# Patient Record
Sex: FEMALE | Race: WHITE | Employment: OTHER | URBAN - METROPOLITAN AREA
[De-identification: names, ages, dates, MRNs, and addresses within clinical notes are randomized per-mention and may not be internally consistent; named-entity substitution may affect disease eponyms.]

---

## 2017-04-16 ENCOUNTER — HOSPITAL ENCOUNTER (EMERGENCY)
Age: 82
Discharge: HOME | End: 2017-04-16
Attending: EMERGENCY MEDICINE
Payer: MEDICARE

## 2017-04-16 ENCOUNTER — APPOINTMENT (OUTPATIENT)
Dept: CT IMAGING | Age: 82
End: 2017-04-16
Attending: EMERGENCY MEDICINE
Payer: MEDICARE

## 2017-04-16 VITALS
OXYGEN SATURATION: 93 % | RESPIRATION RATE: 18 BRPM | DIASTOLIC BLOOD PRESSURE: 74 MMHG | HEIGHT: 68 IN | SYSTOLIC BLOOD PRESSURE: 136 MMHG | HEART RATE: 56 BPM | WEIGHT: 170 LBS | BODY MASS INDEX: 25.76 KG/M2 | TEMPERATURE: 98.4 F

## 2017-04-16 DIAGNOSIS — R56.9 SEIZURE (HCC): Primary | ICD-10-CM

## 2017-04-16 LAB
ALBUMIN SERPL BCP-MCNC: 3.2 G/DL (ref 3.2–4.6)
ALBUMIN/GLOB SERPL: 0.8 {RATIO} (ref 1.2–3.5)
ALP SERPL-CCNC: 94 U/L (ref 50–136)
ALT SERPL-CCNC: 19 U/L (ref 12–65)
ANION GAP BLD CALC-SCNC: 10 MMOL/L (ref 7–16)
AST SERPL W P-5'-P-CCNC: 23 U/L (ref 15–37)
ATRIAL RATE: 202 BPM
BACTERIA URNS QL MICRO: 0 /HPF
BASOPHILS # BLD AUTO: 0 K/UL (ref 0–0.2)
BASOPHILS # BLD: 0 % (ref 0–2)
BILIRUB SERPL-MCNC: 0.7 MG/DL (ref 0.2–1.1)
BUN SERPL-MCNC: 12 MG/DL (ref 8–23)
CALCIUM SERPL-MCNC: 8.4 MG/DL (ref 8.3–10.4)
CALCULATED P AXIS, ECG09: 89 DEGREES
CALCULATED R AXIS, ECG10: -57 DEGREES
CALCULATED T AXIS, ECG11: 98 DEGREES
CASTS URNS QL MICRO: NORMAL /LPF
CHLORIDE SERPL-SCNC: 84 MMOL/L (ref 98–107)
CO2 SERPL-SCNC: 32 MMOL/L (ref 21–32)
CREAT SERPL-MCNC: 1.08 MG/DL (ref 0.6–1)
DIAGNOSIS, 93000: NORMAL
DIFFERENTIAL METHOD BLD: ABNORMAL
EOSINOPHIL # BLD: 0.1 K/UL (ref 0–0.8)
EOSINOPHIL NFR BLD: 1 % (ref 0.5–7.8)
EPI CELLS #/AREA URNS HPF: NORMAL /HPF
ERYTHROCYTE [DISTWIDTH] IN BLOOD BY AUTOMATED COUNT: 13.1 % (ref 11.9–14.6)
GLOBULIN SER CALC-MCNC: 3.9 G/DL (ref 2.3–3.5)
GLUCOSE SERPL-MCNC: 181 MG/DL (ref 65–100)
HCT VFR BLD AUTO: 36.9 % (ref 35.8–46.3)
HGB BLD-MCNC: 13.3 G/DL (ref 11.7–15.4)
IMM GRANULOCYTES # BLD: 0 K/UL (ref 0–0.5)
IMM GRANULOCYTES NFR BLD AUTO: 0 % (ref 0–5)
LYMPHOCYTES # BLD AUTO: 23 % (ref 13–44)
LYMPHOCYTES # BLD: 1.6 K/UL (ref 0.5–4.6)
MAGNESIUM SERPL-MCNC: 1.6 MG/DL (ref 1.8–2.4)
MCH RBC QN AUTO: 31.5 PG (ref 26.1–32.9)
MCHC RBC AUTO-ENTMCNC: 36 G/DL (ref 31.4–35)
MCV RBC AUTO: 87.4 FL (ref 79.6–97.8)
MONOCYTES # BLD: 0.5 K/UL (ref 0.1–1.3)
MONOCYTES NFR BLD AUTO: 7 % (ref 4–12)
NEUTS SEG # BLD: 4.8 K/UL (ref 1.7–8.2)
NEUTS SEG NFR BLD AUTO: 69 % (ref 43–78)
PLATELET # BLD AUTO: 186 K/UL (ref 150–450)
PLATELET COMMENTS,PCOM: ADEQUATE
PMV BLD AUTO: 10.1 FL (ref 10.8–14.1)
POTASSIUM SERPL-SCNC: 2.9 MMOL/L (ref 3.5–5.1)
PROT SERPL-MCNC: 7.1 G/DL (ref 6.3–8.2)
Q-T INTERVAL, ECG07: 400 MS
QRS DURATION, ECG06: 122 MS
QTC CALCULATION (BEZET), ECG08: 432 MS
RBC # BLD AUTO: 4.22 M/UL (ref 4.05–5.25)
RBC #/AREA URNS HPF: NORMAL /HPF
RBC MORPH BLD: ABNORMAL
SODIUM SERPL-SCNC: 126 MMOL/L (ref 136–145)
TROPONIN I BLD-MCNC: 0.01 NG/ML (ref 0–0.08)
VENTRICULAR RATE, ECG03: 70 BPM
WBC # BLD AUTO: 7 K/UL (ref 4.3–11.1)
WBC MORPH BLD: ABNORMAL
WBC URNS QL MICRO: NORMAL /HPF

## 2017-04-16 PROCEDURE — 93005 ELECTROCARDIOGRAM TRACING: CPT | Performed by: EMERGENCY MEDICINE

## 2017-04-16 PROCEDURE — 81003 URINALYSIS AUTO W/O SCOPE: CPT | Performed by: EMERGENCY MEDICINE

## 2017-04-16 PROCEDURE — 80053 COMPREHEN METABOLIC PANEL: CPT | Performed by: EMERGENCY MEDICINE

## 2017-04-16 PROCEDURE — 85025 COMPLETE CBC W/AUTO DIFF WBC: CPT | Performed by: EMERGENCY MEDICINE

## 2017-04-16 PROCEDURE — 83735 ASSAY OF MAGNESIUM: CPT | Performed by: EMERGENCY MEDICINE

## 2017-04-16 PROCEDURE — 81015 MICROSCOPIC EXAM OF URINE: CPT | Performed by: EMERGENCY MEDICINE

## 2017-04-16 PROCEDURE — 84484 ASSAY OF TROPONIN QUANT: CPT

## 2017-04-16 PROCEDURE — 70450 CT HEAD/BRAIN W/O DYE: CPT

## 2017-04-16 PROCEDURE — 99285 EMERGENCY DEPT VISIT HI MDM: CPT | Performed by: EMERGENCY MEDICINE

## 2017-04-16 NOTE — ED TRIAGE NOTES
Pt. Had a sudden onset of abdominal pain and stated she had become cold and dizzy. Family advised EMS that they thought the pt. Was having a seizure. Pt. Stated that she remembered all events that occurred and denied LOC. Pt. With no hx of seizures.

## 2017-04-16 NOTE — ED PROVIDER NOTES
HPI Comments: 43-year-old lady with a history of passing out versus potentially having had a seizure at home. Family says that they saw her go kind of stiff and then her arms and legs started to shake. He said it then took her a few minutes to get back to normal limits stopped. EMS said on their arrival the  Patient had had no seizures during their travel  But she did have some nausea so they gave her some Zofran. Here the patient says that she has some abdominal pain that she's had for a couple years and she does have some mild nausea with it. Patient has no previous history of seizures. She said she has not been having any kind of headache and for the past couple of days aside from her chronic abdominal pain had been feeling well. Elements of this note were created using speech recognition software. As such, errors of speech recognition may be present. Patient is a 80 y.o. female presenting with abdominal pain and dizziness. The history is provided by the patient. Abdominal Pain    Pertinent negatives include no fever, no diarrhea, no nausea, no vomiting, no dysuria, no hematuria, no headaches, no arthralgias, no myalgias and no chest pain. Dizziness   Pertinent negatives include no shortness of breath, no chest pain, no vomiting, no confusion, no headaches and no nausea. Past Medical History:   Diagnosis Date    Abdominal pain, epigastric     Abdominal pain, unspecified site     Anxiety state, unspecified     Aortic stenosis 6/10/2016    No sever SOB, no chest pain or syncope.  Echo showed mod AS, mean grad 22mm Hg, nl EF, mild LVH    Aortic valve disorders     Atherosclerosis of aorta (HCC)     Backache, unspecified     Benign neoplasm of colon     Cervicalgia     Closed fracture of unspecified bone     Depressive disorder, not elsewhere classified     Diabetes (HCC)     Diarrhea     Disorders of magnesium metabolism     Edema     Elevated sedimentation rate     Encounter for long-term (current) use of other medications     Endocrine disease     Epistaxis     Esophageal reflux     Essential hypertension, benign     Gastrointestinal malfunction arising from mental factors     Hypertension     Hypertension-controlled, benign     Hypertonicity of bladder     Hypopotassemia     Insomnia, unspecified     Irritable bowel syndrome     Memory loss     Nausea alone     Osteoarthrosis, unspecified whether generalized or localized, unspecified site     Other ill-defined conditions     hyperlipidemia    Other malaise and fatigue     Pain in joint, lower leg     Pain in joint, shoulder region     Postnasal drip     Rheumatoid arthritis ([facility] [address]     Type II or unspecified type diabetes mellitus without mention of complication, not stated as uncontrolled     Unspecified constipation     Unspecified hemorrhoids with other complication     Unspecified hemorrhoids without mention of complication     Unspecified hereditary and idiopathic peripheral neuropathy     Unspecified hypothyroidism     Urgency of urination     Urinary tract infection, site not specified        Past Surgical History:   Procedure Laterality Date    HX CHOLECYSTECTOMY      HX OTHER SURGICAL      thyroidectomy    HX TONSILLECTOMY           Family History:   Problem Relation Age of Onset    Arthritis-rheumatoid Daughter     Arthritis-osteo Son        Social History     Social History    Marital status:      Spouse name: N/A    Number of children: N/A    Years of education: N/A     Occupational History    Not on file. Social History Main Topics    Smoking status: Never Smoker    Smokeless tobacco: Never Used    Alcohol use No    Drug use: No    Sexual activity: Not on file     Other Topics Concern    Not on file     Social History Narrative         ALLERGIES: Pcn [penicillins]    Review of Systems   Constitutional: Negative for chills, diaphoresis and fever.    HENT: Negative for congestion, rhinorrhea and sore throat. Eyes: Negative for redness and visual disturbance. Respiratory: Negative for cough, chest tightness, shortness of breath and wheezing. Cardiovascular: Negative for chest pain and palpitations. Gastrointestinal: Positive for abdominal pain. Negative for blood in stool, diarrhea, nausea and vomiting. Endocrine: Negative for polydipsia and polyuria. Genitourinary: Negative for dysuria and hematuria. Musculoskeletal: Negative for arthralgias, myalgias and neck stiffness. Skin: Negative for rash. Allergic/Immunologic: Negative for environmental allergies and food allergies. Neurological: Positive for dizziness and seizures. Negative for weakness and headaches. Hematological: Negative for adenopathy. Does not bruise/bleed easily. Psychiatric/Behavioral: Negative for confusion and sleep disturbance. The patient is not nervous/anxious. Vitals:    04/16/17 1608   BP: 134/63   Pulse: 68   Resp: 18   Temp: 97.7 °F (36.5 °C)   SpO2: 93%   Weight: 77.1 kg (170 lb)   Height: 5' 8\" (1.727 m)            Physical Exam   Constitutional: She is oriented to person, place, and time. She appears well-developed and well-nourished. HENT:   Head: Normocephalic and atraumatic. Eyes: Conjunctivae and EOM are normal. Pupils are equal, round, and reactive to light. Neck: Normal range of motion. Cardiovascular: Normal rate and regular rhythm. Pulmonary/Chest: Effort normal and breath sounds normal. No respiratory distress. She has no wheezes. She has no rales. She exhibits no tenderness. Abdominal: Soft. Bowel sounds are normal. There is no rebound and no guarding. Musculoskeletal: Normal range of motion. She exhibits no edema or tenderness. Lymphadenopathy:     She has no cervical adenopathy. Neurological: She is alert and oriented to person, place, and time. Skin: Skin is warm and dry. Psychiatric: She has a normal mood and affect. Nursing note and vitals reviewed. MDM  Number of Diagnoses or Management Options  Diagnosis management comments: Patient episode does sound like a potential seizure. Her sodium level is 126 which is not low enough that I would think it should cause a seizure. It was 127 in December. Patient's head CT scan was unremarkable. I am waiting on her urine. Patient's urine was unremarkable. I do not have a specific explanation for her symptoms but I do not find any emergent findings. I will discharge her home. I discussed this with the patient and the family and they agree with the plan.     ED Course       Procedures

## 2017-04-17 ENCOUNTER — PATIENT OUTREACH (OUTPATIENT)
Dept: CASE MANAGEMENT | Age: 82
End: 2017-04-17

## 2017-04-17 NOTE — PROGRESS NOTES
This note will not be viewable in [address] Transition of Care Discharge Follow-Up Questionnaire       Date/Time of Call:   April 17, 2017 3:55PM   What was the patient hospitalized for? Patient seen in ED for Seizure           Does the patient understand his/her diagnosis and/or treatment and what happened during the hospitalization? Patient's daughter states understanding of diagnosis and treatment during hospitalization. Did the patient receive discharge instructions? Patient's daughter states discharge instructions explained and received before discharge to home. Review any discharge instructions (see notes in [facility]). Ask patient if they understand these. Do they have any questions? Patient's daughter states no questions at this time discharge instructions were explained at time of discharge. Were home services ordered (nursing, PT, OT, ST, etc.)? No home health services ordered. If so, has the first visit occurred? If not, why? (Assist with coordination of services if necessary. ) NA         Was any DME ordered? No durable medical equipment ordered. If so, has it been received? If not, why?  (Assist with coordination of arranging DME orders if necessary. ) NA       Complete a review of all medications (new, continued and discontinued meds per the D/C instructions and [facility] Care Coordinator reviewed all medications per [facility], no new medications prescribed in ED. Were all new prescriptions filled? If not, why?  (Assist with obtainment of medications if necessary.) Patient states new prescriptions filled and currently being taken per doctors order. Does the patient understand the purpose and dosing instructions for all medications? (If patient has questions, provide explanation and education.) Patient states understanding of medication purposes and dosing instructions. Does the patient have any problems in performing ADLs? (If patient is unable to perform ADLs  what is the limiting factor(s)? Do they have a support system that can assist? If no support system is present, discuss possible assistance that they may be able to obtain.) Patient's daughter states patient is independent with ADL's and ambulation. Does the patient have all follow-up appointments scheduled? Has transportation been arranged? [facility]monary follow-up should be within 7 days of discharge; all others should have PCP follow-up within 7 days of discharge; follow-ups with other specialists as appropriate or ordered.) Patient's daughter states that she is in the process of scheduling hospital follow up with patient's PCP. Care Coordinator advised patient's daughter to schedule within 7 days per medicare guidelines. Patient's daughter states she will do so. Patient has no transportation needs at this time. Any other questions or concerns expressed by the patient? Patient's daughter states no questions or concerns. Schedule next appointment with [facility] Coordinator or refer to RN Case Manager/  as appropriate. No further needs identified, patient's daughter instructed to call Care Coordinator if further questions or concerns arise.    APRIL Call Completed By: Dariela López LPN  Care Coordinator   [facility]

## 2017-04-17 NOTE — ED NOTES
I have reviewed discharge instructions with the patient and caregivers. Patient and caregivers verbalizes understanding. Opportunity for questions provided. Patient off the unit with wheelchair to family car. No distress noted at this time.

## 2017-04-17 NOTE — DISCHARGE INSTRUCTIONS
Return with any fevers, vomiting, difficulty breathing, worsening symptoms, or additional concerns. Follow-up with your primary care doctor for reevaluation in 2 or 3 days.

## 2017-05-18 ENCOUNTER — PATIENT OUTREACH (OUTPATIENT)
Dept: CASE MANAGEMENT | Age: 82
End: 2017-05-18

## 2017-05-18 NOTE — PROGRESS NOTES
Follow Up Call- Care Coordinator spoke with patient's son Mr. Nan Cedeño who stated patient  Mrs. Jan Galeas was doing fine and was still receiving Rehab services. Mr. Emi Mcneill states that he has no questions or concerns pertaining to Mrs. Emi Mcneill everything is going well. This note will not be viewable in [address]

## 2017-07-17 PROBLEM — E89.0 POSTOPERATIVE HYPOTHYROIDISM: Status: ACTIVE | Noted: 2017-07-17

## 2017-07-17 PROBLEM — N32.81 OAB (OVERACTIVE BLADDER): Status: ACTIVE | Noted: 2017-07-17

## 2017-08-30 ENCOUNTER — HOSPITAL ENCOUNTER (EMERGENCY)
Age: 82
Discharge: HOME | End: 2017-08-30
Attending: EMERGENCY MEDICINE
Payer: MEDICARE

## 2017-08-30 ENCOUNTER — APPOINTMENT (OUTPATIENT)
Dept: CT IMAGING | Age: 82
End: 2017-08-30
Attending: EMERGENCY MEDICINE
Payer: MEDICARE

## 2017-08-30 VITALS
TEMPERATURE: 97.7 F | RESPIRATION RATE: 22 BRPM | HEIGHT: 68 IN | BODY MASS INDEX: 25.01 KG/M2 | DIASTOLIC BLOOD PRESSURE: 74 MMHG | SYSTOLIC BLOOD PRESSURE: 161 MMHG | HEART RATE: 83 BPM | WEIGHT: 165 LBS | OXYGEN SATURATION: 98 %

## 2017-08-30 DIAGNOSIS — R51.9 FRONTAL HEADACHE: Primary | ICD-10-CM

## 2017-08-30 DIAGNOSIS — I10 ESSENTIAL HYPERTENSION: ICD-10-CM

## 2017-08-30 LAB
ALBUMIN SERPL-MCNC: 3.3 G/DL (ref 3.2–4.6)
ALBUMIN/GLOB SERPL: 0.7 {RATIO} (ref 1.2–3.5)
ALP SERPL-CCNC: 122 U/L (ref 50–136)
ALT SERPL-CCNC: 16 U/L (ref 12–65)
ANION GAP SERPL CALC-SCNC: 8 MMOL/L (ref 7–16)
AST SERPL-CCNC: 19 U/L (ref 15–37)
BASOPHILS # BLD: 0 K/UL (ref 0–0.2)
BASOPHILS NFR BLD: 0 % (ref 0–2)
BILIRUB SERPL-MCNC: 0.7 MG/DL (ref 0.2–1.1)
BUN SERPL-MCNC: 9 MG/DL (ref 8–23)
CALCIUM SERPL-MCNC: 9.2 MG/DL (ref 8.3–10.4)
CHLORIDE SERPL-SCNC: 97 MMOL/L (ref 98–107)
CO2 SERPL-SCNC: 31 MMOL/L (ref 21–32)
CREAT SERPL-MCNC: 0.8 MG/DL (ref 0.6–1)
DIFFERENTIAL METHOD BLD: ABNORMAL
EOSINOPHIL # BLD: 0.2 K/UL (ref 0–0.8)
EOSINOPHIL NFR BLD: 4 % (ref 0.5–7.8)
ERYTHROCYTE [DISTWIDTH] IN BLOOD BY AUTOMATED COUNT: 14 % (ref 11.9–14.6)
GLOBULIN SER CALC-MCNC: 4.9 G/DL (ref 2.3–3.5)
GLUCOSE SERPL-MCNC: 126 MG/DL (ref 65–100)
HCT VFR BLD AUTO: 43.7 % (ref 35.8–46.3)
HGB BLD-MCNC: 15.6 G/DL (ref 11.7–15.4)
IMM GRANULOCYTES # BLD: 0 K/UL (ref 0–0.5)
IMM GRANULOCYTES NFR BLD: 0.2 % (ref 0–5)
LYMPHOCYTES # BLD: 1.7 K/UL (ref 0.5–4.6)
LYMPHOCYTES NFR BLD: 29 % (ref 13–44)
MCH RBC QN AUTO: 32.2 PG (ref 26.1–32.9)
MCHC RBC AUTO-ENTMCNC: 35.7 G/DL (ref 31.4–35)
MCV RBC AUTO: 90.3 FL (ref 79.6–97.8)
MONOCYTES # BLD: 0.5 K/UL (ref 0.1–1.3)
MONOCYTES NFR BLD: 9 % (ref 4–12)
NEUTS SEG # BLD: 3.4 K/UL (ref 1.7–8.2)
NEUTS SEG NFR BLD: 58 % (ref 43–78)
PLATELET # BLD AUTO: 283 K/UL (ref 150–450)
PMV BLD AUTO: 9.7 FL (ref 10.8–14.1)
POTASSIUM SERPL-SCNC: 3.7 MMOL/L (ref 3.5–5.1)
PROT SERPL-MCNC: 8.2 G/DL (ref 6.3–8.2)
RBC # BLD AUTO: 4.84 M/UL (ref 4.05–5.25)
SODIUM SERPL-SCNC: 136 MMOL/L (ref 136–145)
WBC # BLD AUTO: 5.9 K/UL (ref 4.3–11.1)

## 2017-08-30 PROCEDURE — 93005 ELECTROCARDIOGRAM TRACING: CPT | Performed by: EMERGENCY MEDICINE

## 2017-08-30 PROCEDURE — 70450 CT HEAD/BRAIN W/O DYE: CPT

## 2017-08-30 PROCEDURE — 99284 EMERGENCY DEPT VISIT MOD MDM: CPT | Performed by: EMERGENCY MEDICINE

## 2017-08-30 PROCEDURE — 85025 COMPLETE CBC W/AUTO DIFF WBC: CPT | Performed by: EMERGENCY MEDICINE

## 2017-08-30 PROCEDURE — 74011250636 HC RX REV CODE- 250/636: Performed by: EMERGENCY MEDICINE

## 2017-08-30 PROCEDURE — 96374 THER/PROPH/DIAG INJ IV PUSH: CPT | Performed by: EMERGENCY MEDICINE

## 2017-08-30 PROCEDURE — 80053 COMPREHEN METABOLIC PANEL: CPT | Performed by: EMERGENCY MEDICINE

## 2017-08-30 RX ORDER — HYDRALAZINE HYDROCHLORIDE 20 MG/ML
10 INJECTION INTRAMUSCULAR; INTRAVENOUS ONCE
Status: COMPLETED | OUTPATIENT
Start: 2017-08-30 | End: 2017-08-30

## 2017-08-30 RX ADMIN — HYDRALAZINE HYDROCHLORIDE 10 MG: 20 INJECTION INTRAMUSCULAR; INTRAVENOUS at 22:36

## 2017-08-30 NOTE — ED TRIAGE NOTES
PT arrived to ED c/o headache and HTN for the past 2 hours. PT was seen at [address] [place] [address] and told to come to ED.

## 2017-08-31 ENCOUNTER — PATIENT OUTREACH (OUTPATIENT)
Dept: CASE MANAGEMENT | Age: 82
End: 2017-08-31

## 2017-08-31 LAB
ATRIAL RATE: 120 BPM
CALCULATED P AXIS, ECG09: 0 DEGREES
CALCULATED R AXIS, ECG10: 106 DEGREES
CALCULATED T AXIS, ECG11: 40 DEGREES
DIAGNOSIS, 93000: NORMAL
P-R INTERVAL, ECG05: 216 MS
Q-T INTERVAL, ECG07: 382 MS
QRS DURATION, ECG06: 166 MS
QTC CALCULATION (BEZET), ECG08: 539 MS
VENTRICULAR RATE, ECG03: 120 BPM

## 2017-08-31 NOTE — ED NOTES
I have reviewed discharge instructions with the patient. The spouse verbalized understanding regarding discharge instructions. The patient exited the facility via a wheelchair with discharge instructions in hand. Pt was in no acute distress upon exiting this facility.

## 2017-08-31 NOTE — ED NOTES
Report received from Chavez, [facility]. Care assumed at this time.

## 2017-08-31 NOTE — ED PROVIDER NOTES
HPI Comments: 61-year-old female with history of DM2, HLD, HTN presents with bifrontal headache with elevated blood pressure ×1 day. Patient denies nausea, vomiting, chest pain, shortness of breath, dizziness, focal weakness, slurred speech, facial droop, numbness, tingling. Denies any alleviating or exacerbating factors. Rates headache as dull, constant, and without radiation. Patient is a 80 y.o. female presenting with headaches. The history is provided by the patient. No  was used. Headache    This is a new problem. The current episode started 1 to 2 hours ago. The problem occurs constantly. The problem has not changed since onset. The headache is aggravated by nothing. The pain is located in the bilateral and frontal region. The quality of the pain is described as dull. The pain is at a severity of 3/10. The pain is mild. Pertinent negatives include no fever, no malaise/fatigue, no chest pressure, no near-syncope, no palpitations, no syncope, no shortness of breath, no weakness, no tingling, no dizziness, no visual change, no nausea and no vomiting. She has tried nothing for the symptoms. The treatment provided no relief. Past Medical History:   Diagnosis Date    Abdominal pain, epigastric     Abdominal pain, unspecified site     Anxiety state, unspecified     Aortic stenosis 6/10/2016    No sever SOB, no chest pain or syncope.  Echo showed mod AS, mean grad 22mm Hg, nl EF, mild LVH    Aortic valve disorders     Atherosclerosis of aorta (HCC)     Backache, unspecified     Benign neoplasm of colon     Cervicalgia     Closed fracture of unspecified bone     Depressive disorder, not elsewhere classified     Diabetes (HCC)     Diarrhea     Disorders of magnesium metabolism     Edema     Elevated sedimentation rate     Encounter for long-term (current) use of other medications     Endocrine disease     Epistaxis     Esophageal reflux     Essential hypertension, benign     Gastrointestinal malfunction arising from mental factors     Hypertension     Hypertension-controlled, benign     Hypertonicity of bladder     Hypopotassemia     Insomnia, unspecified     Irritable bowel syndrome     Memory loss     Nausea alone     Osteoarthrosis, unspecified whether generalized or localized, unspecified site     Other ill-defined conditions     hyperlipidemia    Other malaise and fatigue     Pain in joint, lower leg     Pain in joint, shoulder region     Postnasal drip     Rheumatoid arthritis ([facility] [address]     Type II or unspecified type diabetes mellitus without mention of complication, not stated as uncontrolled     Unspecified constipation     Unspecified hemorrhoids with other complication     Unspecified hemorrhoids without mention of complication     Unspecified hereditary and idiopathic peripheral neuropathy     Unspecified hypothyroidism     Urgency of urination     Urinary tract infection, site not specified        Past Surgical History:   Procedure Laterality Date    HX CHOLECYSTECTOMY      HX OTHER SURGICAL      thyroidectomy    HX TONSILLECTOMY           Family History:   Problem Relation Age of Onset    Arthritis-rheumatoid Daughter     Arthritis-osteo Son        Social History     Social History    Marital status:      Spouse name: N/A    Number of children: N/A    Years of education: N/A     Occupational History    Not on file. Social History Main Topics    Smoking status: Never Smoker    Smokeless tobacco: Never Used    Alcohol use No    Drug use: No    Sexual activity: Not on file     Other Topics Concern    Not on file     Social History Narrative         ALLERGIES: Pcn [penicillins]    Review of Systems   Constitutional: Negative for chills, fatigue, fever and malaise/fatigue. HENT: Negative for congestion and rhinorrhea. Eyes: Negative for pain and redness. Respiratory: Negative for cough and shortness of breath. Cardiovascular: Negative for chest pain, palpitations, leg swelling, syncope and near-syncope. Gastrointestinal: Negative for abdominal distention, abdominal pain, anal bleeding, blood in stool, diarrhea, nausea and vomiting. Endocrine: Negative for polydipsia and polyphagia. Genitourinary: Negative for dysuria, flank pain, genital sores, hematuria, pelvic pain, vaginal bleeding and vaginal discharge. Musculoskeletal: Negative for back pain, gait problem, joint swelling, neck pain and neck stiffness. Skin: Negative for pallor and rash. Neurological: Positive for headaches. Negative for dizziness, tingling, seizures, syncope, weakness and numbness. Psychiatric/Behavioral: Negative for agitation and confusion. Vitals:    08/30/17 2206 08/30/17 2246 08/30/17 2315 08/30/17 2329   BP: (!) 204/89 147/70 179/80 161/74   Pulse: 76 81 90 83   Resp:  27 15 22   Temp:    97.7 °F (36.5 °C)   SpO2: 96% 96% 97% 98%   Weight:       Height:                Physical Exam   Constitutional: She is oriented to person, place, and time. She appears well-developed and well-nourished. No distress. HENT:   Head: Normocephalic and atraumatic. Mouth/Throat: Oropharynx is clear and moist. No oropharyngeal exudate. Eyes: Conjunctivae and EOM are normal. Pupils are equal, round, and reactive to light. Neck: Normal range of motion. No JVD present. No tracheal deviation present. Cardiovascular: Normal rate, regular rhythm, normal heart sounds and intact distal pulses. No murmur heard. Radial pulses 2+ and equal bilaterally   Pulmonary/Chest: Effort normal and breath sounds normal. No respiratory distress. She has no wheezes. She has no rales. She exhibits no tenderness. Abdominal: Soft. She exhibits no distension. There is no tenderness. There is no rebound and no guarding. Musculoskeletal: Normal range of motion. She exhibits no edema, tenderness or deformity.    Neurological: She is alert and oriented to person, place, and time. No cranial nerve deficit. Coordination normal.   Strength 5/5 throughout. Normal sensory exam. No facial droop. No slurred speech   Skin: Skin is warm and dry. No rash noted. No erythema. No pallor. Psychiatric: She has a normal mood and affect. Her behavior is normal.   Nursing note and vitals reviewed. MDM  Number of Diagnoses or Management Options  Essential hypertension: new and requires workup  Frontal headache: new and requires workup     Amount and/or Complexity of Data Reviewed  Clinical lab tests: ordered and reviewed  Tests in the radiology section of CPT®: ordered and reviewed  Tests in the medicine section of CPT®: ordered and reviewed  Review and summarize past medical records: yes  Independent visualization of images, tracings, or specimens: yes    Risk of Complications, Morbidity, and/or Mortality  Presenting problems: moderate  Diagnostic procedures: moderate  Management options: moderate    Patient Progress  Patient progress: improved    ED Course   Comment By Time   CT head negative. Blood pressure stabilized. Will discharge home with instructions to follow-up w/ PCP tomorrow to be initiated on antihypertensive medication.  Diaz Lake MD 08/30 1692       EKG  Date/Time: 8/30/2017 11:13 PM  Performed by: Will Banks  Authorized by: JESSA Damon     Rate:     ECG rate:  73    ECG rate assessment: normal    Rhythm:     Rhythm: sinus rhythm    Ectopy:     Ectopy: PVCs    QRS:     QRS axis:  Left  Conduction:     Conduction: normal    ST segments:     ST segments:  Normal  T waves:     T waves: normal

## 2017-08-31 NOTE — DISCHARGE INSTRUCTIONS
Headache: Care Instructions  Your Care Instructions    Headaches have many possible causes. Most headaches aren't a sign of a more serious problem, and they will get better on their own. Home treatment may help you feel better faster. The doctor has checked you carefully, but problems can develop later. If you notice any problems or new symptoms, get medical treatment right away. Follow-up care is a key part of your treatment and safety. Be sure to make and go to all appointments, and call your doctor if you are having problems. It's also a good idea to know your test results and keep a list of the medicines you take. How can you care for yourself at home? · Do not drive if you have taken a prescription pain medicine. · Rest in a quiet, dark room until your headache is gone. Close your eyes and try to relax or go to sleep. Don't watch TV or read. · Put a cold, moist cloth or cold pack on the painful area for 10 to 20 minutes at a time. Put a thin cloth between the cold pack and your skin. · Use a warm, moist towel or a heating pad set on low to relax tight shoulder and neck muscles. · Have someone gently massage your neck and shoulders. · Take pain medicines exactly as directed. ¨ If the doctor gave you a prescription medicine for pain, take it as prescribed. ¨ If you are not taking a prescription pain medicine, ask your doctor if you can take an over-the-counter medicine. · Be careful not to take pain medicine more often than the instructions allow, because you may get worse or more frequent headaches when the medicine wears off. · Do not ignore new symptoms that occur with a headache, such as a fever, weakness or numbness, vision changes, or confusion. These may be signs of a more serious problem. To prevent headaches  · Keep a headache diary so you can figure out what triggers your headaches. Avoiding triggers may help you prevent headaches.  Record when each headache began, how long it lasted, and what the pain was like (throbbing, aching, stabbing, or dull). Write down any other symptoms you had with the headache, such as nausea, flashing lights or dark spots, or sensitivity to bright light or loud noise. Note if the headache occurred near your period. List anything that might have triggered the headache, such as certain foods (chocolate, cheese, wine) or odors, smoke, bright light, stress, or lack of sleep. · Find healthy ways to deal with stress. Headaches are most common during or right after stressful times. Take time to relax before and after you do something that has caused a headache in the past.  · Try to keep your muscles relaxed by keeping good posture. Check your jaw, face, neck, and shoulder muscles for tension, and try relaxing them. When sitting at a desk, change positions often, and stretch for 30 seconds each hour. · Get plenty of sleep and exercise. · Eat regularly and well. Long periods without food can trigger a headache. · Treat yourself to a massage. Some people find that regular massages are very helpful in relieving tension. · Limit caffeine by not drinking too much coffee, tea, or soda. But don't quit caffeine suddenly, because that can also give you headaches. · Reduce eyestrain from computers by blinking frequently and looking away from the computer screen every so often. Make sure you have proper eyewear and that your monitor is set up properly, about an arm's length away. · Seek help if you have depression or anxiety. Your headaches may be linked to these conditions. Treatment can both prevent headaches and help with symptoms of anxiety or depression. When should you call for help? Call 911 anytime you think you may need emergency care. For example, call if:  · You have signs of a stroke. These may include:  ¨ Sudden numbness, paralysis, or weakness in your face, arm, or leg, especially on only one side of your body. ¨ Sudden vision changes.   ¨ Sudden trouble speaking. ¨ Sudden confusion or trouble understanding simple statements. ¨ Sudden problems with walking or balance. ¨ A sudden, severe headache that is different from past headaches. Call your doctor now or seek immediate medical care if:  · You have a new or worse headache. · Your headache gets much worse. Where can you learn more? Go to http://soren-dianne.info/. Enter M271 in the search box to learn more about \"Headache: Care Instructions. \"  Current as of: October 14, 2016  Content Version: 11.3  © 9468-3261 [employer]. Care instructions adapted under license by [employer] (which disclaims liability or warranty for this information). If you have questions about a medical condition or this instruction, always ask your healthcare professional. [address] any warranty or liability for your use of this information. High Blood Pressure: Care Instructions  Your Care Instructions  If your blood pressure is usually above 140/90, you have high blood pressure, or hypertension. That means the top number is 140 or higher or the bottom number is 90 or higher, or both. Despite what a lot of people think, high blood pressure usually doesn't cause headaches or make you feel dizzy or lightheaded. It usually has no symptoms. But it does increase your risk for heart attack, stroke, and kidney or eye damage. The higher your blood pressure, the more your risk increases. Your doctor will give you a goal for your blood pressure. Your goal will be based on your health and your age. An example of a goal is to keep your blood pressure below 140/90. Lifestyle changes, such as eating healthy and being active, are always important to help lower blood pressure. You might also take medicine to reach your blood pressure goal.  Follow-up care is a key part of your treatment and safety.  Be sure to make and go to all appointments, and call your doctor if you are having problems. It's also a good idea to know your test results and keep a list of the medicines you take. How can you care for yourself at home? Medical treatment  · If you stop taking your medicine, your blood pressure will go back up. You may take one or more types of medicine to lower your blood pressure. Be safe with medicines. Take your medicine exactly as prescribed. Call your doctor if you think you are having a problem with your medicine. · Talk to your doctor before you start taking aspirin every day. Aspirin can help certain people lower their risk of a heart attack or stroke. But taking aspirin isn't right for everyone, because it can cause serious bleeding. · See your doctor regularly. You may need to see the doctor more often at first or until your blood pressure comes down. · If you are taking blood pressure medicine, talk to your doctor before you take decongestants or anti-inflammatory medicine, such as ibuprofen. Some of these medicines can raise blood pressure. · Learn how to check your blood pressure at home. Lifestyle changes  · Stay at a healthy weight. This is especially important if you put on weight around the waist. Losing even 10 pounds can help you lower your blood pressure. · If your doctor recommends it, get more exercise. Walking is a good choice. Bit by bit, increase the amount you walk every day. Try for at least 30 minutes on most days of the week. You also may want to swim, bike, or do other activities. · Avoid or limit alcohol. Talk to your doctor about whether you can drink any alcohol. · Try to limit how much sodium you eat to less than 2,300 milligrams (mg) a day. Your doctor may ask you to try to eat less than 1,500 mg a day. · Eat plenty of fruits (such as bananas and oranges), vegetables, legumes, whole grains, and low-fat dairy products. · Lower the amount of saturated fat in your diet.  Saturated fat is found in animal products such as milk, cheese, and meat. Limiting these foods may help you lose weight and also lower your risk for heart disease. · Do not smoke. Smoking increases your risk for heart attack and stroke. If you need help quitting, talk to your doctor about stop-smoking programs and medicines. These can increase your chances of quitting for good. When should you call for help? Call 911 anytime you think you may need emergency care. This may mean having symptoms that suggest that your blood pressure is causing a serious heart or blood vessel problem. Your blood pressure may be over 180/110. For example, call 911 if:  · You have symptoms of a heart attack. These may include:  ¨ Chest pain or pressure, or a strange feeling in the chest.  ¨ Sweating. ¨ Shortness of breath. ¨ Nausea or vomiting. ¨ Pain, pressure, or a strange feeling in the back, neck, jaw, or upper belly or in one or both shoulders or arms. ¨ Lightheadedness or sudden weakness. ¨ A fast or irregular heartbeat. · You have symptoms of a stroke. These may include:  ¨ Sudden numbness, tingling, weakness, or loss of movement in your face, arm, or leg, especially on only one side of your body. ¨ Sudden vision changes. ¨ Sudden trouble speaking. ¨ Sudden confusion or trouble understanding simple statements. ¨ Sudden problems with walking or balance. ¨ A sudden, severe headache that is different from past headaches. · You have severe back or belly pain. Do not wait until your blood pressure comes down on its own. Get help right away. Call your doctor now or seek immediate care if:  · Your blood pressure is much higher than normal (such as 180/110 or higher), but you don't have symptoms. · You think high blood pressure is causing symptoms, such as:  ¨ Severe headache. ¨ Blurry vision. Watch closely for changes in your health, and be sure to contact your doctor if:  · Your blood pressure measures 140/90 or higher at least 2 times.  That means the top number is 140 or higher or the bottom number is 90 or higher, or both. · You think you may be having side effects from your blood pressure medicine. · Your blood pressure is usually normal, but it goes above normal at least 2 times. Where can you learn more? Go to http://soren-dianne.info/. Enter S165 in the search box to learn more about \"High Blood Pressure: Care Instructions. \"  Current as of: August 8, 2016  Content Version: 11.3  © 9222-2935 [employer]. Care instructions adapted under license by [employer] (which disclaims liability or warranty for this information). If you have questions about a medical condition or this instruction, always ask your healthcare professional. [place] [address] any warranty or liability for your use of this information. Learning About High Blood Pressure  What is high blood pressure? Blood pressure is a measure of how hard the blood pushes against the walls of your arteries. It's normal for blood pressure to go up and down throughout the day, but if it stays up, you have high blood pressure. Another name for high blood pressure is hypertension. Two numbers tell you your blood pressure. The first number is the systolic pressure. It shows how hard the blood pushes when your heart is pumping. The second number is the diastolic pressure. It shows how hard the blood pushes between heartbeats, when your heart is relaxed and filling with blood. A blood pressure of less than 120/80 (say \"120 over 80\") is ideal for an adult. High blood pressure is 140/90 or higher. You have high blood pressure if your top number is 140 or higher or your bottom number is 90 or higher, or both. Many people fall into the category in between, called prehypertension. People with prehypertension need to make lifestyle changes to bring their blood pressure down and help prevent or delay high blood pressure.   What happens when you have high blood pressure? · Blood flows through your arteries with too much force. Over time, this damages the walls of your arteries. But you can't feel it. High blood pressure usually doesn't cause symptoms. · Fat and calcium start to build up in your arteries. This buildup is called plaque. Plaque makes your arteries narrower and stiffer. Blood can't flow through them as easily. · This lack of good blood flow starts to damage some of the organs in your body. This can lead to problems such as coronary artery disease and heart attack, heart failure, stroke, kidney failure, and eye damage. How can you prevent high blood pressure? · Stay at a healthy weight. · Try to limit how much sodium you eat to less than 2,300 milligrams (mg) a day. If you limit your sodium to 1,500 mg a day, you can lower your blood pressure even more. ¨ Buy foods that are labeled \"unsalted,\" \"sodium-free,\" or \"low-sodium. \" Foods labeled \"reduced-sodium\" and \"light sodium\" may still have too much sodium. ¨ Flavor your food with garlic, lemon juice, onion, vinegar, herbs, and spices instead of salt. Do not use soy sauce, steak sauce, onion salt, garlic salt, mustard, or ketchup on your food. ¨ Use less salt (or none) when recipes call for it. You can often use half the salt a recipe calls for without losing flavor. · Be physically active. Get at least 30 minutes of exercise on most days of the week. Walking is a good choice. You also may want to do other activities, such as running, swimming, cycling, or playing tennis or team sports. · Limit alcohol to 2 drinks a day for men and 1 drink a day for women. · Eat plenty of fruits, vegetables, and low-fat dairy products. Eat less saturated and total fats. How is high blood pressure treated? · Your doctor will suggest making lifestyle changes. For example, your doctor may ask you to eat healthy foods, quit smoking, lose extra weight, and be more active.   · If lifestyle changes don't help enough or your blood pressure is very high, you will have to take medicine every day. Follow-up care is a key part of your treatment and safety. Be sure to make and go to all appointments, and call your doctor if you are having problems. It's also a good idea to know your test results and keep a list of the medicines you take. Where can you learn more? Go to http://soren-dianne.info/. Enter P501 in the search box to learn more about \"Learning About High Blood Pressure. \"  Current as of: March 23, 2016  Content Version: 11.3  © 3762-8058 [employer]. Care instructions adapted under license by [employer] (which disclaims liability or warranty for this information). If you have questions about a medical condition or this instruction, always ask your healthcare professional. [address] any warranty or liability for your use of this information.

## 2017-09-01 NOTE — PROGRESS NOTES
Transition of Care Discharge Follow-up Questionnaire   Date/Time of Call:   8/31/17 8:22p   What was the patient hospitalized for? Frontal headache  Essential hypertension   Does the patient understand his/her diagnosis and/or treatment and what happened during the hospitalization? Patient verbalizes that she understands the diagnosis. Did the patient receive discharge instructions? Yes   Review any discharge instructions (see notes in [facility]). Ask patient if they understand these. Do they have any questions? Patient understands discharge instructions. Were home services ordered (nursing, PT, OT, ST, etc.)? No   If so, has the first visit occurred? If not, why? (Assist with coordination of services if necessary.) N/A   Was any DME ordered? No   If so, has it been received? If not, why?  (Assist with coordination of arranging DME orders if necessary.) N/A   Complete a review of all medications (new, continued and discontinued meds per the D/C instructions and medication tab in [facility]). Patient and care coordinator reviewed current medications. Were all new prescriptions filled? If not, why?  (Assist with obtainment of medications if necessary.) Yes   Does the patient understand the purpose and dosing instructions for all medications? (If patient has questions, provide explanation and education.) She understands the purpose and dosing instructions for all medications. Does the patient have any problems in performing ADLs? (If patient is unable to perform ADLs  what is the limiting factor(s)? Do they have a support system that can assist? If no support system is present, discuss possible assistance that they may be able to obtain.) Patient uses a wheelchair for ambulation. Patient is currently in an [facility]. Patient verbalizes that aides care for her daily and assist with ADLs. Does the patient have all follow-up appointments scheduled?       7 day f/up with PCP?    7-14 day f/up with specialist?    If f/up has not been made  what actions has the care coordinator made to accomplish this? Has transportation been arranged? [facility] follow-up should be within 7 days of discharge; all others should have PCP follow-up within 7 days of discharge; follow-ups with other specialists should be within 7-14 days of discharge.) Patient has the following appt(s):  Follow-up 9/19 with Cardiology  Patient lives in an care home and verbalizes that she will schedule her follow-up appt. Patient declined care coordinator to contact PCP and schedule appt. Any other questions or concerns expressed by the patient? No other questions or concerns expressed by patient. She was thankful for call. Schedule next appointment with [facility] Coordinator or refer to RN Case Manager/  per the workflow guidelines. When is care coordinators next follow-up call scheduled? If referred for CCM  what RN care manager was the referral assigned? CC will close further outreach to patient due to patients permanent placement in an [facility]. Patient is under care of staff at facility. APRIL Call Completed By:   Antonio Valenzuela LPN  Care Coordinator       This note will not be viewable in [address]

## 2018-01-31 ENCOUNTER — HOSPITAL ENCOUNTER (OUTPATIENT)
Dept: ULTRASOUND IMAGING | Age: 83
Discharge: HOME | End: 2018-01-31
Attending: INTERNAL MEDICINE
Payer: MEDICARE

## 2018-01-31 DIAGNOSIS — R79.89 ELEVATED D-DIMER: ICD-10-CM

## 2018-01-31 DIAGNOSIS — R60.0 BILATERAL LEG EDEMA: ICD-10-CM

## 2018-01-31 PROCEDURE — 93970 EXTREMITY STUDY: CPT

## 2018-01-31 NOTE — PROGRESS NOTES
No DVT. You have a baker's cyst behind your right knee. Happy to refer you to an orthopedist to address this if interested. Evorn Frankel if she is. Thanks.   Zev Mckee

## 2018-06-19 ENCOUNTER — APPOINTMENT (RX ONLY)
Dept: URBAN - METROPOLITAN AREA CLINIC 23 | Facility: CLINIC | Age: 83
Setting detail: DERMATOLOGY
End: 2018-06-19

## 2018-06-19 DIAGNOSIS — D485 NEOPLASM OF UNCERTAIN BEHAVIOR OF SKIN: ICD-10-CM

## 2018-06-19 PROBLEM — H91.90 UNSPECIFIED HEARING LOSS, UNSPECIFIED EAR: Status: ACTIVE | Noted: 2018-06-19

## 2018-06-19 PROBLEM — K21.9 GASTRO-ESOPHAGEAL REFLUX DISEASE WITHOUT ESOPHAGITIS: Status: ACTIVE | Noted: 2018-06-19

## 2018-06-19 PROBLEM — E13.9 OTHER SPECIFIED DIABETES MELLITUS WITHOUT COMPLICATIONS: Status: ACTIVE | Noted: 2018-06-19

## 2018-06-19 PROBLEM — F41.9 ANXIETY DISORDER, UNSPECIFIED: Status: ACTIVE | Noted: 2018-06-19

## 2018-06-19 PROBLEM — I10 ESSENTIAL (PRIMARY) HYPERTENSION: Status: ACTIVE | Noted: 2018-06-19

## 2018-06-19 PROBLEM — E03.9 HYPOTHYROIDISM, UNSPECIFIED: Status: ACTIVE | Noted: 2018-06-19

## 2018-06-19 PROBLEM — L29.8 OTHER PRURITUS: Status: ACTIVE | Noted: 2018-06-19

## 2018-06-19 PROBLEM — D48.5 NEOPLASM OF UNCERTAIN BEHAVIOR OF SKIN: Status: ACTIVE | Noted: 2018-06-19

## 2018-06-19 PROBLEM — E78.5 HYPERLIPIDEMIA, UNSPECIFIED: Status: ACTIVE | Noted: 2018-06-19

## 2018-06-19 PROBLEM — M12.9 ARTHROPATHY, UNSPECIFIED: Status: ACTIVE | Noted: 2018-06-19

## 2018-06-19 PROCEDURE — 11100: CPT

## 2018-06-19 PROCEDURE — ? BIOPSY BY SHAVE METHOD

## 2018-06-19 PROCEDURE — ? COUNSELING

## 2018-06-19 ASSESSMENT — LOCATION ZONE DERM: LOCATION ZONE: FACE

## 2018-06-19 ASSESSMENT — LOCATION SIMPLE DESCRIPTION DERM: LOCATION SIMPLE: LEFT CHEEK

## 2018-06-19 ASSESSMENT — LOCATION DETAILED DESCRIPTION DERM: LOCATION DETAILED: LEFT INFERIOR LATERAL MALAR CHEEK

## 2018-06-19 NOTE — PROCEDURE: BIOPSY BY SHAVE METHOD
Anesthesia Type: 1% lidocaine without epinephrine and a 1:10 solution of 8.4% sodium bicarbonate
Biopsy Method: Dermablade
Electrodesiccation Text: The wound bed was treated with electrodesiccation after the biopsy was performed.
Destruction After The Procedure: No
Depth Of Biopsy: dermis
Notification Instructions: Patient will be notified of biopsy results. However, patient instructed to call the office if not contacted within 2 weeks.
Electrodesiccation And Curettage Text: The wound bed was treated with electrodesiccation and curettage after the biopsy was performed.
Curettage Text: .
Hemostasis: Aluminum Chloride
X Size Of Lesion In Cm: 0
Accession #: [facility]
Wound Care: Vaseline
Type Of Destruction Used: Curettage
Billing Type: Third-Party Bill
Detail Level: Detailed
Anesthesia Volume In Cc: 0.5
Consent: Written consent was obtained and risks were reviewed including but not limited to scarring, infection, bleeding, scabbing, incomplete removal, nerve damage and allergy to anesthesia.
Silver Nitrate Text: The wound bed was treated with silver nitrate after the biopsy was performed.
Biopsy Type: H and E
Dressing: bandage
Cryotherapy Text: The wound bed was treated with cryotherapy after the biopsy was performed.
Post-Care Instructions: I reviewed with the patient in detail post-care instructions. Patient is to keep the biopsy site dry overnight, and then apply bacitracin twice daily until healed. Patient may apply hydrogen peroxide soaks to remove any crusting.
Was A Bandage Applied: Yes

## 2018-12-17 PROBLEM — R00.2 PALPITATIONS: Status: ACTIVE | Noted: 2018-12-17

## 2019-01-17 PROBLEM — E11.21 TYPE 2 DIABETES WITH NEPHROPATHY (HCC): Status: ACTIVE | Noted: 2019-01-17

## 2019-01-28 PROBLEM — F32.A MILD DEPRESSION: Status: ACTIVE | Noted: 2019-01-28

## 2019-08-15 PROCEDURE — 99284 EMERGENCY DEPT VISIT MOD MDM: CPT

## 2019-08-16 ENCOUNTER — HOSPITAL ENCOUNTER (EMERGENCY)
Age: 84
Discharge: HOME | End: 2019-08-16
Payer: MEDICARE

## 2019-08-16 VITALS
DIASTOLIC BLOOD PRESSURE: 80 MMHG | RESPIRATION RATE: 18 BRPM | HEIGHT: 67 IN | WEIGHT: 165 LBS | BODY MASS INDEX: 25.9 KG/M2 | TEMPERATURE: 97.4 F | HEART RATE: 70 BPM | OXYGEN SATURATION: 98 % | SYSTOLIC BLOOD PRESSURE: 176 MMHG

## 2019-08-16 DIAGNOSIS — I10 ESSENTIAL HYPERTENSION: Primary | ICD-10-CM

## 2019-08-16 NOTE — ED TRIAGE NOTES
Patient arrives via EMS from [facility] with elevated blood pressure. facility stated that they took patient blood pressure multiple times and it was elevated. EMS /94. Patient has no complaints. [facility] also gave her her nighttime dose of lisinopril at [id number].

## 2019-08-16 NOTE — DISCHARGE INSTRUCTIONS
Patient Education        DASH Diet: Care Instructions  Your Care Instructions    The DASH diet is an eating plan that can help lower your blood pressure. DASH stands for Dietary Approaches to Stop Hypertension. Hypertension is high blood pressure. The DASH diet focuses on eating foods that are high in calcium, potassium, and magnesium. These nutrients can lower blood pressure. The foods that are highest in these nutrients are fruits, vegetables, low-fat dairy products, nuts, seeds, and legumes. But taking calcium, potassium, and magnesium supplements instead of eating foods that are high in those nutrients does not have the same effect. The DASH diet also includes whole grains, fish, and poultry. The DASH diet is one of several lifestyle changes your doctor may recommend to lower your high blood pressure. Your doctor may also want you to decrease the amount of sodium in your diet. Lowering sodium while following the DASH diet can lower blood pressure even further than just the DASH diet alone. Follow-up care is a key part of your treatment and safety. Be sure to make and go to all appointments, and call your doctor if you are having problems. It's also a good idea to know your test results and keep a list of the medicines you take. How can you care for yourself at home? Following the DASH diet  · Eat 4 to 5 servings of fruit each day. A serving is 1 medium-sized piece of fruit, ½ cup chopped or canned fruit, 1/4 cup dried fruit, or 4 ounces (½ cup) of fruit juice. Choose fruit more often than fruit juice. · Eat 4 to 5 servings of vegetables each day. A serving is 1 cup of lettuce or raw leafy vegetables, ½ cup of chopped or cooked vegetables, or 4 ounces (½ cup) of vegetable juice. Choose vegetables more often than vegetable juice. · Get 2 to 3 servings of low-fat and fat-free dairy each day. A serving is 8 ounces of milk, 1 cup of yogurt, or 1 ½ ounces of cheese. · Eat 6 to 8 servings of grains each day.  A serving is 1 slice of bread, 1 ounce of dry cereal, or ½ cup of cooked rice, pasta, or cooked cereal. Try to choose whole-grain products as much as possible. · Limit lean meat, poultry, and fish to 2 servings each day. A serving is 3 ounces, about the size of a deck of cards. · Eat 4 to 5 servings of nuts, seeds, and legumes (cooked dried beans, lentils, and split peas) each week. A serving is 1/3 cup of nuts, 2 tablespoons of seeds, or ½ cup of cooked beans or peas. · Limit fats and oils to 2 to 3 servings each day. A serving is 1 teaspoon of vegetable oil or 2 tablespoons of salad dressing. · Limit sweets and added sugars to 5 servings or less a week. A serving is 1 tablespoon jelly or jam, ½ cup sorbet, or 1 cup of lemonade. · Eat less than 2,300 milligrams (mg) of sodium a day. If you limit your sodium to 1,500 mg a day, you can lower your blood pressure even more. Tips for success  · Start small. Do not try to make dramatic changes to your diet all at once. You might feel that you are missing out on your favorite foods and then be more likely to not follow the plan. Make small changes, and stick with them. Once those changes become habit, add a few more changes. · Try some of the following:  ? Make it a goal to eat a fruit or vegetable at every meal and at snacks. This will make it easy to get the recommended amount of fruits and vegetables each day. ? Try yogurt topped with fruit and nuts for a snack or healthy dessert. ? Add lettuce, tomato, cucumber, and onion to sandwiches. ? Combine a ready-made pizza crust with low-fat mozzarella cheese and lots of vegetable toppings. Try using tomatoes, squash, spinach, broccoli, carrots, cauliflower, and onions. ? Have a variety of cut-up vegetables with a low-fat dip as an appetizer instead of chips and dip. ? Sprinkle sunflower seeds or chopped almonds over salads. Or try adding chopped walnuts or almonds to cooked vegetables.   ? Try some vegetarian meals using beans and peas. Add garbanzo or kidney beans to salads. Make burritos and tacos with mashed oseguera beans or black beans. Where can you learn more? Go to http://soren-dianne.info/. Enter V020 in the search box to learn more about \"DASH Diet: Care Instructions. \"  Current as of: July 22, 2018  Content Version: 12.1  © 4510-0844 [employer], [employer]. Care instructions adapted under license by [email or web address] (which disclaims liability or warranty for this information). If you have questions about a medical condition or this instruction, always ask your healthcare professional. [address] any warranty or liability for your use of this information.

## 2019-08-16 NOTE — ED TRIAGE NOTES
Pt arrives with her son and daughter, [facility] reports BP of 220/198 after checking BP multiple times, called 911 for ambulance to transport patient. Pt reports slight headache but no other pain.

## 2019-08-16 NOTE — ED PROVIDER NOTES
51-year-old female sent to the ED from assisted living for elevated blood pressure. Daughter reports that the assisted living staff called her and told her that she had a blood pressure of 228/192 and they wanted to send her to the ED which they did. Patient has no endorgan involvement no chest pain headache blurred vision shortness of breath dizziness or any other neurological finding. The history is provided by the patient and a relative. Hypertension    This is a new problem. The current episode started 3 to 5 hours ago. Pertinent negatives include no chest pain, no orthopnea, no palpitations, no PND, no confusion, no malaise/fatigue, no blurred vision, no headaches, no tinnitus, no neck pain, no peripheral edema, no dizziness, no shortness of breath, no nausea and no vomiting. There are no associated agents to hypertension. Past Medical History:   Diagnosis Date    Abdominal pain, epigastric     Abdominal pain, unspecified site     Anxiety state, unspecified     Aortic stenosis 6/10/2016    No sever SOB, no chest pain or syncope.  Echo showed mod AS, mean grad 22mm Hg, nl EF, mild LVH    Aortic valve disorders     Atherosclerosis of aorta (HCC)     Backache, unspecified     Benign neoplasm of colon     Cervicalgia     Closed fracture of unspecified bone     Depressive disorder, not elsewhere classified     Diabetes (HCC)     Diarrhea     Disorders of magnesium metabolism     Edema     Elevated sedimentation rate     Encounter for long-term (current) use of other medications     Endocrine disease     Epistaxis     Esophageal reflux     Essential hypertension, benign     Gastrointestinal malfunction arising from mental factors     Hypertension     Hypertension-controlled, benign     Hypertonicity of bladder     Hypopotassemia     Insomnia, unspecified     Irritable bowel syndrome     Memory loss     Nausea alone     Osteoarthrosis, unspecified whether generalized or localized, unspecified site     Other ill-defined conditions(799.89)     hyperlipidemia    Other malaise and fatigue     Pain in joint, lower leg     Pain in joint, shoulder region     Postnasal drip     Rheumatoid arthritis(714.0)     Type II or unspecified type diabetes mellitus without mention of complication, not stated as uncontrolled     Unspecified constipation     Unspecified hemorrhoids with other complication     Unspecified hemorrhoids without mention of complication     Unspecified hereditary and idiopathic peripheral neuropathy     Unspecified hypothyroidism     Urgency of urination     Urinary tract infection, site not specified        Past Surgical History:   Procedure Laterality Date    HX CHOLECYSTECTOMY      HX OTHER SURGICAL      thyroidectomy    HX TONSILLECTOMY           Family History:   Problem Relation Age of Onset    Arthritis-rheumatoid Daughter     Arthritis-osteo Son        Social History     Socioeconomic History    Marital status:      Spouse name: Not on file    Number of children: Not on file    Years of education: Not on file    Highest education level: Not on file   Occupational History    Not on file   Social Needs    Financial resource strain: Not on file    Food insecurity:     Worry: Not on file     Inability: Not on file    Transportation needs:     Medical: Not on file     Non-medical: Not on file   Tobacco Use    Smoking status: Never Smoker    Smokeless tobacco: Never Used   Substance and Sexual Activity    Alcohol use: No    Drug use: No    Sexual activity: Not on file   Lifestyle    Physical activity:     Days per week: Not on file     Minutes per session: Not on file    Stress: Not on file   Relationships    Social connections:     Talks on phone: Not on file     Gets together: Not on file     Attends Nondenominational service: Not on file     Active member of club or organization: Not on file     Attends meetings of clubs or organizations: Not on file     Relationship status: Not on file    Intimate partner violence:     Fear of current or ex partner: Not on file     Emotionally abused: Not on file     Physically abused: Not on file     Forced sexual activity: Not on file   Other Topics Concern    Not on file   Social History Narrative    Not on file         ALLERGIES: Codeine; Pcn [penicillins]; and [place]    Review of Systems   Constitutional: Negative. Negative for activity change and malaise/fatigue. HENT: Negative. Negative for tinnitus. Eyes: Negative. Negative for blurred vision. Respiratory: Negative. Negative for shortness of breath. Cardiovascular: Negative. Negative for chest pain, palpitations, orthopnea and PND. Gastrointestinal: Negative. Negative for nausea and vomiting. Genitourinary: Negative. Musculoskeletal: Negative. Negative for neck pain. Skin: Negative. Neurological: Negative. Negative for dizziness and headaches. Psychiatric/Behavioral: Negative. Negative for confusion. All other systems reviewed and are negative. Vitals:    08/15/19 2319 08/16/19 0047 08/16/19 0048 08/16/19 0134   BP: 165/75 163/74 162/72 176/80   Pulse: 76 73 70    Resp: 18      Temp: 97.4 °F (36.3 °C)      SpO2: 96% 97% 98%    Weight: 74.8 kg (165 lb)      Height: 5' 7\" (1.702 m)               Physical Exam   Constitutional: She is oriented to person, place, and time. She appears well-developed and well-nourished. No distress. HENT:   Head: Normocephalic and atraumatic. Right Ear: External ear normal.   Left Ear: External ear normal.   Nose: Nose normal.   Mouth/Throat: Oropharynx is clear and moist. No oropharyngeal exudate. Eyes: Pupils are equal, round, and reactive to light. Conjunctivae and EOM are normal. Right eye exhibits no discharge. Left eye exhibits no discharge. No scleral icterus. Neck: Normal range of motion. Neck supple. No JVD present. No tracheal deviation present. Cardiovascular: Normal rate, regular rhythm and intact distal pulses. Pulmonary/Chest: Effort normal and breath sounds normal. No stridor. No respiratory distress. She has no wheezes. She exhibits no tenderness. Abdominal: Soft. She exhibits no distension and no mass. There is no tenderness. Musculoskeletal: Normal range of motion. She exhibits no edema or tenderness. Neurological: She is alert and oriented to person, place, and time. No cranial nerve deficit. Skin: Skin is warm and dry. No rash noted. She is not diaphoretic. No erythema. No pallor. Psychiatric: She has a normal mood and affect. Her behavior is normal. Thought content normal.   Nursing note and vitals reviewed. MDM  Number of Diagnoses or Management Options  Essential hypertension: minor  Diagnosis management comments: Patient blood pressure taken in the ED return to normal.  Family states that her cardiologist wants her to be around 165/90 which she is at right now. May have been late with the lisinopril dosing tonight.   Will not make any changes to her medication until advised by her cardiologist.  Patient family did not want a big work-up and they did not want blood drawn since the blood pressure was back to normal.       Amount and/or Complexity of Data Reviewed  Clinical lab tests: ordered and reviewed           Procedures

## 2019-08-16 NOTE — ED NOTES
I have reviewed discharge instructions with the patient. The patient verbalized understanding. Patient left ED via Discharge Method: wheelchair to Home with family   Opportunity for questions and clarification provided. Patient given 0 scripts. To continue your aftercare when you leave the hospital, you may receive an automated call from our care team to check in on how you are doing. This is a free service and part of our promise to provide the best care and service to meet your aftercare needs.  If you have questions, or wish to unsubscribe from this service please call [phone]. Thank you for Choosing our [facility] Emergency Department.

## 2019-09-25 ENCOUNTER — HOSPITAL ENCOUNTER (EMERGENCY)
Age: 84
Discharge: HOME | End: 2019-09-25
Attending: EMERGENCY MEDICINE
Payer: MEDICARE

## 2019-09-25 ENCOUNTER — APPOINTMENT (OUTPATIENT)
Dept: GENERAL RADIOLOGY | Age: 84
End: 2019-09-25
Attending: EMERGENCY MEDICINE
Payer: MEDICARE

## 2019-09-25 VITALS
DIASTOLIC BLOOD PRESSURE: 79 MMHG | TEMPERATURE: 98.3 F | OXYGEN SATURATION: 94 % | HEART RATE: 75 BPM | SYSTOLIC BLOOD PRESSURE: 179 MMHG | RESPIRATION RATE: 21 BRPM

## 2019-09-25 DIAGNOSIS — S32.82XA MULTIPLE CLOSED FRACTURES OF PELVIS WITHOUT DISRUPTION OF PELVIC RING, INITIAL ENCOUNTER (HCC): Primary | ICD-10-CM

## 2019-09-25 DIAGNOSIS — S40.011A CONTUSION OF RIGHT SHOULDER, INITIAL ENCOUNTER: ICD-10-CM

## 2019-09-25 LAB
ALBUMIN SERPL-MCNC: 3.1 G/DL (ref 3.2–4.6)
ALBUMIN/GLOB SERPL: 0.7 {RATIO} (ref 1.2–3.5)
ALP SERPL-CCNC: 122 U/L (ref 50–136)
ALT SERPL-CCNC: 18 U/L (ref 12–65)
ANION GAP SERPL CALC-SCNC: 7 MMOL/L (ref 7–16)
AST SERPL-CCNC: 20 U/L (ref 15–37)
ATRIAL RATE: 77 BPM
BASOPHILS # BLD: 0 K/UL (ref 0–0.2)
BASOPHILS NFR BLD: 0 % (ref 0–2)
BILIRUB SERPL-MCNC: 0.9 MG/DL (ref 0.2–1.1)
BUN SERPL-MCNC: 17 MG/DL (ref 8–23)
CALCIUM SERPL-MCNC: 8.9 MG/DL (ref 8.3–10.4)
CALCULATED P AXIS, ECG09: 81 DEGREES
CALCULATED R AXIS, ECG10: -62 DEGREES
CALCULATED T AXIS, ECG11: 101 DEGREES
CHLORIDE SERPL-SCNC: 101 MMOL/L (ref 98–107)
CO2 SERPL-SCNC: 30 MMOL/L (ref 21–32)
CREAT SERPL-MCNC: 0.8 MG/DL (ref 0.6–1)
DIAGNOSIS, 93000: NORMAL
DIFFERENTIAL METHOD BLD: ABNORMAL
EOSINOPHIL # BLD: 0.2 K/UL (ref 0–0.8)
EOSINOPHIL NFR BLD: 1 % (ref 0.5–7.8)
ERYTHROCYTE [DISTWIDTH] IN BLOOD BY AUTOMATED COUNT: 14.4 % (ref 11.9–14.6)
GLOBULIN SER CALC-MCNC: 4.3 G/DL (ref 2.3–3.5)
GLUCOSE SERPL-MCNC: 150 MG/DL (ref 65–100)
HCT VFR BLD AUTO: 41.4 % (ref 35.8–46.3)
HGB BLD-MCNC: 14.6 G/DL (ref 11.7–15.4)
IMM GRANULOCYTES # BLD AUTO: 0.1 K/UL (ref 0–0.5)
IMM GRANULOCYTES NFR BLD AUTO: 1 % (ref 0–5)
LYMPHOCYTES # BLD: 2.5 K/UL (ref 0.5–4.6)
LYMPHOCYTES NFR BLD: 19 % (ref 13–44)
MCH RBC QN AUTO: 33.8 PG (ref 26.1–32.9)
MCHC RBC AUTO-ENTMCNC: 35.3 G/DL (ref 31.4–35)
MCV RBC AUTO: 95.8 FL (ref 79.6–97.8)
MONOCYTES # BLD: 0.8 K/UL (ref 0.1–1.3)
MONOCYTES NFR BLD: 6 % (ref 4–12)
NEUTS SEG # BLD: 9.9 K/UL (ref 1.7–8.2)
NEUTS SEG NFR BLD: 73 % (ref 43–78)
NRBC # BLD: 0 K/UL (ref 0–0.2)
P-R INTERVAL, ECG05: 216 MS
PLATELET # BLD AUTO: 224 K/UL (ref 150–450)
PMV BLD AUTO: 11.1 FL (ref 9.4–12.3)
POTASSIUM SERPL-SCNC: 3 MMOL/L (ref 3.5–5.1)
PROT SERPL-MCNC: 7.4 G/DL (ref 6.3–8.2)
Q-T INTERVAL, ECG07: 396 MS
QRS DURATION, ECG06: 114 MS
QTC CALCULATION (BEZET), ECG08: 448 MS
RBC # BLD AUTO: 4.32 M/UL (ref 4.05–5.2)
SODIUM SERPL-SCNC: 138 MMOL/L (ref 136–145)
VENTRICULAR RATE, ECG03: 77 BPM
WBC # BLD AUTO: 13.5 K/UL (ref 4.3–11.1)

## 2019-09-25 PROCEDURE — 73030 X-RAY EXAM OF SHOULDER: CPT

## 2019-09-25 PROCEDURE — 72100 X-RAY EXAM L-S SPINE 2/3 VWS: CPT

## 2019-09-25 PROCEDURE — 80053 COMPREHEN METABOLIC PANEL: CPT

## 2019-09-25 PROCEDURE — 73502 X-RAY EXAM HIP UNI 2-3 VIEWS: CPT

## 2019-09-25 PROCEDURE — 85025 COMPLETE CBC W/AUTO DIFF WBC: CPT

## 2019-09-25 PROCEDURE — 93005 ELECTROCARDIOGRAM TRACING: CPT | Performed by: EMERGENCY MEDICINE

## 2019-09-25 PROCEDURE — 96374 THER/PROPH/DIAG INJ IV PUSH: CPT | Performed by: EMERGENCY MEDICINE

## 2019-09-25 PROCEDURE — 99285 EMERGENCY DEPT VISIT HI MDM: CPT | Performed by: EMERGENCY MEDICINE

## 2019-09-25 PROCEDURE — 74011250636 HC RX REV CODE- 250/636: Performed by: EMERGENCY MEDICINE

## 2019-09-25 PROCEDURE — 96375 TX/PRO/DX INJ NEW DRUG ADDON: CPT | Performed by: EMERGENCY MEDICINE

## 2019-09-25 RX ORDER — ONDANSETRON 2 MG/ML
4 INJECTION INTRAMUSCULAR; INTRAVENOUS
Status: COMPLETED | OUTPATIENT
Start: 2019-09-25 | End: 2019-09-25

## 2019-09-25 RX ORDER — MORPHINE SULFATE 4 MG/ML
4 INJECTION INTRAVENOUS
Status: COMPLETED | OUTPATIENT
Start: 2019-09-25 | End: 2019-09-25

## 2019-09-25 RX ORDER — TRAMADOL HYDROCHLORIDE 50 MG/1
50 TABLET ORAL
Qty: 19 TAB | Refills: 0 | Status: SHIPPED | OUTPATIENT
Start: 2019-09-25 | End: 2019-09-27

## 2019-09-25 RX ADMIN — ONDANSETRON 4 MG: 2 INJECTION INTRAMUSCULAR; INTRAVENOUS at 10:35

## 2019-09-25 RX ADMIN — MORPHINE SULFATE 4 MG: 4 INJECTION INTRAVENOUS at 10:35

## 2019-09-25 NOTE — ED TRIAGE NOTES
Pt stood up from wheelchair today and fell onto tile floor. R hip pain. Hit head, no LOC, no blood thinners. Coming from [facility] assisted in [facility].

## 2019-09-25 NOTE — DISCHARGE INSTRUCTIONS
Continue physical therapy  Continue gait training  Continue fall precautions  Increase Ultram to 1 pill every 6 hours as needed  Return to ER for any worsening symptoms or new problems which may arise  Call your doctor/follow up doctor to set up appointment for recheck visit

## 2019-09-25 NOTE — ED NOTES
I have reviewed discharge instructions with the patient. The patient verbalized understanding. Patient left ED via Discharge Method: stretcher to Home with thorn ). Opportunity for questions and clarification provided. Patient given 1 scripts. To continue your aftercare when you leave the hospital, you may receive an automated call from our care team to check in on how you are doing. This is a free service and part of our promise to provide the best care and service to meet your aftercare needs.  If you have questions, or wish to unsubscribe from this service please call [phone]. Thank you for Choosing our [facility] Emergency Department.

## 2019-09-25 NOTE — ED PROVIDER NOTES
80-year-old female presents with right hip and right shoulder pain after a fall. Family and patient report that she stood up from her wheelchair today when her legs gave out and she fell to the right striking her right hip and right shoulder  She did not strike her head there is no loss of consciousness she denies any neck pain    She has had no nausea vomiting or diarrhea. No recent fevers she denies any urinary symptoms. Family reports that the patient is basically wheelchair-bound and is only supposed to stand or attempt to walk with assistance. They have been working with her and physical therapy but she is nowhere near independent for ambulating or even standing. The history is provided by the patient and a relative. Fall   The accident occurred 1 to 2 hours ago. The fall occurred while standing. She fell from a height of ground level. She landed on hard floor. The point of impact was the right hip and right shoulder. The pain is present in the right hip and right shoulder. The pain is moderate. She was not ambulatory at the scene. There was no entrapment after the fall. There was no drug use involved in the accident. There was no alcohol use involved in the accident. Pertinent negatives include no fever, no abdominal pain, no bowel incontinence, no vomiting, no headaches, no hearing loss, no loss of consciousness and no laceration. Past Medical History:   Diagnosis Date    Abdominal pain, epigastric     Abdominal pain, unspecified site     Anxiety state, unspecified     Aortic stenosis 6/10/2016    No sever SOB, no chest pain or syncope.  Echo showed mod AS, mean grad 22mm Hg, nl EF, mild LVH    Aortic valve disorders     Atherosclerosis of aorta (HCC)     Backache, unspecified     Benign neoplasm of colon     Cervicalgia     Closed fracture of unspecified bone     Depressive disorder, not elsewhere classified     Diabetes ([facility] [address]     Diarrhea     Disorders of magnesium metabolism  Edema     Elevated sedimentation rate     Encounter for long-term (current) use of other medications     Endocrine disease     Epistaxis     Esophageal reflux     Essential hypertension, benign     Gastrointestinal malfunction arising from mental factors     Hypertension     Hypertension-controlled, benign     Hypertonicity of bladder     Hypopotassemia     Insomnia, unspecified     Irritable bowel syndrome     Memory loss     Nausea alone     Osteoarthrosis, unspecified whether generalized or localized, unspecified site     Other ill-defined conditions(799.89)     hyperlipidemia    Other malaise and fatigue     Pain in joint, lower leg     Pain in joint, shoulder region     Postnasal drip     Rheumatoid arthritis(714.0)     Type II or unspecified type diabetes mellitus without mention of complication, not stated as uncontrolled     Unspecified constipation     Unspecified hemorrhoids with other complication     Unspecified hemorrhoids without mention of complication     Unspecified hereditary and idiopathic peripheral neuropathy     Unspecified hypothyroidism     Urgency of urination     Urinary tract infection, site not specified        Past Surgical History:   Procedure Laterality Date    HX CHOLECYSTECTOMY      HX OTHER SURGICAL      thyroidectomy    HX TONSILLECTOMY           Family History:   Problem Relation Age of Onset    Arthritis-rheumatoid Daughter     Arthritis-osteo Son        Social History     Socioeconomic History    Marital status:      Spouse name: Not on file    Number of children: Not on file    Years of education: Not on file    Highest education level: Not on file   Occupational History    Not on file   Social Needs    Financial resource strain: Not on file    Food insecurity:     Worry: Not on file     Inability: Not on file    Transportation needs:     Medical: Not on file     Non-medical: Not on file   Tobacco Use    Smoking status: Never Smoker    Smokeless tobacco: Never Used   Substance and Sexual Activity    Alcohol use: No    Drug use: No    Sexual activity: Not on file   Lifestyle    Physical activity:     Days per week: Not on file     Minutes per session: Not on file    Stress: Not on file   Relationships    Social connections:     Talks on phone: Not on file     Gets together: Not on file     Attends Mormonism service: Not on file     Active member of club or organization: Not on file     Attends meetings of clubs or organizations: Not on file     Relationship status: Not on file    Intimate partner violence:     Fear of current or ex partner: Not on file     Emotionally abused: Not on file     Physically abused: Not on file     Forced sexual activity: Not on file   Other Topics Concern    Not on file   Social History Narrative    Not on file         ALLERGIES: Codeine; Pcn [penicillins]; and [place]    Review of Systems   Constitutional: Negative for chills and fever. HENT: Negative for congestion, ear pain and rhinorrhea. Eyes: Negative for photophobia and discharge. Respiratory: Negative for cough and shortness of breath. Cardiovascular: Negative for chest pain and palpitations. Gastrointestinal: Negative for abdominal pain, bowel incontinence, constipation, diarrhea and vomiting. Endocrine: Negative for cold intolerance and heat intolerance. Genitourinary: Negative for dysuria and flank pain. Musculoskeletal: Positive for arthralgias, back pain, gait problem and myalgias. Negative for neck pain. Skin: Negative for rash and wound. Allergic/Immunologic: Negative for environmental allergies and food allergies. Neurological: Negative for loss of consciousness, syncope and headaches. Hematological: Negative for adenopathy. Does not bruise/bleed easily. Psychiatric/Behavioral: Positive for decreased concentration. Negative for dysphoric mood. The patient is not nervous/anxious.     All other systems reviewed and are negative. There were no vitals filed for this visit. Physical Exam   Constitutional: She is oriented to person, place, and time. She appears well-developed and well-nourished. She appears distressed. HENT:   Head: Normocephalic and atraumatic. Mouth/Throat: Oropharynx is clear and moist. No oropharyngeal exudate. Eyes: Pupils are equal, round, and reactive to light. Conjunctivae and EOM are normal.   Neck: Normal range of motion. Neck supple. No JVD present. Cardiovascular: Normal rate, regular rhythm, normal heart sounds and intact distal pulses. Exam reveals no gallop and no friction rub. No murmur heard. Pulmonary/Chest: Effort normal and breath sounds normal.   Abdominal: Soft. Normal appearance and bowel sounds are normal. She exhibits no distension and no mass. There is no hepatosplenomegaly. There is no tenderness. Musculoskeletal: She exhibits no edema or deformity. Right shoulder: She exhibits decreased range of motion and tenderness. She exhibits no bony tenderness, no swelling, no effusion and no crepitus. Right hip: She exhibits decreased range of motion, tenderness, bony tenderness and crepitus. She exhibits no deformity. Neurological: She is alert and oriented to person, place, and time. She has normal strength. No cranial nerve deficit or sensory deficit. She displays a negative Romberg sign. Gait normal.   Skin: Skin is warm and dry. Capillary refill takes less than 2 seconds. No laceration and no rash noted. Psychiatric: She has a normal mood and affect. Her speech is normal and behavior is normal. Judgment and thought content normal. Cognition and memory are normal.   Nursing note and vitals reviewed.        MDM  Number of Diagnoses or Management Options  Contusion of right shoulder, initial encounter: new and requires workup  Multiple closed fractures of pelvis without disruption of pelvic ring, initial encounter ([facility] [address]): new and requires workup  Diagnosis management comments: 70-year-old female status post fall  Will look at her right hip and pelvis. Also complaining of pain in her right shoulder and low back    11:11 AM  Work-up today reveals right pelvic fractures. No other acute injuries on radiographs  Blood work reveals slightly elevated white blood cell count at 13  No other signs or symptoms of an infectious process. This mild leukocytosis may be related to trauma. Amount and/or Complexity of Data Reviewed  Clinical lab tests: ordered and reviewed  Tests in the radiology section of CPT®: ordered and reviewed  Tests in the medicine section of CPT®: ordered and reviewed  Review and summarize past medical records: yes  Independent visualization of images, tracings, or specimens: yes    Risk of Complications, Morbidity, and/or Mortality  Presenting problems: moderate  Diagnostic procedures: moderate  Management options: low  General comments: Elements of this note have been dictated via voice recognition software. Text and phrases may be limited by the accuracy of the software. The chart has been reviewed, but errors may still be present.       Patient Progress  Patient progress: improved         Procedures

## 2020-06-23 ENCOUNTER — APPOINTMENT (OUTPATIENT)
Dept: GENERAL RADIOLOGY | Age: 85
DRG: 308 | End: 2020-06-23
Attending: EMERGENCY MEDICINE
Payer: MEDICARE

## 2020-06-23 ENCOUNTER — APPOINTMENT (OUTPATIENT)
Dept: CT IMAGING | Age: 85
DRG: 308 | End: 2020-06-23
Attending: INTERNAL MEDICINE
Payer: MEDICARE

## 2020-06-23 ENCOUNTER — HOSPITAL ENCOUNTER (INPATIENT)
Age: 85
LOS: 3 days | Discharge: HOME | DRG: 308 | End: 2020-06-26
Attending: EMERGENCY MEDICINE | Admitting: INTERNAL MEDICINE
Payer: MEDICARE

## 2020-06-23 DIAGNOSIS — I48.91 ATRIAL FIBRILLATION WITH RVR (HCC): Primary | ICD-10-CM

## 2020-06-23 DIAGNOSIS — S32.82XA MULTIPLE CLOSED FRACTURES OF PELVIS WITHOUT DISRUPTION OF PELVIC RING, INITIAL ENCOUNTER (HCC): ICD-10-CM

## 2020-06-23 DIAGNOSIS — I35.0 NONRHEUMATIC AORTIC VALVE STENOSIS: ICD-10-CM

## 2020-06-23 PROBLEM — E03.9 ACQUIRED HYPOTHYROIDISM: Status: ACTIVE | Noted: 2017-07-17

## 2020-06-23 LAB
ALBUMIN SERPL-MCNC: 2.6 G/DL (ref 3.2–4.6)
ALBUMIN/GLOB SERPL: 0.6 {RATIO} (ref 1.2–3.5)
ALP SERPL-CCNC: 104 U/L (ref 50–136)
ALT SERPL-CCNC: 17 U/L (ref 12–65)
ANION GAP SERPL CALC-SCNC: 10 MMOL/L (ref 7–16)
AST SERPL-CCNC: 19 U/L (ref 15–37)
ATRIAL RATE: 136 BPM
BACTERIA URNS QL MICRO: 0 /HPF
BILIRUB SERPL-MCNC: 0.9 MG/DL (ref 0.2–1.1)
BNP SERPL-MCNC: 9629 PG/ML
BUN SERPL-MCNC: 15 MG/DL (ref 8–23)
CALCIUM SERPL-MCNC: 8.5 MG/DL (ref 8.3–10.4)
CALCULATED R AXIS, ECG10: -58 DEGREES
CALCULATED T AXIS, ECG11: 109 DEGREES
CASTS URNS QL MICRO: NORMAL /LPF
CHLORIDE SERPL-SCNC: 100 MMOL/L (ref 98–107)
CO2 SERPL-SCNC: 27 MMOL/L (ref 21–32)
CREAT SERPL-MCNC: 1.03 MG/DL (ref 0.6–1)
DIAGNOSIS, 93000: NORMAL
EPI CELLS #/AREA URNS HPF: NORMAL /HPF
ERYTHROCYTE [DISTWIDTH] IN BLOOD BY AUTOMATED COUNT: 14.7 % (ref 11.9–14.6)
EST. AVERAGE GLUCOSE BLD GHB EST-MCNC: 151 MG/DL
GLOBULIN SER CALC-MCNC: 4.4 G/DL (ref 2.3–3.5)
GLUCOSE SERPL-MCNC: 166 MG/DL (ref 65–100)
HBA1C MFR BLD: 6.9 % (ref 4.8–6)
HCT VFR BLD AUTO: 37.2 % (ref 35.8–46.3)
HGB BLD-MCNC: 13.7 G/DL (ref 11.7–15.4)
LACTATE SERPL-SCNC: 2.2 MMOL/L (ref 0.4–2)
MCH RBC QN AUTO: 35 PG (ref 26.1–32.9)
MCHC RBC AUTO-ENTMCNC: 36.8 G/DL (ref 31.4–35)
MCV RBC AUTO: 95.1 FL (ref 79.6–97.8)
NRBC # BLD: 0 K/UL (ref 0–0.2)
PLATELET # BLD AUTO: 195 K/UL (ref 150–450)
PMV BLD AUTO: 11.3 FL (ref 9.4–12.3)
POTASSIUM SERPL-SCNC: 3.7 MMOL/L (ref 3.5–5.1)
PROT SERPL-MCNC: 7 G/DL (ref 6.3–8.2)
Q-T INTERVAL, ECG07: 332 MS
QRS DURATION, ECG06: 106 MS
QTC CALCULATION (BEZET), ECG08: 434 MS
RBC # BLD AUTO: 3.91 M/UL (ref 4.05–5.2)
RBC #/AREA URNS HPF: 0 /HPF
SODIUM SERPL-SCNC: 137 MMOL/L (ref 136–145)
T4 FREE SERPL-MCNC: 1.5 NG/DL (ref 0.9–1.8)
TROPONIN-HIGH SENSITIVITY: 21.6 PG/ML (ref 0–14)
TROPONIN-HIGH SENSITIVITY: 23.1 PG/ML (ref 0–14)
TSH SERPL DL<=0.005 MIU/L-ACNC: 0.64 UIU/ML (ref 0.36–3.74)
VENTRICULAR RATE, ECG03: 103 BPM
WBC # BLD AUTO: 10.9 K/UL (ref 4.3–11.1)
WBC URNS QL MICRO: 0 /HPF

## 2020-06-23 PROCEDURE — 80053 COMPREHEN METABOLIC PANEL: CPT

## 2020-06-23 PROCEDURE — 81003 URINALYSIS AUTO W/O SCOPE: CPT

## 2020-06-23 PROCEDURE — 77030038269 HC DRN EXT URIN PURWCK BARD -A

## 2020-06-23 PROCEDURE — 65660000000 HC RM CCU STEPDOWN

## 2020-06-23 PROCEDURE — 84443 ASSAY THYROID STIM HORMONE: CPT

## 2020-06-23 PROCEDURE — 71045 X-RAY EXAM CHEST 1 VIEW: CPT

## 2020-06-23 PROCEDURE — 84439 ASSAY OF FREE THYROXINE: CPT

## 2020-06-23 PROCEDURE — 83735 ASSAY OF MAGNESIUM: CPT

## 2020-06-23 PROCEDURE — 74011250636 HC RX REV CODE- 250/636: Performed by: INTERNAL MEDICINE

## 2020-06-23 PROCEDURE — 77030040393 HC DRSG OPTIFOAM GENT MDII -B

## 2020-06-23 PROCEDURE — 74011636320 HC RX REV CODE- 636/320: Performed by: INTERNAL MEDICINE

## 2020-06-23 PROCEDURE — 83880 ASSAY OF NATRIURETIC PEPTIDE: CPT

## 2020-06-23 PROCEDURE — 99285 EMERGENCY DEPT VISIT HI MDM: CPT

## 2020-06-23 PROCEDURE — 84484 ASSAY OF TROPONIN QUANT: CPT

## 2020-06-23 PROCEDURE — 83036 HEMOGLOBIN GLYCOSYLATED A1C: CPT

## 2020-06-23 PROCEDURE — 87635 SARS-COV-2 COVID-19 AMP PRB: CPT

## 2020-06-23 PROCEDURE — 74011000250 HC RX REV CODE- 250: Performed by: EMERGENCY MEDICINE

## 2020-06-23 PROCEDURE — 85027 COMPLETE CBC AUTOMATED: CPT

## 2020-06-23 PROCEDURE — 96374 THER/PROPH/DIAG INJ IV PUSH: CPT

## 2020-06-23 PROCEDURE — 83605 ASSAY OF LACTIC ACID: CPT

## 2020-06-23 PROCEDURE — 81015 MICROSCOPIC EXAM OF URINE: CPT

## 2020-06-23 PROCEDURE — 74011250637 HC RX REV CODE- 250/637: Performed by: INTERNAL MEDICINE

## 2020-06-23 PROCEDURE — 74011000258 HC RX REV CODE- 258: Performed by: INTERNAL MEDICINE

## 2020-06-23 PROCEDURE — 36415 COLL VENOUS BLD VENIPUNCTURE: CPT

## 2020-06-23 PROCEDURE — 74177 CT ABD & PELVIS W/CONTRAST: CPT

## 2020-06-23 RX ORDER — MORPHINE SULFATE 2 MG/ML
1 INJECTION, SOLUTION INTRAMUSCULAR; INTRAVENOUS
Status: DISCONTINUED | OUTPATIENT
Start: 2020-06-23 | End: 2020-06-26 | Stop reason: HOSPADM

## 2020-06-23 RX ORDER — SODIUM CHLORIDE 9 MG/ML
500 INJECTION, SOLUTION INTRAVENOUS ONCE
Status: DISPENSED | OUTPATIENT
Start: 2020-06-23 | End: 2020-06-24

## 2020-06-23 RX ORDER — ACETAMINOPHEN 325 MG/1
650 TABLET ORAL
Status: DISCONTINUED | OUTPATIENT
Start: 2020-06-23 | End: 2020-06-26 | Stop reason: HOSPADM

## 2020-06-23 RX ORDER — FUROSEMIDE 40 MG/1
20 TABLET ORAL DAILY
Status: DISCONTINUED | OUTPATIENT
Start: 2020-06-24 | End: 2020-06-24

## 2020-06-23 RX ORDER — ONDANSETRON 4 MG/1
4 TABLET, ORALLY DISINTEGRATING ORAL
Status: DISCONTINUED | OUTPATIENT
Start: 2020-06-23 | End: 2020-06-26 | Stop reason: HOSPADM

## 2020-06-23 RX ORDER — SODIUM CHLORIDE 0.9 % (FLUSH) 0.9 %
10 SYRINGE (ML) INJECTION
Status: ACTIVE | OUTPATIENT
Start: 2020-06-23 | End: 2020-06-24

## 2020-06-23 RX ORDER — MEMANTINE HYDROCHLORIDE 5 MG/1
10 TABLET ORAL 2 TIMES DAILY
Status: DISCONTINUED | OUTPATIENT
Start: 2020-06-23 | End: 2020-06-26 | Stop reason: HOSPADM

## 2020-06-23 RX ORDER — HEPARIN SODIUM 5000 [USP'U]/ML
5000 INJECTION, SOLUTION INTRAVENOUS; SUBCUTANEOUS EVERY 8 HOURS
Status: DISCONTINUED | OUTPATIENT
Start: 2020-06-23 | End: 2020-06-24 | Stop reason: SDUPTHER

## 2020-06-23 RX ORDER — SODIUM CHLORIDE 0.9 % (FLUSH) 0.9 %
5-40 SYRINGE (ML) INJECTION AS NEEDED
Status: DISCONTINUED | OUTPATIENT
Start: 2020-06-23 | End: 2020-06-26 | Stop reason: HOSPADM

## 2020-06-23 RX ORDER — HYDRALAZINE HYDROCHLORIDE 10 MG/1
10 TABLET, FILM COATED ORAL 3 TIMES DAILY
Status: DISCONTINUED | OUTPATIENT
Start: 2020-06-23 | End: 2020-06-24

## 2020-06-23 RX ORDER — SUCRALFATE 1 G/1
1 TABLET ORAL
Status: DISCONTINUED | OUTPATIENT
Start: 2020-06-24 | End: 2020-06-26 | Stop reason: HOSPADM

## 2020-06-23 RX ORDER — POLYETHYLENE GLYCOL 3350 17 G/17G
17 POWDER, FOR SOLUTION ORAL DAILY
Status: DISCONTINUED | OUTPATIENT
Start: 2020-06-24 | End: 2020-06-23 | Stop reason: SDUPTHER

## 2020-06-23 RX ORDER — METOPROLOL TARTRATE 25 MG/1
25 TABLET, FILM COATED ORAL 2 TIMES DAILY
Status: DISCONTINUED | OUTPATIENT
Start: 2020-06-23 | End: 2020-06-25

## 2020-06-23 RX ORDER — POLYETHYLENE GLYCOL 3350 17 G/17G
17 POWDER, FOR SOLUTION ORAL DAILY
Status: DISCONTINUED | OUTPATIENT
Start: 2020-06-24 | End: 2020-06-26 | Stop reason: HOSPADM

## 2020-06-23 RX ORDER — PANTOPRAZOLE SODIUM 40 MG/1
40 TABLET, DELAYED RELEASE ORAL 2 TIMES DAILY
Status: DISCONTINUED | OUTPATIENT
Start: 2020-06-23 | End: 2020-06-26 | Stop reason: HOSPADM

## 2020-06-23 RX ORDER — DILTIAZEM HYDROCHLORIDE 5 MG/ML
10 INJECTION INTRAVENOUS ONCE
Status: COMPLETED | OUTPATIENT
Start: 2020-06-23 | End: 2020-06-23

## 2020-06-23 RX ORDER — ACETAMINOPHEN 500 MG
1000 TABLET ORAL DAILY
Status: DISCONTINUED | OUTPATIENT
Start: 2020-06-24 | End: 2020-06-26 | Stop reason: HOSPADM

## 2020-06-23 RX ORDER — SODIUM CHLORIDE 0.9 % (FLUSH) 0.9 %
5-40 SYRINGE (ML) INJECTION EVERY 8 HOURS
Status: DISCONTINUED | OUTPATIENT
Start: 2020-06-23 | End: 2020-06-26 | Stop reason: HOSPADM

## 2020-06-23 RX ORDER — LISINOPRIL 20 MG/1
20 TABLET ORAL 2 TIMES DAILY
Status: DISCONTINUED | OUTPATIENT
Start: 2020-06-23 | End: 2020-06-24

## 2020-06-23 RX ADMIN — Medication 10 ML: at 23:56

## 2020-06-23 RX ADMIN — CEFTRIAXONE SODIUM 1 G: 1 INJECTION, POWDER, FOR SOLUTION INTRAMUSCULAR; INTRAVENOUS at 20:54

## 2020-06-23 RX ADMIN — MEMANTINE HYDROCHLORIDE 10 MG: 5 TABLET ORAL at 20:52

## 2020-06-23 RX ADMIN — SODIUM CHLORIDE 100 ML: 900 INJECTION, SOLUTION INTRAVENOUS at 22:28

## 2020-06-23 RX ADMIN — PANTOPRAZOLE SODIUM 40 MG: 40 TABLET, DELAYED RELEASE ORAL at 20:52

## 2020-06-23 RX ADMIN — SODIUM CHLORIDE 2.5 MG/HR: 900 INJECTION, SOLUTION INTRAVENOUS at 19:00

## 2020-06-23 RX ADMIN — IOPAMIDOL 100 ML: 755 INJECTION, SOLUTION INTRAVENOUS at 22:28

## 2020-06-23 RX ADMIN — DILTIAZEM HYDROCHLORIDE 10 MG: 5 INJECTION, SOLUTION INTRAVENOUS at 16:15

## 2020-06-23 RX ADMIN — HEPARIN SODIUM 5000 UNITS: 5000 INJECTION INTRAVENOUS; SUBCUTANEOUS at 23:21

## 2020-06-23 RX ADMIN — LISINOPRIL 20 MG: 20 TABLET ORAL at 20:52

## 2020-06-23 RX ADMIN — DIATRIZOATE MEGLUMINE AND DIATRIZOATE SODIUM 15 ML: 660; 100 LIQUID ORAL; RECTAL at 20:51

## 2020-06-23 RX ADMIN — SODIUM CHLORIDE 2.5 MG/HR: 900 INJECTION, SOLUTION INTRAVENOUS at 18:45

## 2020-06-23 RX ADMIN — METOPROLOL TARTRATE 25 MG: 25 TABLET, FILM COATED ORAL at 20:52

## 2020-06-23 NOTE — ED PROVIDER NOTES
Here with report of not feeling totally well for at least several days. She has no significant cough. Possibly had some mild chest discomfort. No wheezing. No change in dependent edema. No awareness of fever. Possibly being treated for urinary tract infection. MS was contacted transport to the hospital patient was found to be in atrial fib. The history is provided by the patient. Palpitations    This is a new problem. Episode onset: Uncertain. The problem is associated with nothing. Associated symptoms include a fever. Pertinent negatives include no near-syncope, no vomiting, no headaches and no lower extremity edema. Risk factors include no risk factors. She has tried nothing for the symptoms. Her past medical history does not include DM or SVT. Past Medical History:   Diagnosis Date    Abdominal pain, epigastric     Abdominal pain, unspecified site     Anxiety state, unspecified     Aortic stenosis 6/10/2016    No sever SOB, no chest pain or syncope.  Echo showed mod AS, mean grad 22mm Hg, nl EF, mild LVH    Aortic valve disorders     Atherosclerosis of aorta (HCC)     Backache, unspecified     Benign neoplasm of colon     Cervicalgia     Closed fracture of unspecified bone     Depressive disorder, not elsewhere classified     Diabetes (HCC)     Diarrhea     Disorders of magnesium metabolism     Edema     Elevated sedimentation rate     Encounter for long-term (current) use of other medications     Endocrine disease     Epistaxis     Esophageal reflux     Essential hypertension, benign     Gastrointestinal malfunction arising from mental factors     Hypertension     Hypertension-controlled, benign     Hypertonicity of bladder     Hypopotassemia     Insomnia, unspecified     Irritable bowel syndrome     Memory loss     Nausea alone     Osteoarthrosis, unspecified whether generalized or localized, unspecified site     Other ill-defined conditions(159.23) hyperlipidemia    Other malaise and fatigue     Pain in joint, lower leg     Pain in joint, shoulder region     Postnasal drip     Rheumatoid arthritis(714.0)     Type II or unspecified type diabetes mellitus without mention of complication, not stated as uncontrolled     Unspecified constipation     Unspecified hemorrhoids with other complication     Unspecified hemorrhoids without mention of complication     Unspecified hereditary and idiopathic peripheral neuropathy     Unspecified hypothyroidism     Urgency of urination     Urinary tract infection, site not specified        Past Surgical History:   Procedure Laterality Date    HX CHOLECYSTECTOMY      HX OTHER SURGICAL      thyroidectomy    HX TONSILLECTOMY           Family History:   Problem Relation Age of Onset    Arthritis-rheumatoid Daughter     Arthritis-osteo Son        Social History     Socioeconomic History    Marital status:      Spouse name: Not on file    Number of children: Not on file    Years of education: Not on file    Highest education level: Not on file   Occupational History    Not on file   Social Needs    Financial resource strain: Not on file    Food insecurity     Worry: Not on file     Inability: Not on file    Transportation needs     Medical: Not on file     Non-medical: Not on file   Tobacco Use    Smoking status: Never Smoker    Smokeless tobacco: Never Used   Substance and Sexual Activity    Alcohol use: No    Drug use: No    Sexual activity: Not on file   Lifestyle    Physical activity     Days per week: Not on file     Minutes per session: Not on file    Stress: Not on file   Relationships    Social connections     Talks on phone: Not on file     Gets together: Not on file     Attends [employer] service: Not on file     Active member of club or organization: Not on file     Attends meetings of clubs or organizations: Not on file     Relationship status: Not on file    Intimate partner violence     Fear of current or ex partner: Not on file     Emotionally abused: Not on file     Physically abused: Not on file     Forced sexual activity: Not on file   Other Topics Concern    Not on file   Social History Narrative    Not on file         ALLERGIES: Codeine; Pcn [penicillins]; and [place]    Review of Systems   Constitutional: Positive for fever. Negative for chills. Cardiovascular: Positive for palpitations. Negative for near-syncope. Gastrointestinal: Negative for vomiting. Genitourinary: Negative. Possibly being treated for urinary tract infection   Musculoskeletal: Negative. Neurological: Negative. Negative for headaches. Psychiatric/Behavioral: Positive for decreased concentration. Negative for confusion. All other systems reviewed and are negative. Vitals:    06/23/20 1240   BP: 120/75   Pulse: (!) 112   Resp: 16   Temp: 99.6 °F (37.6 °C)   SpO2: 93%   Weight: 72.6 kg (160 lb)   Height: 5' 8\" (1.727 m)            Physical Exam  Vitals signs and nursing note reviewed. Constitutional:       General: She is not in acute distress. Appearance: She is well-developed. HENT:      Head: Atraumatic. Eyes:      General: No scleral icterus. Neck:      Musculoskeletal: Neck supple. Cardiovascular:      Rate and Rhythm: Tachycardia present. Rhythm irregularly irregular. Pulmonary:      Effort: Pulmonary effort is normal. No respiratory distress. Breath sounds: No decreased breath sounds, wheezing, rhonchi or rales. Abdominal:      General: Abdomen is flat. Palpations: Abdomen is soft. Tenderness: There is no abdominal tenderness. There is no right CVA tenderness, left CVA tenderness, guarding or rebound. Musculoskeletal:         General: No swelling. Right lower leg: No edema. Left lower leg: No edema. Skin:     General: Skin is warm and dry. Neurological:      Mental Status: Mental status is at baseline.    Psychiatric: Thought Content: Thought content normal.          MDM  Number of Diagnoses or Management Options  Atrial fibrillation with RVR [facility]):   Diagnosis management comments: With acute atrial fibrillation with rapid ventricular response that is episodic. Have controlled her with diltiazem 10 mg in our department but had recurrence. Patient is being admitted by hospitalist.       Amount and/or Complexity of Data Reviewed  Clinical lab tests: ordered and reviewed  Tests in the radiology section of CPT®: reviewed and ordered  Decide to obtain previous medical records or to obtain history from someone other than the patient: yes  Discuss the patient with other providers: yes  Independent visualization of images, tracings, or specimens: yes    Risk of Complications, Morbidity, and/or Mortality  Presenting problems: moderate  Diagnostic procedures: low  Management options: moderate    Critical Care  Total time providing critical care: 30-74 minutes (CRITICAL CARE Documentation: This patient is critically ill and there is a high probability of of imminent or life threatening deterioration in the patient's condition without immediate management. The nature of the patient's clinical problem is: Atrial fib with RVR    I have spent 50 minutes in direct patient care, documentation, review of labs/xrays/old records, discussion with Staff, Nursing, hospitalist t. The time involved in the performance of separately reportable procedures was not counted toward critical care time.      Kayla Conway MD; 6/23/2020 @7:49 PM    )    Patient Progress  Patient progress: stable         Procedures

## 2020-06-23 NOTE — H&P
HOSPITALIST H&P/CONSULT  NAME:  Abdi Delgado   Age:  80 y.o.  :   1926   MRN:   [id number]  PCP: Christopher Arnold MD  Consulting MD:  Treatment Team: Attending Provider: Pedro Rene; Primary Nurse: Yomi Flores RN  HPI:   Patient 35-year-old female with past medical history of aortic valve stenosis, hypertension, hypothyroidism, dementia, GERD, long-term resident of skilled nursing facility presents with complaints of right lower quadrant pain, chest discomfort and was found in EMS ride to have A. fib with RVR. Per review of facility notes from [facility] patient has recently been treated for suspected UTI on Macrobid but was noted to have increasing fatigue, decreased appetite and general poor appearance. ER evaluation notable for EKG with A. fib RVR with rates during my evaluation of -. Patient is confused and disoriented with complaints of substernal chest pressure. Cardiology has been notified of patient by ER physician with request for hospitalist to admit. Patient denies fever, chills, cough, dyspnea, nausea, vomiting but does say she has had intermittent right lower quadrant pain (she is not tender on exam) She is a rather poor historian. Denies changes in stool or urine. Complete ROS done and is as stated in HPI or otherwise negative  Past Medical History:   Diagnosis Date    Abdominal pain, epigastric     Abdominal pain, unspecified site     Anxiety state, unspecified     Aortic stenosis 6/10/2016    No sever SOB, no chest pain or syncope.  Echo showed mod AS, mean grad 22mm Hg, nl EF, mild LVH    Aortic valve disorders     Atherosclerosis of aorta (HCC)     Backache, unspecified     Benign neoplasm of colon     Cervicalgia     Closed fracture of unspecified bone     Depressive disorder, not elsewhere classified     Diabetes (HCC)     Diarrhea     Disorders of magnesium metabolism     Edema     Elevated sedimentation rate     Encounter for long-term (current) use of other medications     Endocrine disease     Epistaxis     Esophageal reflux     Essential hypertension, benign     Gastrointestinal malfunction arising from mental factors     Hypertension     Hypertension-controlled, benign     Hypertonicity of bladder     Hypopotassemia     Insomnia, unspecified     Irritable bowel syndrome     Memory loss     Nausea alone     Osteoarthrosis, unspecified whether generalized or localized, unspecified site     Other ill-defined conditions(799.89)     hyperlipidemia    Other malaise and fatigue     Pain in joint, lower leg     Pain in joint, shoulder region     Postnasal drip     Rheumatoid arthritis(714.0)     Type II or unspecified type diabetes mellitus without mention of complication, not stated as uncontrolled     Unspecified constipation     Unspecified hemorrhoids with other complication     Unspecified hemorrhoids without mention of complication     Unspecified hereditary and idiopathic peripheral neuropathy     Unspecified hypothyroidism     Urgency of urination     Urinary tract infection, site not specified       Past Surgical History:   Procedure Laterality Date    HX CHOLECYSTECTOMY      HX OTHER SURGICAL      thyroidectomy    HX TONSILLECTOMY        Prior to Admission Medications   Prescriptions Last Dose Informant Patient Reported? Taking? Blood-Glucose Meter (ACCU-CHEK RAMON PLUS METER) misc   No No   Sig: Test 4 four times a week - test fasting and 2 hours after the largest meal   acetaminophen (TYLENOL EXTRA STRENGTH) 500 mg tablet   Yes No   Sig: Take 1,000 mg by mouth daily. carboxymethylcellulose sodium (REFRESH OP)   Yes No   Sig: Apply  to eye four (4) times daily. furosemide (LASIX) 20 mg tablet   Yes No   Sig: Take  by mouth daily.    glucose blood VI test strips (ACCU-CHEK RAMON PLUS TEST STRP) strip   No No   Sig: One meter   hydrALAZINE (APRESOLINE) 10 mg tablet   No No   Sig: Take 1 Tab by mouth three (3) times daily. lancets (ACCU-CHEK MULTICLIX LANCET) misc   No No   Sig: Test once daily   lansoprazole (PREVACID) 15 mg capsule   No No   Sig: Take 2 Caps by mouth two (2) times a day. levothyroxine (SYNTHROID) 125 mcg tablet   No No   Sig: Take 1 Tab by mouth Daily (before breakfast). lisinopril (PRINIVIL, ZESTRIL) 20 mg tablet   No No   Sig: Take 1 Tab by mouth two (2) times a day. memantine (NAMENDA) 10 mg tablet   No No   Sig: Take 1 Tab by mouth two (2) times a day. metoprolol tartrate (LOPRESSOR) 25 mg tablet   No No   Sig: Take 1 Tab by mouth two (2) times a day. mirabegron ER (MYRBETRIQ) 25 mg ER tablet   Yes No   Sig: Take 25 mg by mouth daily. nitroglycerin (NITROSTAT) 0.4 mg SL tablet   Yes No   Sig: by SubLINGual route every five (5) minutes as needed for Chest Pain. ondansetron hcl (ZOFRAN) 4 mg tablet   No No   Sig: Take 1 Tab by mouth every eight (8) hours as needed for Nausea. polyethylene glycol (MIRALAX) 17 gram packet   No No   Sig: Take 1 Packet by mouth daily. polyethylene glycol (MIRALAX) 17 gram packet   No No   Sig: Take 1 Packet by mouth daily. Provide qhs   potassium chloride SR (KLOR-CON 8) 8 mEq tablet   No No   Sig: Take 1 Tab by mouth daily. psyllium (METAMUCIL) packet   No No   Sig: Take 1 Packet by mouth daily. Indications: irritable colon   sucralfate (CARAFATE) 100 mg/mL suspension   No No   Si gram by mouth q ac and qhs   traMADol (ULTRAM) 50 mg tablet   No No   Sig: Take 1 Tab by mouth three (3) times daily (with meals) for 30 days. Max Daily Amount: 150 mg.       Facility-Administered Medications: None     Allergies   Allergen Reactions    Codeine Rash    Pcn [Penicillins] Hives    Strawberry Itching      Social History     Tobacco Use    Smoking status: Never Smoker    Smokeless tobacco: Never Used   Substance Use Topics    Alcohol use: No      Family History   Problem Relation Age of Onset    Arthritis-rheumatoid Daughter     Arthritis-osteo Son       Objective:     Visit Vitals  /76   Pulse (!) 136   Temp 99.6 °F (37.6 °C)   Resp 21   Ht 5' 8\" (1.727 m)   Wt 72.6 kg (160 lb)   SpO2 98%   BMI 24.33 kg/m²      Temp (24hrs), Av.6 °F (37.6 °C), Min:99.6 °F (37.6 °C), Max:99.6 °F (37.6 °C)    Oxygen Therapy  O2 Sat (%): 98 % (20 1517)  Pulse via Oximetry: 106 beats per minute (20 1517)  O2 Device: Room air (20 1240)  Physical Exam:  General:    Alert, cooperative, no distress, appears stated age. disoriented   Head:   Normocephalic, without obvious abnormality, atraumatic. Nose:  Nares normal. No drainage or sinus tenderness. Lungs:   Clear to auscultation bilaterally. No Wheezing or Rhonchi. No rales. Heart:   Irregularly irregular 2/6 systolic murmur  Abdomen:   Soft, non-tender. Not distended. Bowel sounds normal.   Extremities: No cyanosis. No edema. No clubbing  Skin:     Texture, turgor normal. No rashes or lesions. Not Jaundiced  Neurologic: Alert and oriented x 3, no focal deficits   Data Review:   Recent Results (from the past 24 hour(s))   EKG, 12 LEAD, INITIAL    Collection Time: 20 12:51 PM   Result Value Ref Range    Ventricular Rate 103 BPM    Atrial Rate 136 BPM    QRS Duration 106 ms    Q-T Interval 332 ms    QTC Calculation (Bezet) 434 ms    Calculated R Axis -58 degrees    Calculated T Axis 109 degrees    Diagnosis       !! AGE AND GENDER SPECIFIC ECG ANALYSIS !!   Atrial fibrillation with rapid ventricular response  Left axis deviation  Minimal voltage criteria for LVH, may be normal variant  Septal infarct (cited on or before 2020)  ST & T wave abnormality, consider lateral ischemia or digitalis effect  Abnormal ECG    Confirmed by ST MARCELINO JUNG MD (), NELL TYSON ([id number]) on 2020 1:54:12 PM     CBC W/O DIFF    Collection Time: 20 12:55 PM   Result Value Ref Range    WBC 10.9 4.3 - 11.1 K/uL    RBC 3.91 (L) 4.05 - 5.2 M/uL    HGB 13.7 11.7 - 15.4 g/dL HCT 37.2 35.8 - 46.3 %    MCV 95.1 79.6 - 97.8 FL    MCH 35.0 (H) 26.1 - 32.9 PG    MCHC 36.8 (H) 31.4 - 35.0 g/dL    RDW 14.7 (H) 11.9 - 14.6 %    PLATELET 791 351 - 856 K/uL    MPV 11.3 9.4 - 12.3 FL    ABSOLUTE NRBC 0.00 0.0 - 0.2 K/uL   TROPONIN-HIGH SENSITIVITY    Collection Time: 06/23/20 12:55 PM   Result Value Ref Range    Troponin-High Sensitivity 21.6 (H) 0 - 14 pg/mL   METABOLIC PANEL, COMPREHENSIVE    Collection Time: 06/23/20 12:55 PM   Result Value Ref Range    Sodium 137 136 - 145 mmol/L    Potassium 3.7 3.5 - 5.1 mmol/L    Chloride 100 98 - 107 mmol/L    CO2 27 21 - 32 mmol/L    Anion gap 10 7 - 16 mmol/L    Glucose 166 (H) 65 - 100 mg/dL    BUN 15 8 - 23 MG/DL    Creatinine 1.03 (H) 0.6 - 1.0 MG/DL    GFR est AA >60 >60 ml/min/1.73m2    GFR est non-AA 53 (L) >60 ml/min/1.73m2    Calcium 8.5 8.3 - 10.4 MG/DL    Bilirubin, total 0.9 0.2 - 1.1 MG/DL    ALT (SGPT) 17 12 - 65 U/L    AST (SGOT) 19 15 - 37 U/L    Alk. phosphatase 104 50 - 136 U/L    Protein, total 7.0 6.3 - 8.2 g/dL    Albumin 2.6 (L) 3.2 - 4.6 g/dL    Globulin 4.4 (H) 2.3 - 3.5 g/dL    A-G Ratio 0.6 (L) 1.2 - 3.5     URINE MICROSCOPIC    Collection Time: 06/23/20  4:45 PM   Result Value Ref Range    WBC 0 0 /hpf    RBC 0 0 /hpf    Epithelial cells 0-3 0 /hpf    Bacteria 0 0 /hpf    Casts 0-3 0 /lpf       Status Exam Begun  Exam Ended        CHEST X-RAY, one view.     HISTORY:  Shortness of breath. Possible sepsis.       TECHNIQUE:  AP upright portable view.     COMPARISON: November 2006     FINDINGS:   Interstitial prominence. No lobar consolidation. Mild blunting  costophrenic angles. Heart may be enlarged. Aortic calcifications.      IMPRESSION  IMPRESSION:  Interstitial pattern, mild interstitial edema versus atypical  pneumonia.          Assessment and Plan:      Active Hospital Problems    Diagnosis Date Noted    Atrial fibrillation with RVR ([facility] 06/23/2020    [facility] 01/17/2019    Acquired hypothyroidism 07/17/2017    Aortic stenosis 06/10/2016     No sever SOB, no chest pain or syncope. Echo showed mod AS, mean grad 22mm Hg, nl EF, mild LVH      Depression        A/P    - Afib rvr - start cardizem gtt, check echo, serial troponins. Monitor on telemetry. Consult cardiology. Continue home dose metoprolol.    - abdominal pain - not present on my exam. CT abd/pelvis pending for r/o of occult infection causing fever. Check lactic acid. - Aortic stenosis - repeat echo. Continue oral lasix. BNP pending    - Fever - with report of recent uti. UA non infectious appearing. CXR w/ interstitial edema vs atypical infection. Check BNP. Will add rocephin/azithromycin for empiric coverage. Swab for covid-19 given coming from SNF and w/ fever. Isolation precautions. - Hypothyroidism - check TSH/t4.  Continue synthroid    - Dm2 - not on home meds, will check A1C      Code Status: Full code    Anticipated discharge: 2-3 days    Signed By: Jazmine Duran DO     June 23, 2020

## 2020-06-23 NOTE — ED TRIAGE NOTES
Pt arrives via EMS from [facility] living. EMS was called out for chest, when EMS arrived the facility stated possible sepsis due to UTI and when EMS got Pt into truck they found the Pt was in AFIB. Pt complaint of chest pain, ShOB and RLQ abdominal pain. Pt denies fever and chills. 121/59, 110-115 afib, Temp 98.8,  O2 97% RA. EMS placed 18g L forarm.

## 2020-06-24 LAB
ANION GAP SERPL CALC-SCNC: 10 MMOL/L (ref 7–16)
APPEARANCE UR: CLEAR
BILIRUB UR QL: NEGATIVE
BUN SERPL-MCNC: 14 MG/DL (ref 8–23)
CALCIUM SERPL-MCNC: 7.9 MG/DL (ref 8.3–10.4)
CHLORIDE SERPL-SCNC: 102 MMOL/L (ref 98–107)
CO2 SERPL-SCNC: 26 MMOL/L (ref 21–32)
COLOR UR: YELLOW
CREAT SERPL-MCNC: 0.99 MG/DL (ref 0.6–1)
ERYTHROCYTE [DISTWIDTH] IN BLOOD BY AUTOMATED COUNT: 13.2 % (ref 11.9–14.6)
GLUCOSE BLD STRIP.AUTO-MCNC: 169 MG/DL (ref 65–100)
GLUCOSE BLD STRIP.AUTO-MCNC: 175 MG/DL (ref 65–100)
GLUCOSE SERPL-MCNC: 179 MG/DL (ref 65–100)
GLUCOSE UR STRIP.AUTO-MCNC: NEGATIVE MG/DL
HCT VFR BLD AUTO: 34.5 % (ref 35.8–46.3)
HGB BLD-MCNC: 12.2 G/DL (ref 11.7–15.4)
HGB UR QL STRIP: NEGATIVE
KETONES UR QL STRIP.AUTO: NEGATIVE MG/DL
LEUKOCYTE ESTERASE UR QL STRIP.AUTO: NEGATIVE
MAGNESIUM SERPL-MCNC: 1.8 MG/DL (ref 1.8–2.4)
MCH RBC QN AUTO: 32.4 PG (ref 26.1–32.9)
MCHC RBC AUTO-ENTMCNC: 35.4 G/DL (ref 31.4–35)
MCV RBC AUTO: 91.8 FL (ref 79.6–97.8)
NITRITE UR QL STRIP.AUTO: NEGATIVE
NRBC # BLD: 0 K/UL (ref 0–0.2)
PH UR STRIP: 6 [PH] (ref 5–9)
PLATELET # BLD AUTO: 169 K/UL (ref 150–450)
PMV BLD AUTO: 10.5 FL (ref 9.4–12.3)
POTASSIUM SERPL-SCNC: 3.3 MMOL/L (ref 3.5–5.1)
PROT UR STRIP-MCNC: NEGATIVE MG/DL
RBC # BLD AUTO: 3.76 M/UL (ref 4.05–5.2)
SODIUM SERPL-SCNC: 138 MMOL/L (ref 136–145)
SP GR UR REFRACTOMETRY: 1.06 (ref 1–1.02)
TROPONIN-HIGH SENSITIVITY: 17.2 PG/ML (ref 0–14)
TROPONIN-HIGH SENSITIVITY: 19 PG/ML (ref 0–14)
TROPONIN-HIGH SENSITIVITY: 20.6 PG/ML (ref 0–14)
TROPONIN-HIGH SENSITIVITY: 25.1 PG/ML (ref 0–14)
TROPONIN-HIGH SENSITIVITY: 25.1 PG/ML (ref 0–14)
UROBILINOGEN UR QL STRIP.AUTO: 0.2 EU/DL (ref 0.2–1)
WBC # BLD AUTO: 10.8 K/UL (ref 4.3–11.1)

## 2020-06-24 PROCEDURE — 74011000302 HC RX REV CODE- 302: Performed by: HOSPITALIST

## 2020-06-24 PROCEDURE — 87086 URINE CULTURE/COLONY COUNT: CPT

## 2020-06-24 PROCEDURE — 74011250637 HC RX REV CODE- 250/637: Performed by: HOSPITALIST

## 2020-06-24 PROCEDURE — 82962 GLUCOSE BLOOD TEST: CPT

## 2020-06-24 PROCEDURE — 85027 COMPLETE CBC AUTOMATED: CPT

## 2020-06-24 PROCEDURE — 97110 THERAPEUTIC EXERCISES: CPT

## 2020-06-24 PROCEDURE — 65660000000 HC RM CCU STEPDOWN

## 2020-06-24 PROCEDURE — 74011250637 HC RX REV CODE- 250/637: Performed by: INTERNAL MEDICINE

## 2020-06-24 PROCEDURE — 74011636637 HC RX REV CODE- 636/637: Performed by: HOSPITALIST

## 2020-06-24 PROCEDURE — 80048 BASIC METABOLIC PNL TOTAL CA: CPT

## 2020-06-24 PROCEDURE — 86580 TB INTRADERMAL TEST: CPT | Performed by: HOSPITALIST

## 2020-06-24 PROCEDURE — 97161 PT EVAL LOW COMPLEX 20 MIN: CPT

## 2020-06-24 PROCEDURE — 81003 URINALYSIS AUTO W/O SCOPE: CPT

## 2020-06-24 PROCEDURE — 74011000258 HC RX REV CODE- 258: Performed by: INTERNAL MEDICINE

## 2020-06-24 PROCEDURE — 84484 ASSAY OF TROPONIN QUANT: CPT

## 2020-06-24 PROCEDURE — 36415 COLL VENOUS BLD VENIPUNCTURE: CPT

## 2020-06-24 PROCEDURE — 74011250636 HC RX REV CODE- 250/636: Performed by: INTERNAL MEDICINE

## 2020-06-24 RX ORDER — INSULIN LISPRO 100 [IU]/ML
INJECTION, SOLUTION INTRAVENOUS; SUBCUTANEOUS
Status: DISCONTINUED | OUTPATIENT
Start: 2020-06-24 | End: 2020-06-26 | Stop reason: HOSPADM

## 2020-06-24 RX ORDER — MAG HYDROX/ALUMINUM HYD/SIMETH 200-200-20
30 SUSPENSION, ORAL (FINAL DOSE FORM) ORAL
Status: DISCONTINUED | OUTPATIENT
Start: 2020-06-24 | End: 2020-06-26 | Stop reason: HOSPADM

## 2020-06-24 RX ORDER — DILTIAZEM HYDROCHLORIDE 120 MG/1
120 CAPSULE, COATED, EXTENDED RELEASE ORAL 2 TIMES DAILY
Status: DISCONTINUED | OUTPATIENT
Start: 2020-06-24 | End: 2020-06-25

## 2020-06-24 RX ADMIN — POTASSIUM BICARBONATE 40 MEQ: 782 TABLET, EFFERVESCENT ORAL at 17:13

## 2020-06-24 RX ADMIN — ACETAMINOPHEN 1000 MG: 500 TABLET ORAL at 08:39

## 2020-06-24 RX ADMIN — INSULIN LISPRO 2 UNITS: 100 INJECTION, SOLUTION INTRAVENOUS; SUBCUTANEOUS at 16:41

## 2020-06-24 RX ADMIN — PANTOPRAZOLE SODIUM 40 MG: 40 TABLET, DELAYED RELEASE ORAL at 17:13

## 2020-06-24 RX ADMIN — SUCRALFATE 1 G: 1 TABLET ORAL at 16:41

## 2020-06-24 RX ADMIN — Medication 10 ML: at 05:31

## 2020-06-24 RX ADMIN — FUROSEMIDE 20 MG: 40 TABLET ORAL at 08:40

## 2020-06-24 RX ADMIN — APIXABAN 5 MG: 5 TABLET, FILM COATED ORAL at 10:52

## 2020-06-24 RX ADMIN — MORPHINE SULFATE 1 MG: 2 INJECTION, SOLUTION INTRAMUSCULAR; INTRAVENOUS at 02:18

## 2020-06-24 RX ADMIN — APIXABAN 5 MG: 5 TABLET, FILM COATED ORAL at 17:13

## 2020-06-24 RX ADMIN — SUCRALFATE 1 G: 1 TABLET ORAL at 10:52

## 2020-06-24 RX ADMIN — PANTOPRAZOLE SODIUM 40 MG: 40 TABLET, DELAYED RELEASE ORAL at 08:40

## 2020-06-24 RX ADMIN — DILTIAZEM HYDROCHLORIDE 120 MG: 120 CAPSULE, COATED, EXTENDED RELEASE ORAL at 10:52

## 2020-06-24 RX ADMIN — ALUMINUM HYDROXIDE, MAGNESIUM HYDROXIDE, AND SIMETHICONE 30 ML: 200; 200; 20 SUSPENSION ORAL at 22:18

## 2020-06-24 RX ADMIN — ALUMINUM HYDROXIDE, MAGNESIUM HYDROXIDE, AND SIMETHICONE 30 ML: 200; 200; 20 SUSPENSION ORAL at 14:51

## 2020-06-24 RX ADMIN — MEMANTINE HYDROCHLORIDE 10 MG: 5 TABLET ORAL at 08:40

## 2020-06-24 RX ADMIN — POTASSIUM BICARBONATE 40 MEQ: 782 TABLET, EFFERVESCENT ORAL at 08:38

## 2020-06-24 RX ADMIN — Medication 5 ML: at 22:08

## 2020-06-24 RX ADMIN — MORPHINE SULFATE 1 MG: 2 INJECTION, SOLUTION INTRAMUSCULAR; INTRAVENOUS at 09:05

## 2020-06-24 RX ADMIN — HEPARIN SODIUM 5000 UNITS: 5000 INJECTION INTRAVENOUS; SUBCUTANEOUS at 05:31

## 2020-06-24 RX ADMIN — METOPROLOL TARTRATE 25 MG: 25 TABLET, FILM COATED ORAL at 08:40

## 2020-06-24 RX ADMIN — CEFTRIAXONE SODIUM 1 G: 1 INJECTION, POWDER, FOR SOLUTION INTRAMUSCULAR; INTRAVENOUS at 22:16

## 2020-06-24 RX ADMIN — DILTIAZEM HYDROCHLORIDE 120 MG: 120 CAPSULE, COATED, EXTENDED RELEASE ORAL at 17:13

## 2020-06-24 RX ADMIN — AZITHROMYCIN 500 MG: 500 INJECTION, POWDER, LYOPHILIZED, FOR SOLUTION INTRAVENOUS at 22:04

## 2020-06-24 RX ADMIN — HYDRALAZINE HYDROCHLORIDE 10 MG: 10 TABLET, FILM COATED ORAL at 08:40

## 2020-06-24 RX ADMIN — ACETAMINOPHEN 650 MG: 325 TABLET, FILM COATED ORAL at 00:26

## 2020-06-24 RX ADMIN — MEMANTINE HYDROCHLORIDE 10 MG: 5 TABLET ORAL at 17:13

## 2020-06-24 RX ADMIN — POLYETHYLENE GLYCOL 3350 17 G: 17 POWDER, FOR SOLUTION ORAL at 08:38

## 2020-06-24 RX ADMIN — INSULIN LISPRO 2 UNITS: 100 INJECTION, SOLUTION INTRAVENOUS; SUBCUTANEOUS at 22:11

## 2020-06-24 RX ADMIN — AZITHROMYCIN 500 MG: 500 INJECTION, POWDER, LYOPHILIZED, FOR SOLUTION INTRAVENOUS at 00:20

## 2020-06-24 RX ADMIN — METOPROLOL TARTRATE 25 MG: 25 TABLET, FILM COATED ORAL at 17:13

## 2020-06-24 RX ADMIN — ACETAMINOPHEN 650 MG: 325 TABLET, FILM COATED ORAL at 22:18

## 2020-06-24 RX ADMIN — TUBERCULIN PURIFIED PROTEIN DERIVATIVE 5 UNITS: 5 INJECTION, SOLUTION INTRADERMAL at 17:13

## 2020-06-24 RX ADMIN — SUCRALFATE 1 G: 1 TABLET ORAL at 08:39

## 2020-06-24 RX ADMIN — LEVOTHYROXINE SODIUM 125 MCG: 75 TABLET ORAL at 08:40

## 2020-06-24 RX ADMIN — Medication 10 ML: at 14:55

## 2020-06-24 RX ADMIN — LISINOPRIL 20 MG: 20 TABLET ORAL at 08:40

## 2020-06-24 NOTE — PROGRESS NOTES
HOSPITALIST PROGRESS NOTE  NAME:  Alfred Serrano   Age:  80 y.o.  :   1926   MRN:   [id number]  PCP: Michelle Alves MD  Consulting MD:  Treatment Team: Attending Provider: Jeana Haney MD; Consulting Provider: Naya Garcia MD; Staff Nurse: Zoran Calderon, ANTONIO; Utilization Review: Cristal Hampton RN  SUBJECTIVE:   Patient 80-year-old female with history of aortic valve stenosis, hypertension, hypothyroidism, dementia, GERD, long-term resident of skilled nursing facility admitted on  for A.fib with RVR. Pt reported of increased fatigue, poor oral intake and generalized weakness. ER evaluation notable for EKG with A. fib RVR with rates during my evaluation of -.     :  Pt reports feeling okay, c/o mild chest discomfort. No fever, chills, nausea/vomiting, palpitations, abdominal pain. No overnight events. Currently on diltiazem gtt. Complete ROS done and is as stated above or otherwise negative    Objective:     Visit Vitals  BP 98/56   Pulse 86   Temp 98 °F (36.7 °C)   Resp 20   Ht 5' 8\" (1.727 m)   Wt 76 kg (167 lb 9.6 oz)   SpO2 91%   BMI 25.48 kg/m²      Temp (24hrs), Av.6 °F (37 °C), Min:98 °F (36.7 °C), Max:99.6 °F (37.6 °C)    Oxygen Therapy  O2 Sat (%): 91 % (20 0520)  Pulse via Oximetry: 137 beats per minute (20 2100)  O2 Device: Room air (20 0432)  Physical Exam:  General:    Alert, awake, NAD, on RA  Head:   Normocephalic, without obvious abnormality, atraumatic. Nose:  Nares normal. No drainage or sinus tenderness. Lungs:   Clear to auscultation bilaterally. No Wheezing or Rhonchi. No rales. Heart:   Irregularly irregular 2/6 systolic murmur  Abdomen:   Soft, non-tender. Not distended. Bowel sounds normal.   Extremities: No cyanosis. No edema. No clubbing  Skin:     Texture, turgor normal. No rashes or lesions.   Not Jaundiced  Neurologic: gcs 15, no motor or sensory deficits, CN 2-12 intact  Psych:             AOx3, mood and affect appropriate  Data Review:   Recent Results (from the past 24 hour(s))   EKG, 12 LEAD, INITIAL    Collection Time: 06/23/20 12:51 PM   Result Value Ref Range    Ventricular Rate 103 BPM    Atrial Rate 136 BPM    QRS Duration 106 ms    Q-T Interval 332 ms    QTC Calculation (Bezet) 434 ms    Calculated R Axis -58 degrees    Calculated T Axis 109 degrees    Diagnosis       !! AGE AND GENDER SPECIFIC ECG ANALYSIS !! Atrial fibrillation with rapid ventricular response  Left axis deviation  Minimal voltage criteria for LVH, may be normal variant  Septal infarct (cited on or before 23-JUN-2020)  ST & T wave abnormality, consider lateral ischemia or digitalis effect  Abnormal ECG    Confirmed by ST MARCELINO JUNG MD (), NELL TYSON ([id number]) on 6/23/2020 1:54:12 PM     CBC W/O DIFF    Collection Time: 06/23/20 12:55 PM   Result Value Ref Range    WBC 10.9 4.3 - 11.1 K/uL    RBC 3.91 (L) 4.05 - 5.2 M/uL    HGB 13.7 11.7 - 15.4 g/dL    HCT 37.2 35.8 - 46.3 %    MCV 95.1 79.6 - 97.8 FL    MCH 35.0 (H) 26.1 - 32.9 PG    MCHC 36.8 (H) 31.4 - 35.0 g/dL    RDW 14.7 (H) 11.9 - 14.6 %    PLATELET 357 537 - 086 K/uL    MPV 11.3 9.4 - 12.3 FL    ABSOLUTE NRBC 0.00 0.0 - 0.2 K/uL   TROPONIN-HIGH SENSITIVITY    Collection Time: 06/23/20 12:55 PM   Result Value Ref Range    Troponin-High Sensitivity 21.6 (H) 0 - 14 pg/mL   METABOLIC PANEL, COMPREHENSIVE    Collection Time: 06/23/20 12:55 PM   Result Value Ref Range    Sodium 137 136 - 145 mmol/L    Potassium 3.7 3.5 - 5.1 mmol/L    Chloride 100 98 - 107 mmol/L    CO2 27 21 - 32 mmol/L    Anion gap 10 7 - 16 mmol/L    Glucose 166 (H) 65 - 100 mg/dL    BUN 15 8 - 23 MG/DL    Creatinine 1.03 (H) 0.6 - 1.0 MG/DL    GFR est AA >60 >60 ml/min/1.73m2    GFR est non-AA 53 (L) >60 ml/min/1.73m2    Calcium 8.5 8.3 - 10.4 MG/DL    Bilirubin, total 0.9 0.2 - 1.1 MG/DL    ALT (SGPT) 17 12 - 65 U/L    AST (SGOT) 19 15 - 37 U/L    Alk.  phosphatase 104 50 - 136 U/L    Protein, total 7.0 6.3 - 8.2 g/dL    Albumin 2.6 (L) 3.2 - 4.6 g/dL    Globulin 4.4 (H) 2.3 - 3.5 g/dL    A-G Ratio 0.6 (L) 1.2 - 3.5     URINE MICROSCOPIC    Collection Time: 06/23/20  4:45 PM   Result Value Ref Range    WBC 0 0 /hpf    RBC 0 0 /hpf    Epithelial cells 0-3 0 /hpf    Bacteria 0 0 /hpf    Casts 0-3 0 /lpf   SARS-COV-2    Collection Time: 06/23/20  7:33 PM   Result Value Ref Range    SARS-CoV-2 PENDING     Specimen source Nasopharyngeal      COVID-19 rapid test Not detected NOTD     TROPONIN-HIGH SENSITIVITY    Collection Time: 06/23/20  9:45 PM   Result Value Ref Range    Troponin-High Sensitivity 23.1 (H) 0 - 14 pg/mL   NT-PRO BNP    Collection Time: 06/23/20  9:45 PM   Result Value Ref Range    NT pro-BNP 9,629 (H) <450 PG/ML   TSH 3RD GENERATION    Collection Time: 06/23/20  9:45 PM   Result Value Ref Range    TSH 0.641 0.358 - 3.740 uIU/mL   T4, FREE    Collection Time: 06/23/20  9:45 PM   Result Value Ref Range    T4, Free 1.5 0.9 - 1.8 NG/DL   HEMOGLOBIN A1C WITH EAG    Collection Time: 06/23/20  9:45 PM   Result Value Ref Range    Hemoglobin A1c 6.9 (H) 4.8 - 6.0 %    Est. average glucose 151 mg/dL   LACTIC ACID    Collection Time: 06/23/20  9:45 PM   Result Value Ref Range    Lactic acid 2.2 (HH) 0.4 - 2.0 MMOL/L   MAGNESIUM    Collection Time: 06/23/20  9:45 PM   Result Value Ref Range    Magnesium 1.8 1.8 - 2.4 mg/dL   METABOLIC PANEL, BASIC    Collection Time: 06/24/20  3:14 AM   Result Value Ref Range    Sodium 138 136 - 145 mmol/L    Potassium 3.3 (L) 3.5 - 5.1 mmol/L    Chloride 102 98 - 107 mmol/L    CO2 26 21 - 32 mmol/L    Anion gap 10 7 - 16 mmol/L    Glucose 179 (H) 65 - 100 mg/dL    BUN 14 8 - 23 MG/DL    Creatinine 0.99 0.6 - 1.0 MG/DL    GFR est AA >60 >60 ml/min/1.73m2    GFR est non-AA 56 (L) >60 ml/min/1.73m2    Calcium 7.9 (L) 8.3 - 10.4 MG/DL   CBC W/O DIFF    Collection Time: 06/24/20  3:14 AM   Result Value Ref Range    WBC 10.8 4.3 - 11.1 K/uL    RBC 3.76 (L) 4.05 - 5.2 M/uL    HGB 12.2 11.7 - 15.4 g/dL    HCT 34.5 (L) 35.8 - 46.3 %    MCV 91.8 79.6 - 97.8 FL    MCH 32.4 26.1 - 32.9 PG    MCHC 35.4 (H) 31.4 - 35.0 g/dL    RDW 13.2 11.9 - 14.6 %    PLATELET 418 762 - 434 K/uL    MPV 10.5 9.4 - 12.3 FL    ABSOLUTE NRBC 0.00 0.0 - 0.2 K/uL   TROPONIN-HIGH SENSITIVITY    Collection Time: 06/24/20  3:14 AM   Result Value Ref Range    Troponin-High Sensitivity 25.1 (H) 0 - 14 pg/mL       Status Exam Begun  Exam Ended        CHEST X-RAY, one view.     HISTORY:  Shortness of breath. Possible sepsis.       TECHNIQUE:  AP upright portable view.     COMPARISON: November 2006     FINDINGS:   Interstitial prominence. No lobar consolidation. Mild blunting  costophrenic angles. Heart may be enlarged. Aortic calcifications.      IMPRESSION  IMPRESSION:  Interstitial pattern, mild interstitial edema versus atypical  pneumonia.          Assessment and Plan: Active Hospital Problems    Diagnosis Date Noted    Atrial fibrillation with RVR ([facility] [address] 06/23/2020    Type 2 diabetes with nephropathy ([facility] [address] 01/17/2019    Acquired hypothyroidism 07/17/2017    Aortic stenosis 06/10/2016     No sever SOB, no chest pain or syncope. Echo showed mod AS, mean grad 22mm Hg, nl EF, mild LVH      Depression        A/P    - Afib rvr - continue cardizem gtt, echo is pending, serial troponins are flat in 3 sets. Monitor on telemetry. Cardiology on board    - Hypokalemia: potassium replaced, orally, recheck in AM. Today 3.3    - abdominal pain - resolved  CTAP with no acute intra abdominal process. - Aortic stenosis - echo is pending    - Fever - resolved  Afebrile over past 24 hours. UA is clean. Urine culture is pending. Pt swabbed for COVID. Pending currently  Pt is on empiric IV rocephin and azithromycin    - Hypothyroidism -   Optimally controlled, TSH 0.641.  Continue synthroid 125 mcg daily    - Dm2 - A1c is 6.9  Use sliding scale only     - PT/OT eval  - continue droplet/contact isolation until covid test is back  - DVT prophylaxis with eliquis      Code Status: Full code    Dispo: pending until medically stable    Signed By: Ronak Haskins MD     June 24, 2020

## 2020-06-24 NOTE — PROGRESS NOTES
Care Management Interventions  PCP Verified by CM: Yes  Last Visit to PCP: 06/22/19  Mode of Transport at Discharge: Other (see comment)(Loretta Kearney Daughter [phone] )  Transition of Care Consult (CM Consult): Discharge Planning, SNF  Discharge Durable Medical Equipment: No  Physical Therapy Consult: Yes  Occupational Therapy Consult: No  Speech Therapy Consult: No  Current Support Network: Other([facility] term care[facility])  Confirm Follow Up Transport: Wheelchair Susannah Railing  The Plan for Transition of Care is Related to the Following Treatment Goals : SNF  The Patient and/or Patient Representative was Provided with a Choice of Provider and Agrees with the Discharge Plan?: Yes  Name of the Patient Representative Who was Provided with a Choice of Provider and Agrees with the Discharge Plan: Pt's daughterShabbir Roles of Choice List was Provided with Basic Dialogue that Supports the Patient's Individualized Plan of Care/Goals, Treatment Preferences and Shares the Quality Data Associated with the Providers?: Yes  Discharge Location  Discharge Placement: Unable to determine at this time    Pt admitted to 3rd floor tele for afib with RVR. Pt is a long term resident at [facility] for the last 4 yrs. CM met with pt's daughter, Suzi Kumar to discuss CM needs & DCP. Pt has dementia. Suzi Kumar informs CM that the family is wanting to move pt to another LT facility due to changes at [facility] over the years. CM has given Szui Kumar SNF list for shor term placement while the family pursues finding a new place. CM gave her [facility] brochure to assist in the search. CM informed the pt does not have LTC policy  CM will follow along for CM needs.

## 2020-06-24 NOTE — PROGRESS NOTES
Problem: Mobility Impaired (Adult and Pediatric)  Goal: *Acute Goals and Plan of Care (Insert Text)  Outcome: Progressing Towards Goal  Note: STG:  (1.)Ms. Chelsi Baker will move from supine to sit and sit to supine , scoot up and down, and roll side to side with SUPERVISION within 3 treatment day(s). (2.)Ms. Chelsi Baker will transfer from bed to chair and chair to bed with MAXIMAL ASSIST using the least restrictive device within 3 treatment day(s). (3.)Ms. Chelsi Baker will self propel wheelchair x 100 ft with Min A within 3 treatment days    LTG:  (1.)Ms. Chelsi Baker will move from supine to sit and sit to supine , scoot up and down, and roll side to side in bed with MODIFIED INDEPENDENCE within 7 treatment day(s). (2.)Ms. Chelsi Baker will transfer from bed to chair and chair to bed with MINIMAL ASSIST using the least restrictive device within 7 treatment day(s). (3.)Ms. Chelsi Baker will self propel wheelchair x250 feet with Min A within 7 treatment days  (4.)Pt will perform 30 min of therapeutic ex/activity to improve functional mobility within 7 treatment days.    ________________________________________________________________________________________________        PHYSICAL THERAPY: Initial Assessment, Daily Note, and PM 6/24/2020  INPATIENT: PT Visit Days : 1  [place] / [place] / Product Type: Medicare /       NAME/AGE/GENDER: Kana Garcia is a 80 y.o. female   PRIMARY DIAGNOSIS: Atrial fibrillation with RVR ([address] [I48.91] Atrial fibrillation with RVR (HCC) Atrial fibrillation with RVR ([address]        ICD-10: Treatment Diagnosis:    Generalized Muscle Weakness (M62.81)  Other lack of cordination (R27.8)  Difficulty in walking, Not elsewhere classified (R26.2)  Other abnormalities of gait and mobility (R26.89)   Precaution/Allergies:  Codeine; Pcn [penicillins]; and Strawberry      ASSESSMENT:     Ms. Chelsi Baker  is a 80year old female who has been admitted to hospital on 06/23 with above diagnosis. Prior to hospital admission pt lives in a SNF story home with 0 step(s) to enter. Pt endorses 0 falls in past 6 months. Prior to admission wheelchair bound for mobility and participating in PT at SNF.     06/24: Upon entering, pt supine, Alert and oriented to person and place. Disoriented to time. Agreeable to therapy. She reports 4/10 chest pain at rest. CGA for rolling side to side and performing bed mobility. Sit <> supine with Min A and additional time. BLE strength is poor (3/5 bilat). Sitting EOB with fair static and dynamic sitting balance control. Seated balance control training and edge of bed. Min A to scoot in bed ,Sit <> stand not performed today. After seated EOB activity BP checked 167/70. Supine BP read again as 167/70. RN informed. Pt fatigued following session. At end of session pt resting in bed with all needs within reach, alarm activated for safety, RN notified. Pt presents as functioning below her baseline, with deficits in mobility including transfers, gait, balance, and activity tolerance. Treatment initiated in session to address seated balance, activity tolerance. Pt will benefit from skilled therapy services to address stated deficits to promote return to highest level of function, independence, and safety.        This section established at most recent assessment   PROBLEM LIST (Impairments causing functional limitations):  Decreased Strength  Decreased ADL/Functional Activities  Decreased Transfer Abilities  Decreased Ambulation Ability/Technique  Decreased Balance  Increased Pain  Decreased Activity Tolerance  Increased Shortness of Breath  Decreased Flexibility/Joint Mobility  Decreased [place] with Home Exercise Program   INTERVENTIONS PLANNED: (Benefits and precautions of physical therapy have been discussed with the patient.)  Balance Exercise  Bed Mobility  Family Education  Gait Training  Group Therapy  Home Exercise Program (HEP)  Manual Therapy  Neuromuscular Re-education/Strengthening  Range of Motion (ROM)  Therapeutic Activites  Therapeutic Exercise/Strengthening  Transfer Training     TREATMENT PLAN: Frequency/Duration: 3 times a week for duration of hospital stay  Rehabilitation Potential For Stated Goals: Good     REHAB RECOMMENDATIONS (at time of discharge pending progress):    Placement: It is my opinion, based on this patient's performance to date, that Ms. Shasha Chahal may benefit from intensive therapy at a [address] [place] [address] after discharge due to the functional deficits listed above that are likely to improve with skilled rehabilitation and concerns that he/she may be unsafe to be unsupervised at home due to gross immobility, multiple comorbidities and high level of care/supervision. Equipment:   None at this time              HISTORY:   History of Present Injury/Illness (Reason for Referral):  Per Physician Note:    Patient 80-year-old female with past medical history of aortic valve stenosis, hypertension, hypothyroidism, dementia, GERD, long-term resident of skilled nursing facility presents with complaints of right lower quadrant pain, chest discomfort and was found in EMS ride to have A. fib with RVR. Per review of facility notes from [facility] patient has recently been treated for suspected UTI on Macrobid but was noted to have increasing fatigue, decreased appetite and general poor appearance. ER evaluation notable for EKG with A. fib RVR with rates during my evaluation of 1 20-1 40. Patient is confused and disoriented with complaints of substernal chest pressure. Cardiology has been notified of patient by ER physician with request for hospitalist to admit. Patient denies fever, chills, cough, dyspnea, nausea, vomiting but does say she has had intermittent right lower quadrant pain (she is not tender on exam) She is a rather poor historian. Denies changes in stool or urine.   Past Medical History/Comorbidities: Ms. Jovanni Randall  has a past medical history of Abdominal pain, epigastric, Abdominal pain, unspecified site, Anxiety state, unspecified, Aortic stenosis (6/10/2016), Aortic valve disorders, Atherosclerosis of aorta (HCC), Backache, unspecified, Benign neoplasm of colon, Cervicalgia, Closed fracture of unspecified bone, Depressive disorder, not elsewhere classified, Diabetes ([facility] [address] Diarrhea, Disorders of magnesium metabolism, Edema, Elevated sedimentation rate, Encounter for long-term (current) use of other medications, Endocrine disease, Epistaxis, Esophageal reflux, Essential hypertension, benign, Gastrointestinal malfunction arising from mental factors, Hypertension, Hypertension-controlled, benign, Hypertonicity of bladder, Hypopotassemia, Insomnia, unspecified, Irritable bowel syndrome, Memory loss, Nausea alone, Osteoarthrosis, unspecified whether generalized or localized, unspecified site, Other ill-defined conditions(799.89), Other malaise and fatigue, Pain in joint, lower leg, Pain in joint, shoulder region, Postnasal drip, Rheumatoid arthritis(714.0), Type II or unspecified type diabetes mellitus without mention of complication, not stated as uncontrolled, Unspecified constipation, Unspecified hemorrhoids with other complication, Unspecified hemorrhoids without mention of complication, Unspecified hereditary and idiopathic peripheral neuropathy, Unspecified hypothyroidism, Urgency of urination, and Urinary tract infection, site not specified. Ms. Jovanni Randall  has a past surgical history that includes hx cholecystectomy; hx other surgical; and hx tonsillectomy.   Social History/Living Environment:   Home Environment: Skilled nursing facility  One/Two Story Residence: One story  Living Alone: No  Support Systems: Child(yohan)  Patient Expects to be Discharged to[de-identified] Skilled nursing facility  Current DME Used/Available at Home: Wheelchair  Prior Level of Function/Work/Activity:  Receiving PT at SNF, uses wheelchair for mobility     Number of Personal Factors/Comorbidities that affect the Plan of Care: 3+: HIGH COMPLEXITY   EXAMINATION:   Most Recent Physical Functioning:   Gross Assessment:  AROM: Generally decreased, functional  PROM: Generally decreased, functional  Strength: Generally decreased, functional  Coordination: Generally decreased, functional  Tone: Normal  Sensation: Impaired               Posture:  Posture (WDL): Exceptions to WDL  Posture Assessment: Trunk flexion, Increased, Rounded shoulders, Forward head(Seated)  Balance:  Sitting: Impaired  Sitting - Static: Fair (occasional)  Sitting - Dynamic: Fair (occasional) Bed Mobility:  Rolling: Contact guard assistance  Supine to Sit: Minimum assistance  Sit to Supine: Contact guard assistance  Scooting: Minimum assistance  Wheelchair Mobility:     Transfers:     Gait:            Body Structures Involved:  Heart  Joints  Muscles  Ligaments Body Functions Affected:  Cardio  Movement Related Activities and Participation Affected:  General Tasks and Demands  Mobility  Self Care  Domestic Life  Interpersonal Interactions and Relationships  Community, Social and Civic Life   Number of elements that affect the Plan of Care: 4+: HIGH COMPLEXITY   CLINICAL PRESENTATION:   Presentation: Stable and uncomplicated: LOW COMPLEXITY   CLINICAL DECISION MAKING:   [facility] 6 Clicks   Basic Mobility Inpatient Short Form  How much difficulty does the patient currently have. .. Unable A Lot A Little None   1. Turning over in bed (including adjusting bedclothes, sheets and blankets)? [] 1   [] 2   [x] 3   [] 4   2. Sitting down on and standing up from a chair with arms ( e.g., wheelchair, bedside commode, etc.)   [] 1   [x] 2   [] 3   [] 4   3. Moving from lying on back to sitting on the side of the bed? [] 1   [] 2   [x] 3   [] 4   How much help from another person does the patient currently need. .. Total A Lot A Little None   4.   Moving to and from a bed to a chair (including a wheelchair)? [] 1   [x] 2   [] 3   [] 4   5. Need to walk in hospital room? [] 1   [x] 2   [] 3   [] 4   6. Climbing 3-5 steps with a railing? [x] 1   [] 2   [] 3   [] 4   © 2007, Trustees of [address] [place] [address] [address] [address], under license to [employer]. All rights reserved      Score:  Initial: 13 Most Recent: X (Date: -- )    Interpretation of Tool:  Represents activities that are increasingly more difficult (i.e. Bed mobility, Transfers, Gait). Medical Necessity:     Patient is expected to demonstrate progress in   strength, range of motion, balance, coordination, and functional technique   to   improve safety during bed mobility, transfers and ADLs   . Reason for Services/Other Comments:  Patient continues to require skilled intervention due to   Gross weakness, poor mobility and increased fall risk   . Use of outcome tool(s) and clinical judgement create a POC that gives a: Questionable prediction of patient's progress: MODERATE COMPLEXITY            TREATMENT:   (In addition to Assessment/Re-Assessment sessions the following treatments were rendered)   Pre-treatment Symptoms/Complaints:  \"I don't walk dear. \"  Pain: Initial:   Pain Intensity 1: 4  Pain Location 1: Chest  Post Session:  4/10     Therapeutic Activity: (    10min):  Therapeutic activities including Bed transfers and seated static/dynamic balance to improve mobility, strength, balance, and coordination. Required minimal   to promote static and dynamic balance in standing and promote coordination of bilateral, lower extremity(s). Braces/Orthotics/Lines/Etc:   O2 Device: Room air  Treatment/Session Assessment:    Response to Treatment:  See Above  Interdisciplinary Collaboration:   Physical Therapist  Registered Nurse  After treatment position/precautions:   Supine in bed  Bed alarm/tab alert on  Bed/Chair-wheels locked  Call light within reach  RN notified   Compliance with Program/Exercises:  Will assess as treatment progresses  Recommendations/Intent for next treatment session: \"Next visit will focus on advancements to more challenging activities\".   Total Treatment Duration:  PT Patient Time In/Time Out  Time In: 1440  Time Out: [address], PT, DPT

## 2020-06-24 NOTE — ROUTINE PROCESS
"Occupational Therapy Treatment completed with focus on ADLs, ADL transfers and patient education.  Functional Status:  Pt seen for OT tx. Up in chair at beginning of session. Supervision sit to stand, supv amb w/ FWW, supv toilet transfer/pericare, supv standing at the sink to groom. Pt given thorough education regarding completing LB dressing using AE as needed. Pt also given physical demonstration and verbalized understanding of completing tub transfer for safety. Pt pleasant and cooperative. Discussed home safety w/ pt regarding ADLs, ADL transfers and IADLs.   Plan of Care: Will benefit from Occupational Therapy 5 times per week  Discharge Recommendations:  Equipment Will Continue to Assess for Equipment Needs.     See \"Rehab Therapy-Acute\" Patient Summary Report for complete documentation.   " Notified Liang Rosado MD regarding patients BP having dropped. Orders to hold 500ml fluid bolus. Held on [facility]. Also received order to hold hydralazine.  Held on [facility]

## 2020-06-24 NOTE — CONSULTS
Pt seen and examined  Note extension pending  Imp:  Fever  A fib  Aortic stenosis  Htn  Dementia  Plan:  Hold hydralazine, lisinopril, lasix  Change diltiazem to po  Add Eliquis 2.5

## 2020-06-24 NOTE — ROUTINE PROCESS
Bedside and Verbal shift change report given to Wilburn Severin, RN and Mike Diaz RN (oncoming nurse) by self Cherie david). Report included the following information SBAR, Kardex, Intake/Output, MAR, Recent Results

## 2020-06-24 NOTE — CONSULTS
[facility] Cardiology Consultation        Date of  Admission: 6/23/2020 12:31 PM     Primary Care Physician: Dr Pravin Barone  Primary Cardiologist: Dr Vincenzo Taylor  Referring Physician: Dr Celia Bloch Physician: Dr Megan Palomares    CC/Reason for consult: Atrial fibrillation with RVR    HPI:  Sadia Cervantes is a 80 y.o. female with h/o aortic valve stenosis, HTN, hypothyroidism, GERD, and dementia who presented to [facility] ED from skilled nursing facility with complaints of right lower quadrant pain and chest discomfort. Patient is poor historian due to dementia therefore history is gathered from medical chart and medical staff. During transportation EMS reported atrial fibrillation with RVR. Upon evaluation in ED EKD showed atrial fibrillation with RVR. Patient was given IV Cardizem 10 mg and started on Cardizem gtt. According to history patient does not have history of atrial fibrillation. Patient had cardiac holter monitor in December 2018 that showed PACs but no atrial fibrillation. High sensitivity troponin 21.6-23.1-25.1, BUN/Cr 15/1.03, Mag 1.8, K 3.7, proBNP 9,629. We were consulted for atrial fibrillation. Past Medical History:   Diagnosis Date    Abdominal pain, epigastric     Abdominal pain, unspecified site     Anxiety state, unspecified     Aortic stenosis 6/10/2016    No sever SOB, no chest pain or syncope.  Echo showed mod AS, mean grad 22mm Hg, nl EF, mild LVH    Aortic valve disorders     Atherosclerosis of aorta (HCC)     Backache, unspecified     Benign neoplasm of colon     Cervicalgia     Closed fracture of unspecified bone     Depressive disorder, not elsewhere classified     Diabetes (HCC)     Diarrhea     Disorders of magnesium metabolism     Edema     Elevated sedimentation rate     Encounter for long-term (current) use of other medications     Endocrine disease     Epistaxis     Esophageal reflux     Essential hypertension, benign     Gastrointestinal malfunction arising from mental factors     Hypertension     Hypertension-controlled, benign     Hypertonicity of bladder     Hypopotassemia     Insomnia, unspecified     Irritable bowel syndrome     Memory loss     Nausea alone     Osteoarthrosis, unspecified whether generalized or localized, unspecified site     Other ill-defined conditions(799.89)     hyperlipidemia    Other malaise and fatigue     Pain in joint, lower leg     Pain in joint, shoulder region     Postnasal drip     Rheumatoid arthritis(714.0)     Type II or unspecified type diabetes mellitus without mention of complication, not stated as uncontrolled     Unspecified constipation     Unspecified hemorrhoids with other complication     Unspecified hemorrhoids without mention of complication     Unspecified hereditary and idiopathic peripheral neuropathy     Unspecified hypothyroidism     Urgency of urination     Urinary tract infection, site not specified       Past Surgical History:   Procedure Laterality Date    HX CHOLECYSTECTOMY      HX OTHER SURGICAL      thyroidectomy    HX TONSILLECTOMY         Allergies   Allergen Reactions    Codeine Rash    Pcn [Penicillins] Hives    Strawberry Itching      Social History     Socioeconomic History    Marital status:      Spouse name: Not on file    Number of children: Not on file    Years of education: Not on file    Highest education level: Not on file   Occupational History    Not on file   Social Needs    Financial resource strain: Not on file    Food insecurity     Worry: Not on file     Inability: Not on file    Transportation needs     Medical: Not on file     Non-medical: Not on file   Tobacco Use    Smoking status: Never Smoker    Smokeless tobacco: Never Used   Substance and Sexual Activity    Alcohol use: No    Drug use: No    Sexual activity: Not on file   Lifestyle    Physical activity     Days per week: Not on file     Minutes per session: Not on file    Stress: Not on file   Relationships    Social connections     Talks on phone: Not on file     Gets together: Not on file     Attends [employer] service: Not on file     Active member of club or organization: Not on file     Attends meetings of clubs or organizations: Not on file     Relationship status: Not on file    Intimate partner violence     Fear of current or ex partner: Not on file     Emotionally abused: Not on file     Physically abused: Not on file     Forced sexual activity: Not on file   Other Topics Concern    Not on file   Social History Narrative    Not on file     Family History   Problem Relation Age of Onset    Arthritis-rheumatoid Daughter     Arthritis-osteo Son         Current Facility-Administered Medications   Medication Dose Route Frequency    potassium bicarb-citric acid (EFFER-K) tablet 40 mEq  40 mEq Oral BID    dilTIAZem ER (CARDIZEM CD) capsule 120 mg  120 mg Oral BID    apixaban (ELIQUIS) tablet 5 mg  5 mg Oral BID    acetaminophen (TYLENOL) tablet 1,000 mg  1,000 mg Oral DAILY    pantoprazole (PROTONIX) tablet 40 mg  40 mg Oral BID    levothyroxine (SYNTHROID) tablet 125 mcg  125 mcg Oral ACB    memantine (NAMENDA) tablet 10 mg  10 mg Oral BID    metoprolol tartrate (LOPRESSOR) tablet 25 mg  25 mg Oral BID    ondansetron (ZOFRAN ODT) tablet 4 mg  4 mg Oral Q8H PRN    polyethylene glycol (MIRALAX) packet 17 g  17 g Oral DAILY    sucralfate (CARAFATE) tablet 1 g  1 g Oral TIDAC    sodium chloride (NS) flush 5-40 mL  5-40 mL IntraVENous Q8H    sodium chloride (NS) flush 5-40 mL  5-40 mL IntraVENous PRN    acetaminophen (TYLENOL) tablet 650 mg  650 mg Oral Q4H PRN    heparin (porcine) injection 5,000 Units  5,000 Units SubCUTAneous Q8H    cefTRIAXone (ROCEPHIN) 1 g in 0.9% sodium chloride (MBP/ADV) 50 mL  1 g IntraVENous Q24H    azithromycin (ZITHROMAX) 500 mg in 0.9% sodium chloride (MBP/ADV) 250 mL  500 mg IntraVENous Q24H    0.9% sodium chloride infusion 500 mL  500 mL IntraVENous ONCE    morphine injection 1 mg  1 mg IntraVENous Q6H PRN       Review of Symptoms:  Unable to review with patient secondary to dementia      Subjective:     Physical Exam:  General: Elderly, appears stated age  [de-identified]: pupils equal and round, no abnormalities noted  Neck: supple, no JVD, no carotid bruits  Heart: IRR with systolic murmur  Lungs: Clear throughout auscultation bilaterally without adventitious sounds  Abd: soft, nontender, nondistended, with good bowel sounds  Ext: warm, no edema, calves supple/nontender, pulses 2+ bilaterally  Skin: warm and dry  Psychiatric: Normal mood and affect  Neurologic: Alert and oriented X 3    Cardiographics    Telemetry: AFIB  ECG: atrial fibrillation  Echocardiogram: ordered, pending     Labs:   Recent Results (from the past 24 hour(s))   EKG, 12 LEAD, INITIAL    Collection Time: 06/23/20 12:51 PM   Result Value Ref Range    Ventricular Rate 103 BPM    Atrial Rate 136 BPM    QRS Duration 106 ms    Q-T Interval 332 ms    QTC Calculation (Bezet) 434 ms    Calculated R Axis -58 degrees    Calculated T Axis 109 degrees    Diagnosis       !! AGE AND GENDER SPECIFIC ECG ANALYSIS !!   Atrial fibrillation with rapid ventricular response  Left axis deviation  Minimal voltage criteria for LVH, may be normal variant  Septal infarct (cited on or before 23-JUN-2020)  ST & T wave abnormality, consider lateral ischemia or digitalis effect  Abnormal ECG    Confirmed by ST MARCELINO JUNG MD (), NELL TYSON ([id number]) on 6/23/2020 1:54:12 PM     CBC W/O DIFF    Collection Time: 06/23/20 12:55 PM   Result Value Ref Range    WBC 10.9 4.3 - 11.1 K/uL    RBC 3.91 (L) 4.05 - 5.2 M/uL    HGB 13.7 11.7 - 15.4 g/dL    HCT 37.2 35.8 - 46.3 %    MCV 95.1 79.6 - 97.8 FL    MCH 35.0 (H) 26.1 - 32.9 PG    MCHC 36.8 (H) 31.4 - 35.0 g/dL    RDW 14.7 (H) 11.9 - 14.6 %    PLATELET 848 563 - 237 K/uL    MPV 11.3 9.4 - 12.3 FL    ABSOLUTE NRBC 0.00 0.0 - 0.2 K/uL   TROPONIN-HIGH SENSITIVITY    Collection Time: 06/23/20 12:55 PM   Result Value Ref Range    Troponin-High Sensitivity 21.6 (H) 0 - 14 pg/mL   METABOLIC PANEL, COMPREHENSIVE    Collection Time: 06/23/20 12:55 PM   Result Value Ref Range    Sodium 137 136 - 145 mmol/L    Potassium 3.7 3.5 - 5.1 mmol/L    Chloride 100 98 - 107 mmol/L    CO2 27 21 - 32 mmol/L    Anion gap 10 7 - 16 mmol/L    Glucose 166 (H) 65 - 100 mg/dL    BUN 15 8 - 23 MG/DL    Creatinine 1.03 (H) 0.6 - 1.0 MG/DL    GFR est AA >60 >60 ml/min/1.73m2    GFR est non-AA 53 (L) >60 ml/min/1.73m2    Calcium 8.5 8.3 - 10.4 MG/DL    Bilirubin, total 0.9 0.2 - 1.1 MG/DL    ALT (SGPT) 17 12 - 65 U/L    AST (SGOT) 19 15 - 37 U/L    Alk.  phosphatase 104 50 - 136 U/L    Protein, total 7.0 6.3 - 8.2 g/dL    Albumin 2.6 (L) 3.2 - 4.6 g/dL    Globulin 4.4 (H) 2.3 - 3.5 g/dL    A-G Ratio 0.6 (L) 1.2 - 3.5     URINE MICROSCOPIC    Collection Time: 06/23/20  4:45 PM   Result Value Ref Range    WBC 0 0 /hpf    RBC 0 0 /hpf    Epithelial cells 0-3 0 /hpf    Bacteria 0 0 /hpf    Casts 0-3 0 /lpf   SARS-COV-2    Collection Time: 06/23/20  7:33 PM   Result Value Ref Range    SARS-CoV-2 PENDING     Specimen source Nasopharyngeal      COVID-19 rapid test Not detected NOTD     TROPONIN-HIGH SENSITIVITY    Collection Time: 06/23/20  9:45 PM   Result Value Ref Range    Troponin-High Sensitivity 23.1 (H) 0 - 14 pg/mL   NT-PRO BNP    Collection Time: 06/23/20  9:45 PM   Result Value Ref Range    NT pro-BNP 9,629 (H) <450 PG/ML   TSH 3RD GENERATION    Collection Time: 06/23/20  9:45 PM   Result Value Ref Range    TSH 0.641 0.358 - 3.740 uIU/mL   T4, FREE    Collection Time: 06/23/20  9:45 PM   Result Value Ref Range    T4, Free 1.5 0.9 - 1.8 NG/DL   HEMOGLOBIN A1C WITH EAG    Collection Time: 06/23/20  9:45 PM   Result Value Ref Range    Hemoglobin A1c 6.9 (H) 4.8 - 6.0 %    Est. average glucose 151 mg/dL   LACTIC ACID    Collection Time: 06/23/20  9:45 PM   Result Value Ref Range    Lactic acid 2.2 (HH) 0.4 - 2.0 MMOL/L MAGNESIUM    Collection Time: 06/23/20  9:45 PM   Result Value Ref Range    Magnesium 1.8 1.8 - 2.4 mg/dL   METABOLIC PANEL, BASIC    Collection Time: 06/24/20  3:14 AM   Result Value Ref Range    Sodium 138 136 - 145 mmol/L    Potassium 3.3 (L) 3.5 - 5.1 mmol/L    Chloride 102 98 - 107 mmol/L    CO2 26 21 - 32 mmol/L    Anion gap 10 7 - 16 mmol/L    Glucose 179 (H) 65 - 100 mg/dL    BUN 14 8 - 23 MG/DL    Creatinine 0.99 0.6 - 1.0 MG/DL    GFR est AA >60 >60 ml/min/1.73m2    GFR est non-AA 56 (L) >60 ml/min/1.73m2    Calcium 7.9 (L) 8.3 - 10.4 MG/DL   CBC W/O DIFF    Collection Time: 06/24/20  3:14 AM   Result Value Ref Range    WBC 10.8 4.3 - 11.1 K/uL    RBC 3.76 (L) 4.05 - 5.2 M/uL    HGB 12.2 11.7 - 15.4 g/dL    HCT 34.5 (L) 35.8 - 46.3 %    MCV 91.8 79.6 - 97.8 FL    MCH 32.4 26.1 - 32.9 PG    MCHC 35.4 (H) 31.4 - 35.0 g/dL    RDW 13.2 11.9 - 14.6 %    PLATELET 952 217 - 686 K/uL    MPV 10.5 9.4 - 12.3 FL    ABSOLUTE NRBC 0.00 0.0 - 0.2 K/uL   TROPONIN-HIGH SENSITIVITY    Collection Time: 06/24/20  3:14 AM   Result Value Ref Range    Troponin-High Sensitivity 25.1 (H) 0 - 14 pg/mL     Pt has been seen and examined by Dr. Lois Langford. He agrees with the following assessment and plan. Assessment/Plan:      Principal Problem:    Atrial fibrillation with RVR- heart rate currently controlled, ECHO, Cardizem gtt discontinued and changed to PO Cardizem  mg BID, cont Lopressor 25 mg BID, Start Eliquis 5 mg BID    Active Problems:    HTN- running on low side, hold hydralazine, Lisinopril, and Lasix        Depression- per primary      Aortic stenosis- ECHO      Acquired hypothyroidism- per primary      Type 2 diabetes with nephropathy- per primary    Thank you for consulting [facility] Cardiology and allowing us to participate in the care of this patient. We will continue to follow along with you.     MOHAN Fink

## 2020-06-24 NOTE — ROUTINE PROCESS
Verbal bedside report given to [facility], darvin RN. Patient's situation, background, assessment and recommendations provided. Opportunity for questions provided. Oncoming RN assumed care of patient.

## 2020-06-24 NOTE — PROGRESS NOTES
Problem: Afib Pathway: Day 1  Goal: *Hemodynamically stable  Outcome: Progressing Towards Goal  Goal: *Stable cardiac rhythm  Outcome: Progressing Towards Goal  Goal: *Lungs clear or at baseline  Outcome: Progressing Towards Goal  Goal: *Labs within defined limits  Outcome: Progressing Towards Goal

## 2020-06-24 NOTE — ED NOTES
TRANSFER - OUT REPORT:    Verbal report given to ANTONIO Guidry(name) on Wm. Youngblood Sandag Malathi  being transferred to 517(unit) for routine progression of care       Report consisted of patients Situation, Background, Assessment and   Recommendations(SBAR). Information from the following report(s) ED Summary was reviewed with the receiving nurse. Lines:   Peripheral IV 06/23/20 Right Antecubital (Active)        Opportunity for questions and clarification was provided.       Patient transported with:   Registered Nurse

## 2020-06-24 NOTE — PROGRESS NOTES
TRANSFER - IN REPORT:    Verbal report received from Dl Sharma RN on [employer] being received from ED for routine progression of care. Report consisted of patients Situation, Background, Assessment and Recommendations(SBAR). Information from the following report(s) SBAR, Kardex, ED Summary, [facility] and Recent Results was reviewed. Opportunity for questions and clarification was provided. Assessment completed upon patients arrival to unit and care assumed. Patient received to room 317. Patient connected to monitor and assessment completed. Plan of care reviewed. Patient oriented to room and call light. Patient aware to use call light to communicate any chest pain or needs. Admission skin assessment completed with second RN and reveals the following: Skin is intact with scattered redness and bruising. Sacrum is intact but blanchable, an alevyn was placed on her bottom. Patients heels are dry and intact.

## 2020-06-25 LAB
ALBUMIN SERPL-MCNC: 2.4 G/DL (ref 3.2–4.6)
ALBUMIN/GLOB SERPL: 0.5 {RATIO} (ref 1.2–3.5)
ALP SERPL-CCNC: 97 U/L (ref 50–136)
ALT SERPL-CCNC: 15 U/L (ref 12–65)
ANION GAP SERPL CALC-SCNC: 7 MMOL/L (ref 7–16)
AST SERPL-CCNC: 14 U/L (ref 15–37)
BILIRUB SERPL-MCNC: 0.5 MG/DL (ref 0.2–1.1)
BUN SERPL-MCNC: 13 MG/DL (ref 8–23)
CALCIUM SERPL-MCNC: 8.3 MG/DL (ref 8.3–10.4)
CHLORIDE SERPL-SCNC: 100 MMOL/L (ref 98–107)
CO2 SERPL-SCNC: 29 MMOL/L (ref 21–32)
CREAT SERPL-MCNC: 1.1 MG/DL (ref 0.6–1)
GLOBULIN SER CALC-MCNC: 4.9 G/DL (ref 2.3–3.5)
GLUCOSE BLD STRIP.AUTO-MCNC: 138 MG/DL (ref 65–100)
GLUCOSE BLD STRIP.AUTO-MCNC: 160 MG/DL (ref 65–100)
GLUCOSE BLD STRIP.AUTO-MCNC: 170 MG/DL (ref 65–100)
GLUCOSE BLD STRIP.AUTO-MCNC: 171 MG/DL (ref 65–100)
GLUCOSE SERPL-MCNC: 155 MG/DL (ref 65–100)
MM INDURATION POC: 0 MM (ref 0–5)
POTASSIUM SERPL-SCNC: 3.9 MMOL/L (ref 3.5–5.1)
PPD POC: NEGATIVE NEGATIVE
PROT SERPL-MCNC: 7.3 G/DL (ref 6.3–8.2)
SODIUM SERPL-SCNC: 136 MMOL/L (ref 136–145)

## 2020-06-25 PROCEDURE — 74011000258 HC RX REV CODE- 258: Performed by: INTERNAL MEDICINE

## 2020-06-25 PROCEDURE — 65660000000 HC RM CCU STEPDOWN

## 2020-06-25 PROCEDURE — 74011250637 HC RX REV CODE- 250/637: Performed by: INTERNAL MEDICINE

## 2020-06-25 PROCEDURE — 36415 COLL VENOUS BLD VENIPUNCTURE: CPT

## 2020-06-25 PROCEDURE — 74011250637 HC RX REV CODE- 250/637: Performed by: HOSPITALIST

## 2020-06-25 PROCEDURE — 74011636637 HC RX REV CODE- 636/637: Performed by: HOSPITALIST

## 2020-06-25 PROCEDURE — 97166 OT EVAL MOD COMPLEX 45 MIN: CPT

## 2020-06-25 PROCEDURE — 97535 SELF CARE MNGMENT TRAINING: CPT

## 2020-06-25 PROCEDURE — 82962 GLUCOSE BLOOD TEST: CPT

## 2020-06-25 PROCEDURE — 74011250636 HC RX REV CODE- 250/636: Performed by: INTERNAL MEDICINE

## 2020-06-25 PROCEDURE — 80053 COMPREHEN METABOLIC PANEL: CPT

## 2020-06-25 RX ORDER — AZITHROMYCIN 250 MG/1
500 TABLET, FILM COATED ORAL DAILY
Status: DISCONTINUED | OUTPATIENT
Start: 2020-06-25 | End: 2020-06-26 | Stop reason: HOSPADM

## 2020-06-25 RX ORDER — METOPROLOL TARTRATE 25 MG/1
25 TABLET, FILM COATED ORAL EVERY 6 HOURS
Status: DISCONTINUED | OUTPATIENT
Start: 2020-06-25 | End: 2020-06-26 | Stop reason: HOSPADM

## 2020-06-25 RX ORDER — DILTIAZEM HYDROCHLORIDE 180 MG/1
180 CAPSULE, COATED, EXTENDED RELEASE ORAL 2 TIMES DAILY
Status: DISCONTINUED | OUTPATIENT
Start: 2020-06-25 | End: 2020-06-26 | Stop reason: HOSPADM

## 2020-06-25 RX ADMIN — ACETAMINOPHEN 650 MG: 325 TABLET, FILM COATED ORAL at 17:36

## 2020-06-25 RX ADMIN — ALUMINUM HYDROXIDE, MAGNESIUM HYDROXIDE, AND SIMETHICONE 30 ML: 200; 200; 20 SUSPENSION ORAL at 08:58

## 2020-06-25 RX ADMIN — POTASSIUM BICARBONATE 40 MEQ: 782 TABLET, EFFERVESCENT ORAL at 17:35

## 2020-06-25 RX ADMIN — MEMANTINE HYDROCHLORIDE 10 MG: 5 TABLET ORAL at 17:35

## 2020-06-25 RX ADMIN — APIXABAN 5 MG: 5 TABLET, FILM COATED ORAL at 08:56

## 2020-06-25 RX ADMIN — SUCRALFATE 1 G: 1 TABLET ORAL at 12:45

## 2020-06-25 RX ADMIN — DILTIAZEM HYDROCHLORIDE 180 MG: 180 CAPSULE, COATED, EXTENDED RELEASE ORAL at 17:35

## 2020-06-25 RX ADMIN — INSULIN LISPRO 2 UNITS: 100 INJECTION, SOLUTION INTRAVENOUS; SUBCUTANEOUS at 12:46

## 2020-06-25 RX ADMIN — SUCRALFATE 1 G: 1 TABLET ORAL at 16:57

## 2020-06-25 RX ADMIN — METOPROLOL TARTRATE 25 MG: 25 TABLET, FILM COATED ORAL at 23:35

## 2020-06-25 RX ADMIN — APIXABAN 5 MG: 5 TABLET, FILM COATED ORAL at 17:35

## 2020-06-25 RX ADMIN — ACETAMINOPHEN 1000 MG: 500 TABLET ORAL at 08:56

## 2020-06-25 RX ADMIN — METOPROLOL TARTRATE 25 MG: 25 TABLET, FILM COATED ORAL at 17:35

## 2020-06-25 RX ADMIN — ALUMINUM HYDROXIDE, MAGNESIUM HYDROXIDE, AND SIMETHICONE 30 ML: 200; 200; 20 SUSPENSION ORAL at 23:34

## 2020-06-25 RX ADMIN — POTASSIUM BICARBONATE 40 MEQ: 782 TABLET, EFFERVESCENT ORAL at 08:56

## 2020-06-25 RX ADMIN — INSULIN LISPRO 2 UNITS: 100 INJECTION, SOLUTION INTRAVENOUS; SUBCUTANEOUS at 08:56

## 2020-06-25 RX ADMIN — Medication 10 ML: at 14:00

## 2020-06-25 RX ADMIN — Medication 10 ML: at 05:44

## 2020-06-25 RX ADMIN — SUCRALFATE 1 G: 1 TABLET ORAL at 08:56

## 2020-06-25 RX ADMIN — PANTOPRAZOLE SODIUM 40 MG: 40 TABLET, DELAYED RELEASE ORAL at 17:35

## 2020-06-25 RX ADMIN — CEFTRIAXONE SODIUM 1 G: 1 INJECTION, POWDER, FOR SOLUTION INTRAMUSCULAR; INTRAVENOUS at 21:06

## 2020-06-25 RX ADMIN — DILTIAZEM HYDROCHLORIDE 120 MG: 120 CAPSULE, COATED, EXTENDED RELEASE ORAL at 08:56

## 2020-06-25 RX ADMIN — PANTOPRAZOLE SODIUM 40 MG: 40 TABLET, DELAYED RELEASE ORAL at 08:56

## 2020-06-25 RX ADMIN — MEMANTINE HYDROCHLORIDE 10 MG: 5 TABLET ORAL at 08:56

## 2020-06-25 RX ADMIN — Medication 10 ML: at 21:07

## 2020-06-25 RX ADMIN — AZITHROMYCIN MONOHYDRATE 500 MG: 250 TABLET ORAL at 21:06

## 2020-06-25 RX ADMIN — LEVOTHYROXINE SODIUM 125 MCG: 75 TABLET ORAL at 08:56

## 2020-06-25 RX ADMIN — Medication 5 ML: at 13:00

## 2020-06-25 RX ADMIN — INSULIN LISPRO 2 UNITS: 100 INJECTION, SOLUTION INTRAVENOUS; SUBCUTANEOUS at 22:10

## 2020-06-25 RX ADMIN — METOPROLOL TARTRATE 25 MG: 25 TABLET, FILM COATED ORAL at 12:45

## 2020-06-25 NOTE — ROUTINE PROCESS
Verbal bedside report given to Doris Kent oncoming RN. Patient's situation, background, assessment and recommendations provided. Opportunity for questions provided. Oncoming RN assumed care of patient.

## 2020-06-25 NOTE — ROUTINE PROCESS
Shift change report received from [facility] (off going nurse). Plan of care reviewed with patient at bedside. Opportunity to ask questions provided. Denies needs at this time. Bed low and locked with call light within reach. Patient instructed to call for assistance. Patient verbalized understanding.

## 2020-06-25 NOTE — PROGRESS NOTES
[facility] CARDIOLOGY PROGRESS NOTE           6/25/2020 7:29 AM    Admit Date: 6/23/2020    Admit Diagnosis: Atrial fibrillation with RVR ([facility] [I48.91]    Assessment:   Principal Problem:    Atrial fibrillation with RVR [facility] (6/23/2020)    Active Problems:    Depression ()      Aortic stenosis (6/10/2016)      Overview: No sever SOB, no chest pain or syncope. Echo showed mod AS, mean grad 22mm       Hg, nl EF, mild LVH      Acquired hypothyroidism (7/17/2017)      Type 2 diabetes with nephropathy ([facility] (1/17/2019)      Plan:   1. AF with RVR - currently on metoprolol and diltiazem. Would avoid DCCV. Rate control is paramount. RACE II criteria is optimal with goal HR < 110. Increase metoprolol today as long as BP tolerates. 2. Stroke ppx - on Eliquis 2.5 mg BID. If pt is unsteady and has a propensity for falls, may need to stop. 3. Mild AS - echo pending. 4. Dispo - should return to NH when acute problems improved. Thank you for allowing me to participate in the electrophysiologic care of this most pleasant patient. Please feel free to contact me if there are any questions or concerns. Gregg Dodge. Merle Maldonado MD, MS  Clinical Cardiac Electrophysiology  [facility] Cardiology    Subjective:   No complaints this AM, no chest pain or shortness of breath. Interval History: (History of pertinent interval events obtained from nursing staff)    ROS:  GEN:  No fever or chills  Cardiovascular:  As noted above  Pulmonary:  As noted above  Neuro:  No new focal motor or sensory loss      Objective:     Vitals:    06/24/20 1730 06/24/20 2151 06/25/20 0056 06/25/20 0517   BP: 138/71 (!) 137/92 121/73 116/82   Pulse: (!) 101 88 92 (!) 117   Resp: 20 16 15 16   Temp: 98.2 °F (36.8 °C) 98 °F (36.7 °C) 98.5 °F (36.9 °C) 97.4 °F (36.3 °C)   SpO2: 94% 91% 92% 90%   Weight:    167 lb 8 oz (76 kg)   Height:           Physical Exam:  General-Frail, AAOx0.    Neck- supple, no JVD  CV- IRRR no MRG  Lung- clear bilaterally  Abd- soft, nontender, nondistended  Ext- no edema bilaterally.   Skin- warm and dry    Current Facility-Administered Medications   Medication Dose Route Frequency    potassium bicarb-citric acid (EFFER-K) tablet 40 mEq  40 mEq Oral BID    dilTIAZem ER (CARDIZEM CD) capsule 120 mg  120 mg Oral BID    apixaban (ELIQUIS) tablet 5 mg  5 mg Oral BID    alum-mag hydroxide-simeth (MYLANTA) oral suspension 30 mL  30 mL Oral Q4H PRN    insulin lispro (HUMALOG) injection   SubCUTAneous AC&HS    tuberculin injection 5 Units  5 Units IntraDERMal ONCE    acetaminophen (TYLENOL) tablet 1,000 mg  1,000 mg Oral DAILY    pantoprazole (PROTONIX) tablet 40 mg  40 mg Oral BID    levothyroxine (SYNTHROID) tablet 125 mcg  125 mcg Oral ACB    memantine (NAMENDA) tablet 10 mg  10 mg Oral BID    metoprolol tartrate (LOPRESSOR) tablet 25 mg  25 mg Oral BID    ondansetron (ZOFRAN ODT) tablet 4 mg  4 mg Oral Q8H PRN    polyethylene glycol (MIRALAX) packet 17 g  17 g Oral DAILY    sucralfate (CARAFATE) tablet 1 g  1 g Oral TIDAC    sodium chloride (NS) flush 5-40 mL  5-40 mL IntraVENous Q8H    sodium chloride (NS) flush 5-40 mL  5-40 mL IntraVENous PRN    acetaminophen (TYLENOL) tablet 650 mg  650 mg Oral Q4H PRN    cefTRIAXone (ROCEPHIN) 1 g in 0.9% sodium chloride (MBP/ADV) 50 mL  1 g IntraVENous Q24H    azithromycin (ZITHROMAX) 500 mg in 0.9% sodium chloride (MBP/ADV) 250 mL  500 mg IntraVENous Q24H    morphine injection 1 mg  1 mg IntraVENous Q6H PRN       Data Review:   Recent Results (from the past 24 hour(s))   URINALYSIS W/ RFLX MICROSCOPIC    Collection Time: 06/24/20  8:53 AM   Result Value Ref Range    Color YELLOW      Appearance CLEAR      Specific gravity 1.062 (H) 1.001 - 1.023      pH (UA) 6.0 5.0 - 9.0      Protein Negative NEG mg/dL    Glucose Negative mg/dL    Ketone Negative NEG mg/dL    Bilirubin Negative NEG      Blood Negative NEG      Urobilinogen 0.2 0.2 - 1.0 EU/dL    Nitrites Negative NEG      Leukocyte Esterase Negative NEG     TROPONIN-HIGH SENSITIVITY    Collection Time: 06/24/20  9:00 AM   Result Value Ref Range    Troponin-High Sensitivity 25.1 (H) 0 - 14 pg/mL   TROPONIN-HIGH SENSITIVITY    Collection Time: 06/24/20  2:47 PM   Result Value Ref Range    Troponin-High Sensitivity 19.0 (H) 0 - 14 pg/mL   GLUCOSE, POC    Collection Time: 06/24/20  4:10 PM   Result Value Ref Range    Glucose (POC) 175 (H) 65 - 100 mg/dL   TROPONIN-HIGH SENSITIVITY    Collection Time: 06/24/20  6:51 PM   Result Value Ref Range    Troponin-High Sensitivity 20.6 (H) 0 - 14 pg/mL   TROPONIN-HIGH SENSITIVITY    Collection Time: 06/24/20  8:29 PM   Result Value Ref Range    Troponin-High Sensitivity 17.2 (H) 0 - 14 pg/mL   GLUCOSE, POC    Collection Time: 06/24/20 10:04 PM   Result Value Ref Range    Glucose (POC) 169 (H) 65 - 100 mg/dL   GLUCOSE, POC    Collection Time: 06/25/20  6:15 AM   Result Value Ref Range    Glucose (POC) 170 (H) 65 - 100 mg/dL       EKG:  (EKG has been independently visualized by me with interpretation below): Atrial fibrillation with RVR.

## 2020-06-25 NOTE — PROGRESS NOTES
Problem: Self Care Deficits Care Plan (Adult)  Goal: *Acute Goals and Plan of Care (Insert Text)  Description: 1. Patient will complete upper body bathing and dressing with SBA and adaptive equipment as needed. 2. Patient will complete bed mobility with supervision for hygiene and positioning. .   3. Patient will tolerate 30 minutes of OT treatment with 2-3 rest breaks to increase activity tolerance for ADLs. 4. Patient will attempt standing in preparation for transfers within 3 visits. 5. Patient will participate in 8 minutes of therapeutic exercises to improve strength for ADL/functional transfers. Timeframe: 7 visits      Outcome: Progressing Towards Goal     OCCUPATIONAL THERAPY: Initial Assessment, Daily Note, and AM 6/25/2020  INPATIENT: OT Visit Days: 1  [place] / [place] / Product Type: Medicare /      NAME/AGE/GENDER: Kana Garcia is a 80 y.o. female   PRIMARY DIAGNOSIS:  Atrial fibrillation with RVR ([facility] [address] [I48.91] Atrial fibrillation with RVR (HCC) Atrial fibrillation with RVR ([facility] [address]        ICD-10: Treatment Diagnosis:    Generalized Muscle Weakness (M62.81)  Other lack of cordination (R27.8)  Low Back Pain (M54.5)   Precautions/Allergies:     Codeine; Pcn [penicillins]; and Strawberry      ASSESSMENT:     Ms. Chelsi Baker presents to the hospital with a-fib with RVR. Pt arrived from [facility] where she is a resident (states she has been there for the past 3-4 years). Pt states she gets assistance from staff with ADL. Pt states she has been wheelchair bound for the past few years. Pt states that normally she is able to propel herself to the dining banks. Pt is agreeable to sitting up on the edge of the bed today with some encouragement. Pt does complain of some low back pain with bed mobility requiring minimal assistance overall. Pt's balance is fair with propped sitting edge of the bed.  Pt does fatigue with sitting edge of bed and HR is all over the place. Pt reports some dizziness but states she commonly feels dizzy when first sitting up. Pt leans into her R elbow at times to rest. Pt demonstrates decreased extension at the LEs and did not attempt to stand today. Pt states she is fearful on falling. Pt washed her face sitting edge of the bed. Pt returned to supine and pt worked on combing her hair with SBA with head of bed elevated. Pt was appreciative of care. Pt is currently functioning below baseline and will benefit from OT services to address stated goals and plan of care. This section established at most recent assessment   PROBLEM LIST (Impairments causing functional limitations):  Decreased Strength  Decreased ADL/Functional Activities  Decreased Transfer Abilities  Decreased Balance  Decreased Activity Tolerance  Increased Fatigue  Decreased Flexibility/Joint Mobility  Decreased [place] with Home Exercise Program  Decreased Cognition   INTERVENTIONS PLANNED: (Benefits and precautions of occupational therapy have been discussed with the patient.)  Activities of daily living training  Adaptive equipment training  Balance training  Clothing management  Donning&doffing training  Neuromuscular re-eduation  Therapeutic activity  Therapeutic exercise     TREATMENT PLAN: Frequency/Duration: Follow patient 3 times per week to address above goals. Rehabilitation Potential For Stated Goals: Good     REHAB RECOMMENDATIONS (at time of discharge pending progress):    Placement: It is my opinion, based on this patient's performance to date, that Ms. Chelsi Baker may benefit from intensive therapy at a [address] [place] [address] after discharge due to the functional deficits listed above that are likely to improve with skilled rehabilitation and concerns that he/she may be unsafe to be unsupervised at home due to risk for falls and further functional decline.   Equipment:   TBD               OCCUPATIONAL PROFILE AND HISTORY:   History of Present Injury/Illness (Reason for Referral):  See H&P  Past Medical History/Comorbidities:   Ms. Vinayak Mckeon  has a past medical history of Abdominal pain, epigastric, Abdominal pain, unspecified site, Anxiety state, unspecified, Aortic stenosis (6/10/2016), Aortic valve disorders, Atherosclerosis of aorta (HCC), Backache, unspecified, Benign neoplasm of colon, Cervicalgia, Closed fracture of unspecified bone, Depressive disorder, not elsewhere classified, Diabetes ([facility] [address] Diarrhea, Disorders of magnesium metabolism, Edema, Elevated sedimentation rate, Encounter for long-term (current) use of other medications, Endocrine disease, Epistaxis, Esophageal reflux, Essential hypertension, benign, Gastrointestinal malfunction arising from mental factors, Hypertension, Hypertension-controlled, benign, Hypertonicity of bladder, Hypopotassemia, Insomnia, unspecified, Irritable bowel syndrome, Memory loss, Nausea alone, Osteoarthrosis, unspecified whether generalized or localized, unspecified site, Other ill-defined conditions(799.89), Other malaise and fatigue, Pain in joint, lower leg, Pain in joint, shoulder region, Postnasal drip, Rheumatoid arthritis(714.0), Type II or unspecified type diabetes mellitus without mention of complication, not stated as uncontrolled, Unspecified constipation, Unspecified hemorrhoids with other complication, Unspecified hemorrhoids without mention of complication, Unspecified hereditary and idiopathic peripheral neuropathy, Unspecified hypothyroidism, Urgency of urination, and Urinary tract infection, site not specified. Ms. Vinayak Mckeon  has a past surgical history that includes hx cholecystectomy; hx other surgical; and hx tonsillectomy.   Social History/Living Environment:   Home Environment: Skilled nursing facility  One/Two Story Residence: One story  Living Alone: No  Support Systems: Child(yohan)  Patient Expects to be Discharged to[de-identified] Skilled nursing facility  Current DME Used/Available at Home: Wheelchair  Tub or Shower Type: Tub/Shower combination  Prior Level of Function/Work/Activity:  Pt arrived from [facility] where she is a resident (states she has been there for the past 3-4 years). Pt states she gets assistance from staff with ADL. Pt states she has been wheelchair bound for the past few years. Pt states that normally she is able to propel herself to the dining banks. Personal Factors:          Social Background:  Lives at [facility]        Past/Current Experience:  WC bound and needs A w/ ADL at baseline. Other factors that influence how disability is experienced by the patient:  multiple co-morbidities    Number of Personal Factors/Comorbidities that affect the Plan of Care: Extensive review of physical, cognitive, and psychosocial performance (3+):  HIGH COMPLEXITY   ASSESSMENT OF OCCUPATIONAL PERFORMANCE[de-identified]   Activities of Daily Living:   Basic ADLs (From Assessment) Complex ADLs (From Assessment)   Feeding: Stand-by assistance  Oral Facial Hygiene/Grooming: Minimum assistance  Bathing: Moderate assistance  Upper Body Dressing: Minimum assistance  Lower Body Dressing: Total assistance  Toileting: Total assistance Instrumental ADL  Meal Preparation: Total assistance  Homemaking: Total assistance   Grooming/Bathing/Dressing Activities of Daily Living   Grooming  Washing Face: Set-up  Brushing/Combing Hair: Stand-by assistance Cognitive Retraining  Safety/Judgement: Fall prevention                       Bed/Mat Mobility  Supine to Sit: Minimum assistance  Sit to Supine: Minimum assistance     Most Recent Physical Functioning:   Gross Assessment:  AROM: Generally decreased, functional  Strength: Generally decreased, functional  Coordination: Generally decreased, functional               Posture:  Posture (WDL): Exceptions to WDL  Posture Assessment: Trunk flexion, Increased, Rounded shoulders, Forward head(Seated)  Balance:  Sitting: Impaired  Sitting - Static: Fair (occasional); Prop sitting  Sitting - Dynamic: Fair (occasional)  Standing: (pt too fatigued/fearful to attempt standing) Bed Mobility:  Supine to Sit: Minimum assistance  Sit to Supine: Minimum assistance  Wheelchair Mobility:     Transfers:               Patient Vitals for the past 6 hrs:   BP BP Patient Position SpO2 Pulse   20 0820 120/75 At rest 93 % 97   20 1150 113/78 At rest 93 % 100       Mental Status  Neurologic State: Alert, Confused  Orientation Level: Disoriented to situation, Disoriented to time, Oriented to person, Oriented to place  Cognition: Follows commands  Perception: Appears intact  Perseveration: No perseveration noted  Safety/Judgement: Fall prevention                          Physical Skills Involved:  Range of Motion  Balance  Strength  Activity Tolerance  Pain (Chronic) Cognitive Skills Affected (resulting in the inability to perform in a timely and safe manner):  Executive Function  Short Term Recall Psychosocial Skills Affected:  Habits/Routines  Environmental Adaptation   Number of elements that affect the Plan of Care: 5+:  HIGH COMPLEXITY   CLINICAL DECISION MAKIN[address] [place] [id number] AM-PAC 6 Clicks   Daily Activity Inpatient Short Form  How much help from another person does the patient currently need. .. Total A Lot A Little None   1. Putting on and taking off regular lower body clothing? [x] 1   [] 2   [] 3   [] 4   2. Bathing (including washing, rinsing, drying)? [] 1   [x] 2   [] 3   [] 4   3. Toileting, which includes using toilet, bedpan or urinal?   [x] 1   [] 2   [] 3   [] 4   4. Putting on and taking off regular upper body clothing? [] 1   [] 2   [x] 3   [] 4   5. Taking care of personal grooming such as brushing teeth? [] 1   [] 2   [x] 3   [] 4   6. Eating meals? [] 1   [] 2   [x] 3   [] 4   © , Trustees of [address] [place] [address] [address] [address], under license to [employer].  All rights reserved      Score:  Initial: 13 Most Recent: X (Date: -- )    Interpretation of Tool:  Represents activities that are increasingly more difficult (i.e. Bed mobility, Transfers, Gait). Medical Necessity:     Patient demonstrates   good   rehab potential due to higher previous functional level. Reason for Services/Other Comments:  Patient continues to require skilled intervention due to   Decreased independence with ADL/functional transfers   . Use of outcome tool(s) and clinical judgement create a POC that gives a: MODERATE COMPLEXITY         TREATMENT:   (In addition to Assessment/Re-Assessment sessions the following treatments were rendered)     Pre-treatment Symptoms/Complaints:    Pain: Initial:   Pain Intensity 1: 0  Post Session:  0/10     Self Care: (8 minutes): Procedure(s) (per grid) utilized to improve and/or restore self-care/home management as related to grooming. Required minimal visual, verbal, and tactile cueing to facilitate activities of daily living skills and compensatory activities. Braces/Orthotics/Lines/Etc:   O2 Device: Room air  Treatment/Session Assessment:    Response to Treatment: Pt   Interdisciplinary Collaboration:   Occupational Therapist  Registered Nurse  After treatment position/precautions:   Supine in bed  Bed/Chair-wheels locked  Call light within reach  RN notified   Compliance with Program/Exercises: Will assess as treatment progresses. Recommendations/Intent for next treatment session: \"Next visit will focus on advancements to more challenging activities and reduction in assistance provided\".   Total Treatment Duration:  OT Patient Time In/Time Out  Time In: 0937  Time Out: [address] [facility],

## 2020-06-25 NOTE — PROGRESS NOTES
HOSPITALIST PROGRESS NOTE  NAME:  Ignacio iXong   Age:  80 y.o.  :   1926   MRN:   [id number]  PCP: Armani Yung MD  Consulting MD:  Treatment Team: Attending Provider: Denton Vicente MD; Consulting Provider: Yoon Osuna MD; Utilization Review: Karel Skinner, RN; Care Manager: Jean Meneses, RN; Staff Nurse: Malinda Dubon, RN; Occupational Therapist: Erlinda Trevino OT  SUBJECTIVE:   Patient 27-year-old female with history of aortic valve stenosis, hypertension, hypothyroidism, dementia, GERD, long-term resident of skilled nursing facility admitted on  for A.fib with RVR. Pt reported of increased fatigue, poor oral intake and generalized weakness. ER evaluation notable for EKG with A. fib RVR with rates during my evaluation of 120-140.     :  Pt reports feeling well. No fever, chills, nausea/vomiting, palpitations, abdominal pain. Complete ROS done and is as stated above or otherwise negative    Objective:     Visit Vitals  /82 (BP 1 Location: Left arm, BP Patient Position: At rest)   Pulse (!) 117   Temp 97.4 °F (36.3 °C)   Resp 16   Ht 5' 8\" (1.727 m)   Wt 76 kg (167 lb 8 oz)   SpO2 90%   BMI 25.47 kg/m²      Temp (24hrs), Av.7 °F (36.5 °C), Min:96.6 °F (35.9 °C), Max:98.5 °F (36.9 °C)    Oxygen Therapy  O2 Sat (%): 90 % (20 0517)  Pulse via Oximetry: 137 beats per minute (20 2100)  O2 Device: Room air (20 2211)  Physical Exam:  General:    Alert, awake, NAD, on RA  Head:   Normocephalic, without obvious abnormality, atraumatic. Nose:  Nares normal. No drainage or sinus tenderness. Lungs:   Clear to auscultation bilaterally. No Wheezing or Rhonchi. No rales. Heart:   Irregularly irregular 2/6 systolic murmur  Abdomen:   Soft, non-tender. Not distended. Bowel sounds normal.   Extremities: No cyanosis. No edema. No clubbing  Skin:     Texture, turgor normal. No rashes or lesions.   Not Jaundiced  Neurologic: gcs 15, no motor or sensory deficits, CN 2-12 intact  Psych:             AOx3, mood and affect appropriate  Data Review:   Recent Results (from the past 24 hour(s))   URINALYSIS W/ RFLX MICROSCOPIC    Collection Time: 06/24/20  8:53 AM   Result Value Ref Range    Color YELLOW      Appearance CLEAR      Specific gravity 1.062 (H) 1.001 - 1.023      pH (UA) 6.0 5.0 - 9.0      Protein Negative NEG mg/dL    Glucose Negative mg/dL    Ketone Negative NEG mg/dL    Bilirubin Negative NEG      Blood Negative NEG      Urobilinogen 0.2 0.2 - 1.0 EU/dL    Nitrites Negative NEG      Leukocyte Esterase Negative NEG     TROPONIN-HIGH SENSITIVITY    Collection Time: 06/24/20  9:00 AM   Result Value Ref Range    Troponin-High Sensitivity 25.1 (H) 0 - 14 pg/mL   TROPONIN-HIGH SENSITIVITY    Collection Time: 06/24/20  2:47 PM   Result Value Ref Range    Troponin-High Sensitivity 19.0 (H) 0 - 14 pg/mL   GLUCOSE, POC    Collection Time: 06/24/20  4:10 PM   Result Value Ref Range    Glucose (POC) 175 (H) 65 - 100 mg/dL   TROPONIN-HIGH SENSITIVITY    Collection Time: 06/24/20  6:51 PM   Result Value Ref Range    Troponin-High Sensitivity 20.6 (H) 0 - 14 pg/mL   TROPONIN-HIGH SENSITIVITY    Collection Time: 06/24/20  8:29 PM   Result Value Ref Range    Troponin-High Sensitivity 17.2 (H) 0 - 14 pg/mL   GLUCOSE, POC    Collection Time: 06/24/20 10:04 PM   Result Value Ref Range    Glucose (POC) 169 (H) 65 - 100 mg/dL   GLUCOSE, POC    Collection Time: 06/25/20  6:15 AM   Result Value Ref Range    Glucose (POC) 170 (H) 65 - 100 mg/dL       Status Exam Begun  Exam Ended        CHEST X-RAY, one view.     HISTORY:  Shortness of breath. Possible sepsis.       TECHNIQUE:  AP upright portable view.     COMPARISON: November 2006     FINDINGS:   Interstitial prominence. No lobar consolidation. Mild blunting  costophrenic angles. Heart may be enlarged.  Aortic calcifications.      IMPRESSION  IMPRESSION:  Interstitial pattern, mild interstitial edema versus atypical  pneumonia.          Assessment and Plan: Active Hospital Problems    Diagnosis Date Noted    Atrial fibrillation with RVR ([facility] [address] 06/23/2020    Type 2 diabetes with nephropathy ([facility] [address] 01/17/2019    Acquired hypothyroidism 07/17/2017    Aortic stenosis 06/10/2016     No sever SOB, no chest pain or syncope. Echo showed mod AS, mean grad 22mm Hg, nl EF, mild LVH      Depression        A/P    - Afib rvr - rate controlled  Continue diltiazem 180 mg bid and lopressor 25 mg q6h  Continue eliquis    - Hypokalemia: resolved  On oral potassium 40 mg    - abdominal pain - resolved  CTAP with no acute intra abdominal process. - Aortic stenosis - echo is pending    - Fever - resolved  Afebrile over past 24 hours. UA is clean. Urine culture negative so far  COVID19 negative  Pt is on empiric IV rocephin and azithromycin    - Hypothyroidism -   Optimally controlled, TSH 0.641.  Continue synthroid 125 mcg daily    - Dm2 - A1c is 6.9  Use sliding scale only     - PT/OT eval  - continue droplet/contact isolation until covid test is back  - DVT prophylaxis with eliquis      Code Status: Full code    Dispo: will likely be discharged in next 1-2 days back to facility    Signed By: Mone Sutherland MD     June 25, 2020

## 2020-06-25 NOTE — PROGRESS NOTES
Care Management Interventions  PCP Verified by CM: Yes  Last Visit to PCP: 06/22/19  Mode of Transport at Discharge: Other (see comment)(Loretta Kearney Daughter [phone] )  Transition of Care Consult (CM Consult): Discharge Planning, SNF  Discharge Durable Medical Equipment: No  Physical Therapy Consult: Yes  Occupational Therapy Consult: No  Speech Therapy Consult: No  Current Support Network: Other(Long term care-[facility])  Confirm Follow Up Transport: Wheelchair Omid Morrison  The Plan for Transition of Care is Related to the Following Treatment Goals : SNF  The Patient and/or Patient Representative was Provided with a Choice of Provider and Agrees with the Discharge Plan?: Yes  Name of the Patient Representative Who was Provided with a Choice of Provider and Agrees with the Discharge Plan: Pt's daughterTheodora of Choice List was Provided with Basic Dialogue that Supports the Patient's Individualized Plan of Care/Goals, Treatment Preferences and Shares the Quality Data Associated with the Providers?: Yes  Discharge Location  Discharge Placement: Unable to determine at this time    CM spoke with pt's daughter , Denver Putnam ( [phone]) by phone to f/u yesterday's discussion. Denver Putnam informed CM that she has conferred with her brother. They are unsure if pt will be [place] or need NH/LlTC after this admission. CM discussed SNF with daughter and she mentioned that pt had been to [place] before. 2) [place]. Referrals placed. CM contacting [facility] for possible dc tomorrow if family decides against SNF. CM following.

## 2020-06-26 VITALS
WEIGHT: 159.2 LBS | RESPIRATION RATE: 18 BRPM | BODY MASS INDEX: 24.13 KG/M2 | HEART RATE: 74 BPM | OXYGEN SATURATION: 95 % | SYSTOLIC BLOOD PRESSURE: 137 MMHG | TEMPERATURE: 98 F | HEIGHT: 68 IN | DIASTOLIC BLOOD PRESSURE: 63 MMHG

## 2020-06-26 PROBLEM — J18.9 COMMUNITY ACQUIRED PNEUMONIA: Status: ACTIVE | Noted: 2020-06-26

## 2020-06-26 LAB
ALBUMIN SERPL-MCNC: 0.7 G/DL (ref 3.2–4.6)
ALBUMIN/GLOB SERPL: 0.1 {RATIO} (ref 1.2–3.5)
ALP SERPL-CCNC: 86 U/L (ref 50–136)
ALT SERPL-CCNC: 13 U/L (ref 12–65)
ANION GAP SERPL CALC-SCNC: 7 MMOL/L (ref 7–16)
AST SERPL-CCNC: 12 U/L (ref 15–37)
BACTERIA SPEC CULT: NORMAL
BILIRUB SERPL-MCNC: 0.5 MG/DL (ref 0.2–1.1)
BUN SERPL-MCNC: 14 MG/DL (ref 8–23)
CALCIUM SERPL-MCNC: 8.1 MG/DL (ref 8.3–10.4)
CHLORIDE SERPL-SCNC: 105 MMOL/L (ref 98–107)
CO2 SERPL-SCNC: 28 MMOL/L (ref 21–32)
CREAT SERPL-MCNC: 0.95 MG/DL (ref 0.6–1)
GLOBULIN SER CALC-MCNC: 6 G/DL (ref 2.3–3.5)
GLUCOSE BLD STRIP.AUTO-MCNC: 134 MG/DL (ref 65–100)
GLUCOSE BLD STRIP.AUTO-MCNC: 176 MG/DL (ref 65–100)
GLUCOSE SERPL-MCNC: 139 MG/DL (ref 65–100)
POTASSIUM SERPL-SCNC: 4.2 MMOL/L (ref 3.5–5.1)
PROT SERPL-MCNC: 6.7 G/DL (ref 6.3–8.2)
SERVICE CMNT-IMP: NORMAL
SODIUM SERPL-SCNC: 140 MMOL/L (ref 136–145)

## 2020-06-26 PROCEDURE — 74011250636 HC RX REV CODE- 250/636: Performed by: HOSPITALIST

## 2020-06-26 PROCEDURE — 74011636637 HC RX REV CODE- 636/637: Performed by: HOSPITALIST

## 2020-06-26 PROCEDURE — 74011250637 HC RX REV CODE- 250/637: Performed by: INTERNAL MEDICINE

## 2020-06-26 PROCEDURE — C8929 TTE W OR WO FOL WCON,DOPPLER: HCPCS

## 2020-06-26 PROCEDURE — 80053 COMPREHEN METABOLIC PANEL: CPT

## 2020-06-26 PROCEDURE — 82962 GLUCOSE BLOOD TEST: CPT

## 2020-06-26 PROCEDURE — 74011250637 HC RX REV CODE- 250/637: Performed by: HOSPITALIST

## 2020-06-26 PROCEDURE — 36415 COLL VENOUS BLD VENIPUNCTURE: CPT

## 2020-06-26 PROCEDURE — 99232 SBSQ HOSP IP/OBS MODERATE 35: CPT | Performed by: INTERNAL MEDICINE

## 2020-06-26 PROCEDURE — 74011000250 HC RX REV CODE- 250: Performed by: HOSPITALIST

## 2020-06-26 RX ORDER — CEFPODOXIME PROXETIL 100 MG/1
100 TABLET, FILM COATED ORAL 2 TIMES DAILY
Qty: 14 TAB | Refills: 0 | Status: SHIPPED | OUTPATIENT
Start: 2020-06-26 | End: 2020-06-26 | Stop reason: SDUPTHER

## 2020-06-26 RX ORDER — DILTIAZEM HYDROCHLORIDE 180 MG/1
180 CAPSULE, COATED, EXTENDED RELEASE ORAL 2 TIMES DAILY
Qty: 60 CAP | Refills: 3 | Status: SHIPPED | OUTPATIENT
Start: 2020-06-26 | End: 2020-07-03

## 2020-06-26 RX ORDER — CEFPODOXIME PROXETIL 100 MG/1
100 TABLET, FILM COATED ORAL 2 TIMES DAILY
Qty: 14 TAB | Refills: 0 | Status: SHIPPED | OUTPATIENT
Start: 2020-06-26 | End: 2020-07-03

## 2020-06-26 RX ORDER — METOPROLOL TARTRATE 25 MG/1
50 TABLET, FILM COATED ORAL 2 TIMES DAILY
Qty: 120 TAB | Refills: 3 | Status: SHIPPED | OUTPATIENT
Start: 2020-06-26 | End: 2020-07-21 | Stop reason: SDUPTHER

## 2020-06-26 RX ORDER — DILTIAZEM HYDROCHLORIDE 180 MG/1
180 CAPSULE, COATED, EXTENDED RELEASE ORAL 2 TIMES DAILY
Qty: 60 CAP | Refills: 3 | Status: SHIPPED | OUTPATIENT
Start: 2020-06-26 | End: 2020-06-26

## 2020-06-26 RX ORDER — METOPROLOL TARTRATE 25 MG/1
50 TABLET, FILM COATED ORAL 2 TIMES DAILY
Qty: 120 TAB | Refills: 3 | Status: SHIPPED | OUTPATIENT
Start: 2020-06-26 | End: 2020-06-26 | Stop reason: SDUPTHER

## 2020-06-26 RX ORDER — TRAMADOL HYDROCHLORIDE 50 MG/1
50 TABLET ORAL
Qty: 21 TAB | Refills: 0 | Status: SHIPPED | OUTPATIENT
Start: 2020-06-26 | End: 2021-04-29

## 2020-06-26 RX ADMIN — APIXABAN 5 MG: 5 TABLET, FILM COATED ORAL at 08:45

## 2020-06-26 RX ADMIN — DILTIAZEM HYDROCHLORIDE 180 MG: 180 CAPSULE, COATED, EXTENDED RELEASE ORAL at 08:44

## 2020-06-26 RX ADMIN — LEVOTHYROXINE SODIUM 125 MCG: 75 TABLET ORAL at 08:05

## 2020-06-26 RX ADMIN — PERFLUTREN 1 ML: 6.52 INJECTION, SUSPENSION INTRAVENOUS at 09:00

## 2020-06-26 RX ADMIN — PANTOPRAZOLE SODIUM 40 MG: 40 TABLET, DELAYED RELEASE ORAL at 08:44

## 2020-06-26 RX ADMIN — SUCRALFATE 1 G: 1 TABLET ORAL at 08:05

## 2020-06-26 RX ADMIN — METOPROLOL TARTRATE 25 MG: 25 TABLET, FILM COATED ORAL at 11:49

## 2020-06-26 RX ADMIN — INSULIN LISPRO 2 UNITS: 100 INJECTION, SOLUTION INTRAVENOUS; SUBCUTANEOUS at 11:49

## 2020-06-26 RX ADMIN — ACETAMINOPHEN 1000 MG: 500 TABLET ORAL at 08:44

## 2020-06-26 RX ADMIN — Medication 10 ML: at 06:06

## 2020-06-26 RX ADMIN — MEMANTINE HYDROCHLORIDE 10 MG: 5 TABLET ORAL at 08:44

## 2020-06-26 RX ADMIN — METOPROLOL TARTRATE 25 MG: 25 TABLET, FILM COATED ORAL at 06:06

## 2020-06-26 RX ADMIN — SUCRALFATE 1 G: 1 TABLET ORAL at 11:49

## 2020-06-26 NOTE — PROGRESS NOTES
Care Management Interventions  PCP Verified by CM: Yes  Last Visit to PCP: 06/22/19  Mode of Transport at Discharge: Other (see comment)(Loretta Kearney Daughter [phone] )  Transition of Care Consult (CM Consult): Discharge Planning, Assisted Living  Discharge Durable Medical Equipment: No  Physical Therapy Consult: Yes  Occupational Therapy Consult: No  Speech Therapy Consult: No  Current Support Network: Other(Long term care-[facility])  Confirm Follow Up Transport: Family  The Plan for Transition of Care is Related to the Following Treatment Goals : AL  The Patient and/or Patient Representative was Provided with a Choice of Provider and Agrees with the Discharge Plan?: Yes  Name of the Patient Representative Who was Provided with a Choice of Provider and Agrees with the Discharge Plan: Pt and daughterMercedes of Choice List was Provided with Basic Dialogue that Supports the Patient's Individualized Plan of Care/Goals, Treatment Preferences and Shares the Quality Data Associated with the Providers?: Yes  Discharge Location  Discharge Placement: Assisted Living    CM spoke with pt's daughter this AM to discuss discharge order and plan to return to [facility]. They are both in agreement. Ruby Heaton contacted by CM to update discharge plan. [facility] to transport at 1300 to facility. No further CM needs.

## 2020-06-26 NOTE — ROUTINE PROCESS
Bedside and Verbal shift change report given to Sara Rosa RN (oncoming nurse). Report included the following information SBAR, Kardex, MAR and Recent Results.

## 2020-06-26 NOTE — ACP (ADVANCE CARE PLANNING)
Advance Care Planning     Advance Care Planning Activator (Inpatient)  Conversation Note      Date of ACP Conversation: 06/26/20      Conversation Conducted with:   Patient with [address]: Named in Advance Directive or Healthcare Power of  (name) Radha Thacker    ACP Activator: Bo Chapman RN    *When Pati Ag makes decisions on behalf of the incapacitated patient: Decision Maker is asked to consider and make decisions based on patient values, known preferences, or best interests. Health Care Decision Maker:    Current Designated Health Care Decision Maker:   (If there is a valid [place] named in the [address] [employer]\" box in the ACP activity, but it is not visible above, be sure to open that field and then select the health care decision maker relationship (ie \"primary\") in the blank space to the right of the name.) Validate  this information as still accurate & up-to-date; edit [place] field as needed.)    Note: Assess and validate information in current ACP documents, as indicated. If no Decision Maker listed above or available through scanned documents, then:    If no Authorized Decision Maker has previously been identified, then patient chooses [place]:  \"Who would you like to name as your primary health care decision-maker? \"    Name: Shayna Reynoso Relationship: Daughter Phone number: [phone]  Connie Render this person be reached easily? YES  \"Who would you like to name as your back-up decision maker? \"   Name: Radha Thacker [phone]: SON Phone number:   Connie Render this person be reached easily? \" YES    Note: If the relationship of these Decision-Makers to the patient does NOT follow your state's Next of Kin hierarchy, recommend that patient complete ACP document that meets state-specific requirements to allow them to act on the patient's behalf when appropriate. Care Preferences    Ventilation: \"If you were in your present state of health and suddenly became very ill and were unable to breathe on your own, what would your preference be about the use of a ventilator (breathing machine) if it were available to you? \"      If patient would desire the use of a ventilator (breathing:NO mnoachine), NO  \"If your health worsens and it becomes clear that your chance of recovery is unlikely, what would your preference be about the use of a ventilator (breathing machine) if it were available to you? \" NO    Would the patient desire the use of a ventilator (breathing machine)? NO}      Resuscitation  \"CPR works best to restart the heart when there is a sudden event, like a heart attack, in someone who is otherwise healthy. Unfortunately, CPR does not typically restart the heart for people who have serious health conditions or who are very sick. \"    \"In the event your heart stopped as a result of an underlying serious health condition, would you want attempts to be  for attempt to resuscitate) or would you prefer a natural death (answer \"no\" for do not attempt to resuscitate)? \" NO      NOTE: If the patient has a valid advance directive AND now provides care preference(s) that are inconsistent with that prior directive, advise the patient to consider either: creating a new advance directive that complies with state-specific requirements; or, if that is not possible, orally revoking that prior directive in accordance with state-specific requirements, which must be documented in the EHR. [] Yes  [] No   Educated Patient / Clarice Pena regarding differences between Advance Directives and portable DNR orders.     Length of ACP Conversation in minutes:      Conversation Outcomes:  [x] ACP discussion completed  [] Existing advance directive reviewed with patient; no changes to patient's previously recorded wishes     [] New Advance Directive completed   [] Portable Do Not Resuscitate prepared for Provider review and signature  [] POLST/POST/MOLST/MOST prepared for Provider review and signature      Follow-up plan:    [] Schedule follow-up conversation to continue planning  [] Referred individual to Provider for additional questions/concerns   [] Advised patient/agent/surrogate to review completed ACP document and update if needed with changes in condition, patient preferences or care setting     [] This note routed to one or more involved healthcare providers

## 2020-06-26 NOTE — DISCHARGE SUMMARY
Hospitalist Discharge Summary     Patient ID:  Kana Garcia  [id number]  6 y.o.  12/13/1926  Admit date: 6/23/2020 12:31 PM  Discharge date and time: 6/26/2020  Attending: Duncan Lane MD  PCP:  Black Stauffer MD  Treatment Team: Attending Provider: Duncan Lane MD; Care Manager: Genesis Michaels, ANTONIO; Utilization Review: Kostas Pedroza RN; Primary Nurse: Erik Callejas; Physical Therapist: Omar Szymanski PT, DPT    Principal Diagnosis Atrial fibrillation with RVR [facility])   Principal Problem:    At[facility] fibrillation [facility] ([facility] (6/23/2020)    Active Problems:    Depression ()      Aortic stenosis (6/10/2016)      Overview: No sever SOB, no chest pain or syncope. Echo showed mod AS, mean grad 22mm       Hg, nl EF, mild LVH      Acquired hypothyroidism (7/17/2017)      Type 2 diabetes with nephropathy ([facility] (1/17/2019)      Community acquired pneumonia (6/26/2020)             Hospital Course:  Please refer to the admission H&P for details of presentation. In summary, the patient is 71-year-old female with history of aortic valve stenosis, hypertension, hypothyroidism, dementia, GERD, long-term resident of skilled nursing facility admitted on 6/23 for A.fib with RVR. Pt reported of increased fatigue, poor oral intake and generalized weakness. ER evaluation notable for EKG with A. fib RVR with rates during my evaluation of 120-140. Pt was on diltiazem drip initially, which was switched to Diltiazem CD and lopressor. Despite advanced age and risk of calls, pt was started on eliquis as [id number] >2.  Pt had low grade fever, UA was clean, COVID19 was ruled out. Pt was kept on empiric IV rocephin and azithromycin for possible early pneumonia. She had abdominal pain on admission, which was likely gas or acid reflux as CTAP showed no acute intra abdominal pathology. She has participated well with PT/OT, will be discharged with HHA/PT/OT.   Rest of the hospital course was uneventful. For further details, please refer to daily progress notes. Significant Diagnostic Studies:   EXAM: CT abdomen and pelvis with IV contrast.     INDICATION: Abdominal pain.     COMPARISON: Prior CT abdomen and pelvis on December 10, 2015.     TECHNIQUE: Axial CT images of the abdomen and pelvis were obtained after the  intravenous injection of 100 mL Isovue 370 CT contrast. Oral contrast was  administered as well. Radiation dose reduction techniques were used for this  study. Our CT scanners use one or all of the following:  Automated exposure  control, adjustment of the mA or kV according to patient size, iterative  reconstruction.     FINDINGS:     - Liver: Within normal limits. - Gallbladder and bile ducts: The gallbladder is absent. - Spleen: Within normal limits. - Urinary tract: Within normal limits. - Adrenals: Within normal limits. - Pancreas: Within normal limits. - Gastrointestinal tract: There is colonic diverticulosis, without evidence of  acute diverticulitis. The small bowel and appendix are within normal limits. - Retroperitoneum: Within normal limits. - Peritoneal cavity and abdominal wall: Within normal limits. - Pelvis: Within normal limits. - Spine/bones: No acute process. There are old spinal compression and right  pelvic fractures. - Other comments: There are new tiny bilateral pleural effusions and mild  bibasilar lung atelectasis. Also noted is coronary artery disease and a moderate  sized hiatal hernia.     IMPRESSION  IMPRESSION:   1. No acute intra-abdominal process. 2. Colonic diverticulosis. 3. Prior cholecystectomy. 4. Moderate size hiatal hernia. 5. Coronary artery disease. 6. Tiny bilateral pleural effusions. CHEST X-RAY, one view.     HISTORY:  Shortness of breath. Possible sepsis.       TECHNIQUE:  AP upright portable view.     COMPARISON: November 2006     FINDINGS:   Interstitial prominence. No lobar consolidation.  Mild blunting  costophrenic angles. Heart may be enlarged. Aortic calcifications.      IMPRESSION  IMPRESSION:  Interstitial pattern, mild interstitial edema versus atypical  pneumonia. Labs: Results:       Chemistry Recent Labs     06/26/20  0455 06/25/20  1705 06/24/20  0314 06/23/20  1255   * 155* 179* 166*    136 138 137   K 4.2 3.9 3.3* 3.7    100 102 100   CO2 28 29 26 27   BUN 14 13 14 15   CREA 0.95 1.10* 0.99 1.03*   CA 8.1* 8.3 7.9* 8.5   AGAP 7 7 10 10   AP 86 97  --  104   TP 6.7 7.3  --  7.0   ALB 0.7* 2.4*  --  2.6*   GLOB 6.0* 4.9*  --  4.4*   AGRAT 0.1* 0.5*  --  0.6*      CBC w/Diff Recent Labs     06/24/20  0314 06/23/20  1255   WBC 10.8 10.9   RBC 3.76* 3.91*   HGB 12.2 13.7   HCT 34.5* 37.2    195      Cardiac Enzymes No results for input(s): CPK, CKND1, AKIRA in the last 72 hours. No lab exists for component: CKRMB, TROIP   Coagulation No results for input(s): PTP, INR, APTT, INREXT in the last 72 hours. Lipid Panel Lab Results   Component Value Date/Time    Cholesterol, total 226 (H) 01/19/2018 11:33 AM    HDL Cholesterol 56 01/19/2018 11:33 AM    LDL, calculated 139 (H) 01/19/2018 11:33 AM    VLDL, calculated 31 01/19/2018 11:33 AM    Triglyceride 157 (H) 01/19/2018 11:33 AM      BNP No results for input(s): BNPP in the last 72 hours. Liver Enzymes Recent Labs     06/26/20  0455   TP 6.7   ALB 0.7*   AP 86      Thyroid Studies Lab Results   Component Value Date/Time    TSH 0.641 06/23/2020 09:45 PM            Discharge Exam:  Visit Vitals  /67 (BP 1 Location: Left arm, BP Patient Position: At rest)   Pulse 62   Temp 98.8 °F (37.1 °C)   Resp 19   Ht 5' 8\" (1.727 m)   Wt 72.2 kg (159 lb 3.2 oz)   SpO2 94%   BMI 24.21 kg/m²     General appearance: alert, cooperative, no distress, appears stated age  Lungs: clear to auscultation bilaterally  Heart: regular rate and rhythm, S1, S2 normal, no murmur, click, rub or gallop  Abdomen: soft, non-tender.  Bowel sounds normal. No masses,  no organomegaly  Extremities: no cyanosis or edema  Neurologic: Grossly normal    Disposition: home with HHA/PT/OT  Discharge Condition: stable  Patient Instructions:   Current Discharge Medication List      START taking these medications    Details   dilTIAZem ER (CARDIZEM CD) 180 mg capsule Take 1 Cap by mouth two (2) times a day for 30 days. Qty: 60 Cap, Refills: 3      apixaban (ELIQUIS) 5 mg tablet Take 1 Tab by mouth two (2) times a day for 30 days. Qty: 60 Tab, Refills: 2      cefpodoxime (VANTIN) 100 mg tablet Take 1 Tab by mouth two (2) times a day for 7 days. Qty: 14 Tab, Refills: 0         CONTINUE these medications which have CHANGED    Details   metoprolol tartrate (LOPRESSOR) 25 mg tablet Take 2 Tabs by mouth two (2) times a day for 30 days. Qty: 120 Tab, Refills: 3      traMADoL (Ultram) 50 mg tablet Take 1 Tab by mouth every eight (8) hours as needed for Pain for up to 7 days. Max Daily Amount: 150 mg.  Qty: 21 Tab, Refills: 0    Associated Diagnoses: Multiple closed fractures of pelvis without disruption of pelvic ring, initial encounter ([facility]         CONTINUE these medications which have NOT CHANGED    Details   hydrALAZINE (APRESOLINE) 10 mg tablet Take 1 Tab by mouth three (3) times daily. Qty: 90 Tab, Refills: 11      lisinopril (PRINIVIL, ZESTRIL) 20 mg tablet Take 1 Tab by mouth two (2) times a day. Qty: 60 Tab, Refills: 11      acetaminophen (TYLENOL EXTRA STRENGTH) 500 mg tablet Take 1,000 mg by mouth daily. levothyroxine (SYNTHROID) 125 mcg tablet Take 1 Tab by mouth Daily (before breakfast). Qty: 90 Tab, Refills: 3    Associated Diagnoses: Acquired hypothyroidism      memantine (NAMENDA) 10 mg tablet Take 1 Tab by mouth two (2) times a day.   Qty: 180 Tab, Refills: 3      Blood-Glucose Meter (ACCU-CHEK RAMON PLUS METER) misc Test 4 four times a week - test fasting and 2 hours after the largest meal  Qty: 100 Each, Refills: 3    Comments: Dx E11.9  Associated Diagnoses: Type 2 diabetes mellitus without complication, without long-term current use of insulin ([facility])      glucose blood VI test strips (ACCU-CHEK RAMON PLUS TEST STRP) strip One meter  Qty: 100 Strip, Refills: 3    Associated Diagnoses: Type 2 diabetes mellitus without complication, without long-term current use of insulin ([facility])      sucralfate (CARAFATE) 100 mg/mL suspension 1 gram by mouth q ac and qhs  Qty: 1800 mL, Refills: 3      polyethylene glycol (MIRALAX) 17 gram packet Take 1 Packet by mouth daily. Qty: 17 Packet, Refills: 3      potassium chloride SR (KLOR-CON 8) 8 mEq tablet Take 1 Tab by mouth daily. Qty: 90 Tab, Refills: 3      furosemide (LASIX) 20 mg tablet Take  by mouth daily. mirabegron ER (MYRBETRIQ) 25 mg ER tablet Take 25 mg by mouth daily. lancets (ACCU-CHEK MULTICLIX LANCET) misc Test once daily  Qty: 50 Each, Refills: 3      psyllium (METAMUCIL) packet Take 1 Packet by mouth daily. Indications: irritable colon  Qty: 90 Packet, Refills: 3      lansoprazole (PREVACID) 15 mg capsule Take 2 Caps by mouth two (2) times a day. Qty: 360 Cap, Refills: 3      ondansetron hcl (ZOFRAN) 4 mg tablet Take 1 Tab by mouth every eight (8) hours as needed for Nausea. Qty: 90 Tab, Refills: 1      nitroglycerin (NITROSTAT) 0.4 mg SL tablet by SubLINGual route every five (5) minutes as needed for Chest Pain.              Activity: PT/OT Eval and Treat  Diet: Cardiac Diet and Diabetic Diet  Wound Care: None needed    Follow-up  · Follow up with cardiology in 1-2 weeks as scheduled    Time spent to discharge patient 35 minutes  Signed:  J Luis Samuel MD  6/26/2020  7:57 AM

## 2020-06-26 NOTE — DISCHARGE INSTRUCTIONS

## 2020-06-26 NOTE — PROGRESS NOTES
am  6/26/2020 7:02 AM    Admit Date: 6/23/2020    Admit Diagnosis: Atrial fibrillation with RVR ([facility] [I48.91]      Subjective:    Patient doing ok.  Brief PAF last night but predominantly in nsr    Objective:      Visit Vitals  /67 (BP 1 Location: Left arm, BP Patient Position: At rest)   Pulse 62   Temp 98.8 °F (37.1 °C)   Resp 19   Ht 5' 8\" (1.727 m)   Wt 159 lb 3.2 oz (72.2 kg)   SpO2 94%   BMI 24.21 kg/m²       ROS:  General ROS: negative for - chills  Hematological and Lymphatic ROS: negative for - blood clots or jaundice  Respiratory ROS: no cough, shortness of breath, or wheezing  Cardiovascular ROS: no chest pain or dyspnea on exertion  Gastrointestinal ROS: no abdominal pain, change in bowel habits, or black or bloody stools  Neurological ROS: no TIA or stroke symptoms    Physical Exam:    Physical Examination: General appearance - alert, well appearing, and in no distress  Mental status - alert, oriented to person, place, and time  Eyes - pupils equal and reactive, extraocular eye movements intact  Neck/lymph - supple, no significant adenopathy  Chest/CV - clear to auscultation, no wheezes, rales or rhonchi, symmetric air entry  Heart - normal rate, regular rhythm, normal S1, S2, no murmurs, rubs, clicks or gallops  Abdomen/GI - soft, nontender, nondistended, no masses or organomegaly  Musculoskeletal - no joint tenderness, deformity or swelling  Extremities - peripheral pulses normal, no pedal edema, no clubbing or cyanosis  Skin - normal coloration and turgor, no rashes, no suspicious skin lesions noted    Current Facility-Administered Medications   Medication Dose Route Frequency    metoprolol tartrate (LOPRESSOR) tablet 25 mg  25 mg Oral Q6H    azithromycin (ZITHROMAX) tablet 500 mg  500 mg Oral DAILY    dilTIAZem ER (CARDIZEM CD) capsule 180 mg  180 mg Oral BID    apixaban (ELIQUIS) tablet 5 mg  5 mg Oral BID    alum-mag hydroxide-simeth (MYLANTA) oral suspension 30 mL  30 mL Oral Q4H PRN  insulin lispro (HUMALOG) injection   SubCUTAneous AC&HS    acetaminophen (TYLENOL) tablet 1,000 mg  1,000 mg Oral DAILY    pantoprazole (PROTONIX) tablet 40 mg  40 mg Oral BID    levothyroxine (SYNTHROID) tablet 125 mcg  125 mcg Oral ACB    memantine (NAMENDA) tablet 10 mg  10 mg Oral BID    ondansetron (ZOFRAN ODT) tablet 4 mg  4 mg Oral Q8H PRN    polyethylene glycol (MIRALAX) packet 17 g  17 g Oral DAILY    sucralfate (CARAFATE) tablet 1 g  1 g Oral TIDAC    sodium chloride (NS) flush 5-40 mL  5-40 mL IntraVENous Q8H    sodium chloride (NS) flush 5-40 mL  5-40 mL IntraVENous PRN    acetaminophen (TYLENOL) tablet 650 mg  650 mg Oral Q4H PRN    cefTRIAXone (ROCEPHIN) 1 g in 0.9% sodium chloride (MBP/ADV) 50 mL  1 g IntraVENous Q24H    morphine injection 1 mg  1 mg IntraVENous Q6H PRN       Data Review:   @LABRCNT(Na,K,BUN,CREA,WBC,HGB,HCT,PLT,INR,TRP,TCHOL*,Triglyceride*,LDL*,LDLCPOC HDL*,HDL])@    TELEMETRY: nsr    Assessment/Plan:     Principal Problem:    Atrial fibrillation with RVR ([facility]) (6/23/2020)    Active Problems:    Depression ()      Aortic stenosis (6/10/2016)      Overview: No sever SOB, no chest pain or syncope. Echo showed mod AS, mean grad 22mm       Hg, nl EF, mild LVH      Acquired hypothyroidism (7/17/2017)      Type 2 diabetes with nephropathy ([facility] (1/17/2019)    plan  1. PAF continue current meds  2. AS moderate. Follow up in office  3.  Call if needed      Ronnell Chin MD

## 2020-06-27 ENCOUNTER — PATIENT OUTREACH (OUTPATIENT)
Dept: CASE MANAGEMENT | Age: 85
End: 2020-06-27

## 2020-06-27 NOTE — PROGRESS NOTES
Chart opened and reviewed for [facility] outreach post hospital discharge. Patient discharged to [facility] intermediate. No outreach to intermediate. Will close and resolve episode.

## 2020-06-28 ENCOUNTER — HOSPITAL ENCOUNTER (INPATIENT)
Age: 85
LOS: 5 days | Discharge: OTHER INSTITUTION | DRG: 871 | End: 2020-07-03
Attending: EMERGENCY MEDICINE | Admitting: INTERNAL MEDICINE
Payer: MEDICARE

## 2020-06-28 ENCOUNTER — APPOINTMENT (OUTPATIENT)
Dept: GENERAL RADIOLOGY | Age: 85
DRG: 871 | End: 2020-06-28
Attending: EMERGENCY MEDICINE
Payer: MEDICARE

## 2020-06-28 ENCOUNTER — APPOINTMENT (OUTPATIENT)
Dept: CT IMAGING | Age: 85
DRG: 871 | End: 2020-06-28
Attending: EMERGENCY MEDICINE
Payer: MEDICARE

## 2020-06-28 DIAGNOSIS — R11.2 NAUSEA AND VOMITING, INTRACTABILITY OF VOMITING NOT SPECIFIED, UNSPECIFIED VOMITING TYPE: ICD-10-CM

## 2020-06-28 DIAGNOSIS — A41.9 SEPSIS WITHOUT ACUTE ORGAN DYSFUNCTION, DUE TO UNSPECIFIED ORGANISM (HCC): ICD-10-CM

## 2020-06-28 DIAGNOSIS — R91.8 BILATERAL PULMONARY INFILTRATES ON CHEST X-RAY: ICD-10-CM

## 2020-06-28 DIAGNOSIS — R50.9 ACUTE FEBRILE ILLNESS: Primary | ICD-10-CM

## 2020-06-28 PROBLEM — Z20.822 PERSON UNDER INVESTIGATION FOR COVID-19: Status: ACTIVE | Noted: 2020-06-28

## 2020-06-28 PROBLEM — K52.9 COLITIS: Status: ACTIVE | Noted: 2020-06-28

## 2020-06-28 PROBLEM — R19.7 DIARRHEA: Status: ACTIVE | Noted: 2020-06-28

## 2020-06-28 LAB
ALBUMIN SERPL-MCNC: 2.5 G/DL (ref 3.2–4.6)
ALBUMIN/GLOB SERPL: 0.5 {RATIO} (ref 1.2–3.5)
ALP SERPL-CCNC: 117 U/L (ref 50–136)
ALT SERPL-CCNC: 18 U/L (ref 12–65)
ANION GAP SERPL CALC-SCNC: 11 MMOL/L (ref 7–16)
APPEARANCE UR: CLEAR
AST SERPL-CCNC: 32 U/L (ref 15–37)
BASOPHILS # BLD: 0 K/UL (ref 0–0.2)
BASOPHILS NFR BLD: 0 % (ref 0–2)
BILIRUB SERPL-MCNC: 0.6 MG/DL (ref 0.2–1.1)
BILIRUB UR QL: NEGATIVE
BNP SERPL-MCNC: 2277 PG/ML
BUN SERPL-MCNC: 11 MG/DL (ref 8–23)
CALCIUM SERPL-MCNC: 8.3 MG/DL (ref 8.3–10.4)
CHLORIDE SERPL-SCNC: 101 MMOL/L (ref 98–107)
CO2 SERPL-SCNC: 22 MMOL/L (ref 21–32)
COLOR UR: YELLOW
COVID-19 RAPID TEST, COVR: NOT DETECTED
COVID-19 RAPID TEST, COVR: NOT DETECTED
CREAT SERPL-MCNC: 1.01 MG/DL (ref 0.6–1)
D DIMER PPP FEU-MCNC: 3.6 UG/ML(FEU)
DIFFERENTIAL METHOD BLD: ABNORMAL
EOSINOPHIL # BLD: 0.1 K/UL (ref 0–0.8)
EOSINOPHIL NFR BLD: 1 % (ref 0.5–7.8)
ERYTHROCYTE [DISTWIDTH] IN BLOOD BY AUTOMATED COUNT: 15.7 % (ref 11.9–14.6)
FERRITIN SERPL-MCNC: 274 NG/ML (ref 8–388)
GLOBULIN SER CALC-MCNC: 5.1 G/DL (ref 2.3–3.5)
GLUCOSE BLD STRIP.AUTO-MCNC: 151 MG/DL (ref 65–100)
GLUCOSE SERPL-MCNC: 176 MG/DL (ref 65–100)
GLUCOSE UR STRIP.AUTO-MCNC: NEGATIVE MG/DL
HCT VFR BLD AUTO: 35.7 % (ref 35.8–46.3)
HGB BLD-MCNC: 13.3 G/DL (ref 11.7–15.4)
HGB UR QL STRIP: NEGATIVE
IMM GRANULOCYTES # BLD AUTO: 0.2 K/UL (ref 0–0.5)
IMM GRANULOCYTES NFR BLD AUTO: 1 % (ref 0–5)
KETONES UR QL STRIP.AUTO: NEGATIVE MG/DL
LACTATE SERPL-SCNC: 2 MMOL/L (ref 0.4–2)
LACTATE SERPL-SCNC: 3.1 MMOL/L (ref 0.4–2)
LDH SERPL L TO P-CCNC: 258 U/L (ref 110–210)
LEUKOCYTE ESTERASE UR QL STRIP.AUTO: NEGATIVE
LYMPHOCYTES # BLD: 0.7 K/UL (ref 0.5–4.6)
LYMPHOCYTES NFR BLD: 4 % (ref 13–44)
MAGNESIUM SERPL-MCNC: 2 MG/DL (ref 1.8–2.4)
MCH RBC QN AUTO: 36.3 PG (ref 26.1–32.9)
MCHC RBC AUTO-ENTMCNC: 37.3 G/DL (ref 31.4–35)
MCV RBC AUTO: 97.5 FL (ref 79.6–97.8)
MONOCYTES # BLD: 0.3 K/UL (ref 0.1–1.3)
MONOCYTES NFR BLD: 2 % (ref 4–12)
NEUTS SEG # BLD: 14.9 K/UL (ref 1.7–8.2)
NEUTS SEG NFR BLD: 92 % (ref 43–78)
NITRITE UR QL STRIP.AUTO: NEGATIVE
NRBC # BLD: 0 K/UL (ref 0–0.2)
PH UR STRIP: 5.5 [PH] (ref 5–9)
PHOSPHATE SERPL-MCNC: 3.8 MG/DL (ref 2.3–3.7)
PLATELET # BLD AUTO: 279 K/UL (ref 150–450)
PMV BLD AUTO: 10.6 FL (ref 9.4–12.3)
POTASSIUM SERPL-SCNC: 4.1 MMOL/L (ref 3.5–5.1)
PROCALCITONIN SERPL-MCNC: 0.5 NG/ML
PROT SERPL-MCNC: 7.6 G/DL (ref 6.3–8.2)
PROT UR STRIP-MCNC: NEGATIVE MG/DL
RBC # BLD AUTO: 3.66 M/UL (ref 4.05–5.2)
SARS-COV-2, COV2NT: NOT DETECTED
SODIUM SERPL-SCNC: 134 MMOL/L (ref 136–145)
SOURCE, COVRS: NORMAL
SOURCE, COVRS: NORMAL
SP GR UR REFRACTOMETRY: 1.01 (ref 1–1.02)
TROPONIN-HIGH SENSITIVITY: 7.3 PG/ML (ref 0–14)
UROBILINOGEN UR QL STRIP.AUTO: 0.2 EU/DL (ref 0.2–1)
WBC # BLD AUTO: 16.2 K/UL (ref 4.3–11.1)

## 2020-06-28 PROCEDURE — 51702 INSERT TEMP BLADDER CATH: CPT

## 2020-06-28 PROCEDURE — 99285 EMERGENCY DEPT VISIT HI MDM: CPT

## 2020-06-28 PROCEDURE — 65270000029 HC RM PRIVATE

## 2020-06-28 PROCEDURE — 74011250637 HC RX REV CODE- 250/637: Performed by: INTERNAL MEDICINE

## 2020-06-28 PROCEDURE — 74011000258 HC RX REV CODE- 258: Performed by: INTERNAL MEDICINE

## 2020-06-28 PROCEDURE — 74011636320 HC RX REV CODE- 636/320: Performed by: EMERGENCY MEDICINE

## 2020-06-28 PROCEDURE — 80053 COMPREHEN METABOLIC PANEL: CPT

## 2020-06-28 PROCEDURE — 85025 COMPLETE CBC W/AUTO DIFF WBC: CPT

## 2020-06-28 PROCEDURE — 84484 ASSAY OF TROPONIN QUANT: CPT

## 2020-06-28 PROCEDURE — 83605 ASSAY OF LACTIC ACID: CPT

## 2020-06-28 PROCEDURE — 74011000258 HC RX REV CODE- 258: Performed by: EMERGENCY MEDICINE

## 2020-06-28 PROCEDURE — 96375 TX/PRO/DX INJ NEW DRUG ADDON: CPT

## 2020-06-28 PROCEDURE — 85379 FIBRIN DEGRADATION QUANT: CPT

## 2020-06-28 PROCEDURE — 96365 THER/PROPH/DIAG IV INF INIT: CPT

## 2020-06-28 PROCEDURE — 87040 BLOOD CULTURE FOR BACTERIA: CPT

## 2020-06-28 PROCEDURE — 83880 ASSAY OF NATRIURETIC PEPTIDE: CPT

## 2020-06-28 PROCEDURE — 84145 PROCALCITONIN (PCT): CPT

## 2020-06-28 PROCEDURE — 82962 GLUCOSE BLOOD TEST: CPT

## 2020-06-28 PROCEDURE — 71045 X-RAY EXAM CHEST 1 VIEW: CPT

## 2020-06-28 PROCEDURE — 74177 CT ABD & PELVIS W/CONTRAST: CPT

## 2020-06-28 PROCEDURE — 81003 URINALYSIS AUTO W/O SCOPE: CPT

## 2020-06-28 PROCEDURE — 74011250636 HC RX REV CODE- 250/636: Performed by: EMERGENCY MEDICINE

## 2020-06-28 PROCEDURE — 74018 RADEX ABDOMEN 1 VIEW: CPT

## 2020-06-28 PROCEDURE — 84100 ASSAY OF PHOSPHORUS: CPT

## 2020-06-28 PROCEDURE — 82728 ASSAY OF FERRITIN: CPT

## 2020-06-28 PROCEDURE — 83735 ASSAY OF MAGNESIUM: CPT

## 2020-06-28 PROCEDURE — 83615 LACTATE (LD) (LDH) ENZYME: CPT

## 2020-06-28 PROCEDURE — 87635 SARS-COV-2 COVID-19 AMP PRB: CPT

## 2020-06-28 PROCEDURE — 74011250636 HC RX REV CODE- 250/636: Performed by: INTERNAL MEDICINE

## 2020-06-28 PROCEDURE — 87086 URINE CULTURE/COLONY COUNT: CPT

## 2020-06-28 PROCEDURE — 74011250637 HC RX REV CODE- 250/637: Performed by: EMERGENCY MEDICINE

## 2020-06-28 PROCEDURE — 0107U C DIFF TOX AG DETCJ IA STOOL: CPT

## 2020-06-28 RX ORDER — TRAMADOL HYDROCHLORIDE 50 MG/1
25 TABLET ORAL
Status: DISCONTINUED | OUTPATIENT
Start: 2020-06-28 | End: 2020-07-03 | Stop reason: HOSPADM

## 2020-06-28 RX ORDER — ONDANSETRON 2 MG/ML
4 INJECTION INTRAMUSCULAR; INTRAVENOUS
Status: COMPLETED | OUTPATIENT
Start: 2020-06-28 | End: 2020-06-28

## 2020-06-28 RX ORDER — ACETAMINOPHEN 325 MG/1
650 TABLET ORAL
Status: COMPLETED | OUTPATIENT
Start: 2020-06-28 | End: 2020-06-28

## 2020-06-28 RX ORDER — MEMANTINE HYDROCHLORIDE 5 MG/1
10 TABLET ORAL 2 TIMES DAILY
Status: DISCONTINUED | OUTPATIENT
Start: 2020-06-28 | End: 2020-07-03 | Stop reason: HOSPADM

## 2020-06-28 RX ORDER — ACETAMINOPHEN 325 MG/1
650 TABLET ORAL
Status: DISCONTINUED | OUTPATIENT
Start: 2020-06-28 | End: 2020-07-03 | Stop reason: HOSPADM

## 2020-06-28 RX ORDER — CIPROFLOXACIN 2 MG/ML
400 INJECTION, SOLUTION INTRAVENOUS ONCE
Status: COMPLETED | OUTPATIENT
Start: 2020-06-28 | End: 2020-06-28

## 2020-06-28 RX ORDER — HYDROMORPHONE HYDROCHLORIDE 1 MG/ML
0.5 INJECTION, SOLUTION INTRAMUSCULAR; INTRAVENOUS; SUBCUTANEOUS
Status: DISCONTINUED | OUTPATIENT
Start: 2020-06-28 | End: 2020-07-03

## 2020-06-28 RX ORDER — VANCOMYCIN/0.9 % SOD CHLORIDE 1.5G/250ML
1500 PLASTIC BAG, INJECTION (ML) INTRAVENOUS ONCE
Status: COMPLETED | OUTPATIENT
Start: 2020-06-28 | End: 2020-06-28

## 2020-06-28 RX ORDER — HEPARIN SODIUM 5000 [USP'U]/ML
5000 INJECTION, SOLUTION INTRAVENOUS; SUBCUTANEOUS EVERY 8 HOURS
Status: CANCELLED | OUTPATIENT
Start: 2020-06-28

## 2020-06-28 RX ORDER — INSULIN LISPRO 100 [IU]/ML
INJECTION, SOLUTION INTRAVENOUS; SUBCUTANEOUS
Status: DISCONTINUED | OUTPATIENT
Start: 2020-06-28 | End: 2020-07-03 | Stop reason: HOSPADM

## 2020-06-28 RX ORDER — METOPROLOL TARTRATE 25 MG/1
25 TABLET, FILM COATED ORAL 2 TIMES DAILY
Status: DISCONTINUED | OUTPATIENT
Start: 2020-06-28 | End: 2020-07-03 | Stop reason: HOSPADM

## 2020-06-28 RX ORDER — DIPHENHYDRAMINE HCL 25 MG
25 CAPSULE ORAL
Status: DISCONTINUED | OUTPATIENT
Start: 2020-06-28 | End: 2020-07-03 | Stop reason: HOSPADM

## 2020-06-28 RX ORDER — ONDANSETRON 2 MG/ML
4 INJECTION INTRAMUSCULAR; INTRAVENOUS
Status: DISCONTINUED | OUTPATIENT
Start: 2020-06-28 | End: 2020-07-03 | Stop reason: HOSPADM

## 2020-06-28 RX ORDER — VANCOMYCIN HYDROCHLORIDE 1 G/20ML
INJECTION, POWDER, LYOPHILIZED, FOR SOLUTION INTRAVENOUS ONCE
Status: DISCONTINUED | OUTPATIENT
Start: 2020-06-28 | End: 2020-06-28 | Stop reason: DRUGHIGH

## 2020-06-28 RX ORDER — NITROGLYCERIN 0.4 MG/1
0.4 TABLET SUBLINGUAL
Status: DISCONTINUED | OUTPATIENT
Start: 2020-06-28 | End: 2020-07-03 | Stop reason: HOSPADM

## 2020-06-28 RX ORDER — SODIUM CHLORIDE 0.9 % (FLUSH) 0.9 %
5-40 SYRINGE (ML) INJECTION AS NEEDED
Status: DISCONTINUED | OUTPATIENT
Start: 2020-06-28 | End: 2020-07-03 | Stop reason: HOSPADM

## 2020-06-28 RX ORDER — SUCRALFATE 1 G/10ML
1 SUSPENSION ORAL
Status: DISCONTINUED | OUTPATIENT
Start: 2020-06-28 | End: 2020-07-03 | Stop reason: HOSPADM

## 2020-06-28 RX ORDER — DICYCLOMINE HYDROCHLORIDE 20 MG/1
20 TABLET ORAL
Status: DISCONTINUED | OUTPATIENT
Start: 2020-06-28 | End: 2020-07-03 | Stop reason: HOSPADM

## 2020-06-28 RX ORDER — SODIUM CHLORIDE 0.9 % (FLUSH) 0.9 %
5-40 SYRINGE (ML) INJECTION EVERY 8 HOURS
Status: DISCONTINUED | OUTPATIENT
Start: 2020-06-28 | End: 2020-07-03 | Stop reason: HOSPADM

## 2020-06-28 RX ORDER — SODIUM CHLORIDE 0.9 % (FLUSH) 0.9 %
5-10 SYRINGE (ML) INJECTION
Status: COMPLETED | OUTPATIENT
Start: 2020-06-28 | End: 2020-06-28

## 2020-06-28 RX ADMIN — IOPAMIDOL 85 ML: 755 INJECTION, SOLUTION INTRAVENOUS at 12:36

## 2020-06-28 RX ADMIN — Medication 5 ML: at 23:29

## 2020-06-28 RX ADMIN — MEMANTINE HYDROCHLORIDE 10 MG: 5 TABLET ORAL at 23:28

## 2020-06-28 RX ADMIN — METOPROLOL TARTRATE 25 MG: 25 TABLET, FILM COATED ORAL at 18:37

## 2020-06-28 RX ADMIN — CEFEPIME HYDROCHLORIDE 2 G: 2 INJECTION, POWDER, FOR SOLUTION INTRAVENOUS at 18:26

## 2020-06-28 RX ADMIN — Medication 10 ML: at 12:36

## 2020-06-28 RX ADMIN — ONDANSETRON 4 MG: 2 INJECTION INTRAMUSCULAR; INTRAVENOUS at 10:36

## 2020-06-28 RX ADMIN — CIPROFLOXACIN 400 MG: 2 INJECTION, SOLUTION INTRAVENOUS at 08:19

## 2020-06-28 RX ADMIN — ACETAMINOPHEN 650 MG: 325 TABLET, FILM COATED ORAL at 07:42

## 2020-06-28 RX ADMIN — ONDANSETRON 4 MG: 2 INJECTION INTRAMUSCULAR; INTRAVENOUS at 09:01

## 2020-06-28 RX ADMIN — DIATRIZOATE MEGLUMINE AND DIATRIZOATE SODIUM 15 ML: 660; 100 LIQUID ORAL; RECTAL at 10:36

## 2020-06-28 RX ADMIN — APIXABAN 5 MG: 5 TABLET, FILM COATED ORAL at 18:25

## 2020-06-28 RX ADMIN — SODIUM CHLORIDE 100 ML: 900 INJECTION, SOLUTION INTRAVENOUS at 12:36

## 2020-06-28 RX ADMIN — VANCOMYCIN HYDROCHLORIDE 1500 MG: 10 INJECTION, POWDER, LYOPHILIZED, FOR SOLUTION INTRAVENOUS at 09:01

## 2020-06-28 NOTE — ED PROVIDER NOTES
77-year-old female has a history of aortic stenosis, hypertension, diabetes, hypothyroidism. Patient could not give much history but really denies any chest pain or headache. She states of abdominal pain and some vomiting this morning. EMS was called to the nursing facility where she resides due to fever and vomiting. EMS does not know of any history of diarrhea. Patient states no shortness of breath. Cannot give much other history. Review of records reveals the patient was admitted from June 23-26 for atrial fibrillation rapid ventricular response. She had suspected pneumonia was given IV antibiotics. She was discharged on Vantin. Patient had negative COVID test here. The facility in which she resides evidently does have COVID patients. EMS witnessed some emesis. The history is provided by the patient, the nursing home and the EMS personnel. Fever    This is a new problem. The current episode started 6 to 12 hours ago. The problem occurs constantly. Associated symptoms include vomiting and muscle aches. Pertinent negatives include no chest pain, no headaches, no cough, no shortness of breath, no mental status change and no urinary symptoms. She has tried nothing for the symptoms. Vomiting    Associated symptoms include a fever. Pertinent negatives include no headaches, no cough and no headaches. Past Medical History:   Diagnosis Date    Abdominal pain, epigastric     Abdominal pain, unspecified site     Anxiety state, unspecified     Aortic stenosis 6/10/2016    No sever SOB, no chest pain or syncope.  Echo showed mod AS, mean grad 22mm Hg, nl EF, mild LVH    Aortic valve disorders     Atherosclerosis of aorta (HCC)     Backache, unspecified     Benign neoplasm of colon     Cervicalgia     Closed fracture of unspecified bone     Depressive disorder, not elsewhere classified     Diabetes (HCC)     Diarrhea     Disorders of magnesium metabolism     Edema     Elevated sedimentation rate     Encounter for long-term (current) use of other medications     Endocrine disease     Epistaxis     Esophageal reflux     Essential hypertension, benign     Gastrointestinal malfunction arising from mental factors     Hypertension     Hypertension-controlled, benign     Hypertonicity of bladder     Hypopotassemia     Insomnia, unspecified     Irritable bowel syndrome     Memory loss     Nausea alone     Osteoarthrosis, unspecified whether generalized or localized, unspecified site     Other ill-defined conditions(799.89)     hyperlipidemia    Other malaise and fatigue     Pain in joint, lower leg     Pain in joint, shoulder region     Postnasal drip     Rheumatoid arthritis(714.0)     Type II or unspecified type diabetes mellitus without mention of complication, not stated as uncontrolled     Unspecified constipation     Unspecified hemorrhoids with other complication     Unspecified hemorrhoids without mention of complication     Unspecified hereditary and idiopathic peripheral neuropathy     Unspecified hypothyroidism     Urgency of urination     Urinary tract infection, site not specified        Past Surgical History:   Procedure Laterality Date    HX CHOLECYSTECTOMY      HX OTHER SURGICAL      thyroidectomy    HX TONSILLECTOMY           Family History:   Problem Relation Age of Onset    Arthritis-rheumatoid Daughter     Arthritis-osteo Son        Social History     Socioeconomic History    Marital status:      Spouse name: Not on file    Number of children: Not on file    Years of education: Not on file    Highest education level: Not on file   Occupational History    Not on file   Social Needs    Financial resource strain: Not on file    Food insecurity     Worry: Not on file     Inability: Not on file    Transportation needs     Medical: Not on file     Non-medical: Not on file   Tobacco Use    Smoking status: Never Smoker    Smokeless tobacco: Never Used   Substance and Sexual Activity    Alcohol use: No    Drug use: No    Sexual activity: Not on file   Lifestyle    Physical activity     Days per week: Not on file     Minutes per session: Not on file    Stress: Not on file   Relationships    Social connections     Talks on phone: Not on file     Gets together: Not on file     Attends [employer] witness service: Not on file     Active member of club or organization: Not on file     Attends meetings of clubs or organizations: Not on file     Relationship status: Not on file    Intimate partner violence     Fear of current or ex partner: Not on file     Emotionally abused: Not on file     Physically abused: Not on file     Forced sexual activity: Not on file   Other Topics Concern    Not on file   Social History Narrative    Not on file         ALLERGIES: Codeine; Pcn [penicillins]; and [place]    Review of Systems   Unable to perform ROS: Mental status change   Constitutional: Positive for fever. Respiratory: Negative for cough and shortness of breath. Cardiovascular: Negative for chest pain. Gastrointestinal: Positive for vomiting. Neurological: Negative for headaches. Vitals:    06/28/20 0707   BP: 132/61   Pulse: (!) 59   Resp: 16   Temp: (!) 101.7 °F (38.7 °C)   SpO2: (!) 89%   Weight: 72.1 kg (159 lb)   Height: 5' 8\" (1.727 m)            Physical Exam  Vitals signs and nursing note reviewed. Constitutional:       General: She is not in acute distress. Appearance: She is well-developed. HENT:      Head: Normocephalic and atraumatic. Right Ear: External ear normal.      Left Ear: External ear normal.      Mouth/Throat:      Pharynx: No oropharyngeal exudate. Eyes:      Conjunctiva/sclera: Conjunctivae normal.      Pupils: Pupils are equal, round, and reactive to light. Neck:      Musculoskeletal: Normal range of motion and neck supple. Cardiovascular:      Rate and Rhythm: Normal rate and regular rhythm. Heart sounds: No murmur. Pulmonary:      Effort: No respiratory distress. Breath sounds: Normal breath sounds. Abdominal:      General: Bowel sounds are normal.      Palpations: Abdomen is soft. There is no mass. Tenderness: There is generalized abdominal tenderness. There is no guarding or rebound. Hernia: No hernia is present. Comments: Mild nonspecific tenderness of the abdomen. Skin:     General: Skin is warm and dry. Neurological:      Mental Status: She is alert. She is confused. GCS: GCS eye subscore is 4. GCS verbal subscore is 4. GCS motor subscore is 6. Gait: Gait normal.      Comments: Nl speech  Equal . Moves all fours. Does not cooperate well for formal neurologic examination. Psychiatric:         Speech: Speech normal.          MDM  Number of Diagnoses or Management Options  Diagnosis management comments: Temperature 101.8 here. Septic work-up. Possibility of C. difficile. Check for UTI, pneumonia. Screen for COVID. Amount and/or Complexity of Data Reviewed  Clinical lab tests: reviewed and ordered  Tests in the radiology section of CPT®: ordered and reviewed  Tests in the medicine section of CPT®: reviewed and ordered  Review and summarize past medical records: yes (Recent admission for A. fib and pneumonia.   The above)  Independent visualization of images, tracings, or specimens: yes    Risk of Complications, Morbidity, and/or Mortality  Presenting problems: moderate  Diagnostic procedures: minimal  Management options: low           Procedures      Results Include:    Recent Results (from the past 24 hour(s))   LACTIC ACID    Collection Time: 06/28/20  7:17 AM   Result Value Ref Range    Lactic acid 3.1 (HH) 0.4 - 2.0 MMOL/L   CBC WITH AUTOMATED DIFF    Collection Time: 06/28/20  7:19 AM   Result Value Ref Range    WBC 16.2 (H) 4.3 - 11.1 K/uL    RBC 3.66 (L) 4.05 - 5.2 M/uL    HGB 13.3 11.7 - 15.4 g/dL    HCT 35.7 (L) 35.8 - 46.3 %    MCV 97.5 79.6 - 97.8 FL    MCH 36.3 (H) 26.1 - 32.9 PG    MCHC 37.3 (H) 31.4 - 35.0 g/dL    RDW 15.7 (H) 11.9 - 14.6 %    PLATELET 945 992 - 268 K/uL    MPV 10.6 9.4 - 12.3 FL    ABSOLUTE NRBC 0.00 0.0 - 0.2 K/uL    DF AUTOMATED      NEUTROPHILS 92 (H) 43 - 78 %    LYMPHOCYTES 4 (L) 13 - 44 %    MONOCYTES 2 (L) 4.0 - 12.0 %    EOSINOPHILS 1 0.5 - 7.8 %    BASOPHILS 0 0.0 - 2.0 %    IMMATURE GRANULOCYTES 1 0.0 - 5.0 %    ABS. NEUTROPHILS 14.9 (H) 1.7 - 8.2 K/UL    ABS. LYMPHOCYTES 0.7 0.5 - 4.6 K/UL    ABS. MONOCYTES 0.3 0.1 - 1.3 K/UL    ABS. EOSINOPHILS 0.1 0.0 - 0.8 K/UL    ABS. BASOPHILS 0.0 0.0 - 0.2 K/UL    ABS. IMM. GRANS. 0.2 0.0 - 0.5 K/UL   METABOLIC PANEL, COMPREHENSIVE    Collection Time: 06/28/20  7:19 AM   Result Value Ref Range    Sodium 134 (L) 136 - 145 mmol/L    Potassium 4.1 3.5 - 5.1 mmol/L    Chloride 101 98 - 107 mmol/L    CO2 22 21 - 32 mmol/L    Anion gap 11 7 - 16 mmol/L    Glucose 176 (H) 65 - 100 mg/dL    BUN 11 8 - 23 MG/DL    Creatinine 1.01 (H) 0.6 - 1.0 MG/DL    GFR est AA >60 >60 ml/min/1.73m2    GFR est non-AA 54 (L) >60 ml/min/1.73m2    Calcium 8.3 8.3 - 10.4 MG/DL    Bilirubin, total 0.6 0.2 - 1.1 MG/DL    ALT (SGPT) 18 12 - 65 U/L    AST (SGOT) 32 15 - 37 U/L    Alk.  phosphatase 117 50 - 136 U/L    Protein, total 7.6 6.3 - 8.2 g/dL    Albumin 2.5 (L) 3.2 - 4.6 g/dL    Globulin 5.1 (H) 2.3 - 3.5 g/dL    A-G Ratio 0.5 (L) 1.2 - 3.5     PROCALCITONIN    Collection Time: 06/28/20  7:19 AM   Result Value Ref Range    Procalcitonin 0.50 ng/mL   CULTURE, BLOOD    Collection Time: 06/28/20  7:19 AM   Result Value Ref Range    Special Requests: LEFT  Antecubital        Culture result: PENDING    CULTURE, BLOOD    Collection Time: 06/28/20  7:19 AM   Result Value Ref Range    Special Requests: RIGHT  Antecubital        Culture result: PENDING    SARS-COV-2    Collection Time: 06/28/20  7:31 AM   Result Value Ref Range    SARS-CoV-2 PENDING     Specimen source Nasopharyngeal      COVID-19 rapid test Not detected NOTD     URINALYSIS W/ RFLX MICROSCOPIC    Collection Time: 06/28/20  7:41 AM   Result Value Ref Range    Color YELLOW      Appearance CLEAR      Specific gravity 1.008 1.001 - 1.023      pH (UA) 5.5 5.0 - 9.0      Protein Negative NEG mg/dL    Glucose Negative mg/dL    Ketone Negative NEG mg/dL    Bilirubin Negative NEG      Blood Negative NEG      Urobilinogen 0.2 0.2 - 1.0 EU/dL    Nitrites Negative NEG      Leukocyte Esterase Negative NEG       Xr Abd (kub)    Result Date: 6/28/2020  Exam: Single view abdomen. Indication: Abdominal pain, possible Covid 19. Comparison: None. FINDINGS: Nonobstructive bowel gas pattern. No portal venous air. No gross pneumoperitoneum. Bladder catheter evident. Chronic healed right superior pubic ramus fracture deformity. Cholecystectomy clips are evident. IMPRESSION: 1. Non-obstructive bowel gas pattern. Xr Chest Port    Result Date: 6/28/2020  EXAM: Single view chest radiograph. INDICATION: Fever, possible Covid 19. COMPARISON: Chest radiograph dated June 23, 2020. FINDINGS: No pneumothorax. Diffuse coarse reticular lung markings with basilar predominance, similar to prior. Heart appears normal in size. Atherosclerotic aorta. IMPRESSION: 1. Diffusely coarsened reticular lung markings with basilar predominance which appears similar to prior. Possibility of food versus colitis versus pneumonia. Blood cultures done. IV antibiotics given. Notified hospitalist regarding admission. Xr Abd (kub)    Result Date: 6/28/2020  Exam: Single view abdomen. Indication: Abdominal pain, possible Covid 19. Comparison: None. FINDINGS: Nonobstructive bowel gas pattern. No portal venous air. No gross pneumoperitoneum. Bladder catheter evident. Chronic healed right superior pubic ramus fracture deformity. Cholecystectomy clips are evident. IMPRESSION: 1. Non-obstructive bowel gas pattern.     Ct Abd Pelv W Cont    Result Date: 6/28/2020  CT OF THE ABDOMEN AND PELVIS INDICATION: Right lower quadrant pain and fever Multiple axial images were obtained through the abdomen and pelvis. Oral contrast was used for bowel opacification. 100mL of Isovue 370 intravenous contrast was used for better evaluation of solid organs and vascular structures. Radiation dose reduction techniques were used for this study. All CT scans performed at this facility use one or all of the following: Automated exposure control, adjustment of the mA and/or kVp according to patient's size, iterative reconstruction. COMPARISON: 06/23/2020 FINDINGS: -LUNG BASES: There is slight increased bibasilar infiltrate/atelectasis. Small bilateral pleural effusions are again noted. -LIVER: Normal in size and appearance. -GALLBLADDER/BILE DUCTS: No gallstones or bile duct dilatation. -PANCREAS: Normal. -SPLEEN: Normal. -ADRENALS: Normal. -KIDNEYS/URETERS: No hydronephrosis or significant mass. -BLADDER: Umaña catheter in place. -REPRODUCTIVE ORGANS: No pelvic masses. -BOWEL: There is a moderate size hiatal hernia. Wall thickening in the proximal colon could represent nondistention artifact or inflammation. Appendix is normal in size. Sigmoid diverticulosis is again noted. -LYMPH NODES: No significant retroperitoneal, mesenteric, or pelvic adenopathy. -BONES: Old right pubic rami fractures. There are also old L2 and T12 compression fractures. -VASCULATURE: Normal -OTHER: No ascites. IMPRESSION: 1. Possible inflammation of the proximal colon. 2.  Increased bibasilar infiltrate/atelectasis. Stable small bilateral pleural effusions. If there are any questions about this report, I can be reached on [employer] or at [phone]     [facility]    Result Date: 6/28/2020  EXAM: Single view chest radiograph. INDICATION: Fever, possible Covid 19. COMPARISON: Chest radiograph dated June 23, 2020. FINDINGS: No pneumothorax. Diffuse coarse reticular lung markings with basilar predominance, similar to prior.  Heart appears normal in size. Atherosclerotic aorta. IMPRESSION: 1. Diffusely coarsened reticular lung markings with basilar predominance which appears similar to prior. 1:15 PM  Notified hospitalist regarding admission.

## 2020-06-28 NOTE — PROGRESS NOTES
Pharmacokinetic Consult to Pharmacist    Jaun Jordy is a 80 y.o. female being treated for sepsis with vancomycin. Height: 5' 8\" (172.7 cm)  Weight: 72.1 kg (159 lb)  Lab Results   Component Value Date/Time    BUN 11 06/28/2020 07:19 AM    Creatinine 1.01 (H) 06/28/2020 07:19 AM    WBC 16.2 (H) 06/28/2020 07:19 AM    Procalcitonin 0.50 06/28/2020 07:19 AM    Lactic acid 3.1 (HH) 06/28/2020 07:17 AM      Estimated Creatinine Clearance: 35.1 mL/min (A) (based on SCr of 1.01 mg/dL (H)). Day 1 of vancomycin. Goal trough is 15-20. Vancomycin dose initiated at 1500 mg x 1 dose; followed by Vanc 1000 mg IV q24h. Will continue to follow patient and order levels when clinically indicated. Thank you,  Flor Reid, PharmD.

## 2020-06-28 NOTE — H&P
Hospitalist H&P Note     Admit Date:  2020  7:05 AM   Name:  Aliyah Iqbal   Age:  80 y.o.  :  1926   MRN:  [id number]   PCP:  Nathaly Blake MD  Treatment Team: Attending Provider: Bette Blackwood MD; Primary Nurse: Jaci Lefort, RN  Chief complaint: Persistent nausea and vomiting and diarrhea  HPI:   Mrs. Viviana Huddleston is a 80-year-old female with a past medical history significant for aortic valve stenosis, hypertension, hypothyroidism, dementia, GERD and she is also a long-term resident of a skilled nursing facility. She was recently admitted to our hospital on  for A. fib with RVR. At that time she was also treated for a pneumonia. COVID-19 was reportedly ruled out. During that admission she did have some abdominal pain that was attributed to gas or acid reflux as her CT abdomen and pelvis showed no acute intra-abdominal pathology. She was eventually discharged back to her assisted living facility on 2020 on [facility]. The patient has reportedly been doing well at this assisted living facility but today developed acute nausea and vomiting. The patient himself cannot recall what happened so the history is given from the ER physician and staff, the patient's chart and her son Elinor Alvarez who is at the bedside. The patient does note that she been experiencing diarrhea at the facility but it is unclear what duration this was. Per the ER her assisted living facility has had a high number of COVID-19 cases so she had a rapid swab test done in the emergency room which was negative. In the ER was also noted that she had right lower quadrant pain so a CT abdomen and pelvis which was done to evaluate for possible appendicitis. The CT was negative for appendicitis but was concerning for inflammation of the colon. Bilateral lung infiltrates and pleural effusions.   The hospitalist were requested for further work-up and management and admission to the inpatient services. The patient denies any chest pain currently she denies any current nausea or vomiting breath or abdominal pain. She denies any urinary tract symptoms. 10 systems reviewed and negative except as noted in HPI. Past Medical History:   Diagnosis Date    Abdominal pain, epigastric     Abdominal pain, unspecified site     Anxiety state, unspecified     Aortic stenosis 6/10/2016    No sever SOB, no chest pain or syncope.  Echo showed mod AS, mean grad 22mm Hg, nl EF, mild LVH    Aortic valve disorders     Atherosclerosis of aorta ([facility])     Backache, unspecified     Benign neoplasm of colon     Cervicalgia     Closed fracture of unspecified bone     Depressive disorder, not elsewhere classified     Diabetes ([facility])     Diarrhea     Disorders of magnesium metabolism     Edema     Elevated sedimentation rate     Encounter for long-term (current) use of other medications     Endocrine disease     Epistaxis     Esophageal reflux     Essential hypertension, benign     Gastrointestinal malfunction arising from mental factors     Hypertension     Hypertension-controlled, benign     Hypertonicity of bladder     Hypopotassemia     Insomnia, unspecified     Irritable bowel syndrome     Memory loss     Nausea alone     Osteoarthrosis, unspecified whether generalized or localized, unspecified site     Other ill-defined conditions(799.89)     hyperlipidemia    Other malaise and fatigue     Pain in joint, lower leg     Pain in joint, shoulder region     Postnasal drip     Rheumatoid arthritis(714.0)     Type II or unspecified type diabetes mellitus without mention of complication, not stated as uncontrolled     Unspecified constipation     Unspecified hemorrhoids with other complication     Unspecified hemorrhoids without mention of complication     Unspecified hereditary and idiopathic peripheral neuropathy     Unspecified hypothyroidism     Urgency of urination     Urinary tract infection, site not specified       Past Surgical History:   Procedure Laterality Date    HX CHOLECYSTECTOMY      HX OTHER SURGICAL      thyroidectomy    HX TONSILLECTOMY        Allergies   Allergen Reactions    Codeine Rash    Pcn [Penicillins] Hives    Strawberry Itching      Social History     Tobacco Use    Smoking status: Never Smoker    Smokeless tobacco: Never Used   Substance Use Topics    Alcohol use: No      Family History   Problem Relation Age of Onset    Arthritis-rheumatoid Daughter     Arthritis-osteo Son       Immunization History   Administered Date(s) Administered    Influenza High Dose Vaccine PF 09/19/2011, 10/01/2015, 10/20/2017, 09/01/2018    Influenza Vaccine 10/26/2014    Pneumococcal Vaccine (Unspecified Type) 01/30/2015    TB Skin Test (PPD) Intradermal 06/24/2020    TDAP Vaccine 12/15/2012     PTA Medications:  Prior to Admission Medications   Prescriptions Last Dose Informant Patient Reported? Taking? Blood-Glucose Meter (ACCU-CHEK RAMON PLUS METER) misc   No No   Sig: Test 4 four times a week - test fasting and 2 hours after the largest meal   acetaminophen (TYLENOL EXTRA STRENGTH) 500 mg tablet   Yes No   Sig: Take 1,000 mg by mouth daily. apixaban (ELIQUIS) 5 mg tablet   No No   Sig: Take 1 Tab by mouth two (2) times a day for 30 days. cefpodoxime (VANTIN) 100 mg tablet   No No   Sig: Take 1 Tab by mouth two (2) times a day for 7 days. dilTIAZem ER (CARDIZEM CD) 180 mg capsule   No No   Sig: Take 1 Cap by mouth two (2) times a day for 30 days. furosemide (LASIX) 20 mg tablet   Yes No   Sig: Take  by mouth daily. glucose blood VI test strips (ACCU-CHEK RAMON PLUS TEST STRP) strip   No No   Sig: One meter   hydrALAZINE (APRESOLINE) 10 mg tablet   No No   Sig: Take 1 Tab by mouth three (3) times daily.    lancets (ACCU-CHEK MULTICLIX LANCET) misc   No No   Sig: Test once daily   lansoprazole (PREVACID) 15 mg capsule   No No   Sig: Take 2 Caps by mouth two (2) times a day. levothyroxine (SYNTHROID) 125 mcg tablet   No No   Sig: Take 1 Tab by mouth Daily (before breakfast). lisinopril (PRINIVIL, ZESTRIL) 20 mg tablet   No No   Sig: Take 1 Tab by mouth two (2) times a day. memantine (NAMENDA) 10 mg tablet   No No   Sig: Take 1 Tab by mouth two (2) times a day. metoprolol tartrate (LOPRESSOR) 25 mg tablet   No No   Sig: Take 2 Tabs by mouth two (2) times a day for 30 days. mirabegron ER (MYRBETRIQ) 25 mg ER tablet   Yes No   Sig: Take 25 mg by mouth daily. nitroglycerin (NITROSTAT) 0.4 mg SL tablet   Yes No   Sig: by SubLINGual route every five (5) minutes as needed for Chest Pain. ondansetron hcl (ZOFRAN) 4 mg tablet   No No   Sig: Take 1 Tab by mouth every eight (8) hours as needed for Nausea. polyethylene glycol (MIRALAX) 17 gram packet   No No   Sig: Take 1 Packet by mouth daily. potassium chloride SR (KLOR-CON 8) 8 mEq tablet   No No   Sig: Take 1 Tab by mouth daily. psyllium (METAMUCIL) packet   No No   Sig: Take 1 Packet by mouth daily. Indications: irritable colon   sucralfate (CARAFATE) 100 mg/mL suspension   No No   Si gram by mouth q ac and qhs   traMADoL (Ultram) 50 mg tablet   No No   Sig: Take 1 Tab by mouth every eight (8) hours as needed for Pain for up to 7 days. Max Daily Amount: 150 mg.       Facility-Administered Medications: None       Objective:     Patient Vitals for the past 24 hrs:   Temp Pulse Resp BP SpO2   20 1031  60  131/61 (!) 89 %   20 0931  (!) 58 26 132/63 94 %   20 0916  (!) 59 27 109/56 94 %   20 0901  63  106/60 95 %   20 0846  64  104/62 93 %   20 0831  69  154/62 95 %   20 0816  62  145/63 (!) 89 %   20 0801  66  134/62 92 %   20 0746  69  146/67 90 %   20 0707 (!) 101.7 °F (38.7 °C) (!) 59 16 132/61 (!) 89 %     Oxygen Therapy  O2 Sat (%): (!) 89 % (20 1031)  Pulse via Oximetry: 63 beats per minute (20 1031)  O2 Device: Nasal cannula (06/28/20 5950)  No intake or output data in the 24 hours ending 06/28/20 0864    Physical Exam:  General:    Well nourished. Alert. Ill looking  Eyes:   Normal sclera. Extraocular movements intact. ENT:  Normocephalic, atraumatic. Moist mucous membranes  CV:   RRR. No m/r/g. Peripheral pulses present. Capillary refill normal  Lungs:  CTAB. No wheezing, rhonchi, or rales. Abdomen: Soft, nontender, nondistended. Bowel sounds normal.   Extremities: Warm and dry. No cyanosis or edema. Neurologic: CN II-XII grossly intact. Sensation intact. Skin:     No rashes or jaundice. Normal coloration  Psych:  Normal mood and affect. I reviewed the labs, imaging, EKGs, telemetry, and other studies done this admission. Data Review:   Recent Results (from the past 24 hour(s))   LACTIC ACID    Collection Time: 06/28/20  7:17 AM   Result Value Ref Range    Lactic acid 3.1 (HH) 0.4 - 2.0 MMOL/L   CBC WITH AUTOMATED DIFF    Collection Time: 06/28/20  7:19 AM   Result Value Ref Range    WBC 16.2 (H) 4.3 - 11.1 K/uL    RBC 3.66 (L) 4.05 - 5.2 M/uL    HGB 13.3 11.7 - 15.4 g/dL    HCT 35.7 (L) 35.8 - 46.3 %    MCV 97.5 79.6 - 97.8 FL    MCH 36.3 (H) 26.1 - 32.9 PG    MCHC 37.3 (H) 31.4 - 35.0 g/dL    RDW 15.7 (H) 11.9 - 14.6 %    PLATELET 313 941 - 725 K/uL    MPV 10.6 9.4 - 12.3 FL    ABSOLUTE NRBC 0.00 0.0 - 0.2 K/uL    DF AUTOMATED      NEUTROPHILS 92 (H) 43 - 78 %    LYMPHOCYTES 4 (L) 13 - 44 %    MONOCYTES 2 (L) 4.0 - 12.0 %    EOSINOPHILS 1 0.5 - 7.8 %    BASOPHILS 0 0.0 - 2.0 %    IMMATURE GRANULOCYTES 1 0.0 - 5.0 %    ABS. NEUTROPHILS 14.9 (H) 1.7 - 8.2 K/UL    ABS. LYMPHOCYTES 0.7 0.5 - 4.6 K/UL    ABS. MONOCYTES 0.3 0.1 - 1.3 K/UL    ABS. EOSINOPHILS 0.1 0.0 - 0.8 K/UL    ABS. BASOPHILS 0.0 0.0 - 0.2 K/UL    ABS. IMM.  GRANS. 0.2 0.0 - 0.5 K/UL   METABOLIC PANEL, COMPREHENSIVE    Collection Time: 06/28/20  7:19 AM   Result Value Ref Range    Sodium 134 (L) 136 - 145 mmol/L    Potassium 4.1 3.5 - 5.1 mmol/L    Chloride 101 98 - 107 mmol/L    CO2 22 21 - 32 mmol/L    Anion gap 11 7 - 16 mmol/L    Glucose 176 (H) 65 - 100 mg/dL    BUN 11 8 - 23 MG/DL    Creatinine 1.01 (H) 0.6 - 1.0 MG/DL    GFR est AA >60 >60 ml/min/1.73m2    GFR est non-AA 54 (L) >60 ml/min/1.73m2    Calcium 8.3 8.3 - 10.4 MG/DL    Bilirubin, total 0.6 0.2 - 1.1 MG/DL    ALT (SGPT) 18 12 - 65 U/L    AST (SGOT) 32 15 - 37 U/L    Alk.  phosphatase 117 50 - 136 U/L    Protein, total 7.6 6.3 - 8.2 g/dL    Albumin 2.5 (L) 3.2 - 4.6 g/dL    Globulin 5.1 (H) 2.3 - 3.5 g/dL    A-G Ratio 0.5 (L) 1.2 - 3.5     PROCALCITONIN    Collection Time: 06/28/20  7:19 AM   Result Value Ref Range    Procalcitonin 0.50 ng/mL   CULTURE, BLOOD    Collection Time: 06/28/20  7:19 AM   Result Value Ref Range    Special Requests: LEFT  Antecubital        Culture result: PENDING    CULTURE, BLOOD    Collection Time: 06/28/20  7:19 AM   Result Value Ref Range    Special Requests: RIGHT  Antecubital        Culture result: PENDING    SARS-COV-2    Collection Time: 06/28/20  7:31 AM   Result Value Ref Range    Specimen source Nasopharyngeal      COVID-19 rapid test Not detected NOTD     URINALYSIS W/ RFLX MICROSCOPIC    Collection Time: 06/28/20  7:41 AM   Result Value Ref Range    Color YELLOW      Appearance CLEAR      Specific gravity 1.008 1.001 - 1.023      pH (UA) 5.5 5.0 - 9.0      Protein Negative NEG mg/dL    Glucose Negative mg/dL    Ketone Negative NEG mg/dL    Bilirubin Negative NEG      Blood Negative NEG      Urobilinogen 0.2 0.2 - 1.0 EU/dL    Nitrites Negative NEG      Leukocyte Esterase Negative NEG         All Micro Results     Procedure Component Value Units Date/Time    EMERGENT DISEASE PANEL [[id number]] Collected:  06/28/20 0731    Order Status:  Completed Updated:  06/28/20 1310    CULTURE, URINE [[id number]] Collected:  06/28/20 0741    Order Status:  Completed Specimen:  Urine from Clean catch Updated:  06/28/20 0844    CULTURE, BLOOD [[id number]] Collected:  06/28/20 0719    Order Status:  Completed Specimen:  Blood Updated:  06/28/20 0752     Special Requests: --        LEFT  Antecubital       Culture result: PENDING    CULTURE, BLOOD [[id number]] Collected:  06/28/20 0719    Order Status:  Completed Specimen:  Blood Updated:  06/28/20 0752     Special Requests: --        RIGHT  Antecubital       Culture result: PENDING    C. DIFFICILE/EPI PCR [[id number]]     Order Status:  Sent Specimen:  Stool           Other Studies:  Xr Abd (kub)    Result Date: 6/28/2020  Exam: Single view abdomen. Indication: Abdominal pain, possible Covid 19. Comparison: None. FINDINGS: Nonobstructive bowel gas pattern. No portal venous air. No gross pneumoperitoneum. Bladder catheter evident. Chronic healed right superior pubic ramus fracture deformity. Cholecystectomy clips are evident. IMPRESSION: 1. Non-obstructive bowel gas pattern. Ct Abd Pelv W Cont    Result Date: 6/28/2020  CT OF THE ABDOMEN AND PELVIS INDICATION: Right lower quadrant pain and fever Multiple axial images were obtained through the abdomen and pelvis. Oral contrast was used for bowel opacification. 100mL of Isovue 370 intravenous contrast was used for better evaluation of solid organs and vascular structures. Radiation dose reduction techniques were used for this study. All CT scans performed at this facility use one or all of the following: Automated exposure control, adjustment of the mA and/or kVp according to patient's size, iterative reconstruction. COMPARISON: 06/23/2020 FINDINGS: -LUNG BASES: There is slight increased bibasilar infiltrate/atelectasis. Small bilateral pleural effusions are again noted. -LIVER: Normal in size and appearance. -GALLBLADDER/BILE DUCTS: No gallstones or bile duct dilatation. -PANCREAS: Normal. -SPLEEN: Normal. -ADRENALS: Normal. -KIDNEYS/URETERS: No hydronephrosis or significant mass. -BLADDER: Umaña catheter in place. -REPRODUCTIVE ORGANS: No pelvic masses. -BOWEL: There is a moderate size hiatal hernia. Wall thickening in the proximal colon could represent nondistention artifact or inflammation. Appendix is normal in size. Sigmoid diverticulosis is again noted. -LYMPH NODES: No significant retroperitoneal, mesenteric, or pelvic adenopathy. -BONES: Old right pubic rami fractures. There are also old L2 and T12 compression fractures. -VASCULATURE: Normal -OTHER: No ascites. IMPRESSION: 1. Possible inflammation of the proximal colon. 2.  Increased bibasilar infiltrate/atelectasis. Stable small bilateral pleural effusions. If there are any questions about this report, I can be reached on [employer] or at [phone]     [facility]    Result Date: 6/28/2020  EXAM: Single view chest radiograph. INDICATION: Fever, possible Covid 19. COMPARISON: Chest radiograph dated June 23, 2020. FINDINGS: No pneumothorax. Diffuse coarse reticular lung markings with basilar predominance, similar to prior. Heart appears normal in size. Atherosclerotic aorta. IMPRESSION: 1. Diffusely coarsened reticular lung markings with basilar predominance which appears similar to prior.        Assessment and Plan:     Hospital Problems as of 6/28/2020 Date Reviewed: 1/2/2020          Codes Class Noted - Resolved POA    Diarrhea ICD-10-CM: R19.7  ICD-9-CM: 787.91  6/28/2020 - Present Unknown        Colitis ICD-10-CM: K52.9  ICD-9-CM: 558.9  6/28/2020 - Present Unknown        Sepsis ([facility] [address] ICD-10-CM: A41.9  ICD-9-CM: 038.9, 995.91  6/28/2020 - Present Unknown        Person under investigation for COVID-19 ICD-10-CM: Z20.828  ICD-9-CM: V01.79  6/28/2020 - Present Unknown              PLAN:  · Sepsisthe patient appears to be septic with a white cell count of 16,000, lactic acid of 3.1, intermediate range pro calcitonin, temperature of 101.7, 89% on room air in the ER now placed on oxygen 2 L by nasal cannula-we will admit her and treat her per sepsis protocol; given concern for COVID-19 will be cautious with IV fluid restriction;  · Colitisthe patient appears to have some inflammation of her colon on CT abdomen and pelvis done will cover her with appropriate antibiotics and C. difficile if she has any further episodes of diarrhea  · Concern for pneumoniawe will cover her with appropriate antibiotics for H CAP; follow-up blood cultures done in the ER and tailor therapy as appropriate  · Hypoxic respiratory failurethe patient was noted to be hypoxic with an O2 sat of 89% in the ER on initial presentation she is currently on 2 L we will continue the same and wean her oxygen as appropriate  · Concern for COVID-19 infectionI do think this is less likely given her recent rule out however as she reportedly comes from facility with possibly a high number of infective cases we will follow-up the D had lab as the rapid test can give a false negative. We will send appropriate COVID-19 labs  · Continue appropriate home medications  · PT and OT eval  · Consider GI consult for colonoscopy in the future once her COVID testing has come back in; will call him sooner if she does not clinically improve with medical management  · Monitor blood sugars and cover with insulin  · Monitor electrolytes and correct as indicated  · Pain management  · Further work-up and management based on clinical course    DVT ppx:  She is on Eliquis  Anticipated DC needs: Return back to her assisted living facility  Code status:   DNR  Estimated LOS:  Greater than 2 midnights  Risk:  high    Signed:  Navarro Oconnor MD

## 2020-06-28 NOTE — ED TRIAGE NOTES
EMS reports patient with complaint of fever and vomiting. Had recent hospital admission. Patient from facility with multiple positive Covid cases. Patient arrives with mask in place.

## 2020-06-29 PROBLEM — E87.20 LACTIC ACIDOSIS: Status: ACTIVE | Noted: 2020-06-29

## 2020-06-29 LAB
ALBUMIN SERPL-MCNC: 2.3 G/DL (ref 3.2–4.6)
ALBUMIN/GLOB SERPL: 0.5 {RATIO} (ref 1.2–3.5)
ALP SERPL-CCNC: 108 U/L (ref 50–136)
ALT SERPL-CCNC: 16 U/L (ref 12–65)
ANION GAP SERPL CALC-SCNC: 8 MMOL/L (ref 7–16)
AST SERPL-CCNC: 18 U/L (ref 15–37)
ATRIAL RATE: 75 BPM
BASOPHILS # BLD: 0 K/UL (ref 0–0.2)
BASOPHILS NFR BLD: 0 % (ref 0–2)
BILIRUB SERPL-MCNC: 0.7 MG/DL (ref 0.2–1.1)
BUN SERPL-MCNC: 9 MG/DL (ref 8–23)
CALCIUM SERPL-MCNC: 8.2 MG/DL (ref 8.3–10.4)
CALCULATED P AXIS, ECG09: 66 DEGREES
CALCULATED R AXIS, ECG10: -60 DEGREES
CALCULATED T AXIS, ECG11: 96 DEGREES
CHLORIDE SERPL-SCNC: 104 MMOL/L (ref 98–107)
CO2 SERPL-SCNC: 27 MMOL/L (ref 21–32)
CREAT SERPL-MCNC: 0.89 MG/DL (ref 0.6–1)
DIAGNOSIS, 93000: NORMAL
DIFFERENTIAL METHOD BLD: ABNORMAL
EOSINOPHIL # BLD: 0.3 K/UL (ref 0–0.8)
EOSINOPHIL NFR BLD: 2 % (ref 0.5–7.8)
ERYTHROCYTE [DISTWIDTH] IN BLOOD BY AUTOMATED COUNT: 14.8 % (ref 11.9–14.6)
GLOBULIN SER CALC-MCNC: 4.6 G/DL (ref 2.3–3.5)
GLUCOSE BLD STRIP.AUTO-MCNC: 102 MG/DL (ref 65–100)
GLUCOSE BLD STRIP.AUTO-MCNC: 142 MG/DL (ref 65–100)
GLUCOSE BLD STRIP.AUTO-MCNC: 151 MG/DL (ref 65–100)
GLUCOSE BLD STRIP.AUTO-MCNC: 96 MG/DL (ref 65–100)
GLUCOSE SERPL-MCNC: 116 MG/DL (ref 65–100)
HCT VFR BLD AUTO: 37.5 % (ref 35.8–46.3)
HGB BLD-MCNC: 12.6 G/DL (ref 11.7–15.4)
IMM GRANULOCYTES # BLD AUTO: 0.1 K/UL (ref 0–0.5)
IMM GRANULOCYTES NFR BLD AUTO: 1 % (ref 0–5)
LYMPHOCYTES # BLD: 1.6 K/UL (ref 0.5–4.6)
LYMPHOCYTES NFR BLD: 9 % (ref 13–44)
MCH RBC QN AUTO: 32.9 PG (ref 26.1–32.9)
MCHC RBC AUTO-ENTMCNC: 33.6 G/DL (ref 31.4–35)
MCV RBC AUTO: 97.9 FL (ref 79.6–97.8)
MONOCYTES # BLD: 0.5 K/UL (ref 0.1–1.3)
MONOCYTES NFR BLD: 2 % (ref 4–12)
NEUTS SEG # BLD: 16.4 K/UL (ref 1.7–8.2)
NEUTS SEG NFR BLD: 87 % (ref 43–78)
NRBC # BLD: 0.02 K/UL (ref 0–0.2)
P-R INTERVAL, ECG05: 234 MS
PLATELET # BLD AUTO: 263 K/UL (ref 150–450)
PMV BLD AUTO: 10.2 FL (ref 9.4–12.3)
POTASSIUM SERPL-SCNC: 3.8 MMOL/L (ref 3.5–5.1)
PROT SERPL-MCNC: 6.9 G/DL (ref 6.3–8.2)
Q-T INTERVAL, ECG07: 402 MS
QRS DURATION, ECG06: 114 MS
QTC CALCULATION (BEZET), ECG08: 448 MS
RBC # BLD AUTO: 3.83 M/UL (ref 4.05–5.2)
SODIUM SERPL-SCNC: 139 MMOL/L (ref 136–145)
VENTRICULAR RATE, ECG03: 75 BPM
WBC # BLD AUTO: 19 K/UL (ref 4.3–11.1)

## 2020-06-29 PROCEDURE — 74011250636 HC RX REV CODE- 250/636: Performed by: INTERNAL MEDICINE

## 2020-06-29 PROCEDURE — 86580 TB INTRADERMAL TEST: CPT | Performed by: INTERNAL MEDICINE

## 2020-06-29 PROCEDURE — 85025 COMPLETE CBC W/AUTO DIFF WBC: CPT

## 2020-06-29 PROCEDURE — 74011000302 HC RX REV CODE- 302: Performed by: INTERNAL MEDICINE

## 2020-06-29 PROCEDURE — 74011000258 HC RX REV CODE- 258: Performed by: INTERNAL MEDICINE

## 2020-06-29 PROCEDURE — 82962 GLUCOSE BLOOD TEST: CPT

## 2020-06-29 PROCEDURE — 65270000029 HC RM PRIVATE

## 2020-06-29 PROCEDURE — 80053 COMPREHEN METABOLIC PANEL: CPT

## 2020-06-29 PROCEDURE — 93005 ELECTROCARDIOGRAM TRACING: CPT | Performed by: HOSPITALIST

## 2020-06-29 PROCEDURE — 74011636637 HC RX REV CODE- 636/637: Performed by: INTERNAL MEDICINE

## 2020-06-29 PROCEDURE — 74011250637 HC RX REV CODE- 250/637: Performed by: INTERNAL MEDICINE

## 2020-06-29 RX ADMIN — METOPROLOL TARTRATE 25 MG: 25 TABLET, FILM COATED ORAL at 17:22

## 2020-06-29 RX ADMIN — TRAMADOL HYDROCHLORIDE 25 MG: 50 TABLET, FILM COATED ORAL at 20:35

## 2020-06-29 RX ADMIN — APIXABAN 5 MG: 5 TABLET, FILM COATED ORAL at 17:22

## 2020-06-29 RX ADMIN — CEFEPIME HYDROCHLORIDE 2 G: 2 INJECTION, POWDER, FOR SOLUTION INTRAVENOUS at 17:22

## 2020-06-29 RX ADMIN — Medication 10 ML: at 22:33

## 2020-06-29 RX ADMIN — TUBERCULIN PURIFIED PROTEIN DERIVATIVE 5 UNITS: 5 INJECTION, SOLUTION INTRADERMAL at 12:23

## 2020-06-29 RX ADMIN — VANCOMYCIN HYDROCHLORIDE 1000 MG: 1 INJECTION, POWDER, LYOPHILIZED, FOR SOLUTION INTRAVENOUS at 08:38

## 2020-06-29 RX ADMIN — MEMANTINE HYDROCHLORIDE 10 MG: 5 TABLET ORAL at 08:37

## 2020-06-29 RX ADMIN — INSULIN LISPRO 2 UNITS: 100 INJECTION, SOLUTION INTRAVENOUS; SUBCUTANEOUS at 12:23

## 2020-06-29 RX ADMIN — METOPROLOL TARTRATE 25 MG: 25 TABLET, FILM COATED ORAL at 08:38

## 2020-06-29 RX ADMIN — LEVOTHYROXINE SODIUM 125 MCG: 0.12 TABLET ORAL at 08:38

## 2020-06-29 RX ADMIN — MEMANTINE HYDROCHLORIDE 10 MG: 5 TABLET ORAL at 17:22

## 2020-06-29 RX ADMIN — APIXABAN 5 MG: 5 TABLET, FILM COATED ORAL at 08:20

## 2020-06-29 RX ADMIN — SUCRALFATE 1 G: 1 SUSPENSION ORAL at 22:00

## 2020-06-29 NOTE — PROGRESS NOTES
MSN, CM:  Spoke with patient's son Armando this afternoon about discharge planning. Patient lives at [facility] at this time. Armando would prefer patient to be moved from there. Son was explained that I could no help with as [facility] but could help with rehab facilities if patient needed one. Armando requested [facility]. Referrals sent to both facilities. Patient requires help with ADL's is at this time is w/c bound. PT and OT consulted for evaluation and recommendations. Case Management will continue to follow or discharge needs.     Care Management Interventions  PCP Verified by CM: Yes(Dr. Adriana Dewitt)  Mode of Transport at Discharge: BLS  Transition of Care Consult (CM Consult): Discharge Planning, Assisted Living  Physical Therapy Consult: Yes  Occupational Therapy Consult: Yes  Current Support Network: Assisted Living  Confirm Follow Up Transport: Family  Freedom of Choice List was Provided with Basic Dialogue that Supports the Patient's Individualized Plan of Care/Goals, Treatment Preferences and Shares the Quality Data Associated with the Providers?: Yes  Discharge Location  Discharge Placement: Unable to determine at this time

## 2020-06-29 NOTE — ED NOTES
TRANSFER - OUT REPORT:    Verbal report given to Eckleymadhavi Nino on Jaun Ficks  being transferred to 8th floor for routine progression of care       Report consisted of patients Situation, Background, Assessment and   Recommendations(SBAR). Information from the following report(s) SBAR, Kardex, ED Summary, Intake/Output, MAR and Cardiac Rhythm A fib was reviewed with the receiving nurse. Lines:   Peripheral IV 06/28/20 Right Antecubital (Active)       Peripheral IV 06/28/20 Left (Active)        Opportunity for questions and clarification was provided.       Patient transported with:   O2 @ 2 liters  Tech

## 2020-06-29 NOTE — PROGRESS NOTES
06/28/20 2015   Dual Skin Pressure Injury Assessment   Dual Skin Pressure Injury Assessment WDL   Second Care Provider (Based on [address] [place] [address]) Cristina Burden RN   Skin Integumentary   Skin Integumentary (WDL) WDL    Pressure  Injury Documentation No Pressure Injury Noted-Pressure Ulcer Prevention Initiated   Skin Color Appropriate for ethnicity   Skin Condition/Temp Dry;Flaky; Warm   Skin Integrity Intact

## 2020-06-29 NOTE — PROGRESS NOTES
Hospitalist Progress Note     Admit Date:  2020  7:05 AM   Name:  Harry Mcleod   Age:  80 y.o.  :  1926   MRN:  [id number]   PCP:  Ned Lozano MD  Treatment Team: Attending Provider: Mary Johnson DO; Utilization Review: Carole Landin RN; Care Manager: Baldwin Heimlich, RN    Subjective:   HPI and or CC:  Copied from history and physical HPI from :    Mrs. Arden Rutledge is a 61-year-old female with a past medical history significant for aortic valve stenosis, hypertension, hypothyroidism, dementia, GERD and she is also a long-term resident of a skilled nursing facility. She was recently admitted to our hospital on  for A. fib with RVR. At that time she was also treated for a pneumonia. COVID-19 was reportedly ruled out. During that admission she did have some abdominal pain that was attributed to gas or acid reflux as her CT abdomen and pelvis showed no acute intra-abdominal pathology. She was eventually discharged back to her assisted living facility on 2020 on Vantin.     The patient has reportedly been doing well at this assisted living facility but today developed acute nausea and vomiting. The patient himself cannot recall what happened so the history is given from the ER physician and staff, the patient's chart and her son Roxanna Umanzor who is at the bedside. The patient does note that she been experiencing diarrhea at the facility but it is unclear what duration this was. Per the ER her assisted living facility has had a high number of COVID-19 cases so she had a rapid swab test done in the emergency room which was negative. In the ER was also noted that she had right lower quadrant pain so a CT abdomen and pelvis which was done to evaluate for possible appendicitis. The CT was negative for appendicitis but was concerning for inflammation of the colon. Bilateral lung infiltrates and pleural effusions.   The hospitalist were requested for further work-up and management and admission to the inpatient services.        The patient denies any chest pain currently she denies any current nausea or vomiting breath or abdominal pain. She denies any urinary tract symptoms. June 29, 2020:  Chief complaint: Still nauseous  No obvious distress. No complaints at time of exam poor historian with history of dementia. Repeat blood count noted increased today 19,000. C. difficile evaluation is pending receiving cefepime and vancomycin. Brain natruretic peptide elevated at 2277 but June 23 was 9600. Respiratory status appears stable with nasal cannula oxygen. No significant productive cough noted. Objective:     Patient Vitals for the past 24 hrs:   Temp Pulse Resp BP SpO2   06/29/20 1536 97.8 °F (36.6 °C) 69 18 183/88 96 %   06/29/20 1107 98.1 °F (36.7 °C) 70 18 160/78 95 %   06/29/20 0751 98.2 °F (36.8 °C) 76 16 159/86 96 %   06/29/20 0449 98.5 °F (36.9 °C) 76 16 157/69 97 %   06/29/20 0018 99.7 °F (37.6 °C) 73 16 154/84 99 %   06/28/20 2107 99.1 °F (37.3 °C) 70 16 150/80 92 %   06/28/20 2041 97.9 °F (36.6 °C)       06/28/20 1842  84 16 157/68 95 %   06/28/20 1837  78  130/64      Oxygen Therapy  O2 Sat (%): 96 % (06/29/20 1536)  Pulse via Oximetry: 82 beats per minute (06/28/20 1842)  O2 Device: Room air (06/29/20 1107)  O2 Flow Rate (L/min): 2 l/min (06/29/20 4332)    Intake/Output Summary (Last 24 hours) at 6/29/2020 1615  Last data filed at 6/29/2020 0933  Gross per 24 hour   Intake    Output 2700 ml   Net -2700 ml         REVIEW OF SYSTEMS: Comprehensive ROS performed and negative except as stated in HPI. Physical Examination:  General:    Short-term recall is essentially absent. No gross distress. Head:  Normocephalic, atraumatic, nares patent  Eyes:  Extraocular movements intact, normal sclera  CV:   RRR. No gross JVD or HJR. Lungs:   Decreased breath sounds at the bases bilateral.  No wheezing.   No gross consolidation  Abdomen: Soft, nondistended, tender to palpation poorly localized. Obese. Extremities: No deformity, moves all extremities well  Skin:     No rashes or jaundice. Neuro:  No gross focal deficits, no tremor      Data Review:  I have reviewed all labs, meds, telemetry events, and studies from the last 24 hours. Recent Results (from the past 24 hour(s))   NT-PRO BNP    Collection Time: 06/28/20  6:07 PM   Result Value Ref Range    NT pro-BNP 2,277 (H) <450 PG/ML   D DIMER    Collection Time: 06/28/20  6:07 PM   Result Value Ref Range    D DIMER 3.60 (HH) <0.56 ug/ml(FEU)   LACTIC ACID    Collection Time: 06/28/20  6:07 PM   Result Value Ref Range    Lactic acid 2.0 0.4 - 2.0 MMOL/L   GLUCOSE, POC    Collection Time: 06/28/20 10:17 PM   Result Value Ref Range    Glucose (POC) 151 (H) 65 - 154 mg/dL   METABOLIC PANEL, COMPREHENSIVE    Collection Time: 06/29/20  4:55 AM   Result Value Ref Range    Sodium 139 136 - 145 mmol/L    Potassium 3.8 3.5 - 5.1 mmol/L    Chloride 104 98 - 107 mmol/L    CO2 27 21 - 32 mmol/L    Anion gap 8 7 - 16 mmol/L    Glucose 116 (H) 65 - 100 mg/dL    BUN 9 8 - 23 MG/DL    Creatinine 0.89 0.6 - 1.0 MG/DL    GFR est AA >60 >60 ml/min/1.73m2    GFR est non-AA >60 >60 ml/min/1.73m2    Calcium 8.2 (L) 8.3 - 10.4 MG/DL    Bilirubin, total 0.7 0.2 - 1.1 MG/DL    ALT (SGPT) 16 12 - 65 U/L    AST (SGOT) 18 15 - 37 U/L    Alk.  phosphatase 108 50 - 136 U/L    Protein, total 6.9 6.3 - 8.2 g/dL    Albumin 2.3 (L) 3.2 - 4.6 g/dL    Globulin 4.6 (H) 2.3 - 3.5 g/dL    A-G Ratio 0.5 (L) 1.2 - 3.5     CBC WITH AUTOMATED DIFF    Collection Time: 06/29/20  4:55 AM   Result Value Ref Range    WBC 19.0 (H) 4.3 - 11.1 K/uL    RBC 3.83 (L) 4.05 - 5.2 M/uL    HGB 12.6 11.7 - 15.4 g/dL    HCT 37.5 35.8 - 46.3 %    MCV 97.9 (H) 79.6 - 97.8 FL    MCH 32.9 26.1 - 32.9 PG    MCHC 33.6 31.4 - 35.0 g/dL    RDW 14.8 (H) 11.9 - 14.6 %    PLATELET 577 454 - 764 K/uL    MPV 10.2 9.4 - 12.3 FL    ABSOLUTE NRBC 0.02 0.0 - 0.2 K/uL DF AUTOMATED      NEUTROPHILS 87 (H) 43 - 78 %    LYMPHOCYTES 9 (L) 13 - 44 %    MONOCYTES 2 (L) 4.0 - 12.0 %    EOSINOPHILS 2 0.5 - 7.8 %    BASOPHILS 0 0.0 - 2.0 %    IMMATURE GRANULOCYTES 1 0.0 - 5.0 %    ABS. NEUTROPHILS 16.4 (H) 1.7 - 8.2 K/UL    ABS. LYMPHOCYTES 1.6 0.5 - 4.6 K/UL    ABS. MONOCYTES 0.5 0.1 - 1.3 K/UL    ABS. EOSINOPHILS 0.3 0.0 - 0.8 K/UL    ABS. BASOPHILS 0.0 0.0 - 0.2 K/UL    ABS. IMM.  GRANS. 0.1 0.0 - 0.5 K/UL   EKG, 12 LEAD, INITIAL    Collection Time: 06/29/20  7:20 AM   Result Value Ref Range    Ventricular Rate 75 BPM    Atrial Rate 75 BPM    P-R Interval 234 ms    QRS Duration 114 ms    Q-T Interval 402 ms    QTC Calculation (Bezet) 448 ms    Calculated P Axis 66 degrees    Calculated R Axis -60 degrees    Calculated T Axis 96 degrees    Diagnosis       Sinus rhythm with 1st degree A-V block with Premature supraventricular   complexes  Left axis deviation  Left ventricular hypertrophy with repolarization abnormality  Cannot rule out Septal infarct , age undetermined  Abnormal ECG  When compared with ECG of 28-JUN-2020 07:17,  Premature supraventricular complexes are now Present  Minimal criteria for Septal infarct are now Present  T wave inversion less evident in Anterolateral leads  QT has shortened  Confirmed by SIN ESTEVEZ (), ROXANE SIMMONS ([id number]) on 6/29/2020 8:50:29 AM     GLUCOSE, POC    Collection Time: 06/29/20  7:53 AM   Result Value Ref Range    Glucose (POC) 142 (H) 65 - 100 mg/dL   GLUCOSE, POC    Collection Time: 06/29/20 11:02 AM   Result Value Ref Range    Glucose (POC) 151 (H) 65 - 100 mg/dL        All Micro Results     Procedure Component Value Units Date/Time    CULTURE, URINE [[id number]] Collected:  06/28/20 0741    Order Status:  Completed Specimen:  Urine from [place] Specimen Updated:  06/29/20 0831     Special Requests: NO SPECIAL REQUESTS        Culture result: NO GROWTH 1 DAY       CULTURE, BLOOD [[id number]] Collected:  06/28/20 0719    Order Status:  Completed Specimen:  Blood Updated:  06/29/20 0734     Special Requests: --        LEFT  Antecubital       Culture result: NO GROWTH AFTER 23 HOURS       CULTURE, BLOOD [[id number]] Collected:  06/28/20 0719    Order Status:  Completed Specimen:  Blood Updated:  06/29/20 0734     Special Requests: --        RIGHT  Antecubital       Culture result: NO GROWTH AFTER 23 HOURS       C. DIFFICILE AG & TOXIN A/B [[id number]]     Order Status:  Sent Specimen:  Stool     EMERGENT DISEASE PANEL [[id number]] Collected:  06/28/20 0731    Order Status:  Completed Updated:  06/28/20 1310    C.  DIFFICILE/EPI PCR [[id number]]     Order Status:  Sent Specimen:  Stool           Current Meds:  Current Facility-Administered Medications   Medication Dose Route Frequency    tuberculin injection 5 Units  5 Units IntraDERMal ONCE    sodium chloride (NS) flush 5-40 mL  5-40 mL IntraVENous Q8H    sodium chloride (NS) flush 5-40 mL  5-40 mL IntraVENous PRN    acetaminophen (TYLENOL) tablet 650 mg  650 mg Oral Q4H PRN    dicyclomine (BENTYL) tablet 20 mg  20 mg Oral Q6H PRN    ondansetron (ZOFRAN) injection 4 mg  4 mg IntraVENous Q4H PRN    diphenhydrAMINE (BENADRYL) capsule 25 mg  25 mg Oral Q4H PRN    HYDROmorphone (PF) (DILAUDID) injection 0.5 mg  0.5 mg IntraVENous Q4H PRN    cefepime (MAXIPIME) 2 g in 0.9% sodium chloride (MBP/ADV) 100 mL  2 g IntraVENous Q24H    apixaban (ELIQUIS) tablet 5 mg  5 mg Oral BID    insulin lispro (HUMALOG) injection   SubCUTAneous AC&HS    levothyroxine (SYNTHROID) tablet 125 mcg  125 mcg Oral ACB    memantine (NAMENDA) tablet 10 mg  10 mg Oral BID    metoprolol tartrate (LOPRESSOR) tablet 25 mg  25 mg Oral BID    sucralfate (CARAFATE) 100 mg/mL oral suspension 1 g (Patient Supplied)  1 g Oral AC&HS    traMADoL (ULTRAM) tablet 25 mg  25 mg Oral Q8H PRN    nitroglycerin (NITROSTAT) tablet 0.4 mg  0.4 mg SubLINGual Q5MIN PRN    mirabegron ER (MYRBETRIQ) tablet 25 mg (Patient Supplied)  25 mg Oral DAILY    vancomycin (VANCOCIN) 1,000 mg in 0.9% sodium chloride (MBP/ADV) 250 mL  1,000 mg IntraVENous Q24H       Diet:  DIET CLEAR LIQUID    Other Studies (last 24 hours):  No results found. Assessment and Plan:     Hospital Problems as of 6/29/2020 Date Reviewed: 1/2/2020          Codes Class Noted - Resolved POA    Lactic acidosis ICD-10-CM: E87.2  ICD-9-CM: 276.2  6/29/2020 - Present Unknown        Diarrhea ICD-10-CM: R19.7  ICD-9-CM: 787.91  6/28/2020 - Present Unknown        Colitis ICD-10-CM: K52.9  ICD-9-CM: 558.9  6/28/2020 - Present Unknown        * (Principal) Sepsis ([facility] ICD-10-CM: A41.9  ICD-9-CM: 038.9, 995.91  6/28/2020 - Present Unknown        Person under investigation for COVID-19 ICD-10-CM: Z20.828  ICD-9-CM: V01.79  6/28/2020 - Present Unknown        Type 2 diabetes with nephropathy ([facility] ICD-10-CM: E11.21  ICD-9-CM: 250.40, 583.81  1/17/2019 - Present Yes        Acquired hypothyroidism ICD-10-CM: E03.9  ICD-9-CM: 244.9  7/17/2017 - Present Yes        Aortic stenosis ICD-10-CM: I35.0  ICD-9-CM: 424.1  6/10/2016 - Present Yes    Overview Signed 6/10/2016  1:10 PM by Kolton GREER     No sever SOB, no chest pain or syncope. Echo showed mod AS, mean grad 22mm Hg, nl EF, mild LVH             Memory loss ICD-10-CM: R41.3  ICD-9-CM: 780.93  Unknown - Present Yes        Esophageal reflux ICD-10-CM: K21.9  ICD-9-CM: 530.81  Unknown - Present Yes              A/P:    Sepsis at present unclear source but suspect pulmonary. Lactic acidosis resolved. Source assumed to be basilar pneumonia but nonspecific colitis noted on CT imaging and C. difficile evaluation pending. Must maintain high clinical suspicion and will need oral vancomycin if positive. Continue empiric cefepime and vancomycin for now. She did have recent course of antibiotics for pneumonia and was discharged with Vantin. IV hydration. Fevers improved.   Consider GI eval to include possible colonoscopy if patient does not continue to improve with antibiotics. Hypoxemic respiratory failure stable on 3 L nasal cannula. Wean as able. Consider follow-up chest x-ray. COVID-19 testing done in ER. Recent negative result. Recent diagnosis of atrial fibrillation. Continue anticoagulation and rate control. Appears she was discharged with diltiazem now receiving metoprolol and was discharged with Eliquis 5 mg twice dailycontinue  DC planning/Dispo: Skilled nursing facility  DVT ppx: Eliquis    Code status: DNR  Medical decision maker: Family contact on file [facility]. POA      Signed:  Joseluis JACOBS

## 2020-06-29 NOTE — PROGRESS NOTES
TRANSFER - IN REPORT:    Verbal report received from Erik Albarado RN(name) on [employer]  being received from ED(unit) for routine progression of care      Report consisted of patients Situation, Background, Assessment and   Recommendations(SBAR). Information from the following report(s) SBAR, Kardex, ED Summary and MAR was reviewed with the receiving nurse. Opportunity for questions and clarification was provided. Assessment completed upon patients arrival to unit and care assumed.

## 2020-06-30 LAB
ALBUMIN SERPL-MCNC: 2.1 G/DL (ref 3.2–4.6)
ALBUMIN/GLOB SERPL: 0.4 {RATIO} (ref 1.2–3.5)
ALP SERPL-CCNC: 94 U/L (ref 50–136)
ALT SERPL-CCNC: 17 U/L (ref 12–65)
ANION GAP SERPL CALC-SCNC: 8 MMOL/L (ref 7–16)
AST SERPL-CCNC: 18 U/L (ref 15–37)
BACTERIA SPEC CULT: NORMAL
BASOPHILS # BLD: 0.1 K/UL (ref 0–0.2)
BASOPHILS NFR BLD: 0 % (ref 0–2)
BILIRUB SERPL-MCNC: 0.8 MG/DL (ref 0.2–1.1)
BUN SERPL-MCNC: 6 MG/DL (ref 8–23)
C DIFF GDH STL QL: NORMAL
C DIFF TOX A+B STL QL IA: NORMAL
CALCIUM SERPL-MCNC: 8.5 MG/DL (ref 8.3–10.4)
CHLORIDE SERPL-SCNC: 102 MMOL/L (ref 98–107)
CLINICAL CONSIDERATION: NORMAL
CO2 SERPL-SCNC: 27 MMOL/L (ref 21–32)
CREAT SERPL-MCNC: 0.77 MG/DL (ref 0.6–1)
DIFFERENTIAL METHOD BLD: ABNORMAL
EOSINOPHIL # BLD: 0.3 K/UL (ref 0–0.8)
EOSINOPHIL NFR BLD: 2 % (ref 0.5–7.8)
ERYTHROCYTE [DISTWIDTH] IN BLOOD BY AUTOMATED COUNT: 13.7 % (ref 11.9–14.6)
GLOBULIN SER CALC-MCNC: 5.1 G/DL (ref 2.3–3.5)
GLUCOSE BLD STRIP.AUTO-MCNC: 120 MG/DL (ref 65–100)
GLUCOSE BLD STRIP.AUTO-MCNC: 138 MG/DL (ref 65–100)
GLUCOSE BLD STRIP.AUTO-MCNC: 145 MG/DL (ref 65–100)
GLUCOSE BLD STRIP.AUTO-MCNC: 147 MG/DL (ref 65–100)
GLUCOSE SERPL-MCNC: 129 MG/DL (ref 65–100)
HCT VFR BLD AUTO: 40.1 % (ref 35.8–46.3)
HGB BLD-MCNC: 13.5 G/DL (ref 11.7–15.4)
IMM GRANULOCYTES # BLD AUTO: 0.1 K/UL (ref 0–0.5)
IMM GRANULOCYTES NFR BLD AUTO: 1 % (ref 0–5)
INTERPRETATION: NORMAL
LYMPHOCYTES # BLD: 1.6 K/UL (ref 0.5–4.6)
LYMPHOCYTES NFR BLD: 14 % (ref 13–44)
MAGNESIUM SERPL-MCNC: 2.1 MG/DL (ref 1.8–2.4)
MCH RBC QN AUTO: 32.5 PG (ref 26.1–32.9)
MCHC RBC AUTO-ENTMCNC: 33.7 G/DL (ref 31.4–35)
MCV RBC AUTO: 96.6 FL (ref 79.6–97.8)
MM INDURATION POC: NORMAL (ref 0–5)
MONOCYTES # BLD: 0.6 K/UL (ref 0.1–1.3)
MONOCYTES NFR BLD: 5 % (ref 4–12)
NEUTS SEG # BLD: 9.2 K/UL (ref 1.7–8.2)
NEUTS SEG NFR BLD: 78 % (ref 43–78)
NRBC # BLD: 0 K/UL (ref 0–0.2)
PCR REFLEX: NORMAL
PLATELET # BLD AUTO: 304 K/UL (ref 150–450)
PMV BLD AUTO: 9.7 FL (ref 9.4–12.3)
POTASSIUM SERPL-SCNC: 3.5 MMOL/L (ref 3.5–5.1)
PPD POC: NORMAL
PROT SERPL-MCNC: 7.2 G/DL (ref 6.3–8.2)
RBC # BLD AUTO: 4.15 M/UL (ref 4.05–5.2)
SERVICE CMNT-IMP: NORMAL
SODIUM SERPL-SCNC: 137 MMOL/L (ref 136–145)
WBC # BLD AUTO: 11.9 K/UL (ref 4.3–11.1)

## 2020-06-30 PROCEDURE — 83735 ASSAY OF MAGNESIUM: CPT

## 2020-06-30 PROCEDURE — 74011250637 HC RX REV CODE- 250/637: Performed by: INTERNAL MEDICINE

## 2020-06-30 PROCEDURE — 97530 THERAPEUTIC ACTIVITIES: CPT

## 2020-06-30 PROCEDURE — 97165 OT EVAL LOW COMPLEX 30 MIN: CPT

## 2020-06-30 PROCEDURE — 74011250636 HC RX REV CODE- 250/636: Performed by: INTERNAL MEDICINE

## 2020-06-30 PROCEDURE — 74011000258 HC RX REV CODE- 258: Performed by: INTERNAL MEDICINE

## 2020-06-30 PROCEDURE — 85025 COMPLETE CBC W/AUTO DIFF WBC: CPT

## 2020-06-30 PROCEDURE — 82962 GLUCOSE BLOOD TEST: CPT

## 2020-06-30 PROCEDURE — 36415 COLL VENOUS BLD VENIPUNCTURE: CPT

## 2020-06-30 PROCEDURE — 80053 COMPREHEN METABOLIC PANEL: CPT

## 2020-06-30 PROCEDURE — 97161 PT EVAL LOW COMPLEX 20 MIN: CPT

## 2020-06-30 PROCEDURE — 97535 SELF CARE MNGMENT TRAINING: CPT

## 2020-06-30 PROCEDURE — 65270000029 HC RM PRIVATE

## 2020-06-30 RX ORDER — NYSTATIN 100000 [USP'U]/ML
500000 SUSPENSION ORAL 4 TIMES DAILY
Status: DISCONTINUED | OUTPATIENT
Start: 2020-06-30 | End: 2020-06-30

## 2020-06-30 RX ORDER — METRONIDAZOLE 500 MG/100ML
500 INJECTION, SOLUTION INTRAVENOUS EVERY 8 HOURS
Status: DISCONTINUED | OUTPATIENT
Start: 2020-06-30 | End: 2020-07-02 | Stop reason: DRUGHIGH

## 2020-06-30 RX ORDER — NYSTATIN 100000 [USP'U]/ML
500000 SUSPENSION ORAL 4 TIMES DAILY
Status: DISCONTINUED | OUTPATIENT
Start: 2020-06-30 | End: 2020-07-03 | Stop reason: HOSPADM

## 2020-06-30 RX ADMIN — NYSTATIN 500000 UNITS: 100000 SUSPENSION ORAL at 21:38

## 2020-06-30 RX ADMIN — ONDANSETRON 4 MG: 2 INJECTION INTRAMUSCULAR; INTRAVENOUS at 09:29

## 2020-06-30 RX ADMIN — ACETAMINOPHEN 650 MG: 325 TABLET, FILM COATED ORAL at 20:23

## 2020-06-30 RX ADMIN — Medication 10 ML: at 21:38

## 2020-06-30 RX ADMIN — MEMANTINE HYDROCHLORIDE 10 MG: 5 TABLET ORAL at 08:20

## 2020-06-30 RX ADMIN — Medication 10 ML: at 05:21

## 2020-06-30 RX ADMIN — LEVOTHYROXINE SODIUM 125 MCG: 0.12 TABLET ORAL at 08:21

## 2020-06-30 RX ADMIN — APIXABAN 5 MG: 5 TABLET, FILM COATED ORAL at 08:21

## 2020-06-30 RX ADMIN — METRONIDAZOLE 500 MG: 500 INJECTION, SOLUTION INTRAVENOUS at 17:17

## 2020-06-30 RX ADMIN — METOPROLOL TARTRATE 25 MG: 25 TABLET, FILM COATED ORAL at 17:18

## 2020-06-30 RX ADMIN — METRONIDAZOLE 500 MG: 500 INJECTION, SOLUTION INTRAVENOUS at 09:55

## 2020-06-30 RX ADMIN — CEFEPIME HYDROCHLORIDE 2 G: 2 INJECTION, POWDER, FOR SOLUTION INTRAVENOUS at 14:22

## 2020-06-30 RX ADMIN — MEMANTINE HYDROCHLORIDE 10 MG: 5 TABLET ORAL at 17:18

## 2020-06-30 RX ADMIN — METOPROLOL TARTRATE 25 MG: 25 TABLET, FILM COATED ORAL at 08:20

## 2020-06-30 RX ADMIN — APIXABAN 5 MG: 5 TABLET, FILM COATED ORAL at 21:38

## 2020-06-30 RX ADMIN — VANCOMYCIN HYDROCHLORIDE 1000 MG: 1 INJECTION, POWDER, LYOPHILIZED, FOR SOLUTION INTRAVENOUS at 08:21

## 2020-06-30 NOTE — PROGRESS NOTES
Pt in bed resting quietly. Pt denies nausea. RR even, unlabored, no noted distress. Bed L/L, call bell is within reach and side rails are up x 2.

## 2020-06-30 NOTE — PROGRESS NOTES
Progress Note    Patient: Whitley Lyon MRN: [id number]  SSN: [id number]    YOB: 1926  Age: 80 y.o. Sex: female      Admit Date: 6/28/2020    LOS: 2 days     Subjective:     past medical history significant for aortic valve stenosis, hypertension, hypothyroidism, dementia, GERD and she is also a long-term resident of a skilled nursing facility. recently admitted to our hospital on June 23 for A. fib with RVR.  At that time she was also treated for a pneumonia.  COVID-19 was reportedly ruled out. Admitted this time due to acute nausea and vomiting. CT abdomen is concerning for colitis, Bilateral lung infiltrates and pleural effusions.     abdomen is feeling slightly better. No fever. WBC is less. Objective:     Vitals:    06/29/20 2355 06/30/20 0352 06/30/20 0730 06/30/20 1105   BP: 161/74 165/76 162/72 152/83   Pulse: 64 60 70 60   Resp: 18 18 14 14   Temp: 98.2 °F (36.8 °C) 98.2 °F (36.8 °C) 98.4 °F (36.9 °C) 98 °F (36.7 °C)   SpO2: 91% 93% 93% 96%   Weight:       Height:            Intake and Output:  Current Shift: No intake/output data recorded. Last three shifts: 06/28 1901 - 06/30 0700  In: -   Out: 5275 [Urine:5275]    Physical Exam:     General:                    The patient is a pleasant elderly female in no acute respiratory distress. Head:                                   Normocephalic/atraumatic. Eyes:                                   No palpebral pallor or scleral icterus. ENT:                                    External auricular and nasal exam within normal limits. Mucous membranes are moist.  Neck:                                   Supple, non-tender, no JVD. Lungs:                       Clear to auscultation bilaterally without wheezes or crackles. No respiratory distress or accessory muscle use.   Heart:                                  Regular rate and rhythm, without murmurs, rubs, or gallops. Abdomen:                  Soft, mildly tender at RLQ, non-distended with normoactive bowel sounds. Genitourinary:           No tenderness over the bladder or bilateral CVAs. Extremities:               Without clubbing, cyanosis, or edema. Skin:                                    Normal color, texture, and turgor. No rashes, lesions, or jaundice. Pulses:                      Radial and dorsalis pedis pulses present 2+ bilaterally. Capillary refill <2s. Neurologic:                CN II-XII grossly intact and symmetrical.                                               Moving all four extremities well with no focal deficits. Psychiatric:                Pleasant demeanor, appropriate affect. Alert and oriented x 3      Lab/Data Review:    Recent Results (from the past 24 hour(s))   GLUCOSE, POC    Collection Time: 06/29/20  4:16 PM   Result Value Ref Range    Glucose (POC) 96 65 - 100 mg/dL   GLUCOSE, POC    Collection Time: 06/29/20  9:37 PM   Result Value Ref Range    Glucose (POC) 102 (H) 65 - 100 mg/dL   GLUCOSE, POC    Collection Time: 06/30/20  7:31 AM   Result Value Ref Range    Glucose (POC) 147 (H) 65 - 299 mg/dL   METABOLIC PANEL, COMPREHENSIVE    Collection Time: 06/30/20  8:50 AM   Result Value Ref Range    Sodium 137 136 - 145 mmol/L    Potassium 3.5 3.5 - 5.1 mmol/L    Chloride 102 98 - 107 mmol/L    CO2 27 21 - 32 mmol/L    Anion gap 8 7 - 16 mmol/L    Glucose 129 (H) 65 - 100 mg/dL    BUN 6 (L) 8 - 23 MG/DL    Creatinine 0.77 0.6 - 1.0 MG/DL    GFR est AA >60 >60 ml/min/1.73m2    GFR est non-AA >60 >60 ml/min/1.73m2    Calcium 8.5 8.3 - 10.4 MG/DL    Bilirubin, total 0.8 0.2 - 1.1 MG/DL    ALT (SGPT) 17 12 - 65 U/L    AST (SGOT) 18 15 - 37 U/L    Alk.  phosphatase 94 50 - 136 U/L    Protein, total 7.2 6.3 - 8.2 g/dL    Albumin 2.1 (L) 3.2 - 4.6 g/dL    Globulin 5.1 (H) 2.3 - 3.5 g/dL    A-G Ratio 0.4 (L) 1.2 - 3.5     CBC WITH AUTOMATED DIFF    Collection Time: 06/30/20  8:50 AM   Result Value Ref Range    WBC 11.9 (H) 4.3 - 11.1 K/uL    RBC 4.15 4.05 - 5.2 M/uL    HGB 13.5 11.7 - 15.4 g/dL    HCT 40.1 35.8 - 46.3 %    MCV 96.6 79.6 - 97.8 FL    MCH 32.5 26.1 - 32.9 PG    MCHC 33.7 31.4 - 35.0 g/dL    RDW 13.7 11.9 - 14.6 %    PLATELET 848 241 - 370 K/uL    MPV 9.7 9.4 - 12.3 FL    ABSOLUTE NRBC 0.00 0.0 - 0.2 K/uL    DF AUTOMATED      NEUTROPHILS 78 43 - 78 %    LYMPHOCYTES 14 13 - 44 %    MONOCYTES 5 4.0 - 12.0 %    EOSINOPHILS 2 0.5 - 7.8 %    BASOPHILS 0 0.0 - 2.0 %    IMMATURE GRANULOCYTES 1 0.0 - 5.0 %    ABS. NEUTROPHILS 9.2 (H) 1.7 - 8.2 K/UL    ABS. LYMPHOCYTES 1.6 0.5 - 4.6 K/UL    ABS. MONOCYTES 0.6 0.1 - 1.3 K/UL    ABS. EOSINOPHILS 0.3 0.0 - 0.8 K/UL    ABS. BASOPHILS 0.1 0.0 - 0.2 K/UL    ABS. IMM.  GRANS. 0.1 0.0 - 0.5 K/UL   MAGNESIUM    Collection Time: 06/30/20  8:50 AM   Result Value Ref Range    Magnesium 2.1 1.8 - 2.4 mg/dL   GLUCOSE, POC    Collection Time: 06/30/20 10:55 AM   Result Value Ref Range    Glucose (POC) 145 (H) 65 - 100 mg/dL   PLEASE READ & DOCUMENT PPD TEST IN 24 HRS    Collection Time: 06/30/20 11:55 AM   Result Value Ref Range    PPD      mm Induration       Results     Procedure Component Value Units Date/Time    C. DIFFICILE/EPI PCR [[id number]]     Order Status:  Sent Specimen:  Stool     C. DIFFICILE AG & TOXIN A/B [[id number]] Collected:  06/28/20 2360    Order Status:  Completed Specimen:  Stool Updated:  06/30/20 4212 [address] ANTIGEN       C. DIFFICILE GDH ANTIGEN-NEGATIVE           C. difficile toxin       C. DIFFICILE TOXIN-NEGATIVE           PCR Reflex NOT APPLICABLE        INTERPRETATION       NEGATIVE FOR TOXIGENIC C. DIFFICILE           Clinical Consideration       NEGATIVE FOR TOXIGENIC C. DIFFICILE          CULTURE, URINE [[id number]] Collected:  06/28/20 0741    Order Status:  Completed Specimen:  Urine from [place] Specimen Updated:  06/30/20 0713     Special Requests: NO SPECIAL REQUESTS        Culture result: NO GROWTH 2 DAYS       EMERGENT DISEASE PANEL [[id number]] Collected:  06/28/20 0731    Order Status:  Completed Updated:  06/28/20 1310    CULTURE, BLOOD [[id number]] Collected:  06/28/20 0719    Order Status:  Completed Specimen:  Blood Updated:  06/30/20 0615     Special Requests: --        LEFT  Antecubital       Culture result: NO GROWTH 2 DAYS       CULTURE, BLOOD [[id number]] Collected:  06/28/20 0719    Order Status:  Completed Specimen:  Blood Updated:  06/30/20 0615     Special Requests: --        RIGHT  Antecubital       Culture result: NO GROWTH 2 DAYS       C. DIFFICILE/EPI PCR [[id number]]     Order Status:  Canceled Specimen:  Stool     CULTURE, URINE [[id number]] Collected:  06/24/20 0853    Order Status:  Completed Specimen:  Urine Updated:  06/26/20 0809     Special Requests: NO SPECIAL REQUESTS        Culture result:       10,000 to 50,000 COLONIES/mL MIXED SKIN THUY ISOLATED          EMERGENT DISEASE PANEL [[id number]] Collected:  06/23/20 1900    Order Status:  Canceled          CT abdomen and pelvis   6-  IMPRESSION:   1. Possible inflammation of the proximal colon. 2.  Increased bibasilar infiltrate/atelectasis. Stable small bilateral pleural  Effusions.       Current Facility-Administered Medications:     metroNIDAZOLE (FLAGYL) IVPB premix 500 mg, 500 mg, IntraVENous, Q8H, Kristie Richardson MD, Last Rate: 100 mL/hr at 06/30/20 0955, 500 mg at 06/30/20 0955    cefepime (MAXIPIME) 2 g in 0.9% sodium chloride (MBP/ADV) 100 mL, 2 g, IntraVENous, Q12H, Jinny Moss DO    [START ON 7/1/2020] Vancomycin Trough Level Reminder, , Other, ONCE, Jinny Moss DO    sodium chloride (NS) flush 5-40 mL, 5-40 mL, IntraVENous, Q8H, Sherri Rudolph MD, 10 mL at 06/30/20 0521    sodium chloride (NS) flush 5-40 mL, 5-40 mL, IntraVENous, PRN, Aleena Cheema MD    acetaminophen (TYLENOL) tablet 650 mg, 650 mg, Oral, Q4H PRN, Mario Vazquez MD    dicyclomine (BENTYL) tablet 20 mg, 20 mg, Oral, Q6H PRN, Herminia Vega MD    ondansetron [facility]) injection 4 mg, 4 mg, IntraVENous, Q4H PRN, Mario Vazquez MD, 4 mg at 06/30/20 2695    diphenhydrAMINE (BENADRYL) capsule 25 mg, 25 mg, Oral, Q4H PRN, Mario Vazquez MD    HYDROmorphone (PF) (DILAUDID) injection 0.5 mg, 0.5 mg, IntraVENous, Q4H PRN, Herminia Vega MD    apixaban (ELIQUIS) tablet 5 mg, 5 mg, Oral, BID, Mario Vazquez MD, 5 mg at 06/30/20 9028    insulin lispro (HUMALOG) injection, , SubCUTAneous, AC&HS, Mario Vazquez MD, Stopped at 06/29/20 1616    levothyroxine (SYNTHROID) tablet 125 mcg, 125 mcg, Oral, ACB, Mario Vazquez MD, 125 mcg at 06/30/20 6595    memantine (NAMENDA) tablet 10 mg, 10 mg, Oral, BID, Mario Vazquez MD, 10 mg at 06/30/20 0820    metoprolol tartrate (LOPRESSOR) tablet 25 mg, 25 mg, Oral, BID, Sherri Vega MD, 25 mg at 06/30/20 0820    sucralfate (CARAFATE) 100 mg/mL oral suspension 1 g (Patient Supplied), 1 g, Oral, AC&HS, Mario Vazquez MD, Stopped at 06/30/20 2702    traMADoL (ULTRAM) tablet 25 mg, 25 mg, Oral, Q8H PRN, Mario Vazquez MD, 25 mg at 06/29/20 2035    nitroglycerin (NITROSTAT) tablet 0.4 mg, 0.4 mg, SubLINGual, Q5MIN PRN, Mario Vazquez MD    mirabegron ER [facility]) tablet 25 mg (Patient Supplied), 25 mg, Oral, DAILY, Mario Vazquez MD, Stopped at 06/29/20 0900    vancomycin (VANCOCIN) 1,000 mg in 0.9% sodium chloride (MBP/ADV) 250 mL, 1,000 mg, IntraVENous, Q24H, Mario Vazquez MD, Last Rate: 250 mL/hr at 06/30/20 0821, 1,000 mg at 06/30/20 9659      Assessment:     Principal Problem:    Sepsis ([address] (6/28/2020)    Active Problems:    Memory loss ()      Esophageal reflux ()      Aortic stenosis (6/10/2016)      Overview: No sever SOB, no chest pain or syncope.  Echo showed mod AS, mean grad 22mm Hg, nl EF, mild LVH      Acquired hypothyroidism (7/17/2017)      Type 2 diabetes with nephropathy ([facility] (1/17/2019)      Diarrhea (6/28/2020)      Colitis (6/28/2020)      Person under investigation for COVID-19 (6/28/2020)      Lactic acidosis (6/29/2020)        Plan:     Sepsis on admission   Colitis vs pneumonia   Empiric IV antibiotics. Metronidazole is added. WBC count is less. Clinically patient seems to improve. Monitor. Acute respiratory failure with hypoxia   From sepsis on admission   Monitor. Wean oxygen as able. AF   On Metoprolol, Apixaban     I have discussed the plan of care with patient. DVT prophylaxis : already on [address] [place] [address].         Signed By: Aubrie Dean MD     June 30, 2020

## 2020-06-30 NOTE — PROGRESS NOTES
Problem: Mobility Impaired (Adult and Pediatric)  Goal: *Acute Goals and Plan of Care  Description: Acute PT Goals:  (1.)Jonathan Storey will move from supine to sit and sit to supine , scoot up and down and roll side to side with MODIFIED INDEPENDENCE within 7 treatment day(s). (2.)Jonathan Storey will transfer from bed to chair and chair to bed with STAND BY ASSIST using the least restrictive device within 7 treatment day(s). (3.)Jonathan Stoutx will perform therapeutic activities x 25 minutes with STAND BY ASSIST to improve safety and activity tolerance within 7 treatment day(s). (4.)Jonathan Storey will perform therapeutic exercises x 15 min for HEP with INDEPENDENCE to improve strength, endurance, and functional mobility within 7 treatment day(s). PHYSICAL THERAPY: Initial Assessment, Daily Note, and PM 6/30/2020  INPATIENT: PT Visit Days : 1  [place] / [place] B / Product Type: Medicare /       NAME/AGE/GENDER: Willi Fair is a 80 y.o. female   PRIMARY DIAGNOSIS: Sepsis ([facility] [A41.9]  Colitis [K52.9]  Diarrhea [R19.7]  Person under investigation for COVID-19 [Z20.828] Sepsis ([facility] Sepsis ([facility]        ICD-10: Treatment Diagnosis:   Generalized Muscle Weakness (M62.81)  Other lack of cordination (R27.8)  Other abnormalities of gait and mobility (R26.89)   Precaution/Allergies:  Codeine; Pcn [penicillins]; and Strawberry      ASSESSMENT:     Ms. Anna Ribeiro is a 80year old F who presents from [facility] with sepsis, colitis. Lab results pending. Pt endorses no falls in past 6 months. Prior to admission Ms. Anna Ribeiro uses a wheelchair for mobility which she reports she is typically able to transfer to herself. Upon entering, pt resting in bed, agreeable to PT evaluation. she reports pain in her abdominal region at rest. BLE assessment indicates sensation to light touch intact, AROM diminished, and strength diminished.  Pt performed supine > sit with CGA, sitting EOB with good sitting balance control. Pt performed functional tasks sitting edge of bed with overall good sitting balance control. Transfer to bedside chair with Mod A x2 using hand held assist. Pt with decreased standing balance control, poor control of descent into chair, flexion at trunk, hips, knees. At end of session pt sitting up in chair with all needs within reach, alarm activated for safety, RN notified. Pt presents as functioning below her baseline, with deficits in mobility including transfers, gait, balance, and activity tolerance. Pt will benefit from skilled therapy services to address stated deficits to promote return to highest level of function, independence, and safety. Will continue to follow. This section established at most recent assessment   PROBLEM LIST (Impairments causing functional limitations):  Decreased Strength  Decreased ADL/Functional Activities  Decreased Transfer Abilities  Decreased Balance  Decreased Activity Tolerance  Increased Fatigue   INTERVENTIONS PLANNED: (Benefits and precautions of physical therapy have been discussed with the patient.)  Balance Exercise  Bed Mobility  Family Education  Gait Training  Home Exercise Program (HEP)  Neuromuscular Re-education/Strengthening  Range of Motion (ROM)  Therapeutic Activites  Therapeutic Exercise/Strengthening  Transfer Training     TREATMENT PLAN: Frequency/Duration: 3 times a week for duration of hospital stay  Rehabilitation Potential For Stated Goals: Good     REHAB RECOMMENDATIONS (at time of discharge pending progress):    Placement: It is my opinion, based on this patient's performance to date, that Ms. Larry Morales may benefit from participating in 1-2 additional therapy sessions in order to continue to assess for rehab potential and then make recommendation for disposition at discharge. (At this rate anticipate return to [facility] with HHPT/OT, but will continue to assess).   Equipment: None at this time              HISTORY:   History of Present Injury/Illness (Reason for Referral):  Per H&P: \"Mrs. Emy Harrison is a 70-year-old female with a past medical history significant for aortic valve stenosis, hypertension, hypothyroidism, dementia, GERD and she is also a long-term resident of a skilled nursing facility. She was recently admitted to our hospital on June 23 for A. fib with RVR. At that time she was also treated for a pneumonia. COVID-19 was reportedly ruled out. During that admission she did have some abdominal pain that was attributed to gas or acid reflux as her CT abdomen and pelvis showed no acute intra-abdominal pathology. She was eventually discharged back to her assisted living facility on June 26, 2020 on [facility]. The patient has reportedly been doing well at this assisted living facility but today developed acute nausea and vomiting. The patient himself cannot recall what happened so the history is given from the ER physician and staff, the patient's chart and her son Anival Lai who is at the bedside. The patient does note that she been experiencing diarrhea at the facility but it is unclear what duration this was. Per the ER her assisted living facility has had a high number of COVID-19 cases so she had a rapid swab test done in the emergency room which was negative. In the ER was also noted that she had right lower quadrant pain so a CT abdomen and pelvis which was done to evaluate for possible appendicitis. The CT was negative for appendicitis but was concerning for inflammation of the colon. Bilateral lung infiltrates and pleural effusions. The hospitalist were requested for further work-up and management and admission to the inpatient services. The patient denies any chest pain currently she denies any current nausea or vomiting breath or abdominal pain. She denies any urinary tract symptoms. 10 systems reviewed and negative except as noted in HPI. \"  Past Medical History/Comorbidities:   Ms. Nicolas Baires  has a past medical history of Abdominal pain, epigastric, Abdominal pain, unspecified site, Anxiety state, unspecified, Aortic stenosis (6/10/2016), Aortic valve disorders, Atherosclerosis of aorta (HCC), Backache, unspecified, Benign neoplasm of colon, Cervicalgia, Closed fracture of unspecified bone, Depressive disorder, not elsewhere classified, Diabetes ([facility] Diarrhea, Disorders of magnesium metabolism, Edema, Elevated sedimentation rate, Encounter for long-term (current) use of other medications, Endocrine disease, Epistaxis, Esophageal reflux, Essential hypertension, benign, Gastrointestinal malfunction arising from mental factors, Hypertension, Hypertension-controlled, benign, Hypertonicity of bladder, Hypopotassemia, Insomnia, unspecified, Irritable bowel syndrome, Memory loss, Nausea alone, Osteoarthrosis, unspecified whether generalized or localized, unspecified site, Other ill-defined conditions(799.89), Other malaise and fatigue, Pain in joint, lower leg, Pain in joint, shoulder region, Postnasal drip, Rheumatoid arthritis(714.0), Type II or unspecified type diabetes mellitus without mention of complication, not stated as uncontrolled, Unspecified constipation, Unspecified hemorrhoids with other complication, Unspecified hemorrhoids without mention of complication, Unspecified hereditary and idiopathic peripheral neuropathy, Unspecified hypothyroidism, Urgency of urination, and Urinary tract infection, site not specified. Ms. Nicolas Baires  has a past surgical history that includes hx cholecystectomy; hx other surgical; and hx tonsillectomy.   Social History/Living Environment:   Home Environment: [address] Name: [place]  One/Two Story Residence: One story  Living Alone: No  Support Systems: Assisted living, Child(yohan), Family member(s)  Patient Expects to be Discharged to[de-identified] Assisted living  Current DME Used/Available at Home: Wheelchair  Prior Level of Function/Work/Activity:  [employer]) with sepsis, colitis. Lab results pending. Pt endorses no falls in past 6 months. Prior to admission Ms. Larry Morales uses a wheelchair for mobility which she reports she is typically able to transfer to herself. Number of Personal Factors/Comorbidities that affect the Plan of Care: 1-2: MODERATE COMPLEXITY   EXAMINATION:   Most Recent Physical Functioning:   Gross Assessment:  AROM: Generally decreased, functional  Strength: Generally decreased, functional  Coordination: Generally decreased, functional  Sensation: Intact               Posture:     Balance:  Sitting: Intact  Standing: Impaired  Standing - Static: Poor;Constant support  Standing - Dynamic : Poor;Constant support Bed Mobility:  Rolling: Contact guard assistance  Supine to Sit: Minimum assistance  Scooting: Contact guard assistance  Interventions: Safety awareness training; Tactile cues; Verbal cues  Wheelchair Mobility:     Transfers:  Sit to Stand: Moderate assistance;Assist x2  Stand to Sit: Moderate assistance;Assist x2  Bed to Chair: Moderate assistance;Assist x2  Interventions: Safety awareness training; Tactile cues; Verbal cues  Gait:            Body Structures Involved:  Digestive Structures  Bones  Joints  Muscles Body Functions Affected:  Neuromusculoskeletal  Movement Related  Digestive Activities and Participation Affected:  General Tasks and Demands  Mobility  Self Care   Number of elements that affect the Plan of Care: 3: MODERATE COMPLEXITY   CLINICAL PRESENTATION:   Presentation: Stable and uncomplicated: LOW COMPLEXITY   CLINICAL DECISION MAKIN [facility] [address] [id number] AM-PAC 6 Clicks   Basic Mobility Inpatient Short Form  How much difficulty does the patient currently have. .. Unable A Lot A Little None   1. Turning over in bed (including adjusting bedclothes, sheets and blankets)? [] 1   [] 2   [x] 3   [] 4   2.   Sitting down on and standing up from a chair with arms ( e.g., wheelchair, bedside commode, etc.)   [] 1   [x] 2   [] 3   [] 4   3. Moving from lying on back to sitting on the side of the bed? [] 1   [] 2   [x] 3   [] 4   How much help from another person does the patient currently need. .. Total A Lot A Little None   4. Moving to and from a bed to a chair (including a wheelchair)? [] 1   [x] 2   [] 3   [] 4   5. Need to walk in hospital room? [x] 1   [] 2   [] 3   [] 4   6. Climbing 3-5 steps with a railing? [x] 1   [] 2   [] 3   [] 4   © 2007, Trustees of [address] [place] [address] [address] [id number], under license to [employer]. All rights reserved      Score:  Initial: 12 Most Recent: X (Date: -- )    Interpretation of Tool:  Represents activities that are increasingly more difficult (i.e. Bed mobility, Transfers, Gait). Medical Necessity:     Patient is expected to demonstrate progress in   strength, range of motion, balance, coordination, and functional technique   to   increase independence with all mobility, including transfers to get to/from her wheelchair. .  Reason for Services/Other Comments:  Patient continues to require skilled intervention due to   medical complications and mobility deficits which impact her level of function, safety, and independence as indicated above. .   Use of outcome tool(s) and clinical judgement create a POC that gives a: Clear prediction of patient's progress: LOW COMPLEXITY        TREATMENT:   (In addition to Assessment/Re-Assessment sessions the following treatments were rendered)   Pre-treatment Symptoms/Complaints:  Fatigue, abdominal pain  Pain: Initial:   Pain Intensity 1: 0  Post Session:  0/10     Therapeutic Activity: (   10 Minutes): Therapeutic activities including bed mobility, activities sitting edge of bed, transfer training to bedside chair to improve mobility, strength, balance, and coordination.   Required minimal cues with Mod Ax2  to promote static and dynamic balance in standing and promote coordination of bilateral, upper extremity(s), lower extremity(s). Braces/Orthotics/Lines/Etc:   lopez catheter  O2 Device: Room air  Treatment/Session Assessment:    Response to Treatment:  see above  Interdisciplinary Collaboration:   Physical Therapist  Occupational Therapist  Registered Nurse  After treatment position/precautions:   Up in chair  Bed alarm/tab alert on  Bed/Chair-wheels locked  Bed in low position  Call light within reach  RN notified   Compliance with Program/Exercises: Will assess as treatment progresses  Recommendations/Intent for next treatment session: \"Next visit will focus on advancements to more challenging activities and reduction in assistance provided\".     Total Treatment Duration:  PT Patient Time In/Time Out  Time In: 1352  Time Out: [address], PT

## 2020-06-30 NOTE — PROGRESS NOTES
Interdisciplinary team rounds were held 6/30/2020 with the following team members:Care Management, Nursing, Physical Therapy and Physician. Plan of care discussed. See clinical pathway and/or care plan for interventions and desired outcomes.

## 2020-06-30 NOTE — PROGRESS NOTES
Problem: Patient Education: Go to Patient Education Activity  Goal: Patient/Family Education  Description: 1. Patient will complete lower body bathing and dressing with min A and adaptive equipment as needed. 2. Patient will complete toileting with min A.   3. Patient will tolerate 25 minutes of OT treatment with 1-2 rest breaks to increase activity tolerance for ADLs. 4. Patient will complete functional transfers with min A and adaptive equipment as needed. 5. Patient will tolerate 15 minutes BUE strengthening exercises to improve ability to assist with functional transfers. Timeframe: 7 visits      Outcome: Progressing Towards Goal      OCCUPATIONAL THERAPY: Initial Assessment, Daily Note, and PM 6/30/2020  INPATIENT: OT Visit Days: 1  [place] / [place] / Product Type: Medicare /      NAME/AGE/GENDER: Radha Mendez is a 80 y.o. female   PRIMARY DIAGNOSIS:  Sepsis ([facility] [A41.9]  Colitis [K52.9]  Diarrhea [R19.7]  Person under investigation for COVID-19 [Z20.828] Sepsis ([facility] Sepsis ([facility]        ICD-10: Treatment Diagnosis:    Generalized Muscle Weakness (M62.81)  Other lack of cordination (R27.8)  History of falling (Z91.81)   Precautions/Allergies:    Fall precautions Codeine; Pcn [penicillins]; and Strawberry      ASSESSMENT:     Ms. Middleton Dear presents for the above diagnoses. Upon arrival, pt supine in bed and agreeable to OT evaluation. Pt is alert and oriented x 4. Pt reports living at [facility] where she is typically independent with ADLs with occasional assist for bathing tasks; w/c bound at baseline, however notes she typically able to transfer to/from w/c with 1 person assist.   Today, pt presents with deficits in overall activity tolerance, strength, ADL performance, and functional mobility. Co-treatment completed with PT today d/t pt's increased need for physical assistance during ADL performance and functional mobility.  BUE AROM and strength (3/5) are generally decreased but [facility]; coordination and sensation are intact. Today, pt transitioned to sitting edge of bed with min A. Static and dynamic sitting balance are intact with no additional support. While in unsupported sitting, pt performed UB bathing with min A (d/t back management). Pt then completed SPT transfer to bedside chair with mod A x 2 with verbal and tactile cues for facilitation. Pt placed sitting upright in bedside chair and proceeded to complete self-grooming tasks, including oral hygiene, with supervision after set-up. Pt then left with needs met, call light within reach, and RN notified. At this time, Jyotsna Echols is functioning below baseline for ADLs and functional mobility. Pt would benefit from skilled OT services to address OT goals and plan of care. At this time, patient is appropriate for Co-treatment with physical  therapy due to patient's clinical complexity, decreased overall endurance/tolerance levels, as well as need for high level assistance and cues and intervention to complete functional transfers/mobility and functional tasks in safe manner. Jyotsna Echols  is appropriate for a multidisciplinary co-treatment of PT and OT to address goals of both disciplines.       This section established at most recent assessment   PROBLEM LIST (Impairments causing functional limitations):  Decreased Strength  Decreased ADL/Functional Activities  Decreased Transfer Abilities  Decreased Ambulation Ability/Technique  Decreased Balance  Decreased Activity Tolerance   INTERVENTIONS PLANNED: (Benefits and precautions of occupational therapy have been discussed with the patient.)  Activities of daily living training  Adaptive equipment training  Balance training  Clothing management  Community reintergration  Donning&doffing training  Neuromuscular re-eduation  Re-evaluation  Therapeutic activity  Therapeutic exercise     TREATMENT PLAN: Frequency/Duration: Follow patient 3x/week to address above goals. Rehabilitation Potential For Stated Goals: [address] (at time of discharge pending progress):    Placement: It is my opinion, based on this patient's performance to date, that Ms. Colby Romo may benefit from participating in 1-2 additional therapy sessions in order to continue to assess for rehab potential and then make recommendation for disposition at discharge.  [facility] at [facility] vs [facility]  Equipment:   TBD               OCCUPATIONAL PROFILE AND HISTORY:   History of Present Injury/Illness (Reason for Referral):  See H&P  Past Medical History/Comorbidities:   Ms. Colby Romo  has a past medical history of Abdominal pain, epigastric, Abdominal pain, unspecified site, Anxiety state, unspecified, Aortic stenosis (6/10/2016), Aortic valve disorders, Atherosclerosis of aorta (HCC), Backache, unspecified, Benign neoplasm of colon, Cervicalgia, Closed fracture of unspecified bone, Depressive disorder, not elsewhere classified, Diabetes ([facility] Diarrhea, Disorders of magnesium metabolism, Edema, Elevated sedimentation rate, Encounter for long-term (current) use of other medications, Endocrine disease, Epistaxis, Esophageal reflux, Essential hypertension, benign, Gastrointestinal malfunction arising from mental factors, Hypertension, Hypertension-controlled, benign, Hypertonicity of bladder, Hypopotassemia, Insomnia, unspecified, Irritable bowel syndrome, Memory loss, Nausea alone, Osteoarthrosis, unspecified whether generalized or localized, unspecified site, Other ill-defined conditions(799.89), Other malaise and fatigue, Pain in joint, lower leg, Pain in joint, shoulder region, Postnasal drip, Rheumatoid arthritis(714.0), Type II or unspecified type diabetes mellitus without mention of complication, not stated as uncontrolled, Unspecified constipation, Unspecified hemorrhoids with other complication, Unspecified hemorrhoids without mention of complication, Unspecified hereditary and idiopathic peripheral neuropathy, Unspecified hypothyroidism, Urgency of urination, and Urinary tract infection, site not specified. Ms. Sulema De Leon  has a past surgical history that includes hx cholecystectomy; hx other surgical; and hx tonsillectomy. Social History/Living Environment:   [facility] [address] Name: [place]   One/Two Story Residence: One story  Living Alone: No  Support Systems: Assisted living, Family member(s)  Patient Expects to be Discharged to[de-identified] Assisted living  Current DME Used/Available at Home: Wheelchair  Prior Level of Function/Work/Activity:  Occasional assist with bathing otherwise independent with ADLs; w/c bound at baseline however able to transfer to/from with 1 person assist.   Personal Factors:          Sex:  female        Age:  80 y.o. Other factors that influence how disability is experienced by the patient:  multiple co-morbidities    Number of Personal Factors/Comorbidities that affect the Plan of Care: Brief history (0):  LOW COMPLEXITY   ASSESSMENT OF OCCUPATIONAL PERFORMANCE[de-identified]   Activities of Daily Living:   Basic ADLs (From Assessment) Complex ADLs (From Assessment)   Feeding: Setup  Oral Facial Hygiene/Grooming: Setup  Bathing: Moderate assistance  Upper Body Dressing: Setup  Lower Body Dressing: Moderate assistance  Toileting: Moderate assistance Instrumental ADL  Meal Preparation: Maximum assistance  Homemaking: Maximum assistance   Grooming/Bathing/Dressing Activities of Daily Living     Cognitive Retraining  Safety/Judgement: Awareness of environment; Fall prevention                       Bed/Mat Mobility  Rolling: Contact guard assistance  Supine to Sit: Minimum assistance  Sit to Stand:  Moderate assistance;Assist x2  Stand to Sit: Moderate assistance;Assist x2  Bed to Chair: Moderate assistance;Assist x2  Scooting: Contact guard assistance     Most Recent Physical Functioning:   Gross Assessment:  AROM: Generally decreased, functional  Strength: Generally decreased, functional  Coordination: Generally decreased, functional  Sensation: Intact               Posture:     Balance:  Sitting: Intact  Standing: Impaired  Standing - Static: Poor;Constant support  Standing - Dynamic : Poor;Constant support Bed Mobility:  Rolling: Contact guard assistance  Supine to Sit: Minimum assistance  Scooting: Contact guard assistance  Interventions: Safety awareness training; Tactile cues; Verbal cues  Wheelchair Mobility:     Transfers:  Sit to Stand: Moderate assistance;Assist x2  Stand to Sit: Moderate assistance;Assist x2  Bed to Chair: Moderate assistance;Assist x2  Interventions: Safety awareness training; Tactile cues; Verbal cues            Patient Vitals for the past 6 hrs:   BP BP Patient Position SpO2 Pulse   20 1105 152/83 At rest;Head of bed elevated (Comment degrees) 96 % 60       Mental Status  Neurologic State: Alert  Orientation Level: Oriented X4  Cognition: Follows commands  Perception: Appears intact  Perseveration: No perseveration noted  Safety/Judgement: Awareness of environment, Fall prevention                          Physical Skills Involved:  Balance  Strength  Activity Tolerance  Gross Motor Control Cognitive Skills Affected (resulting in the inability to perform in a timely and safe manner):  none  Psychosocial Skills Affected:  Habits/Routines  Environmental Adaptation   Number of elements that affect the Plan of Care: 5+:  HIGH COMPLEXITY   CLINICAL DECISION MAKIN [facility] [address] [id number] AM-PAC 6 Clicks   Daily Activity Inpatient Short Form  How much help from another person does the patient currently need. .. Total A Lot A Little None   1. Putting on and taking off regular lower body clothing? [] 1   [x] 2   [] 3   [] 4   2. Bathing (including washing, rinsing, drying)? [] 1   [x] 2   [] 3   [] 4   3. Toileting, which includes using toilet, bedpan or urinal?   [] 1   [x] 2   [] 3   [] 4   4.   Putting on and taking off regular upper body clothing? [] 1   [] 2   [x] 3   [] 4   5. Taking care of personal grooming such as brushing teeth? [] 1   [] 2   [x] 3   [] 4   6. Eating meals? [] 1   [] 2   [x] 3   [] 4   © 2007, Trustees of [address] [place] [address] [address] [id number], under license to [employer]. All rights reserved      Score:  Initial: 15 Most Recent: X (Date: -- )    Interpretation of Tool:  Represents activities that are increasingly more difficult (i.e. Bed mobility, Transfers, Gait). Medical Necessity:     Patient demonstrates   fair   rehab potential due to higher previous functional level. Reason for Services/Other Comments:  Patient continues to require skilled intervention due to   medical complications and patient unable to attend/participate in therapy as expected  . Use of outcome tool(s) and clinical judgement create a POC that gives a: LOW COMPLEXITY         TREATMENT:   (In addition to Assessment/Re-Assessment sessions the following treatments were rendered)     Pre-treatment Symptoms/Complaints:    Pain: Initial:   Pain Intensity 1: 0  Post Session:  0/10     Self Care: (15 minutes): Procedure(s) (per grid) utilized to improve and/or restore self-care/home management as related to bathing and grooming. Required minimal   cueing to facilitate activities of daily living skills. Braces/Orthotics/Lines/Etc:   O2 Device: Room air  Treatment/Session Assessment:    Response to Treatment:  tolerated well with no issues noted. Interdisciplinary Collaboration:   Occupational Therapist  Registered Nurse  After treatment position/precautions:   Up in chair  Bed/Chair-wheels locked  Call light within reach  RN notified   Compliance with Program/Exercises: Will assess as treatment progresses. Recommendations/Intent for next treatment session: \"Next visit will focus on advancements to more challenging activities and reduction in assistance provided\".   Total Treatment Duration:  OT Patient Time In/Time Out  Time In: 0018  Time Out: [address] [facility] Liam Alvarado

## 2020-06-30 NOTE — PROGRESS NOTES
Shift assessment complete. A&OX2- person & place. Lungs clear on auscultation. Respirations present. Even and unlabored. No s/s of distress. Zero c/o pain at this time. Call light within reach. Encouraged patient to call for assistance. Patient verbalizes understanding. See Doc Flowsheet for assessment details. Patient resting in bed. Bed alarm in progress. Will continue to monitor.

## 2020-07-01 LAB
ATRIAL RATE: 75 BPM
CALCULATED P AXIS, ECG09: 95 DEGREES
CALCULATED R AXIS, ECG10: -68 DEGREES
CALCULATED T AXIS, ECG11: 97 DEGREES
DIAGNOSIS, 93000: NORMAL
EMERGENT DISEASE PANEL, EDPR: NOT DETECTED
GLUCOSE BLD STRIP.AUTO-MCNC: 121 MG/DL (ref 65–100)
GLUCOSE BLD STRIP.AUTO-MCNC: 138 MG/DL (ref 65–100)
GLUCOSE BLD STRIP.AUTO-MCNC: 154 MG/DL (ref 65–100)
GLUCOSE BLD STRIP.AUTO-MCNC: 185 MG/DL (ref 65–100)
MM INDURATION POC: 0 MM (ref 0–5)
P-R INTERVAL, ECG05: 202 MS
PPD POC: NEGATIVE NEGATIVE
Q-T INTERVAL, ECG07: 442 MS
QRS DURATION, ECG06: 112 MS
QTC CALCULATION (BEZET), ECG08: 493 MS
TROPONIN-HIGH SENSITIVITY: 8.2 PG/ML (ref 0–14)
VENTRICULAR RATE, ECG03: 75 BPM

## 2020-07-01 PROCEDURE — 84484 ASSAY OF TROPONIN QUANT: CPT

## 2020-07-01 PROCEDURE — 74011636637 HC RX REV CODE- 636/637: Performed by: INTERNAL MEDICINE

## 2020-07-01 PROCEDURE — 74011250637 HC RX REV CODE- 250/637: Performed by: INTERNAL MEDICINE

## 2020-07-01 PROCEDURE — 74011250636 HC RX REV CODE- 250/636: Performed by: INTERNAL MEDICINE

## 2020-07-01 PROCEDURE — 74011000258 HC RX REV CODE- 258: Performed by: INTERNAL MEDICINE

## 2020-07-01 PROCEDURE — 65270000029 HC RM PRIVATE

## 2020-07-01 PROCEDURE — 82962 GLUCOSE BLOOD TEST: CPT

## 2020-07-01 RX ADMIN — NYSTATIN 500000 UNITS: 100000 SUSPENSION ORAL at 13:29

## 2020-07-01 RX ADMIN — APIXABAN 5 MG: 5 TABLET, FILM COATED ORAL at 21:00

## 2020-07-01 RX ADMIN — Medication 10 ML: at 21:01

## 2020-07-01 RX ADMIN — NYSTATIN 500000 UNITS: 100000 SUSPENSION ORAL at 17:30

## 2020-07-01 RX ADMIN — TRAMADOL HYDROCHLORIDE 25 MG: 50 TABLET, FILM COATED ORAL at 21:00

## 2020-07-01 RX ADMIN — NITROGLYCERIN 0.4 MG: 0.4 TABLET SUBLINGUAL at 04:57

## 2020-07-01 RX ADMIN — METRONIDAZOLE 500 MG: 500 INJECTION, SOLUTION INTRAVENOUS at 17:30

## 2020-07-01 RX ADMIN — LEVOTHYROXINE SODIUM 125 MCG: 0.12 TABLET ORAL at 09:11

## 2020-07-01 RX ADMIN — METRONIDAZOLE 500 MG: 500 INJECTION, SOLUTION INTRAVENOUS at 02:18

## 2020-07-01 RX ADMIN — VANCOMYCIN HYDROCHLORIDE 1000 MG: 1 INJECTION, POWDER, LYOPHILIZED, FOR SOLUTION INTRAVENOUS at 09:11

## 2020-07-01 RX ADMIN — MEMANTINE HYDROCHLORIDE 10 MG: 5 TABLET ORAL at 09:11

## 2020-07-01 RX ADMIN — METRONIDAZOLE 500 MG: 500 INJECTION, SOLUTION INTRAVENOUS at 09:11

## 2020-07-01 RX ADMIN — INSULIN LISPRO 2 UNITS: 100 INJECTION, SOLUTION INTRAVENOUS; SUBCUTANEOUS at 16:30

## 2020-07-01 RX ADMIN — CEFEPIME HYDROCHLORIDE 2 G: 2 INJECTION, POWDER, FOR SOLUTION INTRAVENOUS at 02:19

## 2020-07-01 RX ADMIN — METOPROLOL TARTRATE 25 MG: 25 TABLET, FILM COATED ORAL at 17:30

## 2020-07-01 RX ADMIN — METOPROLOL TARTRATE 25 MG: 25 TABLET, FILM COATED ORAL at 09:11

## 2020-07-01 RX ADMIN — Medication 10 ML: at 05:45

## 2020-07-01 RX ADMIN — NITROGLYCERIN 0.4 MG: 0.4 TABLET SUBLINGUAL at 04:20

## 2020-07-01 RX ADMIN — APIXABAN 5 MG: 5 TABLET, FILM COATED ORAL at 09:11

## 2020-07-01 RX ADMIN — CEFEPIME HYDROCHLORIDE 2 G: 2 INJECTION, POWDER, FOR SOLUTION INTRAVENOUS at 13:29

## 2020-07-01 RX ADMIN — Medication 10 ML: at 13:24

## 2020-07-01 RX ADMIN — MEMANTINE HYDROCHLORIDE 10 MG: 5 TABLET ORAL at 17:30

## 2020-07-01 RX ADMIN — NYSTATIN 500000 UNITS: 100000 SUSPENSION ORAL at 21:00

## 2020-07-01 RX ADMIN — NYSTATIN 500000 UNITS: 100000 SUSPENSION ORAL at 09:11

## 2020-07-01 RX ADMIN — INSULIN LISPRO 2 UNITS: 100 INJECTION, SOLUTION INTRAVENOUS; SUBCUTANEOUS at 09:58

## 2020-07-01 RX ADMIN — NITROGLYCERIN 0.4 MG: 0.4 TABLET SUBLINGUAL at 04:13

## 2020-07-01 NOTE — PROGRESS NOTES
MSN, CM:  Spoke with Shanell Mccormack [facility] about patient returning with [facility] services. Facility requires [facility] for [facility]. Referral will be sent when patient stable for discharge. Case Management will continue to follow.

## 2020-07-01 NOTE — PROGRESS NOTES
Progress Note    Patient: Mayra Chapa MRN: [id number]  SSN: [id number]    YOB: 1926  Age: 80 y.o. Sex: female      Admit Date: 6/28/2020    LOS: 3 days     Subjective:     past medical history significant for aortic valve stenosis, hypertension, hypothyroidism, dementia, GERD and she is also a long-term resident of a skilled nursing facility. recently admitted to our hospital on June 23 for A. fib with RVR.  At that time she was also treated for a pneumonia.  COVID-19 was reportedly ruled out. Admitted this time due to acute nausea and vomiting. CT abdomen is concerning for colitis, Bilateral lung infiltrates and pleural effusions.     abdomen is feeling slightly better. No fever. WBC is less. Patient is feeling weak. Objective:     Vitals:    07/01/20 0420 07/01/20 0457 07/01/20 0510 07/01/20 0730   BP: 140/85 (!) 146/98 132/66 131/78   Pulse: 74 75 67 75   Resp:    18   Temp:    97.4 °F (36.3 °C)   SpO2:   93% 95%   Weight:       Height:            Intake and Output:  Current Shift: No intake/output data recorded. Last three shifts: 06/29 1901 - 07/01 0700  In: -   Out: 4900 [Urine:4900]    Physical Exam:     General:                    The patient is a pleasant elderly female in no acute respiratory distress. Patient appears lethargic. Head:                                   Normocephalic/atraumatic. Eyes:                                   No palpebral pallor or scleral icterus. ENT:                                    External auricular and nasal exam within normal limits. Mucous membranes are moist.  Neck:                                   Supple, non-tender, no JVD. Lungs:                       Clear to auscultation bilaterally without wheezes or crackles. No respiratory distress or accessory muscle use.   Heart:                                  Regular rate and rhythm, without murmurs, rubs, or gallops. Abdomen:                  Soft, not tender at RLQ, non-distended with normoactive bowel sounds. Genitourinary:           No tenderness over the bladder or bilateral CVAs. Extremities:               Without clubbing, cyanosis, or edema. Skin:                                    Normal color, texture, and turgor. No rashes, lesions, or jaundice. Pulses:                      Radial and dorsalis pedis pulses present 2+ bilaterally. Capillary refill <2s. Neurologic:                CN II-XII grossly intact and symmetrical.                                               Moving all four extremities well with no focal deficits. Psychiatric:                Pleasant demeanor, appropriate affect.  Alert and oriented x 3      Lab/Data Review:    Recent Results (from the past 24 hour(s))   GLUCOSE, POC    Collection Time: 06/30/20 10:55 AM   Result Value Ref Range    Glucose (POC) 145 (H) 65 - 100 mg/dL   PLEASE READ & DOCUMENT PPD TEST IN 24 HRS    Collection Time: 06/30/20 11:55 AM   Result Value Ref Range    PPD      mm Induration     GLUCOSE, POC    Collection Time: 06/30/20  4:14 PM   Result Value Ref Range    Glucose (POC) 138 (H) 65 - 100 mg/dL   GLUCOSE, POC    Collection Time: 06/30/20  9:21 PM   Result Value Ref Range    Glucose (POC) 120 (H) 65 - 100 mg/dL   TROPONIN-HIGH SENSITIVITY    Collection Time: 07/01/20  5:16 AM   Result Value Ref Range    Troponin-High Sensitivity 8.2 0 - 14 pg/mL   EKG, 12 LEAD, INITIAL    Collection Time: 07/01/20  5:23 AM   Result Value Ref Range    Ventricular Rate 75 BPM    Atrial Rate 75 BPM    P-R Interval 202 ms    QRS Duration 112 ms    Q-T Interval 442 ms    QTC Calculation (Bezet) 493 ms    Calculated P Axis 95 degrees    Calculated R Axis -68 degrees    Calculated T Axis 97 degrees    Diagnosis       Sinus rhythm with Premature atrial complexes  Left anterior fascicular block  Pulmonary disease pattern  Incomplete left bundle branch block  Minimal voltage criteria for LVH, may be normal variant  Nonspecific T wave abnormality  Prolonged QT  Abnormal ECG  When compared with ECG of 29-JUN-2020 07:20,  DC interval has decreased  Minimal criteria for Septal infarct are no longer Present  Confirmed by RAS ESTEVEZ (), Kassandra Shawn ([id number]) on 7/1/2020 8:08:29 AM     PLEASE READ & DOCUMENT PPD TEST IN 48 HRS    Collection Time: 07/01/20  9:16 AM   Result Value Ref Range    PPD Negative Negative    mm Induration 0 0 - 5 mm   GLUCOSE, POC    Collection Time: 07/01/20  9:19 AM   Result Value Ref Range    Glucose (POC) 185 (H) 65 - 100 mg/dL     Results     Procedure Component Value Units Date/Time    C. DIFFICILE/EPI PCR [[id number]]     Order Status:  Sent Specimen:  Stool     C. DIFFICILE AG & TOXIN A/B [[id number]] Collected:  06/28/20 1840    Order Status:  Completed Specimen:  Stool Updated:  06/30/20 1308     [address] ANTIGEN       C. DIFFICILE GDH ANTIGEN-NEGATIVE           C. difficile toxin       C. DIFFICILE TOXIN-NEGATIVE           PCR Reflex NOT APPLICABLE        INTERPRETATION       NEGATIVE FOR TOXIGENIC C. DIFFICILE           Clinical Consideration       NEGATIVE FOR TOXIGENIC C. DIFFICILE          CULTURE, URINE [[id number]] Collected:  06/28/20 0741    Order Status:  Completed Specimen:  Urine from [place] Specimen Updated:  06/30/20 0713     Special Requests: NO SPECIAL REQUESTS        Culture result: NO GROWTH 2 DAYS       EMERGENT DISEASE PANEL [[id number]] Collected:  06/28/20 0731    Order Status:  Completed Specimen:  Not Specified Updated:  07/01/20 9541     Emergent disease panel Not detected        Comment: Performed at [address]  07/01/20, ADS         CULTURE, BLOOD [[id number]] Collected:  06/28/20 0719    Order Status:  Completed Specimen:  Blood Updated:  07/01/20 0869     Special Requests: --        LEFT  Antecubital       Culture result: NO GROWTH 3 DAYS CULTURE, BLOOD [[id number]] Collected:  06/28/20 0719    Order Status:  Completed Specimen:  Blood Updated:  07/01/20 0826     Special Requests: --        RIGHT  Antecubital       Culture result: NO GROWTH 3 DAYS       C. DIFFICILE/EPI PCR [[id number]]     Order Status:  Canceled Specimen:  Stool     CULTURE, URINE [[id number]] Collected:  06/24/20 0853    Order Status:  Completed Specimen:  Urine Updated:  06/26/20 0809     Special Requests: NO SPECIAL REQUESTS        Culture result:       10,000 to 50,000 COLONIES/mL MIXED SKIN THUY ISOLATED          EMERGENT DISEASE PANEL [[id number]] Collected:  06/23/20 1900    Order Status:  Canceled          CT abdomen and pelvis   6-  IMPRESSION:   1. Possible inflammation of the proximal colon. 2.  Increased bibasilar infiltrate/atelectasis. Stable small bilateral pleural  Effusions.       Current Facility-Administered Medications:     metroNIDAZOLE (FLAGYL) IVPB premix 500 mg, 500 mg, IntraVENous, Q8H, Kristie Richardson MD, Last Rate: 100 mL/hr at 07/01/20 0911, 500 mg at 07/01/20 0911    cefepime (MAXIPIME) 2 g in 0.9% sodium chloride (MBP/ADV) 100 mL, 2 g, IntraVENous, Q12H, Adiel Moss DO, Last Rate: 200 mL/hr at 07/01/20 0219, 2 g at 07/01/20 0219    nystatin (MYCOSTATIN) 100,000 unit/mL oral suspension 500,000 Units, 500,000 Units, Oral, QID, Kristie Richardson MD, 500,000 Units at 07/01/20 0911    sodium chloride (NS) flush 5-40 mL, 5-40 mL, IntraVENous, Q8H, Sherri Rudolph MD, 10 mL at 07/01/20 0545    sodium chloride (NS) flush 5-40 mL, 5-40 mL, IntraVENous, PRN, Mariya Ledbetter MD    acetaminophen (TYLENOL) tablet 650 mg, 650 mg, Oral, Q4H PRN, Livan Her MD, 650 mg at 06/30/20 2023    dicyclomine (BENTYL) tablet 20 mg, 20 mg, Oral, Q6H PRN, Livan Her MD    ondansetron [facility]) injection 4 mg, 4 mg, IntraVENous, Q4H PRN, Livan Her MD, 4 mg at 06/30/20 8935    diphenhydrAMINE (BENADRYL) capsule 25 mg, 25 mg, Oral, Q4H PRN, Ruy Guevara MD    HYDROmorphone (PF) (DILAUDID) injection 0.5 mg, 0.5 mg, IntraVENous, Q4H PRN, Wilbern Fraction, Ruy Mei MD    apixaban (ELIQUIS) tablet 5 mg, 5 mg, Oral, BID, Cecelia Roberto MD, 5 mg at 07/01/20 0911    insulin lispro (HUMALOG) injection, , SubCUTAneous, AC&HS, Cecelia Roberto MD, 2 Units at 07/01/20 7332    levothyroxine (SYNTHROID) tablet 125 mcg, 125 mcg, Oral, ACB, Cecelia Roberto MD, 125 mcg at 07/01/20 0911    memantine (NAMENDA) tablet 10 mg, 10 mg, Oral, BID, Cecelia Roberto MD, 10 mg at 07/01/20 0911    metoprolol tartrate (LOPRESSOR) tablet 25 mg, 25 mg, Oral, BID, Cecelia BERRIOS MD, 25 mg at 07/01/20 0911    sucralfate (CARAFATE) 100 mg/mL oral suspension 1 g (Patient Supplied), 1 g, Oral, AC&HS, Cecelia Roberto MD, Stopped at 06/30/20 5988    traMADoL (ULTRAM) tablet 25 mg, 25 mg, Oral, Q8H PRN, Cecelia Roberto MD, 25 mg at 06/29/20 2035    nitroglycerin (NITROSTAT) tablet 0.4 mg, 0.4 mg, SubLINGual, Q5MIN PRN, Cecelia Roberto MD, 0.4 mg at 07/01/20 0457    mirabegron ER (MYRBETRIQ) tablet 25 mg (Patient Supplied), 25 mg, Oral, DAILY, Cecelia Roberto MD, Stopped at 06/29/20 0900    vancomycin (VANCOCIN) 1,000 mg in 0.9% sodium chloride (MBP/ADV) 250 mL, 1,000 mg, IntraVENous, Q24H, Cecelia Roberto MD, Last Rate: 250 mL/hr at 07/01/20 0911, 1,000 mg at 07/01/20 0911      Assessment:     Principal Problem:    Sepsis ([facility] (6/28/2020)    Active Problems:    Memory loss ()      Esophageal reflux ()      Aortic stenosis (6/10/2016)      Overview: No sever SOB, no chest pain or syncope.  Echo showed mod AS, mean grad 22mm       Hg, nl EF, mild LVH      Acquired hypothyroidism (7/17/2017)      Type 2 diabetes with nephropathy ([facility] (1/17/2019)      Diarrhea (6/28/2020)      Colitis (6/28/2020)      Person under investigation for COVID-19 (6/28/2020) Lactic acidosis (6/29/2020)        Plan:     Sepsis on admission   Colitis vs pneumonia   Empiric IV antibiotics. Metronidazole is added. WBC count is less. Clinically patient seems to improve. Monitor. Allow soft diet. Acute respiratory failure with hypoxia   From sepsis on admission   Monitor. Wean oxygen as able. AF   On Metoprolol, Apixaban     I have discussed the plan of care with patient. DVT prophylaxis : already on [address] [place] [address].         Signed By: Elissa Zheng MD     July 1, 2020

## 2020-07-01 NOTE — PROGRESS NOTES
Shift Assessment - Patient is alert and oriented with confusion at times. Lung sounds clear. Bowel sounds active. Assist x1 to [facility]. [place] properly draining clear, yellow urine. IV's infusing without difficulty. Currently resting in a low, locked bed. Call light within reach.

## 2020-07-01 NOTE — PROGRESS NOTES
Patient given Nitro tablet sublingual for c/o chest pain. Patient states\" I've never had this feeling before. \" /85 HR 93  O2 @ 94 room air.

## 2020-07-01 NOTE — PROGRESS NOTES
Patient given 3rd dose of nitro tablet. Patient still c/oi of chest tightness and heaviness. Will notify MD Magdalene Stratton.

## 2020-07-01 NOTE — PROGRESS NOTES
Attempted to call patient's daughter, Deng Chapin, with update on patient. No answer and unable to leave a message.

## 2020-07-02 LAB
GLUCOSE BLD STRIP.AUTO-MCNC: 119 MG/DL (ref 65–100)
GLUCOSE BLD STRIP.AUTO-MCNC: 135 MG/DL (ref 65–100)
GLUCOSE BLD STRIP.AUTO-MCNC: 204 MG/DL (ref 65–100)
GLUCOSE BLD STRIP.AUTO-MCNC: 85 MG/DL (ref 65–100)
MM INDURATION POC: 0 MM (ref 0–5)
PPD POC: NEGATIVE NEGATIVE
VANCOMYCIN TROUGH SERPL-MCNC: 9.5 UG/ML (ref 5–20)

## 2020-07-02 PROCEDURE — 74011000258 HC RX REV CODE- 258: Performed by: INTERNAL MEDICINE

## 2020-07-02 PROCEDURE — 74011250636 HC RX REV CODE- 250/636: Performed by: INTERNAL MEDICINE

## 2020-07-02 PROCEDURE — 80202 ASSAY OF VANCOMYCIN: CPT

## 2020-07-02 PROCEDURE — 77030040393 HC DRSG OPTIFOAM GENT MDII -B

## 2020-07-02 PROCEDURE — 74011636637 HC RX REV CODE- 636/637: Performed by: INTERNAL MEDICINE

## 2020-07-02 PROCEDURE — 65270000029 HC RM PRIVATE

## 2020-07-02 PROCEDURE — 36415 COLL VENOUS BLD VENIPUNCTURE: CPT

## 2020-07-02 PROCEDURE — 74011250637 HC RX REV CODE- 250/637: Performed by: INTERNAL MEDICINE

## 2020-07-02 PROCEDURE — 82962 GLUCOSE BLOOD TEST: CPT

## 2020-07-02 PROCEDURE — 74011000250 HC RX REV CODE- 250: Performed by: INTERNAL MEDICINE

## 2020-07-02 PROCEDURE — 97530 THERAPEUTIC ACTIVITIES: CPT

## 2020-07-02 RX ORDER — AMLODIPINE BESYLATE 5 MG/1
5 TABLET ORAL DAILY
Status: DISCONTINUED | OUTPATIENT
Start: 2020-07-02 | End: 2020-07-03 | Stop reason: HOSPADM

## 2020-07-02 RX ORDER — METRONIDAZOLE 500 MG/1
500 TABLET ORAL EVERY 12 HOURS
Status: DISCONTINUED | OUTPATIENT
Start: 2020-07-02 | End: 2020-07-03

## 2020-07-02 RX ORDER — HYDRALAZINE HYDROCHLORIDE 25 MG/1
25 TABLET, FILM COATED ORAL
Status: DISCONTINUED | OUTPATIENT
Start: 2020-07-02 | End: 2020-07-03 | Stop reason: HOSPADM

## 2020-07-02 RX ADMIN — MEMANTINE HYDROCHLORIDE 10 MG: 5 TABLET ORAL at 08:53

## 2020-07-02 RX ADMIN — METRONIDAZOLE 500 MG: 500 TABLET ORAL at 08:53

## 2020-07-02 RX ADMIN — Medication 10 ML: at 20:00

## 2020-07-02 RX ADMIN — MEMANTINE HYDROCHLORIDE 10 MG: 5 TABLET ORAL at 17:06

## 2020-07-02 RX ADMIN — TRAMADOL HYDROCHLORIDE 25 MG: 50 TABLET, FILM COATED ORAL at 09:20

## 2020-07-02 RX ADMIN — Medication 10 ML: at 22:18

## 2020-07-02 RX ADMIN — METRONIDAZOLE 500 MG: 500 INJECTION, SOLUTION INTRAVENOUS at 02:38

## 2020-07-02 RX ADMIN — INSULIN LISPRO 4 UNITS: 100 INJECTION, SOLUTION INTRAVENOUS; SUBCUTANEOUS at 12:44

## 2020-07-02 RX ADMIN — CEFEPIME HYDROCHLORIDE 2 G: 2 INJECTION, POWDER, FOR SOLUTION INTRAVENOUS at 02:38

## 2020-07-02 RX ADMIN — METRONIDAZOLE 500 MG: 500 TABLET ORAL at 20:16

## 2020-07-02 RX ADMIN — Medication 10 ML: at 12:21

## 2020-07-02 RX ADMIN — LEVOTHYROXINE SODIUM 125 MCG: 0.12 TABLET ORAL at 08:53

## 2020-07-02 RX ADMIN — NYSTATIN 500000 UNITS: 100000 SUSPENSION ORAL at 17:06

## 2020-07-02 RX ADMIN — METOPROLOL TARTRATE 25 MG: 25 TABLET, FILM COATED ORAL at 08:53

## 2020-07-02 RX ADMIN — VANCOMYCIN HYDROCHLORIDE 1000 MG: 1 INJECTION, POWDER, LYOPHILIZED, FOR SOLUTION INTRAVENOUS at 08:55

## 2020-07-02 RX ADMIN — HYDRALAZINE HYDROCHLORIDE 25 MG: 25 TABLET, FILM COATED ORAL at 18:26

## 2020-07-02 RX ADMIN — AMLODIPINE BESYLATE 5 MG: 5 TABLET ORAL at 18:26

## 2020-07-02 RX ADMIN — Medication 5 ML: at 13:49

## 2020-07-02 RX ADMIN — Medication 10 ML: at 05:21

## 2020-07-02 RX ADMIN — APIXABAN 5 MG: 5 TABLET, FILM COATED ORAL at 20:16

## 2020-07-02 RX ADMIN — METOPROLOL TARTRATE 25 MG: 25 TABLET, FILM COATED ORAL at 17:06

## 2020-07-02 RX ADMIN — ONDANSETRON 4 MG: 2 INJECTION INTRAMUSCULAR; INTRAVENOUS at 14:39

## 2020-07-02 RX ADMIN — NYSTATIN 500000 UNITS: 100000 SUSPENSION ORAL at 22:16

## 2020-07-02 RX ADMIN — APIXABAN 5 MG: 5 TABLET, FILM COATED ORAL at 08:53

## 2020-07-02 RX ADMIN — NYSTATIN 500000 UNITS: 100000 SUSPENSION ORAL at 13:49

## 2020-07-02 RX ADMIN — NYSTATIN 500000 UNITS: 100000 SUSPENSION ORAL at 08:53

## 2020-07-02 RX ADMIN — Medication 10 ML: at 16:00

## 2020-07-02 RX ADMIN — CEFEPIME HYDROCHLORIDE 2 G: 2 INJECTION, POWDER, FOR SOLUTION INTRAVENOUS at 13:50

## 2020-07-02 NOTE — PROGRESS NOTES
Progress Note    Patient: Rosario Slater MRN: [id number]  SSN: [id number]    YOB: 1926  Age: 80 y.o. Sex: female      Admit Date: 6/28/2020    LOS: 4 days     Subjective:     Past medical history significant for aortic valve stenosis, hypertension, hypothyroidism, dementia, GERD and she is also a long-term resident of a skilled nursing facility. recently admitted to our hospital on June 23 for A. fib with RVR.  At that time she was also treated for a pneumonia.  COVID-19 was reportedly ruled out. Admitted this time due to acute nausea and vomiting. CT abdomen is concerning for colitis, Bilateral lung infiltrates and pleural effusions.     abdomen is feeling slightly better. No fever. WBC is less. Patient is feeling stronger today. She is talking and asking more questions today. She is complaining of sore mouth and lips. Patient has had multiple small callous on tips of toes and a large purple heel blisters that are becoming eschars. This has been present since admission. Objective:     Vitals:    07/01/20 1947 07/01/20 2357 07/02/20 0359 07/02/20 0845   BP: 184/82 129/82 163/84 (!) 163/106   Pulse: 62 77 77 86   Resp: 18 18 18 18   Temp: 99 °F (37.2 °C) 97.8 °F (36.6 °C) 98.5 °F (36.9 °C) 97.7 °F (36.5 °C)   SpO2: 95% 93% 94% 94%   Weight:       Height:            Intake and Output:  Current Shift: No intake/output data recorded. Last three shifts: 06/30 1901 - 07/02 0700  In: -   Out: 3000 [Urine:3000]    Physical Exam:     General:                    The patient is a pleasant elderly female in no acute respiratory distress. Patient appears with more energy compared to yesterday. Talking more. Mouth    Some redness of lips and buccal mucosa   Head:                                   Normocephalic/atraumatic. Eyes:                                   No palpebral pallor or scleral icterus.   ENT:                                    External auricular and nasal exam within normal limits. Mucous membranes are moist.  Neck:                                   Supple, non-tender, no JVD. Lungs:                       Clear to auscultation bilaterally without wheezes or crackles. No respiratory distress or accessory muscle use. Heart:                                  Regular rate and rhythm, without murmurs, rubs, or gallops. Abdomen:                  Soft, not tender at RLQ, non-distended with normoactive bowel sounds. Genitourinary:           No tenderness over the bladder or bilateral CVAs. Extremities:               Without clubbing, cyanosis, or edema. Skin:                                    toes and heel lesions as mentioned above.  small callous on tips of toes and a large purple heel blisters that are becoming eschars. This has been present since admission. Pulses:                      Radial and dorsalis pedis pulses present 2+ bilaterally. Capillary refill <2s. Neurologic:                CN II-XII grossly intact and symmetrical.                                               Moving all four extremities well with no focal deficits. Psychiatric:                Pleasant demeanor, appropriate affect.  Alert and oriented x 3      Lab/Data Review:    Recent Results (from the past 24 hour(s))   GLUCOSE, POC    Collection Time: 07/01/20 11:32 AM   Result Value Ref Range    Glucose (POC) 138 (H) 65 - 100 mg/dL   GLUCOSE, POC    Collection Time: 07/01/20  4:16 PM   Result Value Ref Range    Glucose (POC) 154 (H) 65 - 100 mg/dL   GLUCOSE, POC    Collection Time: 07/01/20  8:37 PM   Result Value Ref Range    Glucose (POC) 121 (H) 65 - 100 mg/dL   GLUCOSE, POC    Collection Time: 07/02/20  8:47 AM   Result Value Ref Range    Glucose (POC) 135 (H) 65 - 100 mg/dL     Results     Procedure Component Value Units Date/Time    C. DIFFICILE/EPI PCR [[id number]] Collected:  06/29/20 1630    Order Status:  Canceled Specimen:  Stool     C. DIFFICILE AG & TOXIN A/B [[id number]] Collected:  06/28/20 1840    Order Status:  Completed Specimen:  Stool Updated:  06/30/20 1308     [address] ANTIGEN       C. DIFFICILE [facility] ANTIGEN-NEGATIVE           C. difficile toxin       C. DIFFICILE TOXIN-NEGATIVE           PCR Reflex NOT APPLICABLE        INTERPRETATION       NEGATIVE FOR TOXIGENIC C. DIFFICILE           Clinical Consideration       NEGATIVE FOR TOXIGENIC C. DIFFICILE          CULTURE, URINE [[id number]] Collected:  06/28/20 0741    Order Status:  Completed Specimen:  Urine from [place] Specimen Updated:  06/30/20 0713     Special Requests: NO SPECIAL REQUESTS        Culture result: NO GROWTH 2 DAYS       EMERGENT DISEASE PANEL [[id number]] Collected:  06/28/20 0731    Order Status:  Completed Specimen:  Not Specified Updated:  07/01/20 0421     Emergent disease panel Not detected        Comment: Performed at [place] [address] [address] [facility]  07/01/20, ADS         CULTURE, BLOOD [[id number]] Collected:  06/28/20 0719    Order Status:  Completed Specimen:  Blood Updated:  07/02/20 0618     Special Requests: --        LEFT  Antecubital       Culture result: NO GROWTH 4 DAYS       CULTURE, BLOOD [[id number]] Collected:  06/28/20 0719    Order Status:  Completed Specimen:  Blood Updated:  07/02/20 0618     Special Requests: --        RIGHT  Antecubital       Culture result: NO GROWTH 4 DAYS       C. DIFFICILE/EPI PCR [[id number]]     Order Status:  Canceled Specimen:  Stool     CULTURE, URINE [[id number]] Collected:  06/24/20 0853    Order Status:  Completed Specimen:  Urine Updated:  06/26/20 0809     Special Requests: NO SPECIAL REQUESTS        Culture result:       10,000 to 50,000 COLONIES/mL MIXED SKIN THUY ISOLATED          EMERGENT DISEASE PANEL [[id number]] Collected:  06/23/20 1900    Order Status:  Canceled          CT abdomen and pelvis   6-  IMPRESSION: 1.  Possible inflammation of the proximal colon. 2.  Increased bibasilar infiltrate/atelectasis. Stable small bilateral pleural  Effusions.       Current Facility-Administered Medications:     metroNIDAZOLE (FLAGYL) tablet 500 mg, 500 mg, Oral, Q12H, Kristie Richardson MD, 500 mg at 07/02/20 0853    magic mouthwash (SAUNDRA) oral suspension 10 mL, 10 mL, Oral, Q4H, Kristie Richardson MD    Vancomycin Trough Level Reminder, , Other, ONCE, Kristie Richardson MD    cefepime (MAXIPIME) 2 g in 0.9% sodium chloride (MBP/ADV) 100 mL, 2 g, IntraVENous, Q12H, Fisher, Freya Cogan, DO, Last Rate: 200 mL/hr at 07/02/20 0238, 2 g at 07/02/20 0238    nystatin (MYCOSTATIN) 100,000 unit/mL oral suspension 500,000 Units, 500,000 Units, Oral, QID, Kristie Richardson MD, 500,000 Units at 07/02/20 0853    sodium chloride (NS) flush 5-40 mL, 5-40 mL, IntraVENous, Q8H, Sherri Rudolph MD, 10 mL at 07/02/20 0521    sodium chloride (NS) flush 5-40 mL, 5-40 mL, IntraVENous, PRN, Millie Wright MD    acetaminophen (TYLENOL) tablet 650 mg, 650 mg, Oral, Q4H PRN, Gerardo Rdz MD, 650 mg at 06/30/20 2023    dicyclomine (BENTYL) tablet 20 mg, 20 mg, Oral, Q6H PRN, Gerardo Rdz MD    ondansetron [facility]) injection 4 mg, 4 mg, IntraVENous, Q4H PRN, Gerardo Rdz MD, 4 mg at 06/30/20 0459    diphenhydrAMINE (BENADRYL) capsule 25 mg, 25 mg, Oral, Q4H PRN, Gerardo Rdz MD    HYDROmorphone (PF) (DILAUDID) injection 0.5 mg, 0.5 mg, IntraVENous, Q4H PRN, Millie Wright MD    apixaban (ELIQUIS) tablet 5 mg, 5 mg, Oral, BID, Gerardo Rdz MD, 5 mg at 07/02/20 0853    insulin lispro (HUMALOG) injection, , SubCUTAneous, AC&HS, Gerardo Rdz MD, Stopped at 07/01/20 2200    levothyroxine (SYNTHROID) tablet 125 mcg, 125 mcg, Oral, ACB, Gerardo Rdz MD, 125 mcg at 07/02/20 0853    memantine (NAMENDA) tablet 10 mg, 10 mg, Oral, BID, [place], Ruy Mei MD, 10 mg at 07/02/20 0853    metoprolol tartrate (LOPRESSOR) tablet 25 mg, 25 mg, Oral, BID, Cecelia Roberto MD, 25 mg at 07/02/20 0853    sucralfate (CARAFATE) 100 mg/mL oral suspension 1 g (Patient Supplied), 1 g, Oral, AC&HS, Cecelia Roberto MD, Stopped at 06/30/20 7896    traMADoL (ULTRAM) tablet 25 mg, 25 mg, Oral, Q8H PRN, Cecelia Roberto MD, 25 mg at 07/02/20 0920    nitroglycerin (NITROSTAT) tablet 0.4 mg, 0.4 mg, SubLINGual, Q5MIN PRN, Cecelia Roberto MD, 0.4 mg at 07/01/20 0457    mirabegron ER (MYRBETRIQ) tablet 25 mg (Patient Supplied), 25 mg, Oral, DAILY, Cecelia Roberto MD, Stopped at 06/29/20 0900    vancomycin (VANCOCIN) 1,000 mg in 0.9% sodium chloride (MBP/ADV) 250 mL, 1,000 mg, IntraVENous, Q24H, Cecelia Roberto MD, Last Rate: 250 mL/hr at 07/02/20 0855, 1,000 mg at 07/02/20 0855      Assessment:     Principal Problem:    Sepsis ([facility] [address] (6/28/2020)    Active Problems:    Memory loss ()      Esophageal reflux ()      Aortic stenosis (6/10/2016)      Overview: No sever SOB, no chest pain or syncope. Echo showed mod AS, mean grad 22mm       Hg, nl EF, mild LVH      Acquired hypothyroidism (7/17/2017)      Type 2 diabetes with nephropathy ([facility] [address] (1/17/2019)      Diarrhea (6/28/2020)      Colitis (6/28/2020)      Person under investigation for COVID-19 (6/28/2020)      Lactic acidosis (6/29/2020)        Plan:     Sepsis on admission   Colitis vs pneumonia   Empiric IV antibiotics. Metronidazole is added. WBC count is less. Clinically patient seems to improve. Monitor. On soft diet. Patient eats better. Soreness of mouth. I will give Symptomatic treatment. Acute respiratory failure with hypoxia   From sepsis on admission   Monitor. Wean oxygen as able. AF   On Metoprolol, Apixaban     Callous and heel blisters   Wound care is helping. I have discussed the plan of care with patient. DVT prophylaxis : already on [address]. Disposition plan :  When patient is medically ready, will return to her assisted-living facility        Signed By: Jeanne Del Valle MD     July 2, 2020

## 2020-07-02 NOTE — PROGRESS NOTES
MSN, CM:  Referral sent to [facility] for [facility] services at [address] Will send discharge summary either Saturday or Monday morning. Patient ready for discharge with [facility] when stable.

## 2020-07-02 NOTE — PROGRESS NOTES
Received pt from [facility] [place]) in stable condition. Pt in bed resting. Resp even & unlabored on room air; Pt confused & does not remember where she is; appears scared; asking for the door to be left open; asking when the MD will be in to see her. Bed low & locked; call light in reach.

## 2020-07-02 NOTE — PROGRESS NOTES
Problem: Falls - Risk of  Goal: *Absence of Falls  Description: Document Blanca Ferraro Fall Risk and appropriate interventions in the flowsheet.   Outcome: Progressing Towards Goal  Note: Fall Risk Interventions:  Mobility Interventions: Bed/chair exit alarm, Patient to call before getting OOB, PT Consult for mobility concerns, Utilize walker, cane, or other assistive device    Mentation Interventions: Adequate sleep, hydration, pain control, Bed/chair exit alarm, More frequent rounding, Reorient patient, Toileting rounds, Update white board    Medication Interventions: Bed/chair exit alarm, Patient to call before getting OOB, Teach patient to arise slowly    Elimination Interventions: Bed/chair exit alarm, Call light in reach, Patient to call for help with toileting needs, Toileting schedule/hourly rounds              Problem: Patient Education: Go to Patient Education Activity  Goal: Patient/Family Education  Outcome: Progressing Towards Goal

## 2020-07-02 NOTE — PROGRESS NOTES
Problem: Mobility Impaired (Adult and Pediatric)  Goal: *Acute Goals and Plan of Care  Description: Acute PT Goals:  (1.)Jonathan Clark will move from supine to sit and sit to supine , scoot up and down and roll side to side with MODIFIED INDEPENDENCE within 7 treatment day(s). (2.)Jonathan Sierral will transfer from bed to chair and chair to bed with STAND BY ASSIST using the least restrictive device within 7 treatment day(s). (3.)Jonathan Beltran Caul will perform therapeutic activities x 25 minutes with STAND BY ASSIST to improve safety and activity tolerance within 7 treatment day(s). (4.)Jonathan Beltran Caul will perform therapeutic exercises x 15 min for HEP with INDEPENDENCE to improve strength, endurance, and functional mobility within 7 treatment day(s). PHYSICAL THERAPY: Daily Note and AM 7/2/2020  INPATIENT: PT Visit Days : 2  [place] / [place] / Product Type: Medicare /       NAME/AGE/GENDER: Steffen Abarca is a 80 y.o. female   PRIMARY DIAGNOSIS: Sepsis ([facility] [address] [A41.9]  Colitis [K52.9]  Diarrhea [R19.7]  Person under investigation for COVID-19 [Z20.828] Sepsis ([facility] [address] Sepsis ([facility] [address]       ICD-10: Treatment Diagnosis:   · Generalized Muscle Weakness (M62.81)  · Other lack of cordination (R27.8)  · Other abnormalities of gait and mobility (R26.89)   Precaution/Allergies:  Codeine; Pcn [penicillins]; and Strawberry      ASSESSMENT:     Ms. Emmanuel Rob is a 80year old F who presents from [facility] with sepsis, colitis. Lab results pending. Pt endorses no falls in past 6 months. Prior to admission Ms. Emmanuel Rob uses a wheelchair for mobility which she reports she is typically able to transfer to herself. Upon entering, pt resting in bed, agreeable to PT. On room air. she reports no pain at the moment. She report she is eager to return home when she can. Pt performed supine > sit with CGA, sitting EOB with good sitting balance control. Pt performed functional tasks sitting edge of bed with overall good sitting balance control and tolerance, no prop sitting required. Transfer to bedside chair with Mod A for squat pivot transfer. Pt with decreased standing balance control, however improved control of descent compared to previous session. Pt able to assist with scooting towards back of chair for safe, comfortable positioning. At end of session pt sitting up in chair with all needs within reach, alarm activated for safety, RN notified. Pt presents as functioning below her baseline, with deficits in mobility including transfers, gait, balance, and activity tolerance. Pt will benefit from skilled therapy services to address stated deficits to promote return to highest level of function, independence, and safety. Will continue therapy efforts towards stated goals which continue to be appropriate. Pt made progress with transfers and activity tolerance this session. This section established at most recent assessment   PROBLEM LIST (Impairments causing functional limitations):  1. Decreased Strength  2. Decreased ADL/Functional Activities  3. Decreased Transfer Abilities  4. Decreased Balance  5. Decreased Activity Tolerance  6. Increased Fatigue   INTERVENTIONS PLANNED: (Benefits and precautions of physical therapy have been discussed with the patient.)  1. Balance Exercise  2. Bed Mobility  3. Family Education  4. Gait Training  5. Home Exercise Program (HEP)  6. Neuromuscular Re-education/Strengthening  7. Range of Motion (ROM)  8. Therapeutic Activites  9. Therapeutic Exercise/Strengthening  10. Transfer Training     TREATMENT PLAN: Frequency/Duration: 3 times a week for duration of hospital stay  Rehabilitation Potential For Stated Goals: Good     REHAB RECOMMENDATIONS (at time of discharge pending progress):    Placement:   It is my opinion, based on this patient's performance to date, that Ms. Nicolas Baires may benefit from participating in 1-2 additional therapy sessions in order to continue to assess for rehab potential and then make recommendation for disposition at discharge. (At this rate anticipate return to residential with HHPT/OT, but will continue to assess). Equipment:    None at this time              HISTORY:   History of Present Injury/Illness (Reason for Referral):  Per H&P: \"Mrs. Frankey Raid is a 80-year-old female with a past medical history significant for aortic valve stenosis, hypertension, hypothyroidism, dementia, GERD and she is also a long-term resident of a skilled nursing facility. She was recently admitted to our hospital on June 23 for A. fib with RVR. At that time she was also treated for a pneumonia. COVID-19 was reportedly ruled out. During that admission she did have some abdominal pain that was attributed to gas or acid reflux as her CT abdomen and pelvis showed no acute intra-abdominal pathology. She was eventually discharged back to her assisted living facility on June 26, 2020 on [facility]. The patient has reportedly been doing well at this assisted living facility but today developed acute nausea and vomiting. The patient himself cannot recall what happened so the history is given from the ER physician and staff, the patient's chart and her son Moo Humphries who is at the bedside. The patient does note that she been experiencing diarrhea at the facility but it is unclear what duration this was. Per the ER her assisted living facility has had a high number of COVID-19 cases so she had a rapid swab test done in the emergency room which was negative. In the ER was also noted that she had right lower quadrant pain so a CT abdomen and pelvis which was done to evaluate for possible appendicitis. The CT was negative for appendicitis but was concerning for inflammation of the colon. Bilateral lung infiltrates and pleural effusions.   The hospitalist were requested for further work-up and management and admission to the inpatient services. The patient denies any chest pain currently she denies any current nausea or vomiting breath or abdominal pain. She denies any urinary tract symptoms. 10 systems reviewed and negative except as noted in HPI. \"  Past Medical History/Comorbidities:   Ms. Radames Lopez  has a past medical history of Abdominal pain, epigastric, Abdominal pain, unspecified site, Anxiety state, unspecified, Aortic stenosis (6/10/2016), Aortic valve disorders, Atherosclerosis of aorta (HCC), Backache, unspecified, Benign neoplasm of colon, Cervicalgia, Closed fracture of unspecified bone, Depressive disorder, not elsewhere classified, Diabetes ([facility] [address] Diarrhea, Disorders of magnesium metabolism, Edema, Elevated sedimentation rate, Encounter for long-term (current) use of other medications, Endocrine disease, Epistaxis, Esophageal reflux, Essential hypertension, benign, Gastrointestinal malfunction arising from mental factors, Hypertension, Hypertension-controlled, benign, Hypertonicity of bladder, Hypopotassemia, Insomnia, unspecified, Irritable bowel syndrome, Memory loss, Nausea alone, Osteoarthrosis, unspecified whether generalized or localized, unspecified site, Other ill-defined conditions(799.89), Other malaise and fatigue, Pain in joint, lower leg, Pain in joint, shoulder region, Postnasal drip, Rheumatoid arthritis(714.0), Type II or unspecified type diabetes mellitus without mention of complication, not stated as uncontrolled, Unspecified constipation, Unspecified hemorrhoids with other complication, Unspecified hemorrhoids without mention of complication, Unspecified hereditary and idiopathic peripheral neuropathy, Unspecified hypothyroidism, Urgency of urination, and Urinary tract infection, site not specified. Ms. Radames Lopez  has a past surgical history that includes hx cholecystectomy; hx other surgical; and hx tonsillectomy.   Social History/Living Environment:   [address] Name: [address] [place] [address]   One/Two Story Residence: One story  Living Alone: No  Support Systems: Assisted living, Family member(s)  Patient Expects to be Discharged to[de-identified] Assisted living  Current DME Used/Available at Home: Wheelchair  Prior Level of Function/Work/Activity:  [employer] ([address] [place] [address]) with sepsis, colitis. Lab results pending. Pt endorses no falls in past 6 months. Prior to admission Ms. Milena Meyers uses a wheelchair for mobility which she reports she is typically able to transfer to herself. Number of Personal Factors/Comorbidities that affect the Plan of Care: 1-2: MODERATE COMPLEXITY   EXAMINATION:   Most Recent Physical Functioning:   Gross Assessment:  AROM: Generally decreased, functional  Strength: Generally decreased, functional  Coordination: Generally decreased, functional  Sensation: Intact               Posture:     Balance:  Sitting: Intact  Standing: Impaired  Standing - Static: Poor;Constant support  Standing - Dynamic : Poor;Constant support Bed Mobility:  Rolling: Contact guard assistance  Supine to Sit: Minimum assistance  Scooting: Contact guard assistance  Interventions: Safety awareness training; Tactile cues; Verbal cues  Wheelchair Mobility:     Transfers:  Sit to Stand: Moderate assistance  Stand to Sit: Moderate assistance  Bed to Chair: Moderate assistance  Interventions: Safety awareness training; Tactile cues; Verbal cues  Gait:            Body Structures Involved:  1. Digestive Structures  2. Bones  3. Joints  4. Muscles Body Functions Affected:  1. Neuromusculoskeletal  2. Movement Related  3. Digestive Activities and Participation Affected:  1. General Tasks and Demands  2. Mobility  3.  Self Care   Number of elements that affect the Plan of Care: 3: MODERATE COMPLEXITY   CLINICAL PRESENTATION:   Presentation: Stable and uncomplicated: LOW COMPLEXITY   CLINICAL DECISION MAKIN [facility] [address] [id number] AM-PAC 6 Clicks   Basic Mobility Inpatient Short Form  How much difficulty does the patient currently have. .. Unable A Lot A Little None   1. Turning over in bed (including adjusting bedclothes, sheets and blankets)? [] 1   [] 2   [x] 3   [] 4   2. Sitting down on and standing up from a chair with arms ( e.g., wheelchair, bedside commode, etc.)   [] 1   [x] 2   [] 3   [] 4   3. Moving from lying on back to sitting on the side of the bed? [] 1   [] 2   [x] 3   [] 4   How much help from another person does the patient currently need. .. Total A Lot A Little None   4. Moving to and from a bed to a chair (including a wheelchair)? [] 1   [x] 2   [] 3   [] 4   5. Need to walk in hospital room? [x] 1   [] 2   [] 3   [] 4   6. Climbing 3-5 steps with a railing? [x] 1   [] 2   [] 3   [] 4   © 2007, Trustees of [address] [place] [address] [address] [id number], under license to [employer]. All rights reserved      Score:  Initial: 12 Most Recent: X (Date: -- )    Interpretation of Tool:  Represents activities that are increasingly more difficult (i.e. Bed mobility, Transfers, Gait). Medical Necessity:     · Patient is expected to demonstrate progress in   · strength, range of motion, balance, coordination, and functional technique  ·  to   · increase independence with all mobility, including transfers to get to/from her wheelchair. · .  Reason for Services/Other Comments:  · Patient continues to require skilled intervention due to   · medical complications and mobility deficits which impact her level of function, safety, and independence as indicated above. · .   Use of outcome tool(s) and clinical judgement create a POC that gives a: Clear prediction of patient's progress: LOW COMPLEXITY        TREATMENT:   (In addition to Assessment/Re-Assessment sessions the following treatments were rendered)   Pre-treatment Symptoms/Complaints: Eager to go home. Pain: Initial:   Pain Intensity 1: 0  Post Session:  0/10     Therapeutic Activity: (   39 Minutes):   Therapeutic activities including bed mobility, activities sitting edge of bed, transfer training to bedside chair to improve mobility, strength, balance, and coordination. Required minimal cues with Mod A  to promote static and dynamic balance in standing and promote coordination of bilateral, upper extremity(s), lower extremity(s). Braces/Orthotics/Lines/Etc:   · IV  · O2 Device: Room air  Treatment/Session Assessment:    · Response to Treatment:  see above  · Interdisciplinary Collaboration:   o Physical Therapist  o Registered Nurse  · After treatment position/precautions:   o Up in chair  o Bed alarm/tab alert on  o Bed/Chair-wheels locked  o Bed in low position  o Call light within reach  o RN notified   · Compliance with Program/Exercises: Will assess as treatment progresses  · Recommendations/Intent for next treatment session: \"Next visit will focus on advancements to more challenging activities and reduction in assistance provided\".     Total Treatment Duration:  PT Patient Time In/Time Out  Time In: 1129  Time Out: [address], PT

## 2020-07-02 NOTE — PROGRESS NOTES
Pharmacokinetic Consult to Pharmacist    Harry Mcleod is a 80 y.o. female being treated with vancomycin. Height: 5' 8\" (172.7 cm)  Weight: 72.1 kg (159 lb)  Lab Results   Component Value Date/Time    BUN 6 (L) 06/30/2020 08:50 AM    Creatinine 0.77 06/30/2020 08:50 AM    WBC 11.9 (H) 06/30/2020 08:50 AM    Procalcitonin 0.50 06/28/2020 07:19 AM    Lactic acid 2.0 06/28/2020 06:07 PM      Estimated Creatinine Clearance: 46 mL/min (by C-G formula based on SCr of 0.77 mg/dL). Lab Results   Component Value Date/Time    Vancomycin,trough 9.5 07/02/2020 10:08 AM       Day 5 of vancomycin. Goal trough is 15-20. Trough drawn 2 hours late--extrapolates back to 10.3  Will keep dose the same but increase frequency to Q 18 hours. Plan to only check level again if vancomycin continued past planned 7 days    Will continue to follow patient and order levels when clinically indicated.     Thank you,  Reinier Johnson  Clinical Pharmacist  [phone]

## 2020-07-03 VITALS
HEART RATE: 84 BPM | TEMPERATURE: 99.1 F | RESPIRATION RATE: 18 BRPM | HEIGHT: 68 IN | DIASTOLIC BLOOD PRESSURE: 67 MMHG | SYSTOLIC BLOOD PRESSURE: 137 MMHG | WEIGHT: 159 LBS | BODY MASS INDEX: 24.1 KG/M2 | OXYGEN SATURATION: 92 %

## 2020-07-03 PROBLEM — J18.9 CAP (COMMUNITY ACQUIRED PNEUMONIA): Status: ACTIVE | Noted: 2020-07-03

## 2020-07-03 LAB
BACTERIA SPEC CULT: NORMAL
BACTERIA SPEC CULT: NORMAL
GLUCOSE BLD STRIP.AUTO-MCNC: 135 MG/DL (ref 65–100)
GLUCOSE BLD STRIP.AUTO-MCNC: 150 MG/DL (ref 65–100)
GLUCOSE BLD STRIP.AUTO-MCNC: 158 MG/DL (ref 65–100)
SERVICE CMNT-IMP: NORMAL
SERVICE CMNT-IMP: NORMAL

## 2020-07-03 PROCEDURE — 74011636637 HC RX REV CODE- 636/637: Performed by: INTERNAL MEDICINE

## 2020-07-03 PROCEDURE — 74011250636 HC RX REV CODE- 250/636: Performed by: INTERNAL MEDICINE

## 2020-07-03 PROCEDURE — 74011250637 HC RX REV CODE- 250/637: Performed by: INTERNAL MEDICINE

## 2020-07-03 PROCEDURE — 74011000250 HC RX REV CODE- 250: Performed by: INTERNAL MEDICINE

## 2020-07-03 PROCEDURE — 97110 THERAPEUTIC EXERCISES: CPT

## 2020-07-03 PROCEDURE — 97530 THERAPEUTIC ACTIVITIES: CPT

## 2020-07-03 PROCEDURE — 82962 GLUCOSE BLOOD TEST: CPT

## 2020-07-03 PROCEDURE — 74011000258 HC RX REV CODE- 258: Performed by: INTERNAL MEDICINE

## 2020-07-03 PROCEDURE — 97535 SELF CARE MNGMENT TRAINING: CPT

## 2020-07-03 RX ORDER — CETIRIZINE HCL 10 MG
10 TABLET ORAL DAILY
COMMUNITY

## 2020-07-03 RX ORDER — CEFPODOXIME PROXETIL 200 MG/1
200 TABLET, FILM COATED ORAL 2 TIMES DAILY
Qty: 10 TAB | Refills: 0 | Status: SHIPPED | OUTPATIENT
Start: 2020-07-03 | End: 2020-07-08

## 2020-07-03 RX ORDER — SULFAMETHOXAZOLE AND TRIMETHOPRIM 800; 160 MG/1; MG/1
1 TABLET ORAL 2 TIMES DAILY
Qty: 10 TAB | Refills: 0 | Status: SHIPPED | OUTPATIENT
Start: 2020-07-03 | End: 2020-07-08

## 2020-07-03 RX ORDER — AMLODIPINE BESYLATE 5 MG/1
5 TABLET ORAL DAILY
Qty: 30 TAB | Refills: 1 | Status: SHIPPED | OUTPATIENT
Start: 2020-07-04 | End: 2020-07-21 | Stop reason: SDUPTHER

## 2020-07-03 RX ORDER — SENNOSIDES 8.6 MG/1
TABLET ORAL
COMMUNITY

## 2020-07-03 RX ADMIN — INSULIN LISPRO 2 UNITS: 100 INJECTION, SOLUTION INTRAVENOUS; SUBCUTANEOUS at 12:07

## 2020-07-03 RX ADMIN — METRONIDAZOLE 500 MG: 500 TABLET ORAL at 08:50

## 2020-07-03 RX ADMIN — Medication 10 ML: at 06:10

## 2020-07-03 RX ADMIN — MEMANTINE HYDROCHLORIDE 10 MG: 5 TABLET ORAL at 08:50

## 2020-07-03 RX ADMIN — VANCOMYCIN HYDROCHLORIDE 1000 MG: 1 INJECTION, POWDER, LYOPHILIZED, FOR SOLUTION INTRAVENOUS at 04:45

## 2020-07-03 RX ADMIN — APIXABAN 5 MG: 5 TABLET, FILM COATED ORAL at 08:50

## 2020-07-03 RX ADMIN — LEVOTHYROXINE SODIUM 125 MCG: 0.12 TABLET ORAL at 06:11

## 2020-07-03 RX ADMIN — CEFEPIME HYDROCHLORIDE 2 G: 2 INJECTION, POWDER, FOR SOLUTION INTRAVENOUS at 02:04

## 2020-07-03 RX ADMIN — INSULIN LISPRO 2 UNITS: 100 INJECTION, SOLUTION INTRAVENOUS; SUBCUTANEOUS at 08:49

## 2020-07-03 RX ADMIN — AMLODIPINE BESYLATE 5 MG: 5 TABLET ORAL at 08:50

## 2020-07-03 RX ADMIN — NYSTATIN 500000 UNITS: 100000 SUSPENSION ORAL at 12:08

## 2020-07-03 RX ADMIN — ONDANSETRON 4 MG: 2 INJECTION INTRAMUSCULAR; INTRAVENOUS at 13:29

## 2020-07-03 RX ADMIN — METOPROLOL TARTRATE 25 MG: 25 TABLET, FILM COATED ORAL at 08:50

## 2020-07-03 RX ADMIN — NYSTATIN 500000 UNITS: 100000 SUSPENSION ORAL at 09:00

## 2020-07-03 RX ADMIN — Medication 10 ML: at 13:30

## 2020-07-03 RX ADMIN — Medication 10 ML: at 04:00

## 2020-07-03 RX ADMIN — Medication 10 ML: at 08:00

## 2020-07-03 NOTE — PROGRESS NOTES
Problem: Patient Education: Go to Patient Education Activity  Goal: Patient/Family Education  Description:   1. Patient will complete lower body bathing and dressing with min A and adaptive equipment as needed. 2. Patient will complete toileting with min A.   3. Patient will tolerate 25 minutes of OT treatment with 1-2 rest breaks to increase activity tolerance for ADLs. Progressing 7/3/2020  4. Patient will complete functional transfers with min A and adaptive equipment as needed. 5. Patient will tolerate 15 minutes BUE strengthening exercises to improve ability to assist with functional transfers. Progressing 7/3/2020    Timeframe: 7 visits      Outcome: Progressing Towards Goal      OCCUPATIONAL THERAPY: Daily Note and PM    7/3/2020  INPATIENT: OT Visit Days: 2  [place] / [place] / Product Type: Medicare /      NAME/AGE/GENDER: Tila Alicea is a 80 y.o. female   PRIMARY DIAGNOSIS:  Sepsis ([facility] [A41.9]  Colitis [K52.9]  Diarrhea [R19.7]  Person under investigation for COVID-19 [Z20.828] Sepsis ([facility] Sepsis ([facility]       ICD-10: Treatment Diagnosis:    · Generalized Muscle Weakness (M62.81)  · Other lack of cordination (R27.8)  · History of falling (Z91.81)   Precautions/Allergies:    Fall precautions Codeine; Pcn [penicillins]; and Strawberry      ASSESSMENT:     Ms. Melva Costa presents for the above diagnoses. Upon arrival, pt supine in bed and agreeable to OT evaluation. Pt is alert and oriented x 4. Pt reports living at [facility] where she is typically independent with ADLs with occasional assist for bathing tasks; w/c bound at baseline, however notes she typically able to transfer to/from w/c with 1 person assist.     7/3/2020: Today, pt supine in bed, agreeable to get with work with OT. Bed mobility at min A to CGA. EOB sitting balance intact with no additional support. Completed simple grooming tasks and UE exercises as below with appropriate rest breaks.  Sit to stand with Min A and scooted up [place] [facility]. Pt returned to supine with min A and was made comfortable with all needs met and in reach. RN notified. Progressing towards goals #3 and 5. Good effort. At this time, Mikel Ojeda continues to be functioning below baseline for ADLs and functional mobility. Pt would benefit from continued skilled OT services to address OT goals and plan of care. At this time, patient is appropriate for Co-treatment with physical therapy due to patient's clinical complexity, decreased overall endurance/tolerance levels, as well as need for high level assistance and cues and intervention to complete functional transfers/mobility and functional tasks in safe manner. Mikel Ojeda  is appropriate for a multidisciplinary co-treatment of PT and OT to address goals of both disciplines. This section established at most recent assessment   PROBLEM LIST (Impairments causing functional limitations):  1. Decreased Strength  2. Decreased ADL/Functional Activities  3. Decreased Transfer Abilities  4. Decreased Ambulation Ability/Technique  5. Decreased Balance  6. Decreased Activity Tolerance   INTERVENTIONS PLANNED: (Benefits and precautions of occupational therapy have been discussed with the patient.)  1. Activities of daily living training  2. Adaptive equipment training  3. Balance training  4. Clothing management  5. Community reintergration  6. Donning&doffing training  7. Neuromuscular re-eduation  8. Re-evaluation  9. Therapeutic activity  10. Therapeutic exercise     TREATMENT PLAN: Frequency/Duration: Follow patient 3x/week to address above goals. Rehabilitation Potential For Stated Goals: [facility] (at time of discharge pending progress):    Placement:   It is my opinion, based on this patient's performance to date, that Ms. Domenico Moore may benefit from participating in 1-2 additional therapy sessions in order to continue to assess for rehab potential and then make recommendation for disposition at discharge. [facility] at [facility] vs [facility]  Equipment:    TBD               OCCUPATIONAL PROFILE AND HISTORY:   History of Present Injury/Illness (Reason for Referral):  See H&P  Past Medical History/Comorbidities:   Ms. Robert Barnett  has a past medical history of Abdominal pain, epigastric, Abdominal pain, unspecified site, Anxiety state, unspecified, Aortic stenosis (6/10/2016), Aortic valve disorders, Atherosclerosis of aorta (HCC), Backache, unspecified, Benign neoplasm of colon, Cervicalgia, Closed fracture of unspecified bone, Depressive disorder, not elsewhere classified, Diabetes ([address] Diarrhea, Disorders of magnesium metabolism, Edema, Elevated sedimentation rate, Encounter for long-term (current) use of other medications, Endocrine disease, Epistaxis, Esophageal reflux, Essential hypertension, benign, Gastrointestinal malfunction arising from mental factors, Hypertension, Hypertension-controlled, benign, Hypertonicity of bladder, Hypopotassemia, Insomnia, unspecified, Irritable bowel syndrome, Memory loss, Nausea alone, Osteoarthrosis, unspecified whether generalized or localized, unspecified site, Other ill-defined conditions(799.89), Other malaise and fatigue, Pain in joint, lower leg, Pain in joint, shoulder region, Postnasal drip, Rheumatoid arthritis(714.0), Type II or unspecified type diabetes mellitus without mention of complication, not stated as uncontrolled, Unspecified constipation, Unspecified hemorrhoids with other complication, Unspecified hemorrhoids without mention of complication, Unspecified hereditary and idiopathic peripheral neuropathy, Unspecified hypothyroidism, Urgency of urination, and Urinary tract infection, site not specified. Ms. Robert Barnett  has a past surgical history that includes hx cholecystectomy; hx other surgical; and hx tonsillectomy.   Social History/Living Environment:   [facility]  One/Two Story Residence: One story  Living Alone: No  Support Systems: Assisted living  Patient Expects to be Discharged to[de-identified] Assisted living  Current DME Used/Available at Home: Wheelchair  Prior Level of Function/Work/Activity:  Occasional assist with bathing otherwise independent with ADLs; w/c bound at baseline however able to transfer to/from with 1 person assist.   Personal Factors:          Sex:  female        Age:  80 y.o. Other factors that influence how disability is experienced by the patient:  multiple co-morbidities    Number of Personal Factors/Comorbidities that affect the Plan of Care: Brief history (0):  LOW COMPLEXITY   ASSESSMENT OF OCCUPATIONAL PERFORMANCE[de-identified]   Activities of Daily Living:   Basic ADLs (From Assessment) Complex ADLs (From Assessment)   Feeding: Setup  Oral Facial Hygiene/Grooming: Setup  Bathing: Moderate assistance  Upper Body Dressing: Setup  Lower Body Dressing: Moderate assistance  Toileting: Moderate assistance Instrumental ADL  Meal Preparation: Maximum assistance  Homemaking: Maximum assistance   Grooming/Bathing/Dressing Activities of Daily Living   Grooming  Grooming Assistance: Set-up  Position Performed: Seated edge of bed  Washing Face: Set-up  Washing Hands: Set-up  Brushing Teeth: Set-up Cognitive Retraining  Problem Solving: Identifying the problem; Identifying the task;General alternative solution  Executive Functions: Managing time;Regulating behavior  Organizing/Sequencing: Breaking task down;Prioritizing  Attention to Task: Single task  Maintains Attention For (Time): 3 minutes  Following Commands:  Follows one step commands/directions  Safety/Judgement: Fall prevention  Cues: Tactile cues provided;Verbal cues provided;Visual cues provided     Feeding  Feeding Assistance: Set-up  Container Management: Set-up  Drink to Mouth: Set-up                 Bed/Mat Mobility  Rolling: Contact guard assistance  Supine to Sit: Contact guard assistance  Sit to Supine: Contact guard assistance  Sit to Stand: Minimum assistance; Moderate assistance  Stand to Sit: Minimum assistance  Bed to Chair: Minimum assistance  Scooting: Contact guard assistance     Most Recent Physical Functioning:   Gross Assessment:                  Posture:  Posture (WDL): Exceptions to WDL  Posture Assessment: Forward head, Rounded shoulders  Balance:  Sitting: Intact  Standing: Impaired  Standing - Static: Constant support; Fair  Standing - Dynamic : Constant support;Poor Bed Mobility:  Rolling: Contact guard assistance  Supine to Sit: Contact guard assistance  Sit to Supine: Contact guard assistance  Scooting: Contact guard assistance  Wheelchair Mobility:     Transfers:  Sit to Stand: Minimum assistance; Moderate assistance  Stand to Sit: Minimum assistance  Bed to Chair: Minimum assistance            Patient Vitals for the past 6 hrs:   BP BP Patient Position SpO2 Pulse   20 1122 160/76 At rest 93 % 62       Mental Status  Neurologic State: Alert  Orientation Level: Oriented X4  Cognition: Follows commands  Perception: Appears intact  Perseveration: No perseveration noted  Safety/Judgement: Fall prevention                          Physical Skills Involved:  1. Balance  2. Strength  3. Activity Tolerance  4. Gross Motor Control Cognitive Skills Affected (resulting in the inability to perform in a timely and safe manner): 1. none  Psychosocial Skills Affected:  1. Habits/Routines  2. Environmental Adaptation   Number of elements that affect the Plan of Care: 5+:  HIGH COMPLEXITY   CLINICAL DECISION MAKIN [facility] [address] [address] AM-PAC 6 Clicks   Daily Activity Inpatient Short Form  How much help from another person does the patient currently need. .. Total A Lot A Little None   1. Putting on and taking off regular lower body clothing? [] 1   [x] 2   [] 3   [] 4   2. Bathing (including washing, rinsing, drying)? [] 1   [x] 2   [] 3   [] 4   3.   Toileting, which includes using toilet, bedpan or urinal?   [] 1   [x] 2   [] 3   [] 4   4. Putting on and taking off regular upper body clothing? [] 1   [] 2   [x] 3   [] 4   5. Taking care of personal grooming such as brushing teeth? [] 1   [] 2   [x] 3   [] 4   6. Eating meals? [] 1   [] 2   [x] 3   [] 4   © 2007, Trustees of [address] [place] [address] [address] [address], under license to [employer]. All rights reserved      Score:  Initial: 15 Most Recent: X (Date: -- )    Interpretation of Tool:  Represents activities that are increasingly more difficult (i.e. Bed mobility, Transfers, Gait). Medical Necessity:     · Patient demonstrates   · fair  ·  rehab potential due to higher previous functional level. Reason for Services/Other Comments:  · Patient continues to require skilled intervention due to   · medical complications and patient unable to attend/participate in therapy as expected  · . Use of outcome tool(s) and clinical judgement create a POC that gives a: LOW COMPLEXITY         TREATMENT:   (In addition to Assessment/Re-Assessment sessions the following treatments were rendered)     Pre-treatment Symptoms/Complaints:  \"I feel better today. \"  Pain: Initial:   Pain Intensity 1: 0  Post Session:  No complaint of pain       Self Care: (10 mins): Procedure(s) (per grid) utilized to improve and/or restore self-care/home management as related to grooming and self feeding. Required minimal visual, verbal and   cueing to facilitate activities of daily living skills and compensatory activities. Therapeutic Activity: (    10 mins): Therapeutic activities including Bed transfers, Chair transfers, Ambulation on level ground and activity tolerance to improve mobility, strength, balance and coordination. Required minimal   to promote static and dynamic balance in sitting.    Therapeutic Exercise: (  19 mins):  Exercises per grid below to improve mobility, strength, balance, coordination and dynamic movement of B UEs for use in ADLs to improve functional bending, lifting, carrying, reaching, catching, overhead activites and ADLs. Required minimal visual and verbal cues to promote proper body alignment, promote proper body posture, promote proper body mechanics and promote proper body breathing techniques. Progressed resistance, range, repetitions and complexity of movement as indicated. Needed rest breaks. Date:  7/3/2020 Date:   Date:     Activity/Exercise Parameters Parameters Parameters   Shoulder flex/ex 10x     Shoulder horizontal flex/ex 10x     Diagonals 10x     Elbow flex/ex 10x     Fist pumps 10x     Punch up to ceiling 10x     Punch out in front 10x           Braces/Orthotics/Lines/Etc:   · O2 Device: Room air  Treatment/Session Assessment:    · Response to Treatment:  tolerated well with no issues noted. · Interdisciplinary Collaboration:   o Occupational Therapist  o Registered Nurse  o   · After treatment position/precautions:   o Supine in bed  o Bed/Chair-wheels locked  o Bed in low position  o Call light within reach  o RN notified  o tray table beside pt with all needs met and in reach   · Compliance with Program/Exercises: Compliant most of the time, Will assess as treatment progresses. · Recommendations/Intent for next treatment session: \"Next visit will focus on advancements to more challenging activities and reduction in assistance provided\".   Total Treatment Duration:  39 mins  OT Patient Time In/Time Out  Time In: 1348  Time Out: 1427     Tita Pérez, OT   Tita Pérez, MS, OTR/L

## 2020-07-03 NOTE — PROGRESS NOTES
Problem: Mobility Impaired (Adult and Pediatric)  Goal: *Acute Goals and Plan of Care  Description: Acute PT Goals:  (1.)Jonathan Clemons will move from supine to sit and sit to supine , scoot up and down and roll side to side with MODIFIED INDEPENDENCE within 7 treatment day(s). (2.)Jonathan Clemons will transfer from bed to chair and chair to bed with STAND BY ASSIST using the least restrictive device within 7 treatment day(s). (3.)Jonathan Clemons will perform therapeutic activities x 25 minutes with STAND BY ASSIST to improve safety and activity tolerance within 7 treatment day(s). (4.)Jonathan Clemons will perform therapeutic exercises x 15 min for HEP with INDEPENDENCE to improve strength, endurance, and functional mobility within 7 treatment day(s). PHYSICAL THERAPY: Daily Note and AM 7/3/2020  INPATIENT: PT Visit Days : 3  [place] / [place] B / Product Type: Medicare /       NAME/AGE/GENDER: Radha Mendez is a 80 y.o. female   PRIMARY DIAGNOSIS: Sepsis ([address] [A41.9]  Colitis [K52.9]  Diarrhea [R19.7]  Person under investigation for COVID-19 [Z20.828] Sepsis ([address] Sepsis ([address]       ICD-10: Treatment Diagnosis:   · Generalized Muscle Weakness (M62.81)  · Other lack of cordination (R27.8)  · Other abnormalities of gait and mobility (R26.89)   Precaution/Allergies:  Codeine; Pcn [penicillins]; and Strawberry      ASSESSMENT:     Ms. Kane Gutierrez is supine on arrival, states feels better today, reports no pain. Pt required less assist this date with bed mobility, transfers. Pt with CGA for bed mobility and supine to sit. Pt with good static sitting balance at EOB. Pt with MIN to MOD A sit to stand, min A transfer from bed to chair. Pt required additional time for all mobility. Pt reports she is mainly w/c bound and only SPT to w/c or BSC in [facility]. PT to cont to follow for acute care needs, pt making slow progress toward goals.       Per initial: a 80year old F who presents from [facility]) with sepsis, colitis. Lab results pending. Pt endorses no falls in past 6 months. Prior to admission Ms. Elo Gutierrez uses a wheelchair for mobility which she reports she is typically able to transfer to herself. This section established at most recent assessment   PROBLEM LIST (Impairments causing functional limitations):  1. Decreased Strength  2. Decreased ADL/Functional Activities  3. Decreased Transfer Abilities  4. Decreased Balance  5. Decreased Activity Tolerance  6. Increased Fatigue   INTERVENTIONS PLANNED: (Benefits and precautions of physical therapy have been discussed with the patient.)  1. Balance Exercise  2. Bed Mobility  3. Family Education  4. Gait Training  5. Home Exercise Program (HEP)  6. Neuromuscular Re-education/Strengthening  7. Range of Motion (ROM)  8. Therapeutic Activites  9. Therapeutic Exercise/Strengthening  10. Transfer Training     TREATMENT PLAN: Frequency/Duration: 3 times a week for duration of hospital stay  Rehabilitation Potential For Stated Goals: Good     REHAB RECOMMENDATIONS (at time of discharge pending progress):    Placement: It is my opinion, based on this patient's performance to date, that Ms. Elo Gutierrez may benefit from [address] after discharge due to the functional deficits listed above that are likely to improve with skilled rehabilitation because he/she has multiple medical issues that affect his/her functional mobility in the community. Equipment:    None at this time              HISTORY:   History of Present Injury/Illness (Reason for Referral):  Per H&P: \"Mrs. Ana Garber is a 26-year-old female with a past medical history significant for aortic valve stenosis, hypertension, hypothyroidism, dementia, GERD and she is also a long-term resident of a skilled nursing facility. She was recently admitted to our hospital on June 23 for A. fib with RVR.   At that time she was also treated for a pneumonia. COVID-19 was reportedly ruled out. During that admission she did have some abdominal pain that was attributed to gas or acid reflux as her CT abdomen and pelvis showed no acute intra-abdominal pathology. She was eventually discharged back to her assisted living facility on June 26, 2020 on [facility]. The patient has reportedly been doing well at this assisted living facility but today developed acute nausea and vomiting. The patient himself cannot recall what happened so the history is given from the ER physician and staff, the patient's chart and her son Pavel Eye who is at the bedside. The patient does note that she been experiencing diarrhea at the facility but it is unclear what duration this was. Per the ER her assisted living facility has had a high number of COVID-19 cases so she had a rapid swab test done in the emergency room which was negative. In the ER was also noted that she had right lower quadrant pain so a CT abdomen and pelvis which was done to evaluate for possible appendicitis. The CT was negative for appendicitis but was concerning for inflammation of the colon. Bilateral lung infiltrates and pleural effusions. The hospitalist were requested for further work-up and management and admission to the inpatient services. The patient denies any chest pain currently she denies any current nausea or vomiting breath or abdominal pain. She denies any urinary tract symptoms. 10 systems reviewed and negative except as noted in HPI. \"  Past Medical History/Comorbidities:   Ms. Nikkie Dawkins  has a past medical history of Abdominal pain, epigastric, Abdominal pain, unspecified site, Anxiety state, unspecified, Aortic stenosis (6/10/2016), Aortic valve disorders, Atherosclerosis of aorta (HCC), Backache, unspecified, Benign neoplasm of colon, Cervicalgia, Closed fracture of unspecified bone, Depressive disorder, not elsewhere classified, Diabetes ([facility] Diarrhea, Disorders of magnesium metabolism, Edema, Elevated sedimentation rate, Encounter for long-term (current) use of other medications, Endocrine disease, Epistaxis, Esophageal reflux, Essential hypertension, benign, Gastrointestinal malfunction arising from mental factors, Hypertension, Hypertension-controlled, benign, Hypertonicity of bladder, Hypopotassemia, Insomnia, unspecified, Irritable bowel syndrome, Memory loss, Nausea alone, Osteoarthrosis, unspecified whether generalized or localized, unspecified site, Other ill-defined conditions(799.89), Other malaise and fatigue, Pain in joint, lower leg, Pain in joint, shoulder region, Postnasal drip, Rheumatoid arthritis(714.0), Type II or unspecified type diabetes mellitus without mention of complication, not stated as uncontrolled, Unspecified constipation, Unspecified hemorrhoids with other complication, Unspecified hemorrhoids without mention of complication, Unspecified hereditary and idiopathic peripheral neuropathy, Unspecified hypothyroidism, Urgency of urination, and Urinary tract infection, site not specified. Ms. Tadeo Rutherford  has a past surgical history that includes hx cholecystectomy; hx other surgical; and hx tonsillectomy. Social History/Living Environment:   [facility] [address] Name: [place]  One/Two Story Residence: One story  Living Alone: No  Support Systems: Assisted living  Patient Expects to be Discharged to[de-identified] Assisted living  Current DME Used/Available at Home: Wheelchair  Prior Level of Function/Work/Activity:  longterm ([place]) with sepsis, colitis. Lab results pending. Pt endorses no falls in past 6 months. Prior to admission Ms. Tadeo Rutherford uses a wheelchair for mobility which she reports she is typically able to transfer to herself.    Number of Personal Factors/Comorbidities that affect the Plan of Care: 1-2: MODERATE COMPLEXITY   EXAMINATION:   Most Recent Physical Functioning:   Gross Assessment:                  Posture:  Posture (WDL): Exceptions to WDL  Posture Assessment: Forward head, Rounded shoulders  Balance:  Sitting: Intact  Standing: Impaired  Standing - Static: Constant support; Fair  Standing - Dynamic : Constant support;Poor Bed Mobility:  Rolling: Contact guard assistance  Supine to Sit: Contact guard assistance  Sit to Supine: (left up in chair)  Scooting: Contact guard assistance  Wheelchair Mobility:     Transfers:  Sit to Stand: Minimum assistance; Moderate assistance  Stand to Sit: Minimum assistance  Bed to Chair: Minimum assistance  Gait:            Body Structures Involved:  1. Digestive Structures  2. Bones  3. Joints  4. Muscles Body Functions Affected:  1. Neuromusculoskeletal  2. Movement Related  3. Digestive Activities and Participation Affected:  1. General Tasks and Demands  2. Mobility  3. Self Care   Number of elements that affect the Plan of Care: 3: MODERATE COMPLEXITY   CLINICAL PRESENTATION:   Presentation: Stable and uncomplicated: LOW COMPLEXITY   CLINICAL DECISION MAKING:   [facility] AM-[facility]AC[facility] 6 Clicks   Basic Mobility Inpatient Short Form  How much difficulty does the patient currently have. .. Unable A Lot A Little None   1. Turning over in bed (including adjusting bedclothes, sheets and blankets)? [] 1   [] 2   [x] 3   [] 4   2. Sitting down on and standing up from a chair with arms ( e.g., wheelchair, bedside commode, etc.)   [] 1   [x] 2   [] 3   [] 4   3. Moving from lying on back to sitting on the side of the bed? [] 1   [] 2   [x] 3   [] 4   How much help from another person does the patient currently need. .. Total A Lot A Little None   4. Moving to and from a bed to a chair (including a wheelchair)? [] 1   [x] 2   [] 3   [] 4   5. Need to walk in hospital room? [x] 1   [] 2   [] 3   [] 4   6. Climbing 3-5 steps with a railing? [x] 1   [] 2   [] 3   [] 4   © 2007, Trustees of [facility], under license to [employer].  All rights reserved      Score:  Initial: 12 Most Recent: X (Date: -- )    Interpretation of Tool:  Represents activities that are increasingly more difficult (i.e. Bed mobility, Transfers, Gait). Medical Necessity:     · Patient is expected to demonstrate progress in   · strength, range of motion, balance, coordination, and functional technique  ·  to   · increase independence with all mobility, including transfers to get to/from her wheelchair. · .  Reason for Services/Other Comments:  · Patient continues to require skilled intervention due to   · medical complications and mobility deficits which impact her level of function, safety, and independence as indicated above. · .   Use of outcome tool(s) and clinical judgement create a POC that gives a: Clear prediction of patient's progress: LOW COMPLEXITY        TREATMENT:   (In addition to Assessment/Re-Assessment sessions the following treatments were rendered)   Pre-treatment Symptoms/Complaints: \"I am going home today\"  Pain: Initial:   Pain Intensity 1: 0  Post Session:  0/10     Therapeutic Activity: (   23 Minutes): Therapeutic activities including bed mobility, activities sitting edge of bed, transfer training to bedside chair to improve mobility, strength, balance, and coordination. Required minimal cues with Mod A  to promote static and dynamic balance in standing and promote coordination of bilateral, upper extremity(s), lower extremity(s). Additional time for all mobility. Braces/Orthotics/Lines/Etc:   · IV  · O2 Device: Room air  Treatment/Session Assessment:    · Response to Treatment:  Tolerated well  · Interdisciplinary Collaboration:   o Physical Therapist  o Registered Nurse  · After treatment position/precautions:   o Up in chair  o Bed alarm/tab alert on  o Bed/Chair-wheels locked  o Bed in low position  o Call light within reach  o RN notified   · Compliance with Program/Exercises: Will assess as treatment progresses  · Recommendations/Intent for next treatment session:   \"Next visit will focus on advancements to more challenging activities and reduction in assistance provided\".     Total Treatment Duration:  PT Patient Time In/Time Out  Time In: 1104  Time Out: Wilian 51, PT

## 2020-07-03 NOTE — DISCHARGE INSTRUCTIONS
Activity: Activity as tolerated  Diet: Cardiac Diet and Diabetic Diet  Wound Care: None neededPatient Education      Sepsis: Care Instructions  Overview     Sepsis is an intense reaction to an infection. It can cause damage to the body and lead to dangerously low blood pressure. You may have inflammation across large areas of your body. It can damage tissue and even go deep into your organs. Infections that can lead to sepsis include:  · A skin infection such as from a cut. · A lung infection like pneumonia. · A kidney infection. · A gut infection such as E. coli. Sepsis is treated with antibiotics. Your doctor will try to find the infection that led to sepsis. Opal Cerda also get fluids through a vein (IV). Machines will track your vital signs, including temperature, blood pressure, breathing rate, and pulse rate. The physical and mental effects of sepsis may not be seen for several weeks after treatment. And they may last long after the infection is gone. Physical problems may include:  · Feeling weak and tired. · Feeling out of breath. · Aches and pains. · Problems with getting around. · Trouble falling asleep or staying asleep. · Dry and itchy skin, brittle nails, and hair loss. Some of these effects can lead to problems with your organs or your feet, legs, hands, or arms. Sepsis can also affect your mind and emotions. Problems may include:  · Self-doubt. · Anxiety. · Nightmares. · Depression and mood problems. · Wanting to avoid other people. · Confusion. · Flashbacks and bad memories of your illness. It's important to care for yourself and try to avoid infections. This may lower your risk of getting sepsis again. Follow-up care is a key part of your treatment and safety. Be sure to make and go to all appointments, and call your doctor if you are having problems. It's also a good idea to know your test results and keep a list of the medicines you take.   How can you care for yourself at home?  · Be safe with medicines. Take your medicines exactly as prescribed. Call your doctor if you think you are having a problem with your medicine. · If your doctor prescribed antibiotics, take them as directed. Do not stop taking them just because you feel better. You need to take the full course of antibiotics. · Help prevent infections that could again lead to sepsis. ? Try to avoid colds and flu. If you must be around people who have a cold or the flu, wash your hands often. And get a flu vaccine every year. ? Ask your doctor if you need a pneumococcal vaccine (to prevent pneumonia, meningitis, and other infections). If you have had one before, ask your doctor if you need another dose. ? Clean any wounds or scrapes. · Do not smoke or use other tobacco products. When you quit smoking, you are less likely to get a cold, the flu, bronchitis, and pneumonia. If you need help quitting, talk to your doctor about stop-smoking programs and medicines. These can increase your chances of quitting for good. · Drink plenty of fluids to prevent dehydration. Choose water and other caffeine-free clear liquids until you feel better. If you have kidney, heart, or liver disease and have to limit fluids, talk with your doctor before you increase the amount of fluids you drink. · Eat a healthy diet. Include fruits, vegetables, and whole grains in your diet every day. · If your doctor recommends it, try doing some physical activity. Walking is a good choice. Bit by bit, increase the amount you walk every day. · Talk with your family and friends about your challenges. Ask for help if you need it. · Keep a journal. Writing down your thoughts and feelings can help reduce your stress. · Ask family members to fill in gaps in your memory. · Set small goals for yourself that you can reach. Reward yourself for success. When should you call for help? Call  911 anytime you think you may need emergency care.  For example, call if:  · You passed out (lost consciousness). Call your doctor now or seek immediate medical care if:  · You have symptoms such as:  ? Shortness of breath. ? Feeling very sick. ? Severe pain. ? A fast heart rate. ? Cool, pale, or clammy skin. ? Feeling confused. ? Feeling very sleepy, or you are hard to wake up. · You are dizzy or lightheaded, or you feel like you may faint. · You have a fever or chills. Watch closely for changes in your health, and be sure to contact your doctor if:  · You do not get better as expected. Where can you learn more? Go to http://www.gray.com/  Enter T383 in the search box to learn more about \"Sepsis: Care Instructions. \"  Current as of: February 11, 2020               Content Version: 12.5  © 0020-6560 [employer], [employer]. Care instructions adapted under license by [employer] (which disclaims liability or warranty for this information). If you have questions about a medical condition or this instruction, always ask your healthcare professional. [address] any warranty or liability for your use of this information.

## 2020-07-03 NOTE — PROGRESS NOTES
END OF SHIFT NOTE:    INTAKE/OUTPUT  07/02 1900 - 07/03 0700    PO - 120 ml  IVF - 351.01 ml  VOIDING - 2 occurences     Flatus: Patient does have flatus present, per patient    Stool:  0 occurrences. Emesis: 0 occurrences. VITAL SIGNS  Patient Vitals for the past 12 hrs:   Temp Pulse Resp BP SpO2   07/03/20 0735 97.4 °F (36.3 °C) 78 20 146/77 91 %   07/03/20 0307 99 °F (37.2 °C) 75 21 127/74 93 %   07/02/20 2231 99.1 °F (37.3 °C) 76 21 154/80 91 %     Pain Assessment  Pain Intensity 1: 0 (07/02/20 2300)  Pain Location 1: Generalized  Pain Intervention(s) 1: Medication (see MAR)  Patient Stated Pain Goal: 0    Ambulating  No    Shift report given to oncoming nurse at the bedside.     [facility]

## 2020-07-03 NOTE — PROGRESS NOTES
TRANSFER - IN REPORT:    Verbal report received from Jessica RN(name) on [employer]  being received from 8th floor(unit) for routine progression of care      Report consisted of patients Situation, Background, Assessment and   Recommendations(SBAR). Information from the following report(s) SBAR, Intake/Output and MAR was reviewed with the receiving nurse. Opportunity for questions and clarification was provided. Assessment to be completed upon patients arrival to unit and care assumed.

## 2020-07-03 NOTE — DISCHARGE SUMMARY
Discharge Summary     Patient: Radha Mendez MRN: [id number]  SSN: [id number]    YOB: 1926  Age: 80 y.o. Sex: female       Admit Date: 6/28/2020    Discharge Date: 7/3/2020      Admission Diagnoses: Sepsis ([facility] [A41.9]  Colitis [K52.9]  Diarrhea [R19.7]  Person under investigation for COVID-19 [Z20.828]    Discharge Diagnoses:   Problem List as of 7/3/2020 Date Reviewed: 1/2/2020          Codes Class Noted - Resolved    CAP (community acquired pneumonia) ICD-10-CM: J18.9  ICD-9-CM: 486  7/3/2020 - Present        Lactic acidosis ICD-10-CM: E87.2  ICD-9-CM: 276.2  6/29/2020 - Present        Diarrhea ICD-10-CM: R19.7  ICD-9-CM: 787.91  6/28/2020 - Present        Colitis ICD-10-CM: K52.9  ICD-9-CM: 558.9  6/28/2020 - Present        * (Principal) Sepsis ([facility] ICD-10-CM: A41.9  ICD-9-CM: 038.9, 995.91  6/28/2020 - Present        Person under investigation for COVID-19 ICD-10-CM: Z20.828  ICD-9-CM: V01.79  6/28/2020 - Present        Community acquired pneumonia ICD-10-CM: J18.9  ICD-9-CM: 486  6/26/2020 - Present        Atrial fibrillation with RVR ([facility] ICD-10-CM: I48.91  ICD-9-CM: 427.31  6/23/2020 - Present        Mild depression ([facility] ICD-10-CM: F32.0  ICD-9-CM: 354  1/28/2019 - Present        Type 2 diabetes with nephropathy ([facility] ICD-10-CM: E11.21  ICD-9-CM: 250.40, 583.81  1/17/2019 - Present        Palpitations ICD-10-CM: R00.2  ICD-9-CM: 785.1  12/17/2018 - Present        Acquired hypothyroidism ICD-10-CM: E03.9  ICD-9-CM: 244.9  7/17/2017 - Present        OAB (overactive bladder) ICD-10-CM: N32.81  ICD-9-CM: 596.51  7/17/2017 - Present        Dyspnea on exertion ICD-10-CM: R06.00  ICD-9-CM: 786.09  6/28/2016 - Present        Aortic stenosis ICD-10-CM: I35.0  ICD-9-CM: 424.1  6/10/2016 - Present    Overview Signed 6/10/2016  1:10 PM by Dwayne Shea A     No sever SOB, no chest pain or syncope.  Echo showed mod AS, mean grad 22mm Hg, nl EF, mild LVH             Diverticulosis ICD-10-CM: K57.90  ICD-9-CM: 562.10  2/17/2016 - Present        Essential hypertension, benign ICD-10-CM: I10  ICD-9-CM: 401.1  Unknown - Present    Overview Signed 6/10/2016  1:08 PM by Marianna Holt     The blood pressure reading have been in the target range. Endocrine disease ICD-10-CM: E34.9  ICD-9-CM: 259.9  Unknown - Present        Epistaxis ICD-10-CM: R04.0  ICD-9-CM: 756. 7  Unknown - Present        Atherosclerosis of aorta (HCC) ICD-10-CM: I70.0  ICD-9-CM: 440.0  Unknown - Present        Edema ICD-10-CM: R60.9  ICD-9-CM: 646. 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  ICD-9-CM: 596.51  Unknown - Present        Memory loss ICD-10-CM: R41.3  ICD-9-CM: 780.93  Unknown - Present        Irritable bowel syndrome ICD-10-CM: K58.9  ICD-9-CM: 564.1  Unknown - Present        Depression ICD-10-CM: F32.9  ICD-9-CM: 429  Unknown - Present        Diabetes mellitus ([facility] ICD-10-CM: E11.9  ICD-9-CM: 250.00  Unknown - Present        Anxiety state, unspecified ICD-10-CM: F41.1  ICD-9-CM: 300.00  Unknown - Present        Encounter for long-term (current) use of other medications ICD-10-CM: Z79.899  ICD-9-CM: V58.69  Unknown - Present        Esophageal reflux ICD-10-CM: K21.9  ICD-9-CM: 530.81  Unknown - Present        RESOLVED: Diabetes ([facility] ICD-10-CM: E11.9  ICD-9-CM: 250.00  Unknown - 1/19/2018        RESOLVED: Elevated sedimentation rate ICD-10-CM: R70.0  ICD-9-CM: 790.1  Unknown - 1/19/2018        RESOLVED: Rheumatoid arthritis(714.0) ICD-10-CM: M06.9  ICD-9-CM: 714.0  Unknown - 1/19/2018        RESOLVED: Hypopotassemia ICD-10-CM: E87.6  ICD-9-CM: 276.8  Unknown - 1/19/2018               Discharge Condition: Stable    Hospital Course:   Patient presented to the hospital with acute nausea and vomiting. CT abdomen and pelvis with concern for colitis but also showed bilateral lung infiltrates and pleural effusions. Patient was treated for colitis and community-acquired pneumonia. Patient tested negative for novel coronavirus. Patient improved and was weaned off of all supplemental oxygen. Diarrhea resolved. Vomiting resolved. Patient states that she is anxious to return to her assisted living facility. Patient is in stable medical condition. Patient discharged back to her assisted living facility to complete a total 10-day course of antibiotics for pneumonia and colitis. Discharge instructions were discussed at length with patient and she voiced understanding and agreement. Patient also was given return precautions for which she voiced understanding. Physical Exam:   General: Elderly white female, lying in bed, no acute distress  HEENT: NCAT, moist mucous membranes  Skin: No rash noted  Cardio: RRR, normal S1/S2, no rubs, no gallops, no murmurs  Pulm: Non labored respirations on room air, LCAB, no wheezing, no rales, no rhonchi  GI: Soft, minimal tenderness in epigastrium without rebound/guarding/rigidity, Nd, Nml bowel sounds, no masses noted  Extremity: Atraumatic, no deformities, no edema  Neuro: Alert, oriented, moving all extremities, no focal deficits noted  Psych: Pleasant, cooperative, normal range of affect    Consults: None    Significant Diagnostic Studies:     CT OF THE ABDOMEN AND PELVIS     INDICATION: Right lower quadrant pain and fever     Multiple axial images were obtained through the abdomen and pelvis. Oral  contrast was used for bowel opacification.   100mL of Isovue 370 intravenous  contrast was used for better evaluation of solid organs and vascular structures. Radiation dose reduction techniques were used for this study. All CT scans  performed at this facility use one or all of the following: Automated exposure  control, adjustment of the mA and/or kVp according to patient's size, iterative  reconstruction.     COMPARISON: 06/23/2020     FINDINGS:  -LUNG BASES: There is slight increased bibasilar infiltrate/atelectasis. Small  bilateral pleural effusions are again noted.     -LIVER: Normal in size and appearance. -GALLBLADDER/BILE DUCTS: No gallstones or bile duct dilatation. -PANCREAS: Normal.  -SPLEEN: Normal.     -ADRENALS: Normal.  -KIDNEYS/URETERS: No hydronephrosis or significant mass. -BLADDER: Umaña catheter in place. -REPRODUCTIVE ORGANS: No pelvic masses.     -BOWEL: There is a moderate size hiatal hernia. Wall thickening in the proximal  colon could represent nondistention artifact or inflammation. Appendix is  normal in size. Sigmoid diverticulosis is again noted. -LYMPH NODES: No significant retroperitoneal, mesenteric, or pelvic adenopathy. -BONES: Old right pubic rami fractures. There are also old L2 and T12  compression fractures. -VASCULATURE: Normal  -OTHER: No ascites.     IMPRESSION  IMPRESSION:   1. Possible inflammation of the proximal colon. 2.  Increased bibasilar infiltrate/atelectasis. Stable small bilateral pleural  effusions. Disposition: Assisted living facility     Discharge Medications:   Current Discharge Medication List      START taking these medications    Details   amLODIPine (NORVASC) 5 mg tablet Take 1 Tab by mouth daily. Qty: 30 Tab, Refills: 1      trimethoprim-sulfamethoxazole (BACTRIM DS, SEPTRA DS) 160-800 mg per tablet Take 1 Tab by mouth two (2) times a day for 5 days. Qty: 10 Tab, Refills: 0         CONTINUE these medications which have CHANGED    Details   cefpodoxime (VANTIN) 200 mg tablet Take 1 Tab by mouth two (2) times a day for 5 days.   Qty: 10 Tab, Refills: 0         CONTINUE these medications which have NOT CHANGED    Details   cetirizine (ZYRTEC) 10 mg tablet Take 10 mg by mouth daily. senna (Senexon) 8.6 mg tablet Take 2 Tabs by mouth daily. Take 2 tablet PO at bedtime      traMADoL (Ultram) 50 mg tablet Take 1 Tab by mouth every eight (8) hours as needed for Pain for up to 7 days. Max Daily Amount: 150 mg.  Qty: 21 Tab, Refills: 0    Associated Diagnoses: Multiple closed fractures of pelvis without disruption of pelvic ring, initial encounter (HCC)      apixaban (ELIQUIS) 5 mg tablet Take 1 Tab by mouth two (2) times a day for 30 days. Qty: 60 Tab, Refills: 2      metoprolol tartrate (LOPRESSOR) 25 mg tablet Take 2 Tabs by mouth two (2) times a day for 30 days. Qty: 120 Tab, Refills: 3      hydrALAZINE (APRESOLINE) 10 mg tablet Take 1 Tab by mouth three (3) times daily. Qty: 90 Tab, Refills: 11      mirabegron ER (MYRBETRIQ) 25 mg ER tablet Take 25 mg by mouth daily. acetaminophen (TYLENOL EXTRA STRENGTH) 500 mg tablet Take 1,000 mg by mouth daily. levothyroxine (SYNTHROID) 125 mcg tablet Take 1 Tab by mouth Daily (before breakfast). Qty: 90 Tab, Refills: 3    Associated Diagnoses: Acquired hypothyroidism      memantine (NAMENDA) 10 mg tablet Take 1 Tab by mouth two (2) times a day.   Qty: 180 Tab, Refills: 3      Blood-Glucose Meter (ACCU-CHEK RAMON PLUS METER) misc Test 4 four times a week - test fasting and 2 hours after the largest meal  Qty: 100 Each, Refills: 3    Comments: Dx E11.9  Associated Diagnoses: Type 2 diabetes mellitus without complication, without long-term current use of insulin ([facility])      lancets (ACCU-CHEK MULTICLIX LANCET) misc Test once daily  Qty: 50 Each, Refills: 3      glucose blood VI test strips (ACCU-CHEK RAMON PLUS TEST STRP) strip One meter  Qty: 100 Strip, Refills: 3    Associated Diagnoses: Type 2 diabetes mellitus without complication, without long-term current use of insulin ([facility])      psyllium (METAMUCIL) packet Take 1 Packet by mouth daily. Indications: irritable colon  Qty: 90 Packet, Refills: 3      sucralfate (CARAFATE) 100 mg/mL suspension 1 gram by mouth q ac and qhs  Qty: 1800 mL, Refills: 3      lansoprazole (PREVACID) 15 mg capsule Take 2 Caps by mouth two (2) times a day. Qty: 360 Cap, Refills: 3      polyethylene glycol (MIRALAX) 17 gram packet Take 1 Packet by mouth daily. Qty: 17 Packet, Refills: 3      ondansetron hcl (ZOFRAN) 4 mg tablet Take 1 Tab by mouth every eight (8) hours as needed for Nausea. Qty: 90 Tab, Refills: 1      nitroglycerin (NITROSTAT) 0.4 mg SL tablet by SubLINGual route every five (5) minutes as needed for Chest Pain. STOP taking these medications       dilTIAZem ER (CARDIZEM CD) 180 mg capsule Comments:   Reason for Stopping:         lisinopril (PRINIVIL, ZESTRIL) 20 mg tablet Comments:   Reason for Stopping:         furosemide (LASIX) 20 mg tablet Comments:   Reason for Stopping:         potassium chloride SR (KLOR-CON 8) 8 mEq tablet Comments:   Reason for Stopping:               Activity: Activity as tolerated  Diet: Cardiac Diet and Diabetic Diet  Wound Care: None needed    Follow-up Appointments   Procedures    FOLLOW UP VISIT Appointment in: One Week PCP     PCP     Standing Status:   Standing     Number of Occurrences:   1     Order Specific Question:   Appointment in     Answer:    One Week       Signed By: Shari Mason MD     July 3, 2020

## 2020-07-03 NOTE — ROUTINE PROCESS
Discharge instructions reviewed with patient. Patient verbalized/ daughter verbalized/ esigned agreement/ understanding. New meds called into patient's pharmacy [facility] instead of mail order pharmacy.

## 2020-07-03 NOTE — PROGRESS NOTES
07/02/20 2300   Dual Skin Pressure Injury Assessment   Dual Skin Pressure Injury Assessment WDL   Second Care Provider (Based on [address] [place] [address]) Naa Mahoney, RN   Skin Integumentary   Skin Integumentary (WDL) X    Pressure  Injury Documentation No Pressure Injury Noted-Pressure Ulcer Prevention Initiated   Skin Color Appropriate for ethnicity   Skin Condition/Temp Dry;Warm;Fragile   Skin Integrity Intact   Wound Prevention and Protection Methods   Orientation of Wound Prevention Posterior; Lateral   Location of Wound Prevention Ankle;Knee;Sacrum/Coccyx   Dressing Present  No   Dressing Status Other (comment)  (placed on areas listed above)   Wound Offloading (Prevention Methods) Foam silicone;Bed, pressure reduction mattress     Patient has no pressure injuries at this time. Patient has blanchable areas in the following:   Sacral area, bilat-knees, and bi-lat heels. Foam placed on all areas.

## 2020-07-03 NOTE — PROGRESS NOTES
Received patient alert but confused to time. Respirations even and unlabored. Nursing assessment completed. Bed in low and locked position. Call bell is within patient's reach.

## 2020-07-03 NOTE — PROGRESS NOTES
[facility] ambulance transport arranged for 5 pm to return to [address], per daughter request.  Did not order home health, as they are not allowed into the [facility], per [facility] policy. Daughter verbalizes understanding.       Care Management Interventions  PCP Verified by CM: Yes(Dr. Jennifer Orourke)  Mode of Transport at Discharge: BLS  Transition of Care Consult (CM Consult): Discharge Planning, Assisted Living  Physical Therapy Consult: Yes  Occupational Therapy Consult: Yes  Current Support Network: Assisted Living  Confirm Follow Up Transport: Family  Freedom of Choice List was Provided with Basic Dialogue that Supports the Patient's Individualized Plan of Care/Goals, Treatment Preferences and Shares the Quality Data Associated with the Providers?: Yes  Discharge Location  Discharge Placement: Assisted Living([facility])

## 2020-07-06 ENCOUNTER — PATIENT OUTREACH (OUTPATIENT)
Dept: CASE MANAGEMENT | Age: 85
End: 2020-07-06

## 2020-07-08 NOTE — PROGRESS NOTES
No transition of care outreach indicated due to patient discharge to [address] [place] [address]. This note will not be viewable in [address]

## 2021-04-27 ENCOUNTER — HOSPITAL ENCOUNTER (EMERGENCY)
Age: 86
Discharge: HOME | End: 2021-04-27
Attending: EMERGENCY MEDICINE
Payer: MEDICARE

## 2021-04-27 VITALS
RESPIRATION RATE: 16 BRPM | DIASTOLIC BLOOD PRESSURE: 77 MMHG | OXYGEN SATURATION: 98 % | TEMPERATURE: 97.6 F | HEIGHT: 68 IN | HEART RATE: 56 BPM | SYSTOLIC BLOOD PRESSURE: 170 MMHG | BODY MASS INDEX: 24.25 KG/M2 | WEIGHT: 160 LBS

## 2021-04-27 DIAGNOSIS — I10 HYPERTENSION, UNSPECIFIED TYPE: Primary | ICD-10-CM

## 2021-04-27 DIAGNOSIS — I10 ESSENTIAL HYPERTENSION, BENIGN: ICD-10-CM

## 2021-04-27 LAB
ALBUMIN SERPL-MCNC: 3.2 G/DL (ref 3.2–4.6)
ALBUMIN/GLOB SERPL: 0.6 {RATIO} (ref 1.2–3.5)
ALP SERPL-CCNC: 117 U/L (ref 50–136)
ALT SERPL-CCNC: 14 U/L (ref 12–65)
ANION GAP SERPL CALC-SCNC: 5 MMOL/L (ref 7–16)
AST SERPL-CCNC: 19 U/L (ref 15–37)
ATRIAL RATE: 54 BPM
BASOPHILS # BLD: 0 K/UL (ref 0–0.2)
BASOPHILS NFR BLD: 1 % (ref 0–2)
BILIRUB SERPL-MCNC: 0.7 MG/DL (ref 0.2–1.1)
BUN SERPL-MCNC: 13 MG/DL (ref 8–23)
CALCIUM SERPL-MCNC: 8.8 MG/DL (ref 8.3–10.4)
CALCULATED P AXIS, ECG09: 72 DEGREES
CALCULATED R AXIS, ECG10: -65 DEGREES
CALCULATED T AXIS, ECG11: 101 DEGREES
CHLORIDE SERPL-SCNC: 100 MMOL/L (ref 98–107)
CO2 SERPL-SCNC: 30 MMOL/L (ref 21–32)
CREAT SERPL-MCNC: 0.92 MG/DL (ref 0.6–1)
DIAGNOSIS, 93000: NORMAL
DIFFERENTIAL METHOD BLD: NORMAL
EOSINOPHIL # BLD: 0.1 K/UL (ref 0–0.8)
EOSINOPHIL NFR BLD: 2 % (ref 0.5–7.8)
ERYTHROCYTE [DISTWIDTH] IN BLOOD BY AUTOMATED COUNT: 13.9 % (ref 11.9–14.6)
GLOBULIN SER CALC-MCNC: 5.3 G/DL (ref 2.3–3.5)
GLUCOSE SERPL-MCNC: 176 MG/DL (ref 65–100)
HCT VFR BLD AUTO: 43.7 % (ref 35.8–46.3)
HGB BLD-MCNC: 14.5 G/DL (ref 11.7–15.4)
IMM GRANULOCYTES # BLD AUTO: 0 K/UL (ref 0–0.5)
IMM GRANULOCYTES NFR BLD AUTO: 0 % (ref 0–5)
LIPASE SERPL-CCNC: 44 U/L (ref 73–393)
LYMPHOCYTES # BLD: 2.1 K/UL (ref 0.5–4.6)
LYMPHOCYTES NFR BLD: 32 % (ref 13–44)
MAGNESIUM SERPL-MCNC: 1.9 MG/DL (ref 1.8–2.4)
MCH RBC QN AUTO: 32 PG (ref 26.1–32.9)
MCHC RBC AUTO-ENTMCNC: 33.2 G/DL (ref 31.4–35)
MCV RBC AUTO: 96.5 FL (ref 79.6–97.8)
MONOCYTES # BLD: 0.5 K/UL (ref 0.1–1.3)
MONOCYTES NFR BLD: 7 % (ref 4–12)
NEUTS SEG # BLD: 4 K/UL (ref 1.7–8.2)
NEUTS SEG NFR BLD: 59 % (ref 43–78)
NRBC # BLD: 0 K/UL (ref 0–0.2)
P-R INTERVAL, ECG05: 234 MS
PLATELET # BLD AUTO: 239 K/UL (ref 150–450)
PMV BLD AUTO: 10.1 FL (ref 9.4–12.3)
POTASSIUM SERPL-SCNC: 3.4 MMOL/L (ref 3.5–5.1)
PROT SERPL-MCNC: 8.5 G/DL (ref 6.3–8.2)
Q-T INTERVAL, ECG07: 466 MS
QRS DURATION, ECG06: 118 MS
QTC CALCULATION (BEZET), ECG08: 441 MS
RBC # BLD AUTO: 4.53 M/UL (ref 4.05–5.2)
SODIUM SERPL-SCNC: 135 MMOL/L (ref 136–145)
TROPONIN-HIGH SENSITIVITY: 10.2 PG/ML (ref 0–14)
TROPONIN-HIGH SENSITIVITY: 13.2 PG/ML (ref 0–14)
VENTRICULAR RATE, ECG03: 54 BPM
WBC # BLD AUTO: 6.8 K/UL (ref 4.3–11.1)

## 2021-04-27 PROCEDURE — 85025 COMPLETE CBC W/AUTO DIFF WBC: CPT

## 2021-04-27 PROCEDURE — 99284 EMERGENCY DEPT VISIT MOD MDM: CPT

## 2021-04-27 PROCEDURE — 96374 THER/PROPH/DIAG INJ IV PUSH: CPT

## 2021-04-27 PROCEDURE — 93005 ELECTROCARDIOGRAM TRACING: CPT | Performed by: EMERGENCY MEDICINE

## 2021-04-27 PROCEDURE — 83735 ASSAY OF MAGNESIUM: CPT

## 2021-04-27 PROCEDURE — 80053 COMPREHEN METABOLIC PANEL: CPT

## 2021-04-27 PROCEDURE — 84484 ASSAY OF TROPONIN QUANT: CPT

## 2021-04-27 PROCEDURE — 83690 ASSAY OF LIPASE: CPT

## 2021-04-27 PROCEDURE — 74011250636 HC RX REV CODE- 250/636: Performed by: EMERGENCY MEDICINE

## 2021-04-27 RX ORDER — AMLODIPINE BESYLATE 5 MG/1
5 TABLET ORAL DAILY
Qty: 90 TAB | Refills: 3 | Status: SHIPPED | OUTPATIENT
Start: 2021-04-27 | End: 2021-06-10

## 2021-04-27 RX ORDER — HYDRALAZINE HYDROCHLORIDE 20 MG/ML
10 INJECTION INTRAMUSCULAR; INTRAVENOUS
Status: COMPLETED | OUTPATIENT
Start: 2021-04-27 | End: 2021-04-27

## 2021-04-27 RX ADMIN — HYDRALAZINE HYDROCHLORIDE 10 MG: 20 INJECTION, SOLUTION INTRAMUSCULAR; INTRAVENOUS at 12:48

## 2021-04-27 NOTE — ED NOTES
I have reviewed discharge instructions with the patient and son. The patient and son verbalized understanding. Patient left ED via Discharge Method: stretcher to SNF with ened[facility], ems    Opportunity for questions and clarification provided. Patient given 1 scripts. No esign        To continue your aftercare when you leave the hospital, you may receive an automated call from our care team to check in on how you are doing. This is a free service and part of our promise to provide the best care and service to meet your aftercare needs.  If you have questions, or wish to unsubscribe from this service please call [phone]. Thank you for Choosing our [address] [place] [address] Emergency Department.

## 2021-04-27 NOTE — ED TRIAGE NOTES
Patient arrives via [address] [place] [address],[address] from [facility]. Called out for hypertension. 216/98 at facility, 172/90 for EMS. A fib on monitor. Patient reports \"feeling swimmy headed. \"  Patient reports feeling that way every day but today was worse. 99% on RA; bgl 226. Patient denies dizziness, denies chest pain, denies sob.

## 2021-04-27 NOTE — ED PROVIDER NOTES
Patient with history of hypertension and diabetes. Also with atrial fibrillation. Patient on amlodipine and hydralazine. Blood pressure to be elevated at her assisted living facility this morning. In route here blood pressure was somewhat better. Back elevated to systolic 627. Patient denies any symptoms except for some mild dizziness this morning which she normally has every morning. No dizziness here. No headache or blurry vision. No chest pain or shortness of breath. The history is provided by the patient and the EMS personnel. No  was used. Hypertension   This is a new problem. The current episode started 3 to 5 hours ago. The problem has not changed since onset. Associated symptoms include dizziness. Pertinent negatives include no chest pain, no orthopnea, no palpitations, no anxiety, no confusion, no malaise/fatigue, no blurred vision, no headaches, no neck pain, no peripheral edema, no shortness of breath, no nausea and no vomiting. Risk factors include hypertension. Past Medical History:   Diagnosis Date    Abdominal pain, epigastric     Abdominal pain, unspecified site     Anxiety state, unspecified     Aortic stenosis 6/10/2016    No sever SOB, no chest pain or syncope.  Echo showed mod AS, mean grad 22mm Hg, nl EF, mild LVH    Aortic valve disorders     Atherosclerosis of aorta (HCC)     Atrial fibrillation (HCC)     Backache, unspecified     Benign neoplasm of colon     Cervicalgia     Closed fracture of unspecified bone     Depressive disorder, not elsewhere classified     Diabetes ([facility] [address]     Diarrhea     Disorders of magnesium metabolism     Edema     Elevated sedimentation rate     Encounter for long-term (current) use of other medications     Endocrine disease     Epistaxis     Esophageal reflux     Essential hypertension, benign     Gastrointestinal malfunction arising from mental factors     Hypertension     Hypertension-controlled, benign     Hypertonicity of bladder     Hypopotassemia     Insomnia, unspecified     Irritable bowel syndrome     Memory loss     Nausea alone     Osteoarthrosis, unspecified whether generalized or localized, unspecified site     Other ill-defined conditions(799.89)     hyperlipidemia    Other malaise and fatigue     Pain in joint, lower leg     Pain in joint, shoulder region     Postnasal drip     Rheumatoid arthritis(714.0)     Type II or unspecified type diabetes mellitus without mention of complication, not stated as uncontrolled     Unspecified constipation     Unspecified hemorrhoids with other complication     Unspecified hemorrhoids without mention of complication     Unspecified hereditary and idiopathic peripheral neuropathy     Unspecified hypothyroidism     Urgency of urination     Urinary tract infection, site not specified        Past Surgical History:   Procedure Laterality Date    HX CHOLECYSTECTOMY      HX OTHER SURGICAL      thyroidectomy    HX TONSILLECTOMY           Family History:   Problem Relation Age of Onset    Arthritis-rheumatoid Daughter     Arthritis-osteo Son        Social History     Socioeconomic History    Marital status:      Spouse name: Not on file    Number of children: Not on file    Years of education: Not on file    Highest education level: Not on file   Occupational History    Not on file   Social Needs    Financial resource strain: Not on file    Food insecurity     Worry: Not on file     Inability: Not on file    Transportation needs     Medical: Not on file     Non-medical: Not on file   Tobacco Use    Smoking status: Never Smoker    Smokeless tobacco: Never Used   Substance and Sexual Activity    Alcohol use: No    Drug use: No    Sexual activity: Not on file   Lifestyle    Physical activity     Days per week: Not on file     Minutes per session: Not on file    Stress: Not on file   Relationships    Social connections Talks on phone: Not on file     Gets together: Not on file     Attends Orthodoxy service: Not on file     Active member of club or organization: Not on file     Attends meetings of clubs or organizations: Not on file     Relationship status: Not on file    Intimate partner violence     Fear of current or ex partner: Not on file     Emotionally abused: Not on file     Physically abused: Not on file     Forced sexual activity: Not on file   Other Topics Concern    Not on file   Social History Narrative    Not on file         ALLERGIES: Codeine, Pcn [penicillins], and [place]    Review of Systems   Constitutional: Negative for chills, fever and malaise/fatigue. HENT: Negative for rhinorrhea and sore throat. Eyes: Negative for blurred vision, pain and redness. Respiratory: Negative for chest tightness, shortness of breath and wheezing. Cardiovascular: Negative for chest pain, palpitations, orthopnea and leg swelling. Gastrointestinal: Negative for abdominal pain, diarrhea, nausea and vomiting. Musculoskeletal: Negative for back pain, gait problem, neck pain and neck stiffness. Skin: Negative for color change and rash. Neurological: Positive for dizziness. Negative for weakness, numbness and headaches. Psychiatric/Behavioral: Negative for confusion. All other systems reviewed and are negative. Vitals:    04/27/21 1144   BP: (!) 191/88   Pulse: (!) 54   Resp: 16   Temp: 97.6 °F (36.4 °C)   SpO2: 96%   Weight: 72.6 kg (160 lb)   Height: 5' 8\" (1.727 m)            Physical Exam  Constitutional:       Appearance: Normal appearance. She is well-developed. HENT:      Head: Normocephalic and atraumatic. Neck:      Musculoskeletal: Normal range of motion and neck supple. Cardiovascular:      Rate and Rhythm: Normal rate and regular rhythm. Pulmonary:      Effort: Pulmonary effort is normal.      Breath sounds: Normal breath sounds.    Abdominal:      General: Bowel sounds are normal. Palpations: Abdomen is soft. Tenderness: There is no abdominal tenderness. Musculoskeletal: Normal range of motion. Skin:     General: Skin is warm and dry. Neurological:      General: No focal deficit present. Mental Status: She is alert and oriented to person, place, and time. MDM  Number of Diagnoses or Management Options  Diagnosis management comments: Patient's blood pressure improved here. Feels well. Will discharge. Amount and/or Complexity of Data Reviewed  Clinical lab tests: ordered and reviewed  Tests in the medicine section of CPT®: ordered and reviewed    Patient Progress  Patient progress: stable         Procedures        EKG: nonspecific ST and T waves changes, sinus bradycardia, 1st degree AV block. Rate 54.         Results Include:    Recent Results (from the past 24 hour(s))   EKG, 12 LEAD, INITIAL    Collection Time: 04/27/21 11:46 AM   Result Value Ref Range    Ventricular Rate 54 BPM    Atrial Rate 54 BPM    P-R Interval 234 ms    QRS Duration 118 ms    Q-T Interval 466 ms    QTC Calculation (Bezet) 441 ms    Calculated P Axis 72 degrees    Calculated R Axis -65 degrees    Calculated T Axis 101 degrees    Diagnosis       Sinus bradycardia with 1st degree A-V block with occasional Premature   ventricular complexes  Left anterior fascicular block  Left ventricular hypertrophy with QRS widening and repolarization abnormality  Abnormal ECG  When compared with ECG of 01-JUL-2020 05:23,  Premature ventricular complexes are now Present  Premature atrial complexes are no longer Present  LA interval has increased  QT has shortened     TROPONIN-HIGH SENSITIVITY    Collection Time: 04/27/21 11:48 AM   Result Value Ref Range    Troponin-High Sensitivity 13.2 0 - 14 pg/mL   CBC WITH AUTOMATED DIFF    Collection Time: 04/27/21 11:48 AM   Result Value Ref Range    WBC 6.8 4.3 - 11.1 K/uL    RBC 4.53 4.05 - 5.2 M/uL    HGB 14.5 11.7 - 15.4 g/dL    HCT 43.7 35.8 - 46.3 %    MCV 96.5 79.6 - 97.8 FL    MCH 32.0 26.1 - 32.9 PG    MCHC 33.2 31.4 - 35.0 g/dL    RDW 13.9 11.9 - 14.6 %    PLATELET 651 923 - 488 K/uL    MPV 10.1 9.4 - 12.3 FL    ABSOLUTE NRBC 0.00 0.0 - 0.2 K/uL    DF AUTOMATED      NEUTROPHILS 59 43 - 78 %    LYMPHOCYTES 32 13 - 44 %    MONOCYTES 7 4.0 - 12.0 %    EOSINOPHILS 2 0.5 - 7.8 %    BASOPHILS 1 0.0 - 2.0 %    IMMATURE GRANULOCYTES 0 0.0 - 5.0 %    ABS. NEUTROPHILS 4.0 1.7 - 8.2 K/UL    ABS. LYMPHOCYTES 2.1 0.5 - 4.6 K/UL    ABS. MONOCYTES 0.5 0.1 - 1.3 K/UL    ABS. EOSINOPHILS 0.1 0.0 - 0.8 K/UL    ABS. BASOPHILS 0.0 0.0 - 0.2 K/UL    ABS. IMM. GRANS. 0.0 0.0 - 0.5 K/UL   METABOLIC PANEL, COMPREHENSIVE    Collection Time: 04/27/21 11:48 AM   Result Value Ref Range    Sodium 135 (L) 136 - 145 mmol/L    Potassium 3.4 (L) 3.5 - 5.1 mmol/L    Chloride 100 98 - 107 mmol/L    CO2 30 21 - 32 mmol/L    Anion gap 5 (L) 7 - 16 mmol/L    Glucose 176 (H) 65 - 100 mg/dL    BUN 13 8 - 23 MG/DL    Creatinine 0.92 0.6 - 1.0 MG/DL    GFR est AA >60 >60 ml/min/1.73m2    GFR est non-AA >60 >60 ml/min/1.73m2    Calcium 8.8 8.3 - 10.4 MG/DL    Bilirubin, total 0.7 0.2 - 1.1 MG/DL    ALT (SGPT) 14 12 - 65 U/L    AST (SGOT) 19 15 - 37 U/L    Alk.  phosphatase 117 50 - 136 U/L    Protein, total 8.5 (H) 6.3 - 8.2 g/dL    Albumin 3.2 3.2 - 4.6 g/dL    Globulin 5.3 (H) 2.3 - 3.5 g/dL    A-G Ratio 0.6 (L) 1.2 - 3.5     LIPASE    Collection Time: 04/27/21 11:48 AM   Result Value Ref Range    Lipase 44 (L) 73 - 393 U/L   MAGNESIUM    Collection Time: 04/27/21 11:48 AM   Result Value Ref Range    Magnesium 1.9 1.8 - 2.4 mg/dL   TROPONIN-HIGH SENSITIVITY    Collection Time: 04/27/21  1:51 PM   Result Value Ref Range    Troponin-High Sensitivity 10.2 0 - 14 pg/mL

## 2021-10-12 ENCOUNTER — APPOINTMENT (OUTPATIENT)
Dept: GENERAL RADIOLOGY | Age: 86
End: 2021-10-12
Attending: EMERGENCY MEDICINE
Payer: MEDICARE

## 2021-10-12 ENCOUNTER — HOSPITAL ENCOUNTER (EMERGENCY)
Age: 86
Discharge: HOME | End: 2021-10-13
Attending: EMERGENCY MEDICINE
Payer: MEDICARE

## 2021-10-12 DIAGNOSIS — I10 HYPERTENSION, UNSPECIFIED TYPE: Primary | ICD-10-CM

## 2021-10-12 DIAGNOSIS — R60.9 PERIPHERAL EDEMA: ICD-10-CM

## 2021-10-12 DIAGNOSIS — R07.9 CHEST PAIN, UNSPECIFIED TYPE: ICD-10-CM

## 2021-10-12 LAB
ALBUMIN SERPL-MCNC: 3 G/DL (ref 3.2–4.6)
ALBUMIN/GLOB SERPL: 0.6 {RATIO} (ref 1.2–3.5)
ALP SERPL-CCNC: 136 U/L (ref 50–136)
ALT SERPL-CCNC: 16 U/L (ref 12–65)
ANION GAP SERPL CALC-SCNC: 5 MMOL/L (ref 7–16)
AST SERPL-CCNC: 21 U/L (ref 15–37)
BASOPHILS # BLD: 0 K/UL (ref 0–0.2)
BASOPHILS NFR BLD: 1 % (ref 0–2)
BILIRUB SERPL-MCNC: 0.6 MG/DL (ref 0.2–1.1)
BNP SERPL-MCNC: 611 PG/ML
BUN SERPL-MCNC: 13 MG/DL (ref 8–23)
CALCIUM SERPL-MCNC: 8.8 MG/DL (ref 8.3–10.4)
CHLORIDE SERPL-SCNC: 96 MMOL/L (ref 98–107)
CO2 SERPL-SCNC: 32 MMOL/L (ref 21–32)
CREAT SERPL-MCNC: 0.99 MG/DL (ref 0.6–1)
DIFFERENTIAL METHOD BLD: ABNORMAL
EOSINOPHIL # BLD: 0.2 K/UL (ref 0–0.8)
EOSINOPHIL NFR BLD: 2 % (ref 0.5–7.8)
ERYTHROCYTE [DISTWIDTH] IN BLOOD BY AUTOMATED COUNT: 17.2 % (ref 11.9–14.6)
GLOBULIN SER CALC-MCNC: 5.4 G/DL (ref 2.3–3.5)
GLUCOSE SERPL-MCNC: 197 MG/DL (ref 65–100)
HCT VFR BLD AUTO: 37.9 % (ref 35.8–46.3)
HGB BLD-MCNC: 13.7 G/DL (ref 11.7–15.4)
IMM GRANULOCYTES # BLD AUTO: 0 K/UL (ref 0–0.5)
IMM GRANULOCYTES NFR BLD AUTO: 1 % (ref 0–5)
LYMPHOCYTES # BLD: 2.6 K/UL (ref 0.5–4.6)
LYMPHOCYTES NFR BLD: 39 % (ref 13–44)
MAGNESIUM SERPL-MCNC: 1.8 MG/DL (ref 1.8–2.4)
MCH RBC QN AUTO: 35.9 PG (ref 26.1–32.9)
MCHC RBC AUTO-ENTMCNC: 36.1 G/DL (ref 31.4–35)
MCV RBC AUTO: 99.2 FL (ref 79.6–97.8)
MONOCYTES # BLD: 0.6 K/UL (ref 0.1–1.3)
MONOCYTES NFR BLD: 10 % (ref 4–12)
NEUTS SEG # BLD: 3.2 K/UL (ref 1.7–8.2)
NEUTS SEG NFR BLD: 48 % (ref 43–78)
NRBC # BLD: 0 K/UL (ref 0–0.2)
PLATELET # BLD AUTO: 240 K/UL (ref 150–450)
PMV BLD AUTO: 10.6 FL (ref 9.4–12.3)
POTASSIUM SERPL-SCNC: 3.3 MMOL/L (ref 3.5–5.1)
PROT SERPL-MCNC: 8.4 G/DL (ref 6.3–8.2)
RBC # BLD AUTO: 3.82 M/UL (ref 4.05–5.2)
SODIUM SERPL-SCNC: 133 MMOL/L (ref 136–145)
TROPONIN-HIGH SENSITIVITY: 12.5 PG/ML (ref 0–14)
TROPONIN-HIGH SENSITIVITY: 14 PG/ML (ref 0–14)
WBC # BLD AUTO: 6.6 K/UL (ref 4.3–11.1)

## 2021-10-12 PROCEDURE — 83880 ASSAY OF NATRIURETIC PEPTIDE: CPT

## 2021-10-12 PROCEDURE — 71045 X-RAY EXAM CHEST 1 VIEW: CPT

## 2021-10-12 PROCEDURE — 74011250636 HC RX REV CODE- 250/636: Performed by: EMERGENCY MEDICINE

## 2021-10-12 PROCEDURE — 93005 ELECTROCARDIOGRAM TRACING: CPT | Performed by: EMERGENCY MEDICINE

## 2021-10-12 PROCEDURE — 96375 TX/PRO/DX INJ NEW DRUG ADDON: CPT

## 2021-10-12 PROCEDURE — 99285 EMERGENCY DEPT VISIT HI MDM: CPT

## 2021-10-12 PROCEDURE — 83735 ASSAY OF MAGNESIUM: CPT

## 2021-10-12 PROCEDURE — 85025 COMPLETE CBC W/AUTO DIFF WBC: CPT

## 2021-10-12 PROCEDURE — 96374 THER/PROPH/DIAG INJ IV PUSH: CPT

## 2021-10-12 PROCEDURE — 80053 COMPREHEN METABOLIC PANEL: CPT

## 2021-10-12 PROCEDURE — 84484 ASSAY OF TROPONIN QUANT: CPT

## 2021-10-12 PROCEDURE — 74011000250 HC RX REV CODE- 250: Performed by: EMERGENCY MEDICINE

## 2021-10-12 RX ORDER — SODIUM CHLORIDE 0.9 % (FLUSH) 0.9 %
5-10 SYRINGE (ML) INJECTION EVERY 8 HOURS
Status: DISCONTINUED | OUTPATIENT
Start: 2021-10-12 | End: 2021-10-13 | Stop reason: HOSPADM

## 2021-10-12 RX ORDER — ENALAPRILAT 1.25 MG/ML
1.25 INJECTION INTRAVENOUS
Status: COMPLETED | OUTPATIENT
Start: 2021-10-12 | End: 2021-10-12

## 2021-10-12 RX ORDER — FUROSEMIDE 10 MG/ML
20 INJECTION INTRAMUSCULAR; INTRAVENOUS
Status: COMPLETED | OUTPATIENT
Start: 2021-10-12 | End: 2021-10-12

## 2021-10-12 RX ORDER — FUROSEMIDE 10 MG/ML
40 INJECTION INTRAMUSCULAR; INTRAVENOUS
Status: DISCONTINUED | OUTPATIENT
Start: 2021-10-12 | End: 2021-10-12

## 2021-10-12 RX ORDER — NITROGLYCERIN 0.4 MG/1
0.4 TABLET SUBLINGUAL ONCE
Status: DISCONTINUED | OUTPATIENT
Start: 2021-10-12 | End: 2021-10-12

## 2021-10-12 RX ORDER — SODIUM CHLORIDE 0.9 % (FLUSH) 0.9 %
5-10 SYRINGE (ML) INJECTION AS NEEDED
Status: DISCONTINUED | OUTPATIENT
Start: 2021-10-12 | End: 2021-10-13 | Stop reason: HOSPADM

## 2021-10-12 RX ADMIN — FUROSEMIDE 20 MG: 20 INJECTION, SOLUTION INTRAMUSCULAR; INTRAVENOUS at 23:24

## 2021-10-12 RX ADMIN — ENALAPRILAT 1.25 MG: 2.5 INJECTION INTRAVENOUS at 23:37

## 2021-10-12 RX ADMIN — Medication 5 ML: at 23:39

## 2021-10-13 VITALS
TEMPERATURE: 97.4 F | DIASTOLIC BLOOD PRESSURE: 77 MMHG | HEIGHT: 68 IN | HEART RATE: 59 BPM | BODY MASS INDEX: 24.25 KG/M2 | WEIGHT: 160 LBS | OXYGEN SATURATION: 99 % | SYSTOLIC BLOOD PRESSURE: 159 MMHG | RESPIRATION RATE: 20 BRPM

## 2021-10-13 LAB
ATRIAL RATE: 59 BPM
CALCULATED P AXIS, ECG09: 72 DEGREES
CALCULATED R AXIS, ECG10: -66 DEGREES
CALCULATED T AXIS, ECG11: 97 DEGREES
DIAGNOSIS, 93000: NORMAL
P-R INTERVAL, ECG05: 238 MS
Q-T INTERVAL, ECG07: 464 MS
QRS DURATION, ECG06: 120 MS
QTC CALCULATION (BEZET), ECG08: 459 MS
VENTRICULAR RATE, ECG03: 59 BPM

## 2021-10-13 NOTE — ED PROVIDER NOTES
55-year-old female with a history of hypertension, moderate aortic stenosis, atrial fibrillation on Eliquis, congestive heart failure, diabetes presents with elevated blood pressure and chest \"discomfort\" around dinnertime tonight. She did not check her blood pressure, but was told it was elevated by EMS. She reports chronic shortness of breath that is unchanged. She has increased bilateral leg swelling over \"quite some time. \"  She states chest pain has mostly resolved and now she can feel his back pain related to sitting in a chair in the waiting room. Denies any recent cough, nausea, diaphoresis, fever. Denies any missed doses of medication or changes in medications. Chest Pain (Angina)   Associated symptoms include shortness of breath. Pertinent negatives include no abdominal pain, no back pain, no cough, no fever, no headaches, no nausea and no vomiting. Past Medical History:   Diagnosis Date    Abdominal pain, epigastric     Abdominal pain, unspecified site     Anxiety state, unspecified     Aortic stenosis 6/10/2016    No sever SOB, no chest pain or syncope.  Echo showed mod AS, mean grad 22mm Hg, nl EF, mild LVH    Aortic valve disorders     Atherosclerosis of aorta (HCC)     Atrial fibrillation (HCC)     Backache, unspecified     Benign neoplasm of colon     Cervicalgia     Closed fracture of unspecified bone     Depressive disorder, not elsewhere classified     Diabetes (HCC)     Diarrhea     Disorders of magnesium metabolism     Edema     Elevated sedimentation rate     Encounter for long-term (current) use of other medications     Endocrine disease     Epistaxis     Esophageal reflux     Essential hypertension, benign     Gastrointestinal malfunction arising from mental factors     Hypertension     Hypertension-controlled, benign     Hypertonicity of bladder     Hypopotassemia     Insomnia, unspecified     Irritable bowel syndrome     Memory loss     Nausea alone     Osteoarthrosis, unspecified whether generalized or localized, unspecified site     Other ill-defined conditions(799.89)     hyperlipidemia    Other malaise and fatigue     Pain in joint, lower leg     Pain in joint, shoulder region     Postnasal drip     Rheumatoid arthritis(714.0)     Type II or unspecified type diabetes mellitus without mention of complication, not stated as uncontrolled     Unspecified constipation     Unspecified hemorrhoids with other complication     Unspecified hemorrhoids without mention of complication     Unspecified hereditary and idiopathic peripheral neuropathy     Unspecified hypothyroidism     Urgency of urination     Urinary tract infection, site not specified        Past Surgical History:   Procedure Laterality Date    HX CHOLECYSTECTOMY      HX OTHER SURGICAL      thyroidectomy    HX TONSILLECTOMY           Family History:   Problem Relation Age of Onset    Arthritis-rheumatoid Daughter     Arthritis-osteo Son        Social History     Socioeconomic History    Marital status:      Spouse name: Not on file    Number of children: Not on file    Years of education: Not on file    Highest education level: Not on file   Occupational History    Not on file   Tobacco Use    Smoking status: Never Smoker    Smokeless tobacco: Never Used   Substance and Sexual Activity    Alcohol use: No    Drug use: No    Sexual activity: Not on file   Other Topics Concern    Not on file   Social History Narrative    Not on file     Social Determinants of Health     Financial Resource Strain:     Difficulty of Paying Living Expenses:    Food Insecurity:     Worried About Running Out of Food in the Last Year:     Ran Out of Food in the Last Year:    Transportation Needs:     Lack of Transportation (Medical):      Lack of Transportation (Non-Medical):    Physical Activity:     Days of Exercise per Week:     Minutes of Exercise per Session:    Stress:     Feeling of Stress :    Social Connections:     Frequency of Communication with Friends and Family:     Frequency of Social Gatherings with Friends and Family:     Attends Oriental orthodox Services:     Active Member of Clubs or Organizations:     Attends Club or Organization Meetings:     Marital Status:    Intimate Partner Violence:     Fear of Current or Ex-Partner:     Emotionally Abused:     Physically Abused:     Sexually Abused: ALLERGIES: Codeine, Pcn [penicillins], and Beason    Review of Systems   Constitutional: Negative for fever. HENT: Negative for hearing loss. Eyes: Negative for visual disturbance. Respiratory: Positive for shortness of breath. Negative for cough. Cardiovascular: Positive for chest pain and leg swelling. Gastrointestinal: Negative for abdominal pain, diarrhea, nausea and vomiting. Musculoskeletal: Negative for back pain. Skin: Negative for rash. Neurological: Negative for headaches. Psychiatric/Behavioral: Negative for confusion. All other systems reviewed and are negative. Vitals:    10/12/21 2113   BP: (!) 179/88   Pulse: 60   Resp: 20   Temp: 97.4 °F (36.3 °C)   SpO2: 98%   Weight: 72.6 kg (160 lb)   Height: 5' 8\" (1.727 m)            Physical Exam  Vitals and nursing note reviewed. Constitutional:       Appearance: Normal appearance. She is well-developed. HENT:      Head: Normocephalic and atraumatic. Nose: Nose normal.      Mouth/Throat:      Mouth: Mucous membranes are moist.   Eyes:      Pupils: Pupils are equal, round, and reactive to light. Cardiovascular:      Rate and Rhythm: Regular rhythm. Heart sounds: Normal heart sounds. Pulmonary:      Effort: Pulmonary effort is normal.      Breath sounds: Examination of the right-lower field reveals rales. Examination of the left-lower field reveals rales. Rales present. Abdominal:      Palpations: Abdomen is soft. Tenderness: There is no abdominal tenderness. Musculoskeletal:         General: No deformity. Normal range of motion. Cervical back: Normal range of motion and neck supple. Right lower leg: Edema present. Left lower leg: Edema present. Skin:     General: Skin is warm and dry. Neurological:      General: No focal deficit present. Mental Status: She is alert. Mental status is at baseline. Psychiatric:         Mood and Affect: Mood normal.         Behavior: Behavior normal.          MDM  Number of Diagnoses or Management Options  Diagnosis management comments: Parts of this document were created using dragon voice recognition software. The chart has been reviewed but errors may still be present. I wore appropriate PPE throughout this patient's ED visit. Jennifer Gregg MD, 11:24 PM    Diuresing well with small dose of lasix, over 500cc urine. Given vasotec for afterload reduction with AS and HTN. Advised cardiology follow up    SUMMARY:     -  Left ventricle: Systolic function was at the lower limits of normal.  Ejection fraction was estimated in the range of 50 % to 55 %. There were no  regional wall motion abnormalities. There was mild concentric hypertrophy.     -  Right ventricle: The ventricle was dilated. Systolic function was normal.  Estimated peak pressure was in the range of 35-40 mmHg.     -  Inferior vena cava, hepatic veins: The respirophasic change in diameter   was  more than 50%.    -  Aortic valve: Individual leaflets not well visualized due to   calcification,  probably trileaflet. Leaflets exhibited moderately to markedly increased  thickness. There was moderate to severe stenosis. There was mild   regurgitation. The aortic valve area by the continuity equation was 1.09 cm2.     -  Mitral valve: There was mild to moderate regurgitation.     -  Tricuspid valve:  There was mild regurgitation.     SYSTEM MEASUREMENT TABLES     2D mode  Left Atrium Systolic Volume Index; Method of Disks, Biplane; 2D mode;: 43.5  ml/m2  IVS/LVPW (2D): 1  IVSd (2D): 1.3 cm  LVIDd (2D): 4.4 cm  LVIDs (2D): 3.4 cm  LVOT Area (2D): 2.5 cm2  LVPWd (2D): 1.3 cm  RVIDd (2D): 2.6 cm     Unspecified Scan Mode  Peak Grad; Mean; Antegrade Flow: 24 mm(Hg)  Vmax; Antegrade Flow: 237 cm/s  LVOT Diam: 1.8 cm     Prepared and signed by      [address] [place] [address],   Signed 26-Jun-2020 15:31:50       I discussed the results of all labs, procedures, radiographs, and treatments with the patient and available family. Treatment plan is agreed upon and the patient is ready for discharge. Questions about treatment in the ED and differential diagnosis of presenting condition were answered. Patient was given verbal discharge instructions including, but not limited to, importance of returning to the emergency department for any concern of worsening or continued symptoms. Instructions were given to follow up with a primary care provider or specialist within 1-2 days.   Adverse effects of medications, if prescribed, were discussed and patient was advised to refrain from significant physical activity until followed up by primary care physician and to not drive or operate heavy machinery after taking any sedating substances.              Amount and/or Complexity of Data Reviewed  Clinical lab tests: ordered and reviewed (Results for orders placed or performed during the hospital encounter of 10/12/21  -CBC WITH AUTOMATED DIFF       Result                      Value             Ref Range           WBC                         6.6               4.3 - 11.1 K*       RBC                         3.82 (L)          4.05 - 5.2 M*       HGB                         13.7              11.7 - 15.4 *       HCT                         37.9              35.8 - 46.3 %       MCV                         99.2 (H)          79.6 - 97.8 *       MCH                         35.9 (H)          26.1 - 32.9 *       MCHC                        36.1 (H)          31.4 - 35.0 *       RDW 17. 2 (H)          11.9 - 14.6 %       PLATELET                    240               150 - 450 K/*       MPV                         10.6              9.4 - 12.3 FL       ABSOLUTE NRBC               0.00              0.0 - 0.2 K/*       DF                          AUTOMATED                             NEUTROPHILS                 48                43 - 78 %           LYMPHOCYTES                 39                13 - 44 %           MONOCYTES                   10                4.0 - 12.0 %        EOSINOPHILS                 2                 0.5 - 7.8 %         BASOPHILS                   1                 0.0 - 2.0 %         IMMATURE GRANULOCYTES       1                 0.0 - 5.0 %         ABS. NEUTROPHILS            3.2               1.7 - 8.2 K/*       ABS. LYMPHOCYTES            2.6               0.5 - 4.6 K/*       ABS. MONOCYTES              0.6               0.1 - 1.3 K/*       ABS. EOSINOPHILS            0.2               0.0 - 0.8 K/*       ABS. BASOPHILS              0.0               0.0 - 0.2 K/*       ABS. IMM.  GRANS.            0.0               0.0 - 0.5 K/*  -METABOLIC PANEL, COMPREHENSIVE       Result                      Value             Ref Range           Sodium                      133 (L)           136 - 145 mm*       Potassium                   3.3 (L)           3.5 - 5.1 mm*       Chloride                    96 (L)            98 - 107 mmo*       CO2                         32                21 - 32 mmol*       Anion gap                   5 (L)             7 - 16 mmol/L       Glucose                     197 (H)           65 - 100 mg/*       BUN                         13                8 - 23 MG/DL        Creatinine                  0.99              0.6 - 1.0 MG*       GFR est AA                  >60               >60 ml/min/1*       GFR est non-AA              56 (L)            >60 ml/min/1*       Calcium                     8.8               8.3 - 10.4 M*       Bilirubin, total 0.6               0.2 - 1.1 MG*       ALT (SGPT)                  16                12 - 65 U/L         AST (SGOT)                  21                15 - 37 U/L         Alk.  phosphatase            136               50 - 136 U/L        Protein, total              8.4 (H)           6.3 - 8.2 g/*       Albumin                     3.0 (L)           3.2 - 4.6 g/*       Globulin                    5.4 (H)           2.3 - 3.5 g/*       A-G Ratio                   0.6 (L)           1.2 - 3.5      -MAGNESIUM       Result                      Value             Ref Range           Magnesium                   1.8               1.8 - 2.4 mg*  -TROPONIN-HIGH SENSITIVITY       Result                      Value             Ref Range           Troponin-High Sensitiv*     12.5              0 - 14 pg/mL   -NT-PRO BNP       Result                      Value             Ref Range           NT pro-BNP                  611 (H)           <450 PG/ML     -EKG, 12 LEAD, INITIAL       Result                      Value             Ref Range           Ventricular Rate            59                BPM                 Atrial Rate                 59                BPM                 P-R Interval                238               ms                  QRS Duration                120               ms                  Q-T Interval                464               ms                  QTC Calculation (Bezet)     459               ms                  Calculated P Axis           72                degrees             Calculated R Axis           -66               degrees             Calculated T Axis           97                degrees             Diagnosis                                                     Sinus bradycardia with 1st degree A-V block with occasional Premature    ventricular complexes and Fusion complexes   Left anterior fascicular block   Left ventricular hypertrophy with QRS widening and repolarization abnormality   Abnormal ECG   When compared with ECG of 27-APR-2021 11:46,   Fusion complexes are now Present     )  Tests in the radiology section of CPT®: ordered and reviewed (XR CHEST PORT    Result Date: 10/12/2021  EXAM: XR CHEST PORT HISTORY: Syncope. TECHNIQUE: Frontal chest. COMPARISON: 6/28/2020 FINDINGS: There is mild cardiomegaly and mild pulmonary vascular congestion. There is no consolidation, pleural effusion, or pneumothorax. There is a moderate-sized hiatal hernia. No significant osseous abnormalities are observed. Findings described above suggest mild CHF.  There is no consolidation.     )  Tests in the medicine section of CPT®: reviewed and ordered           EKG    Date/Time: 10/12/2021 11:28 PM  Performed by: Gino Sotelo MD  Authorized by: Gino Sotelo MD     ECG reviewed by ED Physician in the absence of a cardiologist: yes    Previous ECG:     Previous ECG:  Compared to current    Comparison ECG info:  New t wave inversions V5-V6    Similarity:  Changes noted  Interpretation:     Interpretation: abnormal    Rate:     ECG rate:  59    ECG rate assessment: bradycardic    Rhythm:     Rhythm: sinus bradycardia    Ectopy:     Ectopy: PVCs    Conduction:     Conduction: abnormal      Abnormal conduction: LAFB    Other findings:     Other findings: LVH

## 2021-10-13 NOTE — DISCHARGE INSTRUCTIONS
Use compression stockings, elevate legs. Follow-up with your cardiologist for further evaluation of uncontrolled blood pressure and leg swelling. Return for worsening or concerning symptoms.

## 2021-10-13 NOTE — ED TRIAGE NOTES
Arrives with face mask in place. Arrives via [facility] from [address] [place] [address] with reports chest pain radiating across entire chest. Onset while getting ready for bed tonight. States intermittent over past week with exertion. Associated shortness of breath. Reports chest pain relieved with rest. Denies cough, n/v/d. Also reports pitting edema bilateral LEs. Denies hx CHF.  with EMS, HX DM.

## 2021-10-13 NOTE — ED NOTES
I have reviewed discharge instructions with the patient. The patient verbalized understanding. Patient left ED via Discharge Method: stretcher to SNF with [facility] St[facility]. Opportunity for questions and clarification provided. Patient given 0 scripts. To continue your aftercare when you leave the hospital, you may receive an automated call from our care team to check in on how you are doing. This is a free service and part of our promise to provide the best care and service to meet your aftercare needs.  If you have questions, or wish to unsubscribe from this service please call [phone]. Thank you for Choosing our [address] [place] [address] Emergency Department.

## 2021-10-29 PROBLEM — R07.9 CHEST PAIN: Status: ACTIVE | Noted: 2021-10-29

## 2022-03-18 PROBLEM — E87.20 LACTIC ACIDOSIS: Status: ACTIVE | Noted: 2020-06-29

## 2022-03-18 PROBLEM — R19.7 DIARRHEA: Status: ACTIVE | Noted: 2020-06-28

## 2022-03-18 PROBLEM — E11.21 TYPE 2 DIABETES WITH NEPHROPATHY (HCC): Status: ACTIVE | Noted: 2019-01-17

## 2022-03-18 PROBLEM — J18.9 COMMUNITY ACQUIRED PNEUMONIA: Status: ACTIVE | Noted: 2020-06-26

## 2022-03-19 PROBLEM — Z20.822 PERSON UNDER INVESTIGATION FOR COVID-19: Status: ACTIVE | Noted: 2020-06-28

## 2022-03-19 PROBLEM — J18.9 CAP (COMMUNITY ACQUIRED PNEUMONIA): Status: ACTIVE | Noted: 2020-07-03

## 2022-03-19 PROBLEM — R00.2 PALPITATIONS: Status: ACTIVE | Noted: 2018-12-17

## 2022-03-19 PROBLEM — E03.9 ACQUIRED HYPOTHYROIDISM: Status: ACTIVE | Noted: 2017-07-17

## 2022-03-19 PROBLEM — I48.91 ATRIAL FIBRILLATION WITH RVR (HCC): Status: ACTIVE | Noted: 2020-06-23

## 2022-03-19 PROBLEM — K52.9 COLITIS: Status: ACTIVE | Noted: 2020-06-28

## 2022-03-19 PROBLEM — N32.81 OAB (OVERACTIVE BLADDER): Status: ACTIVE | Noted: 2017-07-17

## 2022-03-19 PROBLEM — R07.9 CHEST PAIN: Status: ACTIVE | Noted: 2021-10-29

## 2022-03-19 PROBLEM — A41.9 SEPSIS (HCC): Status: ACTIVE | Noted: 2020-06-28

## 2022-03-20 PROBLEM — F32.A MILD DEPRESSION: Status: ACTIVE | Noted: 2019-01-28

## 2022-05-11 ENCOUNTER — APPOINTMENT (OUTPATIENT)
Dept: GENERAL RADIOLOGY | Age: 87
End: 2022-05-11
Attending: EMERGENCY MEDICINE
Payer: MEDICARE

## 2022-05-11 ENCOUNTER — HOSPITAL ENCOUNTER (EMERGENCY)
Age: 87
Discharge: HOME | End: 2022-05-11
Attending: EMERGENCY MEDICINE
Payer: MEDICARE

## 2022-05-11 VITALS
BODY MASS INDEX: 24.25 KG/M2 | WEIGHT: 160 LBS | RESPIRATION RATE: 18 BRPM | HEART RATE: 66 BPM | DIASTOLIC BLOOD PRESSURE: 66 MMHG | HEIGHT: 68 IN | SYSTOLIC BLOOD PRESSURE: 145 MMHG | TEMPERATURE: 97.5 F | OXYGEN SATURATION: 97 %

## 2022-05-11 DIAGNOSIS — R07.9 ACUTE CHEST PAIN: Primary | ICD-10-CM

## 2022-05-11 LAB
ALBUMIN SERPL-MCNC: 3 G/DL (ref 3.2–4.6)
ALBUMIN/GLOB SERPL: 0.6 {RATIO} (ref 1.2–3.5)
ALP SERPL-CCNC: 113 U/L (ref 50–136)
ALT SERPL-CCNC: 15 U/L (ref 12–65)
ANION GAP SERPL CALC-SCNC: 8 MMOL/L (ref 7–16)
AST SERPL-CCNC: 27 U/L (ref 15–37)
BASOPHILS # BLD: 0 K/UL (ref 0–0.2)
BASOPHILS NFR BLD: 1 % (ref 0–2)
BILIRUB SERPL-MCNC: 0.7 MG/DL (ref 0.2–1.1)
BUN SERPL-MCNC: 14 MG/DL (ref 8–23)
CALCIUM SERPL-MCNC: 8.9 MG/DL (ref 8.3–10.4)
CHLORIDE SERPL-SCNC: 94 MMOL/L (ref 98–107)
CO2 SERPL-SCNC: 32 MMOL/L (ref 21–32)
CREAT SERPL-MCNC: 1.2 MG/DL (ref 0.6–1)
DIFFERENTIAL METHOD BLD: NORMAL
EOSINOPHIL # BLD: 0.1 K/UL (ref 0–0.8)
EOSINOPHIL NFR BLD: 1 % (ref 0.5–7.8)
ERYTHROCYTE [DISTWIDTH] IN BLOOD BY AUTOMATED COUNT: 14.3 % (ref 11.9–14.6)
GLOBULIN SER CALC-MCNC: 5.4 G/DL (ref 2.3–3.5)
GLUCOSE SERPL-MCNC: 209 MG/DL (ref 65–100)
HCT VFR BLD AUTO: 40.9 % (ref 35.8–46.3)
HGB BLD-MCNC: 13.8 G/DL (ref 11.7–15.4)
IMM GRANULOCYTES # BLD AUTO: 0 K/UL (ref 0–0.5)
IMM GRANULOCYTES NFR BLD AUTO: 0 % (ref 0–5)
LIPASE SERPL-CCNC: 44 U/L (ref 73–393)
LYMPHOCYTES # BLD: 2 K/UL (ref 0.5–4.6)
LYMPHOCYTES NFR BLD: 29 % (ref 13–44)
MAGNESIUM SERPL-MCNC: 1.7 MG/DL (ref 1.8–2.4)
MCH RBC QN AUTO: 31.1 PG (ref 26.1–32.9)
MCHC RBC AUTO-ENTMCNC: 33.7 G/DL (ref 31.4–35)
MCV RBC AUTO: 92.1 FL (ref 79.6–97.8)
MONOCYTES # BLD: 0.6 K/UL (ref 0.1–1.3)
MONOCYTES NFR BLD: 9 % (ref 4–12)
NEUTS SEG # BLD: 4.2 K/UL (ref 1.7–8.2)
NEUTS SEG NFR BLD: 60 % (ref 43–78)
NRBC # BLD: 0 K/UL (ref 0–0.2)
PLATELET # BLD AUTO: 240 K/UL (ref 150–450)
PMV BLD AUTO: 10.6 FL (ref 9.4–12.3)
POTASSIUM SERPL-SCNC: 3.6 MMOL/L (ref 3.5–5.1)
PROT SERPL-MCNC: 8.4 G/DL (ref 6.3–8.2)
RBC # BLD AUTO: 4.44 M/UL (ref 4.05–5.2)
SODIUM SERPL-SCNC: 134 MMOL/L (ref 136–145)
TROPONIN-HIGH SENSITIVITY: 12.2 PG/ML (ref 0–14)
TROPONIN-HIGH SENSITIVITY: 13 PG/ML (ref 0–14)
WBC # BLD AUTO: 7 K/UL (ref 4.3–11.1)

## 2022-05-11 PROCEDURE — 99285 EMERGENCY DEPT VISIT HI MDM: CPT

## 2022-05-11 PROCEDURE — 83690 ASSAY OF LIPASE: CPT

## 2022-05-11 PROCEDURE — 93005 ELECTROCARDIOGRAM TRACING: CPT

## 2022-05-11 PROCEDURE — 84484 ASSAY OF TROPONIN QUANT: CPT

## 2022-05-11 PROCEDURE — 80053 COMPREHEN METABOLIC PANEL: CPT

## 2022-05-11 PROCEDURE — 83735 ASSAY OF MAGNESIUM: CPT

## 2022-05-11 PROCEDURE — 71045 X-RAY EXAM CHEST 1 VIEW: CPT

## 2022-05-11 PROCEDURE — 85025 COMPLETE CBC W/AUTO DIFF WBC: CPT

## 2022-05-11 RX ORDER — SUCRALFATE 1 G/10ML
SUSPENSION ORAL
Qty: 414 ML | Refills: 3 | Status: SHIPPED | OUTPATIENT
Start: 2022-05-11

## 2022-05-11 NOTE — DISCHARGE INSTRUCTIONS
If not taking Carafate, restart it. Can consider following up with primary care doctor. Number for cardiology as well. You may discuss whether or not further testing would be indicated if Ms. Espinoza Chao has further episodes of pain.

## 2022-05-11 NOTE — ED PROVIDER NOTES
This is a 57-year-old female brought in by EMS. She is a resident at assisted living. She started complaining of chest pain this morning. Evidently has been going a few hours but she cannot quantify when it started. Daughter arrives and was not told exactly when pain started either. Patient points to substernal area. Cannot really say whether it radiated or any other symptoms. Daughter states there was no vomiting or diaphoresis or fever cough or complaints of shortness of breath. Daughter states she does not any stents and has never had a heart attack. Review of records reveals hypertension diabetes and some aortic stenosis. There was a recent visit with a cardiologist who felt that she had moderate aortic stenosis. Not a surgical candidate nor with surgery decayed at this point. Patient is on anticoagulant due to atrial fibrillation. No history of DVT or PE. Does take some antacids for GI problems. Patient states no or very minimal pain at this point. The history is provided by the patient, a relative, the nursing home and the EMS personnel. Chest Pain   This is a new problem. The current episode started 3 to 5 hours ago. The problem has been resolved. The problem occurs constantly. The pain is moderate. The quality of the pain is described as pressure-like. Pertinent negatives include no abdominal pain, no diaphoresis, no fever, no shortness of breath and no vomiting. She has tried nothing for the symptoms. Risk factors include hypertension, diabetes mellitus and cardiac disease. Her past medical history is significant for DM and HTN. Her past medical history does not include DVT or PE. Procedural history includes cardiac catheterization. Pertinent negatives include no cardiac stents.        Past Medical History:   Diagnosis Date    Abdominal pain, epigastric     Abdominal pain, unspecified site     Anxiety state, unspecified     Aortic stenosis 6/10/2016    No sever SOB, no chest pain or syncope.  Echo showed mod AS, mean grad 22mm Hg, nl EF, mild LVH    Aortic valve disorders     Atherosclerosis of aorta (HCC)     Atrial fibrillation (HCC)     Backache, unspecified     Benign neoplasm of colon     Cervicalgia     Closed fracture of unspecified bone     Depressive disorder, not elsewhere classified     Diabetes (HCC)     Diarrhea     Disorders of magnesium metabolism     Edema     Elevated sedimentation rate     Encounter for long-term (current) use of other medications     Endocrine disease     Epistaxis     Esophageal reflux     Essential hypertension, benign     Gastrointestinal malfunction arising from mental factors     Hypertension     Hypertension-controlled, benign     Hypertonicity of bladder     Hypopotassemia     Insomnia, unspecified     Irritable bowel syndrome     Memory loss     Nausea alone     Osteoarthrosis, unspecified whether generalized or localized, unspecified site     Other ill-defined conditions(799.89)     hyperlipidemia    Other malaise and fatigue     Pain in joint, lower leg     Pain in joint, shoulder region     Postnasal drip     Rheumatoid arthritis(714.0)     Type II or unspecified type diabetes mellitus without mention of complication, not stated as uncontrolled     Unspecified constipation     Unspecified hemorrhoids with other complication     Unspecified hemorrhoids without mention of complication     Unspecified hereditary and idiopathic peripheral neuropathy     Unspecified hypothyroidism     Urgency of urination     Urinary tract infection, site not specified        Past Surgical History:   Procedure Laterality Date    HX CHOLECYSTECTOMY      HX OTHER SURGICAL      thyroidectomy    HX TONSILLECTOMY           Family History:   Problem Relation Age of Onset    Arthritis-rheumatoid Daughter     OSTEOARTHRITIS Son        Social History     Socioeconomic History    Marital status:      Spouse name: Not on file  Number of children: Not on file    Years of education: Not on file    Highest education level: Not on file   Occupational History    Not on file   Tobacco Use    Smoking status: Never Smoker    Smokeless tobacco: Never Used   Substance and Sexual Activity    Alcohol use: No    Drug use: No    Sexual activity: Not on file   Other Topics Concern    Not on file   Social History Narrative    Not on file     Social Determinants of Health     Financial Resource Strain:     Difficulty of Paying Living Expenses: Not on file   Food Insecurity:     Worried About Running Out of Food in the Last Year: Not on file    Wendy of Food in the Last Year: Not on file   Transportation Needs:     Lack of Transportation (Medical): Not on file    Lack of Transportation (Non-Medical): Not on file   Physical Activity:     Days of Exercise per Week: Not on file    Minutes of Exercise per Session: Not on file   Stress:     Feeling of Stress : Not on file   Social Connections:     Frequency of Communication with Friends and Family: Not on file    Frequency of Social Gatherings with Friends and Family: Not on file    Attends Religion Services: Not on file    Active Member of [address] [place] [address] or Organizations: Not on file    Attends Club or Organization Meetings: Not on file    Marital Status: Not on file   Intimate Partner Violence:     Fear of Current or Ex-Partner: Not on file    Emotionally Abused: Not on file    Physically Abused: Not on file    Sexually Abused: Not on file   Housing Stability:     Unable to Pay for Housing in the Last Year: Not on file    Number of [place] in the Last Year: Not on file    Unstable Housing in the Last Year: Not on file         ALLERGIES: Codeine, Pcn [penicillins], and [address]    Review of Systems   Constitutional: Negative for diaphoresis and fever. Respiratory: Negative for shortness of breath. Cardiovascular: Positive for chest pain.    Gastrointestinal: Negative for abdominal pain and vomiting. All other systems reviewed and are negative. Vitals:    05/11/22 1414 05/11/22 1456   BP: (!) 157/79 (!) 145/66   Pulse: 67 66   Resp: 20 18   Temp: 98.1 °F (36.7 °C) 97.5 °F (36.4 °C)   SpO2: 92% 97%   Weight:  72.6 kg (160 lb)   Height:  5' 8\" (1.727 m)            Physical Exam  Vitals and nursing note reviewed. Constitutional:       General: She is not in acute distress. Appearance: She is well-developed. HENT:      Head: Normocephalic and atraumatic. Right Ear: External ear normal.      Left Ear: External ear normal.      Mouth/Throat:      Pharynx: No oropharyngeal exudate. Eyes:      General: No scleral icterus. Extraocular Movements: Extraocular movements intact. Conjunctiva/sclera: Conjunctivae normal.      Pupils: Pupils are equal, round, and reactive to light. Cardiovascular:      Rate and Rhythm: Normal rate. Rhythm irregular. Heart sounds: No murmur heard. Pulmonary:      Effort: No respiratory distress. Breath sounds: Normal breath sounds. Abdominal:      General: Bowel sounds are normal.      Palpations: Abdomen is soft. There is no mass. Tenderness: There is no abdominal tenderness. There is no guarding or rebound. Hernia: No hernia is present. Musculoskeletal:         General: No swelling or tenderness. Right lower leg: No edema. Left lower leg: No edema. Skin:     General: Skin is warm and dry. Neurological:      Mental Status: She is alert and oriented to person, place, and time. Gait: Gait normal.      Comments: Nl speech   Psychiatric:         Speech: Speech normal.          MDM  Number of Diagnoses or Management Options  Diagnosis management comments: Substernal chest pain that is essentially resolved and a 80year-old with history of aortic stenosis but no obstructive disease. EKG and serial troponins. Check chest x-ray. Patient receiving Eliquis. I doubt pulmonary embolism. Possibly chest wall pain or GI origin. Amount and/or Complexity of Data Reviewed  Clinical lab tests: ordered and reviewed  Tests in the radiology section of CPT®: ordered and reviewed  Tests in the medicine section of CPT®: ordered and reviewed  Review and summarize past medical records: yes  Independent visualization of images, tracings, or specimens: yes (My interpretation EKG reveals normal sinus rhythm at 65. Occasional PVC. Left bundle branch block. No ST elevation.   Normal QT interval.)    Risk of Complications, Morbidity, and/or Mortality  Presenting problems: moderate  Diagnostic procedures: minimal  Management options: low           Procedures

## 2022-05-11 NOTE — ED TRIAGE NOTES
Pt arrives via [address] [place] [address] Avenue from [facility], called out for sick person. Pt has complained of having intermittent chest pain and \"I dont feel good, I'm 89. \"  Pt has unremarkable 12-lead per EMS.

## 2022-05-12 LAB
ATRIAL RATE: 68 BPM
CALCULATED P AXIS, ECG09: -21 DEGREES
CALCULATED R AXIS, ECG10: -59 DEGREES
CALCULATED T AXIS, ECG11: 112 DEGREES
DIAGNOSIS, 93000: NORMAL
P-R INTERVAL, ECG05: 250 MS
Q-T INTERVAL, ECG07: 448 MS
QRS DURATION, ECG06: 122 MS
QTC CALCULATION (BEZET), ECG08: 466 MS
VENTRICULAR RATE, ECG03: 65 BPM

## 2022-06-11 ENCOUNTER — HOSPITAL ENCOUNTER (INPATIENT)
Age: 87
LOS: 8 days | Discharge: NURSING, REHAB OR LONG-TERM CARE | DRG: 872 | End: 2022-06-19
Attending: EMERGENCY MEDICINE
Payer: MEDICARE

## 2022-06-11 ENCOUNTER — APPOINTMENT (OUTPATIENT)
Dept: GENERAL RADIOLOGY | Age: 87
DRG: 872 | End: 2022-06-11
Payer: MEDICARE

## 2022-06-11 ENCOUNTER — APPOINTMENT (OUTPATIENT)
Dept: CT IMAGING | Age: 87
DRG: 872 | End: 2022-06-11
Payer: MEDICARE

## 2022-06-11 DIAGNOSIS — A41.9 SEPTICEMIA (HCC): Primary | ICD-10-CM

## 2022-06-11 DIAGNOSIS — A41.9 SEVERE SEPSIS (HCC): ICD-10-CM

## 2022-06-11 DIAGNOSIS — M54.2 CERVICALGIA: ICD-10-CM

## 2022-06-11 DIAGNOSIS — R65.20 SEVERE SEPSIS (HCC): ICD-10-CM

## 2022-06-11 PROBLEM — E03.9 ACQUIRED HYPOTHYROIDISM: Status: ACTIVE | Noted: 2017-07-17

## 2022-06-11 PROBLEM — N32.81 OAB (OVERACTIVE BLADDER): Status: ACTIVE | Noted: 2017-07-17

## 2022-06-11 PROBLEM — E11.21 TYPE 2 DIABETES WITH NEPHROPATHY (HCC): Status: ACTIVE | Noted: 2019-01-17

## 2022-06-11 PROBLEM — N39.0 UTI (URINARY TRACT INFECTION): Status: ACTIVE | Noted: 2022-06-11

## 2022-06-11 PROBLEM — R74.01 TRANSAMINITIS: Status: ACTIVE | Noted: 2022-01-01

## 2022-06-11 LAB
ALBUMIN SERPL-MCNC: 2.5 G/DL (ref 3.2–4.6)
ALBUMIN/GLOB SERPL: 0.5 {RATIO} (ref 1.2–3.5)
ALP SERPL-CCNC: 236 U/L (ref 50–136)
ALT SERPL-CCNC: 141 U/L (ref 12–65)
AMMONIA PLAS-SCNC: <10 UMOL/L (ref 11–32)
AMORPH CRY URNS QL MICRO: ABNORMAL
ANION GAP SERPL CALC-SCNC: 11 MMOL/L (ref 7–16)
APPEARANCE UR: CLEAR
APTT PPP: 42.1 SEC (ref 24.1–35.1)
AST SERPL-CCNC: 160 U/L (ref 15–37)
BACTERIA URNS QL MICRO: ABNORMAL /HPF
BASOPHILS # BLD: 0 K/UL (ref 0–0.2)
BASOPHILS NFR BLD: 0 % (ref 0–2)
BILIRUB SERPL-MCNC: 4.4 MG/DL (ref 0.2–1.1)
BILIRUB UR QL: ABNORMAL
BILIRUB UR QL: ABNORMAL
BUN SERPL-MCNC: 24 MG/DL (ref 8–23)
CALCIUM SERPL-MCNC: 9 MG/DL (ref 8.3–10.4)
CHLORIDE SERPL-SCNC: 87 MMOL/L (ref 98–107)
CO2 SERPL-SCNC: 30 MMOL/L (ref 21–32)
COLOR UR: YELLOW
CREAT SERPL-MCNC: 1.6 MG/DL (ref 0.6–1)
DIFFERENTIAL METHOD BLD: ABNORMAL
EOSINOPHIL # BLD: 0.1 K/UL (ref 0–0.8)
EOSINOPHIL NFR BLD: 1 % (ref 0.5–7.8)
EPI CELLS #/AREA URNS HPF: ABNORMAL /HPF
ERYTHROCYTE [DISTWIDTH] IN BLOOD BY AUTOMATED COUNT: 14.2 % (ref 11.9–14.6)
EST. AVERAGE GLUCOSE BLD GHB EST-MCNC: 148 MG/DL
GLOBULIN SER CALC-MCNC: 5.1 G/DL (ref 2.3–3.5)
GLUCOSE BLD STRIP.AUTO-MCNC: 194 MG/DL (ref 65–100)
GLUCOSE SERPL-MCNC: 236 MG/DL (ref 65–100)
GLUCOSE UR QL STRIP.AUTO: NEGATIVE MG/DL
GLUCOSE UR STRIP.AUTO-MCNC: 100 MG/DL
HAV IGM SER QL: NONREACTIVE
HBA1C MFR BLD: 6.8 % (ref 4.2–6.3)
HBV CORE IGM SER QL: NONREACTIVE
HBV SURFACE AG SER QL: NONREACTIVE
HCT VFR BLD AUTO: 37.2 % (ref 35.8–46.3)
HCV AB SER QL: NONREACTIVE
HGB BLD-MCNC: 12.5 G/DL (ref 11.7–15.4)
HGB UR QL STRIP: NEGATIVE
IMM GRANULOCYTES # BLD AUTO: 0.1 K/UL (ref 0–0.5)
IMM GRANULOCYTES NFR BLD AUTO: 1 % (ref 0–5)
INR PPP: 2.1
KETONES UR QL STRIP.AUTO: ABNORMAL MG/DL
KETONES UR-MCNC: ABNORMAL MG/DL
LACTATE SERPL-SCNC: 0.9 MMOL/L (ref 0.4–2)
LACTATE SERPL-SCNC: 3.4 MMOL/L (ref 0.4–2)
LEUKOCYTE ESTERASE UR QL STRIP.AUTO: ABNORMAL
LEUKOCYTE ESTERASE UR QL STRIP: ABNORMAL
LIPASE SERPL-CCNC: 49 U/L (ref 73–393)
LYMPHOCYTES # BLD: 0.8 K/UL (ref 0.5–4.6)
LYMPHOCYTES NFR BLD: 6 % (ref 13–44)
MAGNESIUM SERPL-MCNC: 1.7 MG/DL (ref 1.8–2.4)
MCH RBC QN AUTO: 30.6 PG (ref 26.1–32.9)
MCHC RBC AUTO-ENTMCNC: 33.6 G/DL (ref 31.4–35)
MCV RBC AUTO: 91.2 FL (ref 79.6–97.8)
MONOCYTES # BLD: 0.4 K/UL (ref 0.1–1.3)
MONOCYTES NFR BLD: 3 % (ref 4–12)
NEUTS SEG # BLD: 11.2 K/UL (ref 1.7–8.2)
NEUTS SEG NFR BLD: 89 % (ref 43–78)
NITRITE UR QL STRIP.AUTO: POSITIVE
NITRITE UR QL: NEGATIVE
NRBC # BLD: 0 K/UL (ref 0–0.2)
PH UR STRIP: 5.5 [PH] (ref 5–9)
PH UR: 5 [PH] (ref 5–9)
PLATELET # BLD AUTO: 262 K/UL (ref 150–450)
PMV BLD AUTO: 9.8 FL (ref 9.4–12.3)
POTASSIUM SERPL-SCNC: 3.4 MMOL/L (ref 3.5–5.1)
PROT SERPL-MCNC: 7.6 G/DL (ref 6.3–8.2)
PROT UR QL: 30 MG/DL
PROT UR STRIP-MCNC: ABNORMAL MG/DL
PROTHROMBIN TIME: 24.3 SEC (ref 12.6–14.5)
RBC # BLD AUTO: 4.08 M/UL (ref 4.05–5.2)
RBC # UR STRIP: NEGATIVE /UL
SERVICE CMNT-IMP: ABNORMAL
SERVICE CMNT-IMP: ABNORMAL
SODIUM SERPL-SCNC: 128 MMOL/L (ref 136–145)
SP GR UR REFRACTOMETRY: 1.02 (ref 1–1.02)
SP GR UR: 1.01 (ref 1–1.02)
UROBILINOGEN UR QL STRIP.AUTO: 1 EU/DL (ref 0.2–1)
UROBILINOGEN UR QL: 1 EU/DL (ref 0.2–1)
WBC # BLD AUTO: 12.5 K/UL (ref 4.3–11.1)
WBC URNS QL MICRO: ABNORMAL /HPF

## 2022-06-11 PROCEDURE — 6360000002 HC RX W HCPCS: Performed by: EMERGENCY MEDICINE

## 2022-06-11 PROCEDURE — 85025 COMPLETE CBC W/AUTO DIFF WBC: CPT

## 2022-06-11 PROCEDURE — 2500000003 HC RX 250 WO HCPCS: Performed by: FAMILY MEDICINE

## 2022-06-11 PROCEDURE — 83605 ASSAY OF LACTIC ACID: CPT

## 2022-06-11 PROCEDURE — 85730 THROMBOPLASTIN TIME PARTIAL: CPT

## 2022-06-11 PROCEDURE — 83036 HEMOGLOBIN GLYCOSYLATED A1C: CPT

## 2022-06-11 PROCEDURE — 83735 ASSAY OF MAGNESIUM: CPT

## 2022-06-11 PROCEDURE — 74176 CT ABD & PELVIS W/O CONTRAST: CPT

## 2022-06-11 PROCEDURE — 80074 ACUTE HEPATITIS PANEL: CPT

## 2022-06-11 PROCEDURE — 6360000002 HC RX W HCPCS: Performed by: FAMILY MEDICINE

## 2022-06-11 PROCEDURE — 85610 PROTHROMBIN TIME: CPT

## 2022-06-11 PROCEDURE — 87086 URINE CULTURE/COLONY COUNT: CPT

## 2022-06-11 PROCEDURE — 83690 ASSAY OF LIPASE: CPT

## 2022-06-11 PROCEDURE — 73562 X-RAY EXAM OF KNEE 3: CPT

## 2022-06-11 PROCEDURE — 6370000000 HC RX 637 (ALT 250 FOR IP): Performed by: FAMILY MEDICINE

## 2022-06-11 PROCEDURE — 2580000003 HC RX 258: Performed by: EMERGENCY MEDICINE

## 2022-06-11 PROCEDURE — 87040 BLOOD CULTURE FOR BACTERIA: CPT

## 2022-06-11 PROCEDURE — 2580000003 HC RX 258: Performed by: FAMILY MEDICINE

## 2022-06-11 PROCEDURE — 1100000000 HC RM PRIVATE

## 2022-06-11 PROCEDURE — 82962 GLUCOSE BLOOD TEST: CPT

## 2022-06-11 PROCEDURE — 81003 URINALYSIS AUTO W/O SCOPE: CPT

## 2022-06-11 PROCEDURE — 80053 COMPREHEN METABOLIC PANEL: CPT

## 2022-06-11 PROCEDURE — 1100000003 HC PRIVATE W/ TELEMETRY

## 2022-06-11 PROCEDURE — 99285 EMERGENCY DEPT VISIT HI MDM: CPT

## 2022-06-11 PROCEDURE — 82140 ASSAY OF AMMONIA: CPT

## 2022-06-11 PROCEDURE — 96365 THER/PROPH/DIAG IV INF INIT: CPT

## 2022-06-11 PROCEDURE — 6360000004 HC RX CONTRAST MEDICATION: Performed by: EMERGENCY MEDICINE

## 2022-06-11 RX ORDER — POTASSIUM CHLORIDE 20 MEQ/1
20 TABLET, EXTENDED RELEASE ORAL ONCE
Status: COMPLETED | OUTPATIENT
Start: 2022-06-11 | End: 2022-06-11

## 2022-06-11 RX ORDER — HEPARIN SODIUM 1000 [USP'U]/ML
60 INJECTION, SOLUTION INTRAVENOUS; SUBCUTANEOUS ONCE
Status: DISCONTINUED | OUTPATIENT
Start: 2022-06-11 | End: 2022-06-11

## 2022-06-11 RX ORDER — HEPARIN SODIUM 1000 [USP'U]/ML
2000 INJECTION, SOLUTION INTRAVENOUS; SUBCUTANEOUS PRN
Status: DISCONTINUED | OUTPATIENT
Start: 2022-06-11 | End: 2022-06-12

## 2022-06-11 RX ORDER — MEMANTINE HYDROCHLORIDE 5 MG/1
10 TABLET ORAL 2 TIMES DAILY
Status: DISCONTINUED | OUTPATIENT
Start: 2022-06-11 | End: 2022-06-11

## 2022-06-11 RX ORDER — SODIUM CHLORIDE 0.9 % (FLUSH) 0.9 %
5-40 SYRINGE (ML) INJECTION PRN
Status: DISCONTINUED | OUTPATIENT
Start: 2022-06-11 | End: 2022-06-19 | Stop reason: HOSPADM

## 2022-06-11 RX ORDER — HEPARIN SODIUM 10000 [USP'U]/100ML
5-30 INJECTION, SOLUTION INTRAVENOUS CONTINUOUS
Status: DISCONTINUED | OUTPATIENT
Start: 2022-06-11 | End: 2022-06-12

## 2022-06-11 RX ORDER — SODIUM CHLORIDE 9 MG/ML
INJECTION, SOLUTION INTRAVENOUS PRN
Status: DISCONTINUED | OUTPATIENT
Start: 2022-06-11 | End: 2022-06-19 | Stop reason: HOSPADM

## 2022-06-11 RX ORDER — INSULIN LISPRO 100 [IU]/ML
0-4 INJECTION, SOLUTION INTRAVENOUS; SUBCUTANEOUS
Status: DISCONTINUED | OUTPATIENT
Start: 2022-06-12 | End: 2022-06-19 | Stop reason: HOSPADM

## 2022-06-11 RX ORDER — POLYETHYLENE GLYCOL 3350 17 G/17G
17 POWDER, FOR SOLUTION ORAL DAILY PRN
Status: DISCONTINUED | OUTPATIENT
Start: 2022-06-11 | End: 2022-06-19 | Stop reason: HOSPADM

## 2022-06-11 RX ORDER — SUCRALFATE 1 G/1
1 TABLET ORAL EVERY 6 HOURS SCHEDULED
Status: DISCONTINUED | OUTPATIENT
Start: 2022-06-11 | End: 2022-06-19 | Stop reason: HOSPADM

## 2022-06-11 RX ORDER — TRAMADOL HYDROCHLORIDE 50 MG/1
50 TABLET ORAL EVERY 8 HOURS PRN
Status: DISCONTINUED | OUTPATIENT
Start: 2022-06-11 | End: 2022-06-19 | Stop reason: HOSPADM

## 2022-06-11 RX ORDER — MEMANTINE HYDROCHLORIDE 5 MG/1
5 TABLET ORAL 2 TIMES DAILY
Status: DISCONTINUED | OUTPATIENT
Start: 2022-06-11 | End: 2022-06-19 | Stop reason: HOSPADM

## 2022-06-11 RX ORDER — ONDANSETRON 2 MG/ML
4 INJECTION INTRAMUSCULAR; INTRAVENOUS EVERY 6 HOURS PRN
Status: DISCONTINUED | OUTPATIENT
Start: 2022-06-11 | End: 2022-06-19 | Stop reason: HOSPADM

## 2022-06-11 RX ORDER — INSULIN LISPRO 100 [IU]/ML
0-4 INJECTION, SOLUTION INTRAVENOUS; SUBCUTANEOUS NIGHTLY
Status: DISCONTINUED | OUTPATIENT
Start: 2022-06-11 | End: 2022-06-19 | Stop reason: HOSPADM

## 2022-06-11 RX ORDER — ONDANSETRON 4 MG/1
4 TABLET, ORALLY DISINTEGRATING ORAL EVERY 8 HOURS PRN
Status: DISCONTINUED | OUTPATIENT
Start: 2022-06-11 | End: 2022-06-19 | Stop reason: HOSPADM

## 2022-06-11 RX ORDER — POLYETHYLENE GLYCOL 3350 17 G/17G
17 POWDER, FOR SOLUTION ORAL DAILY
Status: DISCONTINUED | OUTPATIENT
Start: 2022-06-11 | End: 2022-06-19 | Stop reason: HOSPADM

## 2022-06-11 RX ORDER — HEPARIN SODIUM 1000 [USP'U]/ML
4000 INJECTION, SOLUTION INTRAVENOUS; SUBCUTANEOUS PRN
Status: DISCONTINUED | OUTPATIENT
Start: 2022-06-11 | End: 2022-06-12

## 2022-06-11 RX ORDER — SODIUM CHLORIDE 0.9 % (FLUSH) 0.9 %
3 SYRINGE (ML) INJECTION EVERY 8 HOURS
Status: DISCONTINUED | OUTPATIENT
Start: 2022-06-11 | End: 2022-06-19 | Stop reason: HOSPADM

## 2022-06-11 RX ORDER — MAGNESIUM SULFATE 1 G/100ML
1000 INJECTION INTRAVENOUS ONCE
Status: COMPLETED | OUTPATIENT
Start: 2022-06-11 | End: 2022-06-11

## 2022-06-11 RX ORDER — 0.9 % SODIUM CHLORIDE 0.9 %
30 INTRAVENOUS SOLUTION INTRAVENOUS ONCE
Status: COMPLETED | OUTPATIENT
Start: 2022-06-11 | End: 2022-06-11

## 2022-06-11 RX ORDER — SODIUM CHLORIDE 0.9 % (FLUSH) 0.9 %
5-40 SYRINGE (ML) INJECTION EVERY 12 HOURS SCHEDULED
Status: DISCONTINUED | OUTPATIENT
Start: 2022-06-11 | End: 2022-06-19 | Stop reason: HOSPADM

## 2022-06-11 RX ORDER — LEVOTHYROXINE SODIUM 0.05 MG/1
100 TABLET ORAL
Status: DISCONTINUED | OUTPATIENT
Start: 2022-06-12 | End: 2022-06-19 | Stop reason: HOSPADM

## 2022-06-11 RX ORDER — SENNA PLUS 8.6 MG/1
1 TABLET ORAL NIGHTLY
Status: DISCONTINUED | OUTPATIENT
Start: 2022-06-11 | End: 2022-06-19 | Stop reason: HOSPADM

## 2022-06-11 RX ADMIN — HEPARIN SODIUM 12 UNITS/KG/HR: 10000 INJECTION, SOLUTION INTRAVENOUS at 18:06

## 2022-06-11 RX ADMIN — MAGNESIUM SULFATE HEPTAHYDRATE 1000 MG: 1 INJECTION, SOLUTION INTRAVENOUS at 18:14

## 2022-06-11 RX ADMIN — SODIUM CHLORIDE, PRESERVATIVE FREE 3 ML: 5 INJECTION INTRAVENOUS at 18:04

## 2022-06-11 RX ADMIN — SODIUM CHLORIDE, PRESERVATIVE FREE 10 ML: 5 INJECTION INTRAVENOUS at 22:31

## 2022-06-11 RX ADMIN — DIATRIZOATE MEGLUMINE AND DIATRIZOATE SODIUM 15 ML: 660; 100 LIQUID ORAL; RECTAL at 13:42

## 2022-06-11 RX ADMIN — SENNOSIDES 8.6 MG: 8.6 TABLET, FILM COATED ORAL at 22:21

## 2022-06-11 RX ADMIN — POLYETHYLENE GLYCOL 3350 17 G: 17 POWDER, FOR SOLUTION ORAL at 17:59

## 2022-06-11 RX ADMIN — SUCRALFATE 1 G: 1 TABLET ORAL at 17:59

## 2022-06-11 RX ADMIN — MEMANTINE 5 MG: 5 TABLET ORAL at 22:21

## 2022-06-11 RX ADMIN — CEFTRIAXONE 1000 MG: 1 INJECTION, POWDER, FOR SOLUTION INTRAMUSCULAR; INTRAVENOUS at 13:42

## 2022-06-11 RX ADMIN — SODIUM CHLORIDE 2328 ML: 9 INJECTION, SOLUTION INTRAVENOUS at 16:35

## 2022-06-11 RX ADMIN — HEPARIN SODIUM 4000 UNITS: 1000 INJECTION INTRAVENOUS; SUBCUTANEOUS at 18:11

## 2022-06-11 RX ADMIN — VANCOMYCIN HYDROCHLORIDE 1500 MG: 10 INJECTION, POWDER, LYOPHILIZED, FOR SOLUTION INTRAVENOUS at 22:20

## 2022-06-11 RX ADMIN — TUBERCULIN PURIFIED PROTEIN DERIVATIVE 5 UNITS: 5 INJECTION, SOLUTION INTRADERMAL at 18:20

## 2022-06-11 RX ADMIN — POTASSIUM CHLORIDE 20 MEQ: 20 TABLET, EXTENDED RELEASE ORAL at 17:59

## 2022-06-11 RX ADMIN — TRAMADOL HYDROCHLORIDE 50 MG: 50 TABLET, COATED ORAL at 22:20

## 2022-06-11 RX ADMIN — SUCRALFATE 1 G: 1 TABLET ORAL at 22:20

## 2022-06-11 ASSESSMENT — ENCOUNTER SYMPTOMS
NAUSEA: 0
VOMITING: 0
DIARRHEA: 0
RHINORRHEA: 0
COUGH: 0
SHORTNESS OF BREATH: 0
ABDOMINAL PAIN: 1

## 2022-06-11 ASSESSMENT — PAIN DESCRIPTION - LOCATION: LOCATION: KNEE

## 2022-06-11 ASSESSMENT — PAIN DESCRIPTION - DESCRIPTORS: DESCRIPTORS: ACHING;DISCOMFORT

## 2022-06-11 ASSESSMENT — PAIN DESCRIPTION - ORIENTATION: ORIENTATION: RIGHT

## 2022-06-11 ASSESSMENT — PAIN - FUNCTIONAL ASSESSMENT
PAIN_FUNCTIONAL_ASSESSMENT: 0-10
PAIN_FUNCTIONAL_ASSESSMENT: PREVENTS OR INTERFERES WITH MANY ACTIVE NOT PASSIVE ACTIVITIES

## 2022-06-11 ASSESSMENT — PAIN SCALES - GENERAL
PAINLEVEL_OUTOF10: 5
PAINLEVEL_OUTOF10: 5

## 2022-06-11 NOTE — ED TRIAGE NOTES
Patient from [facility] assisted living with complaints of abdominal pain x2 days. Patient alert and oriented to person/situation. Patient with complaints of decreased urination and pelvic pain. 88HR   RA 96%     Patient denies shortness of breath and chest pain.

## 2022-06-11 NOTE — PROGRESS NOTES
TRANSFER - IN REPORT:    Verbal report received from [address] [place] [address] on Stewart Giron  being received from ED for routine progression of patient care      Report consisted of patient's Situation, Background, Assessment and   Recommendations(SBAR). Information from the following report(s) Nurse Handoff Report, ED Encounter Summary, ED SBAR, [facility] and Recent Results was reviewed with the receiving nurse. Opportunity for questions and clarification was provided. Assessment completed upon patient's arrival to unit and care assumed. Waiting on transport

## 2022-06-11 NOTE — ED NOTES
TRANSFER - OUT REPORT:    Verbal report given to [employer] on Lakeshia Kelly  being transferred to 6th floor for routine progression of patient care       Report consisted of patient's Situation, Background, Assessment and   Recommendations(SBAR). Information from the following report(s) ED SBAR was reviewed with the receiving nurse. Lines:   Peripheral IV 06/11/22 Left Hand (Active)       Peripheral IV 06/11/22 Right Wrist (Active)   Site Assessment Clean, dry & intact 06/11/22 1636   Line Status Blood return noted 06/11/22 1636        Opportunity for questions and clarification was provided.       Patient transported with:  Mercedes Capone RN  06/11/22 2936

## 2022-06-11 NOTE — ED PROVIDER NOTES
[facility] Emergency Department Provider Note                   PCP:                Alex Healy MD               Age: 80 y.o. Sex: female     No diagnosis found. DISPOSITION         New Prescriptions    No medications on file       Orders Placed This Encounter   Procedures    Culture, Blood 1    Culture, Blood 1    XR KNEE RIGHT (3 VIEWS)    CBC with Diff    CMP    Lipase    Lactic Acid (Select if patient is over 65 to rule out mesenteric ischemia)    Ammonia    Urinalysis w rflx microscopic    Diet NPO    POCT Urine Dipstick    POCT Urinalysis no Micro    Saline lock IV        Janine Manriquez MD 1:09 PM      MDM  Number of Diagnoses or Management Options  Septicemia [facility])  Diagnosis management comments: We will check urine and do a sepsis evaluation. I will get an x-ray of her right knee. Patient's LFT and bilirubin are elevated. She appears to have a history of a prior cholecystectomy according to the chart. However, I do not see any scars on her abdomen that I would think would be indicative of a prior cholecystectomy. I will get a CT scan to evaluate for possible intra-abdominal mass or obstruction. With her elevated lactic acid I will get blood cultures and empirically treat with Rocephin. Naa Andrade is a 80 y.o. female who presents to the Emergency Department with chief complaint of    Chief Complaint   Patient presents with    Abdominal Pain    Urinary Retention      70-year-old lady presents with concerns about lower abdominal pain and pain with urination. She does have some dementia and getting an exact detailed history is difficult. The symptoms seem to have been present for a couple of days. She says the pain does not really radiate anywhere and describes it as sometimes a burning and sometimes a cramping pain. She denies any other symptoms including no fevers or chills and no cough or difficulty breathing.     Elements of this note were created using speech recognition software. As such, errors of speech recognition may be present. Review of Systems   Constitutional: Negative for chills and fever. HENT: Negative for congestion and rhinorrhea. Respiratory: Negative for cough and shortness of breath. Gastrointestinal: Positive for abdominal pain. Negative for diarrhea, nausea and vomiting. Genitourinary: Positive for dysuria and frequency. Negative for hematuria. Skin: Negative for rash. Neurological: Negative for dizziness, light-headedness and headaches. Psychiatric/Behavioral: Negative for agitation and confusion. Past Medical History:   Diagnosis Date    Abdominal pain, epigastric     Abdominal pain, unspecified site     Anxiety state, unspecified     Aortic stenosis 6/10/2016    No sever SOB, no chest pain or syncope.  Echo showed mod AS, mean grad 22mm Hg, nl EF, mild LVH    Aortic valve disorders     Atherosclerosis of aorta (HCC)     Atrial fibrillation (HCC)     Backache, unspecified     Benign neoplasm of colon     Cervicalgia     Closed fracture of unspecified bone     Depressive disorder, not elsewhere classified     Diabetes (HCC)     Diarrhea     Disorders of magnesium metabolism     Edema     Elevated sedimentation rate     Encounter for long-term (current) use of other medications     Endocrine disease     Epistaxis     Esophageal reflux     Essential hypertension, benign     Gastrointestinal malfunction arising from mental factors     Hypertension     Hypertension, benign     Hypertonicity of bladder     Hypopotassemia     Insomnia, unspecified     Irritable bowel syndrome     Memory loss     Nausea alone     Osteoarthrosis, unspecified whether generalized or localized, unspecified site     Other ill-defined conditions(799.89)     hyperlipidemia    Other malaise and fatigue     Pain in joint, lower leg     Pain in joint, shoulder region     Postnasal drip     Rheumatoid arthritis(714.0)     Type II or unspecified type diabetes mellitus without mention of complication, not stated as uncontrolled     Unspecified constipation     Unspecified hemorrhoids with other complication     Unspecified hemorrhoids without mention of complication     Unspecified hereditary and idiopathic peripheral neuropathy     Unspecified hypothyroidism     Urgency of urination     Urinary tract infection, site not specified         Past Surgical History:   Procedure Laterality Date    CHOLECYSTECTOMY      OTHER SURGICAL HISTORY      thyroidectomy    TONSILLECTOMY          Family History   Problem Relation Age of Onset    Rheum Arthritis Daughter     Osteoarthritis Son            Social Connections:     Frequency of Communication with Friends and Family: Not on file    Frequency of Social Gatherings with Friends and Family: Not on file    Attends Muslim Services: Not on file    Active Member of Clubs or Organizations: Not on file    Attends Club or Organization Meetings: Not on file    Marital Status: Not on file        Allergies   Allergen Reactions    Penicillins Hives    Strawberry Extract Itching    Codeine Rash        Vitals signs and nursing note reviewed. Patient Vitals for the past 4 hrs:   Temp Pulse Resp BP SpO2   06/11/22 1300 -- 89 23 110/76 --   06/11/22 1230 -- 97 25 115/60 93 %   06/11/22 1215 -- 96 24 114/77 92 %   06/11/22 1205 99.1 °F (37.3 °C) 97 18 115/65 93 %          Physical Exam  Vitals and nursing note reviewed. Constitutional:       Appearance: Normal appearance. Cardiovascular:      Rate and Rhythm: Normal rate and regular rhythm. Pulmonary:      Effort: Pulmonary effort is normal.      Breath sounds: Normal breath sounds. Abdominal:      General: Bowel sounds are normal.      Palpations: Abdomen is soft.    Musculoskeletal:      Comments: Right knee swelling with some bruising    She has limited range of motion in both legs and arthritic changes in most joints. Skin:     Coloration: Skin is jaundiced. Findings: No rash. Neurological:      Mental Status: She is alert. Comments: Patient knows where she is and who she is. She has a pretty good understanding of what is going on. She is not well orientated to time. Procedures        Labs Reviewed   LACTIC ACID - Abnormal; Notable for the following components:       Result Value    Lactic Acid, Plasma 3.4 (*)     All other components within normal limits   AMMONIA - Abnormal; Notable for the following components:    Ammonia <10 (*)     All other components within normal limits   POCT URINALYSIS DIPSTICK - Abnormal; Notable for the following components:    Protein, Urine, POC 30 (*)     KETONES, Urine, POC TRACE (*)     Bilirubin, Urine, POC SMALL (*)     Leukocyte Est, UA POC SMALL (*)     All other components within normal limits   CULTURE, BLOOD 1   CULTURE, BLOOD 1   CBC WITH AUTO DIFFERENTIAL   COMPREHENSIVE METABOLIC PANEL   LIPASE   URINALYSIS        XR KNEE RIGHT (3 VIEWS)    (Results Pending)                            Voice dictation software was used during the making of this note. This software is not perfect and grammatical and other typographical errors may be present. This note has not been completely proofread for errors.         Andrez Russo MD  06/11/22 4536

## 2022-06-11 NOTE — PROGRESS NOTES
[facility] INSUFFICIENCY NOTE [address] [facility] Pharmacokinetic Monitoring Service - Vancomycin    Dipika Cruz is a 80 y.o. female starting on vancomycin therapy for sepsis. Pharmacy consulted by Dr. Cristian Denton for monitoring and adjustment. Target Concentration: Random level ? 15 mg/L  Additional Antimicrobials: ceftriaxone    Pertinent Laboratory Values: Wt Readings from Last 1 Encounters:   06/11/22 171 lb (77.6 kg)     Temp Readings from Last 1 Encounters:   06/11/22 99.1 °F (37.3 °C) (Oral)     Recent Labs     06/11/22  1210   BUN 24*   CREATININE 1.60*   WBC 12.5*   LACACIDPL 3.4*       Lab Results   Component Value Date    [facility] 9.5 07/02/2020       MRSA Nasal Swab: N/A. Non-respiratory infection. .    Plan:  Concentration-guided dosing due to renal impairment  Start vancomycin 1500 mg one time loading dose today  Vancomycin concentrations will be ordered as clinically appropriate   Pharmacy will continue to monitor patient and adjust therapy as indicated    Thank you for the consult,  Aniyah Gonzales, [facility]

## 2022-06-11 NOTE — H&P
Hospitalist Admission History and Physical         NAME:            Melida Crocker    Age:                80 y.o.    :               1926    MRN:              [id number]    PCP: Ayala Cerna MD    Consulting MD:    Treatment Team: Attending Provider: Ardyce Kussmaul, DO; Registered Nurse: Jane Dee, RN; Registered Nurse: Marcelina Miranda, RN         Chief Complaint   Patient presents with    Abdominal Pain    Urinary Retention   HPI:    Patient is a 80 y.o. female who presented to the ED for cc lower abdominal pain, pelvic pain, and decreased urination for the past two days. Nothing seems to make better or worse. Hx of a fib on Eliquis, DM type II, edema, GERD, HTN, hypothyroidism, and RA. Vitals - RR 22, . Labs- correct Na 130, K 3.4. Creatine 1.6 from baseline 1. Alk hos 236, total bili 4.4, AST/ALT elevated, WBC 12.5. CT abdomen pelvis without contrast showed -   Large hiatal hernia. 2. Sigmoid diverticulosis. 3. Atherosclerosis. Past Medical History:   Diagnosis Date    Abdominal pain, epigastric     Abdominal pain, unspecified site     Anxiety state, unspecified     Aortic stenosis 6/10/2016    No sever SOB, no chest pain or syncope.  Echo showed mod AS, mean grad 22mm Hg, nl EF, mild LVH    Aortic valve disorders     Atherosclerosis of aorta (HCC)     Atrial fibrillation (HCC)     Backache, unspecified     Benign neoplasm of colon     Cervicalgia     Closed fracture of unspecified bone     Depressive disorder, not elsewhere classified     Diabetes ([facility] [address]     Diarrhea     Disorders of magnesium metabolism     Edema     Elevated sedimentation rate     Encounter for long-term (current) use of other medications     Endocrine disease     Epistaxis     Esophageal reflux     Essential hypertension, benign     Gastrointestinal malfunction arising from mental factors     Hypertension     Hypertension, benign     Hypertonicity of bladder     Hypopotassemia     Insomnia, unspecified     Irritable bowel syndrome     Memory loss     Nausea alone     Osteoarthrosis, unspecified whether generalized or localized, unspecified site     Other ill-defined conditions(799.89)     hyperlipidemia    Other malaise and fatigue     Pain in joint, lower leg     Pain in joint, shoulder region     Postnasal drip     Rheumatoid arthritis(714.0)     Type II or unspecified type diabetes mellitus without mention of complication, not stated as uncontrolled     Unspecified constipation     Unspecified hemorrhoids with other complication     Unspecified hemorrhoids without mention of complication     Unspecified hereditary and idiopathic peripheral neuropathy     Unspecified hypothyroidism     Urgency of urination     Urinary tract infection, site not specified             Past Surgical History:   Procedure Laterality Date    CHOLECYSTECTOMY      OTHER SURGICAL HISTORY      thyroidectomy    TONSILLECTOMY              Family History   Problem Relation Age of Onset    Rheum Arthritis Daughter     Osteoarthritis Son        Family history reviewed and negative except as noted above. Social History     Social History Narrative    Not on file            Social History     Tobacco Use    Smoking status: Never Smoker    Smokeless tobacco: Never Used   Substance Use Topics    Alcohol use: No            Social History     Substance and Sexual Activity   Drug Use No                 Allergies   Allergen Reactions    Penicillins Hives    Strawberry Extract Itching    Codeine Rash            Prior to Admission medications    Medication Sig Start Date End Date Taking?  Authorizing Provider   acetaminophen (TYLENOL) 500 MG tablet Take 1,000 mg by mouth daily    Ar Automatic Reconciliation   amLODIPine (NORVASC) 5 MG tablet TAKE ONE TABLET BY MOUTH EVERY DAY 6/10/21   Ar Automatic Reconciliation   apixaban (ELIQUIS) 5 MG TABS tablet Take 5 mg by mouth 2 times daily    Ar Automatic Reconciliation   cetirizine (ZYRTEC) 10 MG tablet Take 10 mg by mouth daily    Ar Automatic Reconciliation   vitamin D 25 MCG (1000 UT) CAPS Take 1,000 Units by mouth daily    Ar Automatic Reconciliation   diclofenac (FLECTOR) 1.3 % PTCH patch Place 1 patch onto the skin every 12 hours 11/4/20   Ar Automatic Reconciliation   hydrALAZINE (APRESOLINE) 25 MG tablet Take 25 mg by mouth 3 times daily 2/11/22   Ar Automatic Reconciliation   lansoprazole (PREVACID) 15 MG delayed release capsule Take 30 mg by mouth 2 times daily 1/28/19   Ar Automatic Reconciliation   levothyroxine (SYNTHROID) 100 MCG tablet Take by mouth every morning (before breakfast)    Ar Automatic Reconciliation   Lidocaine HCl 4 % CREA Apply topically    Ar Automatic Reconciliation   memantine (NAMENDA) 10 MG tablet Take 10 mg by mouth 2 times daily 8/6/19   Ar Automatic Reconciliation   metoprolol tartrate (LOPRESSOR) 25 MG tablet Take 25 mg by mouth 2 times daily 2/11/22   Ar Automatic Reconciliation   mirabegron (MYRBETRIQ) 25 MG TB24 Take 25 mg by mouth daily    Ar Automatic Reconciliation   nitroGLYCERIN (NITRODUR) 0.4 MG/HR Place 1 patch onto the skin daily 10/29/21   Ar Automatic Reconciliation   nitroGLYCERIN (NITROSTAT) 0.4 MG SL tablet Place under the tongue    Ar Automatic Reconciliation   ondansetron (ZOFRAN) 4 MG tablet Take 4 mg by mouth every 8 hours as needed 1/28/19   Ar Automatic Reconciliation   polyethylene glycol (GLYCOLAX) 17 GM/SCOOP powder Take 17 g by mouth daily 1/28/19   Ar Automatic Reconciliation   potassium chloride (KLOR-CON M) 20 MEQ extended release tablet Take 20 mEq by mouth daily 10/21/20   Ar Automatic Reconciliation   senna (SENOKOT) 8.6 MG tablet Take 2 tablet PO at bedtime    Ar Automatic Reconciliation   sucralfate (CARAFATE) 1 GM/10ML suspension 1 gram by mouth q ac and qhs 5/11/22   Ar Automatic Reconciliation   torsemide (DEMADEX) 20 MG tablet Take 20 mg by mouth 2 times daily    Ar Automatic Reconciliation   traMADol (ULTRAM) 50 MG tablet Take 50 mg by mouth every 8 hours as needed. 6/26/20   Ar Automatic Reconciliation                      Review of Systems         Constitutional: NAD    Eyes:  no change in visual acuity, no photophobia    Ears, nose, mouth, throat, and face: no  Odynphagia, dysphagia, no thrush or exudate, negative for chronic sinus congestion, recurrent headaches    Respiratory: negative for SOB, hemoptysis or cough    Cardiovascular: negative for CP, palpitations, or PND    Gastrointestinal: lower abdominal pain, poor oral intake over the past few days. Genitourinary: decreased urine output    Integument/breast: negative for skin rash or skin lesions    Hematologic/lymphatic: negative for known bleeding disorder    Musculoskeletal:negative for joint pain or joint tenderness    Neurological: negative for lightheadedness, syncope or presyncopal events, no seizure or CVA history    Behavioral/Psych: negative for depression or chronic anxiety,    Endocrine: negative for polydyspia, polyuria or intolerance to heat or cold    Allergic/Immunologic: negative for chronic allergic rhinitis, or known connective tissue disorder              Objective:         Patient Vitals for the past 24 hrs:   Temp Pulse Resp BP SpO2   06/11/22 1600 -- 90 21 (!) 98/54 --   06/11/22 1557 -- 98 21 -- --   06/11/22 1545 -- 93 21 95/61 95 %   06/11/22 1515 -- 93 -- 104/82 96 %   06/11/22 1500 -- (!) 104 -- 110/69 94 %   06/11/22 1430 -- 91 22 114/74 92 %   06/11/22 1415 -- 97 23 (!) 119/100 92 %   06/11/22 1400 -- 93 20 114/80 95 %   06/11/22 1332 -- 99 22 118/83 96 %   06/11/22 1312 -- 88 25 103/68 95 %   06/11/22 1302 -- 98 20 -- 91 %   06/11/22 1300 -- 89 23 110/76 --   06/11/22 1242 -- 100 28 119/77 93 %   06/11/22 1230 -- 97 25 115/60 93 %   06/11/22 1215 -- 96 24 114/77 92 %   06/11/22 1205 99.1 °F (37.3 °C) 97 18 115/65 93 %            No intake/output data recorded.     No intake/output data recorded.          Data Review:   Recent Results (from the past 24 hour(s))   CBC with Diff    Collection Time: 06/11/22 12:10 PM   Result Value Ref Range    WBC 12.5 (H) 4.3 - 11.1 K/uL    RBC 4.08 4.05 - 5.2 M/uL    Hemoglobin 12.5 11.7 - 15.4 g/dL    Hematocrit 37.2 35.8 - 46.3 %    MCV 91.2 79.6 - 97.8 FL    MCH 30.6 26.1 - 32.9 PG    MCHC 33.6 31.4 - 35.0 g/dL    RDW 14.2 11.9 - 14.6 %    Platelets 721 629 - 691 K/uL    MPV 9.8 9.4 - 12.3 FL    nRBC 0.00 0.0 - 0.2 K/uL    Differential Type AUTOMATED      Seg Neutrophils 89 (H) 43 - 78 %    Lymphocytes 6 (L) 13 - 44 %    Monocytes 3 (L) 4.0 - 12.0 %    Eosinophils % 1 0.5 - 7.8 %    Basophils 0 0.0 - 2.0 %    Immature Granulocytes 1 0.0 - 5.0 %    Segs Absolute 11.2 (H) 1.7 - 8.2 K/UL    Absolute Lymph # 0.8 0.5 - 4.6 K/UL    Absolute Mono # 0.4 0.1 - 1.3 K/UL    Absolute Eos # 0.1 0.0 - 0.8 K/UL    Basophils Absolute 0.0 0.0 - 0.2 K/UL    Absolute Immature Granulocyte 0.1 0.0 - 0.5 K/UL   CMP    Collection Time: 06/11/22 12:10 PM   Result Value Ref Range    Sodium 128 (L) 136 - 145 mmol/L    Potassium 3.4 (L) 3.5 - 5.1 mmol/L    Chloride 87 (L) 98 - 107 mmol/L    CO2 30 21 - 32 mmol/L    Anion Gap 11 7 - 16 mmol/L    Glucose 236 (H) 65 - 100 mg/dL    BUN 24 (H) 8 - 23 MG/DL    CREATININE 1.60 (H) 0.6 - 1.0 MG/DL    GFR  39 (L) >60 ml/min/1.73m2    GFR Non- 32 (L) >60 ml/min/1.73m2    Calcium 9.0 8.3 - 10.4 MG/DL    Total Bilirubin 4.4 (H) 0.2 - 1.1 MG/DL     (H) 12 - 65 U/L     (H) 15 - 37 U/L    Alk Phosphatase 236 (H) 50 - 136 U/L    Total Protein 7.6 6.3 - 8.2 g/dL    Albumin 2.5 (L) 3.2 - 4.6 g/dL    Globulin 5.1 (H) 2.3 - 3.5 g/dL    Albumin/Globulin Ratio 0.5 (L) 1.2 - 3.5     Lipase    Collection Time: 06/11/22 12:10 PM   Result Value Ref Range    Lipase 49 (L) 73 - 393 U/L   Lactic Acid (Select if patient is over 65 to rule out mesenteric ischemia)    Collection Time: 06/11/22 12:10 PM   Result Value Ref Range    Lactic Acid, Plasma 3.4 (H) 0.4 - 2.0 MMOL/L   Ammonia    Collection Time: 06/11/22 12:22 PM   Result Value Ref Range    Ammonia <10 (L) 11 - 32 UMOL/L   POCT Urinalysis no Micro    Collection Time: 06/11/22 12:24 PM   Result Value Ref Range    Specific Gravity, Urine, POC 1.015 1.001 - 1.023      pH, Urine, POC 5.0 5.0 - 9.0      Protein, Urine, POC 30 (A) NEG mg/dL    Glucose, UA POC Negative NEG mg/dL    KETONES, Urine, POC TRACE (A) NEG mg/dL    Bilirubin, Urine, POC SMALL (A) NEG      Blood, UA POC Negative NEG      URINE UROBILINOGEN POC 1.0 0.2 - 1.0 EU/dL    Nitrate, Urine, POC Negative NEG      Leukocyte Est, UA POC SMALL (A) NEG      Performed by: Ladi Oliver    Urinalysis w rflx microscopic    Collection Time: 06/11/22 12:33 PM   Result Value Ref Range    Color, UA YELLOW      Appearance CLEAR      Specific Gravity, UA 1.020 1.001 - 1.023      pH, Urine 5.5 5.0 - 9.0      Protein, UA TRACE (A) NEG mg/dL    Glucose,  (A) NEG mg/dL    Ketones, Urine TRACE (A) NEG mg/dL    Bilirubin Urine SMALL (A) NEG      Blood, Urine Negative NEG      Urobilinogen, Urine 1.0 0.2 - 1.0 EU/dL    Nitrite, Urine Positive (A) NEG      Leukocyte Esterase, Urine SMALL (A) NEG      WBC, UA 10-20 0 /hpf    Epithelial Cells UA 20-50 0 /hpf    BACTERIA, URINE 1+ (H) 0 /hpf    AMORPHOUS CRYSTAL 2+ (H) 0            Physical Exam:         General:    Alert, cooperative, no distress,    Eyes:    Conjunctivae/corneas clear. PERRL    Ears:    Normal     Nose:    Nares normal. .    Mouth/Throat:    Dry mouth    Neck:     no JVD. Back:     deferred    Lungs:     Clear to auscultation bilaterally. Heart:    Blowing systolic murmur best heard at right 2nd intercostal space    Abdomen:     Soft, non-tender. Bowel sounds normal. No masses,  No organomegaly. Extremities:    Trace edema bilaterally.  Very small superficial skin tear to right LE    Skin:    Mild erythema to legs bilaterally     Neurologic:    CNII-XII intact. Assessment and Plan         Principal Problem:    Severe sepsis ([facility] [address]  Active Problems:    UTI (urinary tract infection)    Transaminitis    Memory loss    Type 2 diabetes with nephropathy (HCC)    Edema    Essential hypertension, benign    Acquired hypothyroidism    OAB (overactive bladder)  Resolved Problems:    * No resolved hospital problems. *    Severe sepsis secondary to UTI (met by RR, HR, WBC, UTI, JAMIE, and elevated LFTs) - Vancomycin and Ceftriaxone. Follow up blood and urine cultures    JAMIE - Likely from volume depletion and severe sepsis. Holding demadex. IV fluid bolus. Elevated LFTs - CT abdomen with unremarkable liver. Order hepatitis panel. Likely from severe sepsis. Trend LFTs. Hyponatremia - Should improve with fluid bolus. Holding demadex. Hypokalemia - Supplement. Check Mg. HTN - BP borderline low so will hold off on her BP medications today until her BP becomes more stabilized    Aortic stenosis - moderate to severe from ECHO in 2020. Monitor for any signs of volume overload. A fib - Holding Eliquis due to her creatine clearance. Heparin drip for now. Hypothyroidism - Levothyroxine    DM type II - Appears to be diet controlled. Not on any diabetic medications from list brought from nursing facility. A1C I will add on to ensure glucose controlled. Dementia - Namenda but at lower dose due to JAMIE    Chronic edema - Holding her demadex for today since appears to be volume depleted and with JAMIE.      PPD/PT/OT    DVT prophylaxis - Heparin drip          Signed By:    Adamaris Wolf DO    June 11, 2022

## 2022-06-11 NOTE — ACP (ADVANCE CARE PLANNING)
[facility] Hospitalist [facility]  At the heart of better care     Advance Care Planning   Admit Date:  2022 12:02 PM   Name:  Rdaha Oneal   Age:  80 y.o. Sex:  female  :  1926   MRN:  [id number]   Room:  [place] [address]    Radha nOeal is able to make her own decisions:   Yes    If pt unable to make decisions, POA/surrogate decision maker:  Son    Other members present in the meeting:   Son    Patient / surrogate decision-maker directed:  FULL      Patient or surrogate consented to discussion of the current conditions, workup, management plans, prognosis, and understand the risk for further deterioration. Time spent: 17 minutes in direct discussion (face to face and/or over phone).       Signed:  Honey Sosa,

## 2022-06-12 ENCOUNTER — APPOINTMENT (OUTPATIENT)
Dept: GENERAL RADIOLOGY | Age: 87
DRG: 872 | End: 2022-06-12
Payer: MEDICARE

## 2022-06-12 PROBLEM — E87.6 HYPOKALEMIA: Status: ACTIVE | Noted: 2022-01-01

## 2022-06-12 PROBLEM — E87.1 HYPONATREMIA: Status: ACTIVE | Noted: 2022-01-01

## 2022-06-12 LAB
ALBUMIN SERPL-MCNC: 2.2 G/DL (ref 3.2–4.6)
ALBUMIN/GLOB SERPL: 0.6 {RATIO} (ref 1.2–3.5)
ALP SERPL-CCNC: 228 U/L (ref 50–136)
ALT SERPL-CCNC: 115 U/L (ref 12–65)
ANION GAP SERPL CALC-SCNC: 10 MMOL/L (ref 7–16)
AST SERPL-CCNC: 131 U/L (ref 15–37)
BASOPHILS # BLD: 0 K/UL (ref 0–0.2)
BASOPHILS NFR BLD: 0 % (ref 0–2)
BILIRUB SERPL-MCNC: 4.3 MG/DL (ref 0.2–1.1)
BUN SERPL-MCNC: 19 MG/DL (ref 8–23)
CALCIUM SERPL-MCNC: 8.3 MG/DL (ref 8.3–10.4)
CHLORIDE SERPL-SCNC: 92 MMOL/L (ref 98–107)
CO2 SERPL-SCNC: 29 MMOL/L (ref 21–32)
CREAT SERPL-MCNC: 1.2 MG/DL (ref 0.6–1)
DIFFERENTIAL METHOD BLD: ABNORMAL
EOSINOPHIL # BLD: 0.2 K/UL (ref 0–0.8)
EOSINOPHIL NFR BLD: 2 % (ref 0.5–7.8)
ERYTHROCYTE [DISTWIDTH] IN BLOOD BY AUTOMATED COUNT: 14 % (ref 11.9–14.6)
GLOBULIN SER CALC-MCNC: 3.9 G/DL (ref 2.3–3.5)
GLUCOSE BLD STRIP.AUTO-MCNC: 152 MG/DL (ref 65–100)
GLUCOSE BLD STRIP.AUTO-MCNC: 156 MG/DL (ref 65–100)
GLUCOSE BLD STRIP.AUTO-MCNC: 172 MG/DL (ref 65–100)
GLUCOSE BLD STRIP.AUTO-MCNC: 188 MG/DL (ref 65–100)
GLUCOSE SERPL-MCNC: 147 MG/DL (ref 65–100)
HCT VFR BLD AUTO: 33.1 % (ref 35.8–46.3)
HGB BLD-MCNC: 11.5 G/DL (ref 11.7–15.4)
IMM GRANULOCYTES # BLD AUTO: 0 K/UL (ref 0–0.5)
IMM GRANULOCYTES NFR BLD AUTO: 1 % (ref 0–5)
LYMPHOCYTES # BLD: 0.9 K/UL (ref 0.5–4.6)
LYMPHOCYTES NFR BLD: 13 % (ref 13–44)
MAGNESIUM SERPL-MCNC: 2 MG/DL (ref 1.8–2.4)
MCH RBC QN AUTO: 31.3 PG (ref 26.1–32.9)
MCHC RBC AUTO-ENTMCNC: 34.7 G/DL (ref 31.4–35)
MCV RBC AUTO: 89.9 FL (ref 79.6–97.8)
MM INDURATION, POC: NORMAL MM (ref 0–5)
MONOCYTES # BLD: 0.4 K/UL (ref 0.1–1.3)
MONOCYTES NFR BLD: 6 % (ref 4–12)
NEUTS SEG # BLD: 5.5 K/UL (ref 1.7–8.2)
NEUTS SEG NFR BLD: 78 % (ref 43–78)
NRBC # BLD: 0 K/UL (ref 0–0.2)
PLATELET # BLD AUTO: 259 K/UL (ref 150–450)
PMV BLD AUTO: 10.1 FL (ref 9.4–12.3)
POTASSIUM SERPL-SCNC: 3.3 MMOL/L (ref 3.5–5.1)
PPD, POC: NEGATIVE
PROT SERPL-MCNC: 6.1 G/DL (ref 6.3–8.2)
RBC # BLD AUTO: 3.68 M/UL (ref 4.05–5.2)
SERVICE CMNT-IMP: ABNORMAL
SODIUM SERPL-SCNC: 131 MMOL/L (ref 136–145)
UFH PPP CHRO-ACNC: >1.1 IU/ML (ref 0.3–0.7)
UFH PPP CHRO-ACNC: >2.01 IU/ML (ref 0.3–0.7)
VANCOMYCIN SERPL-MCNC: 18.3 UG/ML
WBC # BLD AUTO: 7.1 K/UL (ref 4.3–11.1)

## 2022-06-12 PROCEDURE — 6370000000 HC RX 637 (ALT 250 FOR IP): Performed by: FAMILY MEDICINE

## 2022-06-12 PROCEDURE — 1100000003 HC PRIVATE W/ TELEMETRY

## 2022-06-12 PROCEDURE — 71045 X-RAY EXAM CHEST 1 VIEW: CPT

## 2022-06-12 PROCEDURE — 80202 ASSAY OF VANCOMYCIN: CPT

## 2022-06-12 PROCEDURE — 36415 COLL VENOUS BLD VENIPUNCTURE: CPT

## 2022-06-12 PROCEDURE — 2580000003 HC RX 258: Performed by: FAMILY MEDICINE

## 2022-06-12 PROCEDURE — 85025 COMPLETE CBC W/AUTO DIFF WBC: CPT

## 2022-06-12 PROCEDURE — 6360000002 HC RX W HCPCS: Performed by: INTERNAL MEDICINE

## 2022-06-12 PROCEDURE — 1100000000 HC RM PRIVATE

## 2022-06-12 PROCEDURE — 82962 GLUCOSE BLOOD TEST: CPT

## 2022-06-12 PROCEDURE — 80053 COMPREHEN METABOLIC PANEL: CPT

## 2022-06-12 PROCEDURE — 6370000000 HC RX 637 (ALT 250 FOR IP): Performed by: INTERNAL MEDICINE

## 2022-06-12 PROCEDURE — 6360000002 HC RX W HCPCS: Performed by: FAMILY MEDICINE

## 2022-06-12 PROCEDURE — 85520 HEPARIN ASSAY: CPT

## 2022-06-12 PROCEDURE — 2580000003 HC RX 258: Performed by: EMERGENCY MEDICINE

## 2022-06-12 PROCEDURE — 2580000003 HC RX 258: Performed by: INTERNAL MEDICINE

## 2022-06-12 PROCEDURE — 83735 ASSAY OF MAGNESIUM: CPT

## 2022-06-12 RX ORDER — TROSPIUM CHLORIDE 20 MG/1
20 TABLET, FILM COATED ORAL
Status: DISCONTINUED | OUTPATIENT
Start: 2022-06-12 | End: 2022-06-19 | Stop reason: HOSPADM

## 2022-06-12 RX ORDER — POTASSIUM CHLORIDE 20 MEQ/1
40 TABLET, EXTENDED RELEASE ORAL 2 TIMES DAILY WITH MEALS
Status: DISCONTINUED | OUTPATIENT
Start: 2022-06-12 | End: 2022-06-13

## 2022-06-12 RX ORDER — PANTOPRAZOLE SODIUM 40 MG/1
40 TABLET, DELAYED RELEASE ORAL
Status: DISCONTINUED | OUTPATIENT
Start: 2022-06-12 | End: 2022-06-19 | Stop reason: HOSPADM

## 2022-06-12 RX ORDER — SODIUM CHLORIDE 9 MG/ML
INJECTION, SOLUTION INTRAVENOUS CONTINUOUS
Status: ACTIVE | OUTPATIENT
Start: 2022-06-12 | End: 2022-06-12

## 2022-06-12 RX ADMIN — POTASSIUM CHLORIDE 40 MEQ: 20 TABLET, EXTENDED RELEASE ORAL at 17:50

## 2022-06-12 RX ADMIN — MEMANTINE 5 MG: 5 TABLET ORAL at 09:24

## 2022-06-12 RX ADMIN — SUCRALFATE 1 G: 1 TABLET ORAL at 17:51

## 2022-06-12 RX ADMIN — SODIUM CHLORIDE, PRESERVATIVE FREE 10 ML: 5 INJECTION INTRAVENOUS at 21:10

## 2022-06-12 RX ADMIN — VANCOMYCIN HYDROCHLORIDE 750 MG: 750 INJECTION, POWDER, LYOPHILIZED, FOR SOLUTION INTRAVENOUS at 21:07

## 2022-06-12 RX ADMIN — POLYETHYLENE GLYCOL 3350 17 G: 17 POWDER, FOR SOLUTION ORAL at 09:23

## 2022-06-12 RX ADMIN — SENNOSIDES 8.6 MG: 8.6 TABLET, FILM COATED ORAL at 21:08

## 2022-06-12 RX ADMIN — METOPROLOL TARTRATE 12.5 MG: 25 TABLET, FILM COATED ORAL at 09:23

## 2022-06-12 RX ADMIN — POTASSIUM CHLORIDE 40 MEQ: 20 TABLET, EXTENDED RELEASE ORAL at 09:23

## 2022-06-12 RX ADMIN — MEMANTINE 5 MG: 5 TABLET ORAL at 21:08

## 2022-06-12 RX ADMIN — SUCRALFATE 1 G: 1 TABLET ORAL at 23:46

## 2022-06-12 RX ADMIN — SUCRALFATE 1 G: 1 TABLET ORAL at 12:53

## 2022-06-12 RX ADMIN — METOPROLOL TARTRATE 12.5 MG: 25 TABLET, FILM COATED ORAL at 14:15

## 2022-06-12 RX ADMIN — SODIUM CHLORIDE, PRESERVATIVE FREE 3 ML: 5 INJECTION INTRAVENOUS at 11:27

## 2022-06-12 RX ADMIN — CEFTRIAXONE 1000 MG: 1 INJECTION, POWDER, FOR SOLUTION INTRAMUSCULAR; INTRAVENOUS at 12:52

## 2022-06-12 RX ADMIN — TROSPIUM CHLORIDE 20 MG: 20 TABLET, FILM COATED ORAL at 17:50

## 2022-06-12 RX ADMIN — TROSPIUM CHLORIDE 20 MG: 20 TABLET, FILM COATED ORAL at 09:23

## 2022-06-12 RX ADMIN — PANTOPRAZOLE SODIUM 40 MG: 40 TABLET, DELAYED RELEASE ORAL at 09:25

## 2022-06-12 RX ADMIN — METOPROLOL TARTRATE 25 MG: 25 TABLET, FILM COATED ORAL at 21:08

## 2022-06-12 RX ADMIN — SUCRALFATE 1 G: 1 TABLET ORAL at 06:08

## 2022-06-12 RX ADMIN — APIXABAN 5 MG: 5 TABLET, FILM COATED ORAL at 09:23

## 2022-06-12 RX ADMIN — SODIUM CHLORIDE, PRESERVATIVE FREE 3 ML: 5 INJECTION INTRAVENOUS at 21:10

## 2022-06-12 RX ADMIN — LEVOTHYROXINE SODIUM 100 MCG: 0.05 TABLET ORAL at 06:08

## 2022-06-12 RX ADMIN — ONDANSETRON 4 MG: 2 INJECTION INTRAMUSCULAR; INTRAVENOUS at 18:05

## 2022-06-12 RX ADMIN — SODIUM CHLORIDE, PRESERVATIVE FREE 10 ML: 5 INJECTION INTRAVENOUS at 09:00

## 2022-06-12 RX ADMIN — TRAMADOL HYDROCHLORIDE 50 MG: 50 TABLET, COATED ORAL at 17:50

## 2022-06-12 RX ADMIN — SODIUM CHLORIDE: 9 INJECTION, SOLUTION INTRAVENOUS at 09:21

## 2022-06-12 ASSESSMENT — PAIN DESCRIPTION - DESCRIPTORS: DESCRIPTORS: ACHING

## 2022-06-12 ASSESSMENT — PAIN DESCRIPTION - PAIN TYPE: TYPE: CHRONIC PAIN

## 2022-06-12 ASSESSMENT — PAIN SCALES - GENERAL
PAINLEVEL_OUTOF10: 0
PAINLEVEL_OUTOF10: 6

## 2022-06-12 ASSESSMENT — PAIN DESCRIPTION - LOCATION: LOCATION: KNEE;BACK

## 2022-06-12 ASSESSMENT — PAIN DESCRIPTION - ORIENTATION: ORIENTATION: RIGHT

## 2022-06-12 NOTE — PROGRESS NOTES
Patient developed hematoma at the right arm where IV access for heparin was. IV access was removed. No active bleeding. Hematoma is extensive from right arm to elbow area. It appears superficial.     Plan:   Monitor  Hold Apixaban for now. Check CBC and BMP tomorrow.

## 2022-06-12 NOTE — PROGRESS NOTES
Hospitalist Progress Note   Admit Date:  2022 12:02 PM   Name:  Mouna Franz   Age:  80 y.o. Sex:  female  :  1926   MRN:  [id number]   Room:  [facility]/    Presenting Complaint: Abdominal Pain and Urinary Retention     Reason(s) for Admission: Septicemia ([facility] [address] [A41.9]  Severe sepsis ([facility] [address] [A41.9, R65.20]     Hospital Course & Interval History:     Patient with past medical history of    Atrial fibrillation, on Eliquis   DM type 2  Edema   GERD   Hypertension   Hypothyroidism   Rheumatoid arthritis    Patient was brought to hospital after experiencing lower abdominal pain, pelvic pain, and decreased urination in the past 2 days prior to admission. No history of fever. Patient came from nursing home. In ER she was found to have tachycardia, and also some mild tachypnea. Creatinine went up to 1.6 from baseline of 1, so she has JAMIE. Also she has sodium of 130 potassium 3.4. CT abdomen and pelvis without contrast showed large hiatal hernia, sigmoid diverticulosis, atherosclerosis. .     Patient was admitted for UTI with sepsis with criteria of tachypnea tachycardia JAMIE with hyponatremia and hypokalemia. Subjective/24hr Events (22):     22   Patient is feeling better since admission. No fever. Patient reported having eaten decent dinner last night. Patient denies abdominal pain this morning. Patient denies dysuria. Patient denies hematuria. No nausea. No vomiting. No fever. No shaking. No chills. No chest pain. No shortness of breath. Assessment & Plan:     Principal Problem:    Severe sepsis ([address]    UTI (urinary tract infection)  Patient is advised to make adequate oral intake of fluid. Continue empiric antibiotic including ceftriaxone. Symptomatic treatment. Patient received 1 dose of vancomycin from emergency room. I will continue vancomycin. Follow-up on culture results.     Active Problems:      Transaminitis  Likely from sepsis   Continue to monitor. Expect to see some improvement after her sepsis and UTI of better treated and under control. Memory loss  Noted. Type 2 diabetes with nephropathy (HCC)  Blood sugar has been in the mid 100s to low 200s ranges. Continue current sliding scale coverage with Humalog. Edema  Noted   Legs are without edema today. Monitor. Essential hypertension, benign  Blood pressure is on the low side, we will hold antihypertensive medications for now. Patient denies lightheadedness. Patient denies dizziness. Monitor closely      Acquired hypothyroidism  Continue thyroid hormone supplement. Hyponatremia  Hypokalemia  Likely secondary to poor oral intake and diuretic use prior to coming to hospital.  She is getting gentle hydration. Will monitor closely. Also put the patient on telemetry because of the risk of arrhythmia associated with hypokalemia especially. OAB (overactive bladder)  Noted   Monitor. Symptomatic treatments     I have discussed the plan of care with patient. Discharge Planning:    Likely back to nursing home when improved. Diet:  ADULT DIET; Easy to Chew; 4 carb choices (60 gm/meal); Low Fat/Low Chol/High Fiber/2 gm Na  DVT PPx: On Apixaban PO   Code status: Full Code    Hospital Problems:  Principal Problem:    Severe sepsis ([address]  Active Problems:    UTI (urinary tract infection)    Transaminitis    Memory loss    Type 2 diabetes with nephropathy (HCC)    Edema    Essential hypertension, benign    Acquired hypothyroidism    OAB (overactive bladder)  Resolved Problems:    * No resolved hospital problems.  *      Objective:     Patient Vitals for the past 24 hrs:   Temp Pulse Resp BP SpO2   06/12/22 0732 98.1 °F (36.7 °C) (!) 120 18 106/77 95 %   06/12/22 0421 99.1 °F (37.3 °C) (!) 118 16 106/73 91 %   06/12/22 0345 -- (!) 110 -- -- --   06/11/22 2320 98.6 °F (37 °C) (!) 110 16 106/69 94 %   06/11/22 2300 -- (!) 105 -- -- --   06/11/22 2215 -- -- -- 101/62 -- 06/11/22 2011 98.6 °F (37 °C) 97 18 90/69 95 %   06/11/22 1717 98.1 °F (36.7 °C) (!) 109 14 123/76 94 %   06/11/22 1600 -- 90 21 (!) 98/54 --   06/11/22 1557 -- 98 21 -- --   06/11/22 1545 -- 93 21 95/61 95 %   06/11/22 1515 -- 93 -- 104/82 96 %   06/11/22 1500 -- (!) 104 -- 110/69 94 %   06/11/22 1430 -- 91 22 114/74 92 %   06/11/22 1415 -- 97 23 (!) 119/100 92 %   06/11/22 1400 -- 93 20 114/80 95 %   06/11/22 1332 -- 99 22 118/83 96 %   06/11/22 1312 -- 88 25 103/68 95 %   06/11/22 1302 -- 98 20 -- 91 %   06/11/22 1300 -- 89 23 110/76 --   06/11/22 1242 -- 100 28 119/77 93 %   06/11/22 1230 -- 97 25 115/60 93 %   06/11/22 1215 -- 96 24 114/77 92 %   06/11/22 1205 99.1 °F (37.3 °C) 97 18 115/65 93 %       Oxygen Therapy  SpO2: 95 %  Pulse via Oximetry: 100 beats per minute  O2 Device: None (Room air)    Estimated body mass index is 26 kg/m² as calculated from the following:    Height as of this encounter: 5' 8\" (1.727 m). Weight as of this encounter: 171 lb (77.6 kg). Intake/Output Summary (Last 24 hours) at 6/12/2022 0750  Last data filed at 6/12/2022 0421  Gross per 24 hour   Intake --   Output 600 ml   Net -600 ml         Physical Exam:     Blood pressure 106/77, pulse (!) 120, temperature 98.1 °F (36.7 °C), temperature source Oral, resp. rate 18, height 5' 8\" (1.727 m), weight 171 lb (77.6 kg), SpO2 95 %. General:    Well nourished. Patient is lying flat in bed with head up. Patient is awake alert and oriented. Patient answer question appropriately. Head:  Normocephalic, atraumatic  Eyes:  Sclerae appear normal.  Pupils equally round. ENT:  Nares appear normal, no drainage. Moist oral mucosa  Neck:  No restricted ROM. Trachea midline   CV:   RRR. No m/r/g. No jugular venous distension. Lungs:   CTAB. No wheezing, rhonchi, or rales. Respirations even, unlabored  Abdomen: Bowel sounds present. Soft, nontender, nondistended. Extremities: No cyanosis or clubbing.   No edema  Skin:     No 06/11/22 12:10 PM   Result Value Ref Range    Lipase 49 (L) 73 - 393 U/L   Lactic Acid (Select if patient is over 65 to rule out mesenteric ischemia)    Collection Time: 06/11/22 12:10 PM   Result Value Ref Range    Lactic Acid, Plasma 3.4 (H) 0.4 - 2.0 MMOL/L   Magnesium    Collection Time: 06/11/22 12:10 PM   Result Value Ref Range    Magnesium 1.7 (L) 1.8 - 2.4 mg/dL   APTT    Collection Time: 06/11/22 12:10 PM   Result Value Ref Range    PTT 42.1 (H) 24.1 - 35.1 SEC   Protime-INR    Collection Time: 06/11/22 12:10 PM   Result Value Ref Range    Protime 24.3 (H) 12.6 - 14.5 sec    INR 2.1     Hemoglobin A1C    Collection Time: 06/11/22 12:10 PM   Result Value Ref Range    Hemoglobin A1C 6.8 (H) 4.20 - 6.30 %    eAG 148 mg/dL   Hepatitis Panel, Acute    Collection Time: 06/11/22 12:11 PM   Result Value Ref Range    Hep A IgM NONREACTIVE NR      Hep B Core Ab, IgM NONREACTIVE NR      Hepatitis B Surface Ag NONREACTIVE NR      Hepatitis C Ab NONREACTIVE NR     Ammonia    Collection Time: 06/11/22 12:22 PM   Result Value Ref Range    Ammonia <10 (L) 11 - 32 UMOL/L   POCT Urinalysis no Micro    Collection Time: 06/11/22 12:24 PM   Result Value Ref Range    Specific Gravity, Urine, POC 1.015 1.001 - 1.023      pH, Urine, POC 5.0 5.0 - 9.0      Protein, Urine, POC 30 (A) NEG mg/dL    Glucose, UA POC Negative NEG mg/dL    KETONES, Urine, POC TRACE (A) NEG mg/dL    Bilirubin, Urine, POC SMALL (A) NEG      Blood, UA POC Negative NEG      URINE UROBILINOGEN POC 1.0 0.2 - 1.0 EU/dL    Nitrate, Urine, POC Negative NEG      Leukocyte Est, UA POC SMALL (A) NEG      Performed by: Jordi Dougheryt    Urinalysis w rflx microscopic    Collection Time: 06/11/22 12:33 PM   Result Value Ref Range    Color, UA YELLOW      Appearance CLEAR      Specific Gravity, UA 1.020 1.001 - 1.023      pH, Urine 5.5 5.0 - 9.0      Protein, UA TRACE (A) NEG mg/dL    Glucose,  (A) NEG mg/dL    Ketones, Urine TRACE (A) NEG mg/dL    Bilirubin Urine SMALL (A) NEG      Blood, Urine Negative NEG      Urobilinogen, Urine 1.0 0.2 - 1.0 EU/dL    Nitrite, Urine Positive (A) NEG      Leukocyte Esterase, Urine SMALL (A) NEG      WBC, UA 10-20 0 /hpf    Epithelial Cells UA 20-50 0 /hpf    BACTERIA, URINE 1+ (H) 0 /hpf    AMORPHOUS CRYSTAL 2+ (H) 0   Culture, Blood 1    Collection Time: 06/11/22  1:41 PM    Specimen: Blood   Result Value Ref Range    Special Requests LEFT  HAND        Culture NO GROWTH AFTER 16 HOURS     Culture, Blood 1    Collection Time: 06/11/22  1:53 PM    Specimen: Blood   Result Value Ref Range    Special Requests LEFT  ARM        Culture NO GROWTH AFTER 16 HOURS     Lactic Acid    Collection Time: 06/11/22  4:14 PM   Result Value Ref Range    Lactic Acid, Plasma 0.9 0.4 - 2.0 MMOL/L   POCT Glucose    Collection Time: 06/11/22  8:55 PM   Result Value Ref Range    POC Glucose 194 (H) 65 - 100 mg/dL    Performed by: Nael English    Anti-Xa, Unfractionated Heparin    Collection Time: 06/12/22 12:05 AM   Result Value Ref Range    Anti-XA Unfrac Heparin >2.01 (H) 0.3 - 0.7 IU/mL   Anti-Xa, Unfractionated Heparin    Collection Time: 06/12/22  4:51 AM   Result Value Ref Range    Anti-XA Unfrac Heparin >1.1 (H) 0.3 - 0.7 IU/mL   Comprehensive Metabolic Panel w/ Reflex to MG    Collection Time: 06/12/22  4:51 AM   Result Value Ref Range    Sodium 131 (L) 136 - 145 mmol/L    Potassium 3.3 (L) 3.5 - 5.1 mmol/L    Chloride 92 (L) 98 - 107 mmol/L    CO2 29 21 - 32 mmol/L    Anion Gap 10 7 - 16 mmol/L    Glucose 147 (H) 65 - 100 mg/dL    BUN 19 8 - 23 MG/DL    CREATININE 1.20 (H) 0.6 - 1.0 MG/DL    GFR  54 (L) >60 ml/min/1.73m2    GFR Non- 44 (L) >60 ml/min/1.73m2    Calcium 8.3 8.3 - 10.4 MG/DL    Total Bilirubin 4.3 (H) 0.2 - 1.1 MG/DL     (H) 12 - 65 U/L     (H) 15 - 37 U/L    Alk Phosphatase 228 (H) 50 - 136 U/L    Total Protein 6.1 (L) 6.3 - 8.2 g/dL    Albumin 2.2 (L) 3.2 - 4.6 g/dL    Globulin 3.9 (H) 2.3 - 3.5 g/dL    Albumin/Globulin Ratio 0.6 (L) 1.2 - 3.5     CBC with Auto Differential    Collection Time: 06/12/22  4:51 AM   Result Value Ref Range    WBC 7.1 4.3 - 11.1 K/uL    RBC 3.68 (L) 4.05 - 5.2 M/uL    Hemoglobin 11.5 (L) 11.7 - 15.4 g/dL    Hematocrit 33.1 (L) 35.8 - 46.3 %    MCV 89.9 79.6 - 97.8 FL    MCH 31.3 26.1 - 32.9 PG    MCHC 34.7 31.4 - 35.0 g/dL    RDW 14.0 11.9 - 14.6 %    Platelets 977 007 - 968 K/uL    MPV 10.1 9.4 - 12.3 FL    nRBC 0.00 0.0 - 0.2 K/uL    Differential Type AUTOMATED      Seg Neutrophils 78 43 - 78 %    Lymphocytes 13 13 - 44 %    Monocytes 6 4.0 - 12.0 %    Eosinophils % 2 0.5 - 7.8 %    Basophils 0 0.0 - 2.0 %    Immature Granulocytes 1 0.0 - 5.0 %    Segs Absolute 5.5 1.7 - 8.2 K/UL    Absolute Lymph # 0.9 0.5 - 4.6 K/UL    Absolute Mono # 0.4 0.1 - 1.3 K/UL    Absolute Eos # 0.2 0.0 - 0.8 K/UL    Basophils Absolute 0.0 0.0 - 0.2 K/UL    Absolute Immature Granulocyte 0.0 0.0 - 0.5 K/UL   Vancomycin Level, Random    Collection Time: 06/12/22  4:51 AM   Result Value Ref Range    Vancomycin Rm 18.3 UG/ML   Magnesium    Collection Time: 06/12/22  4:51 AM   Result Value Ref Range    Magnesium 2.0 1.8 - 2.4 mg/dL   POCT Glucose    Collection Time: 06/12/22  7:36 AM   Result Value Ref Range    POC Glucose 152 (H) 65 - 100 mg/dL    Performed by: Heydi        Other Studies:  CT ABDOMEN PELVIS WO CONTRAST Additional Contrast? Oral   Final Result   1. Large hiatal hernia. 2. Sigmoid diverticulosis. 3. Atherosclerosis. XR KNEE RIGHT (3 VIEWS)   Final Result   1. Severe medial and lateral compartment degenerative change and moderate medial   compartment degenerative change. 2. No evidence of acute fracture.              Current Meds:  Current Facility-Administered Medications   Medication Dose Route Frequency    potassium chloride (KLOR-CON M) extended release tablet 40 mEq  40 mEq Oral BID WC    sodium chloride flush 0.9 % injection 3 mL  3 mL IntraVENous Q8H    sodium chloride flush 0.9 % injection 5-40 mL  5-40 mL IntraVENous 2 times per day    sodium chloride flush 0.9 % injection 5-40 mL  5-40 mL IntraVENous PRN    0.9 % sodium chloride infusion   IntraVENous PRN    ondansetron (ZOFRAN-ODT) disintegrating tablet 4 mg  4 mg Oral Q8H PRN    Or    ondansetron (ZOFRAN) injection 4 mg  4 mg IntraVENous Q6H PRN    polyethylene glycol (GLYCOLAX) packet 17 g  17 g Oral Daily PRN    levothyroxine (SYNTHROID) tablet 100 mcg  100 mcg Oral QAM AC    polyethylene glycol (GLYCOLAX) packet 17 g  17 g Oral Daily    senna (SENOKOT) tablet 8.6 mg  1 tablet Oral Nightly    sucralfate (CARAFATE) tablet 1 g  1 g Oral 4 times per day    traMADol (ULTRAM) tablet 50 mg  50 mg Oral Q8H PRN    memantine (NAMENDA) tablet 5 mg  5 mg Oral BID    cefTRIAXone (ROCEPHIN) 1000 mg IVPB in NS 50ml minibag  1,000 mg IntraVENous Q24H    [START ON 6/17/2022] vancomycin (VANCOCIN) intermittent dosing (placeholder)   Other RX Placeholder    tuberculin injection 5 Units  5 Units IntraDERmal Once    heparin (porcine) injection 4,000 Units  4,000 Units IntraVENous PRN    heparin (porcine) injection 2,000 Units  2,000 Units IntraVENous PRN    heparin 25,000 units in dextrose 5% 250 mL (premix) infusion  5-30 Units/kg/hr IntraVENous Continuous    insulin lispro (HUMALOG) injection vial 0-4 Units  0-4 Units SubCUTAneous TID WC    insulin lispro (HUMALOG) injection vial 0-4 Units  0-4 Units SubCUTAneous Nightly       Signed:  Ericka Crowder MD    Part of this note may have been written by using a voice dictation software. The note has been proof read but may still contain some grammatical/other typographical errors.

## 2022-06-12 NOTE — PROGRESS NOTES
Occupational Therapy Note:    OT orders received and chart reviewed--attempted to see pt today for initial assessment--RN reported elevated HR this morning and requested therapy hold mobility at this time. Will continue to follow and attempt to see as schedule allows/medically appropriate.      Thank you,  Shelli Mayes, OTR/L

## 2022-06-12 NOTE — PROGRESS NOTES
Good visit with the patient and her family   patient is calm   family very thankful for the follow up

## 2022-06-12 NOTE — PROGRESS NOTES
Rounding completed during shift. All needs met at this time. Bed L/L with call bell in reach, bed alarm on. Report given to oncoming RN.

## 2022-06-12 NOTE — PROGRESS NOTES
Physical Therapy Note:    Physical therapy evaluation orders received and chart reviewed. Attempted to see patient this AM to initiate assessment. Discussed with RN, patient with elevated HR. Request PT hold on assessment/mobility at this time. Will follow and re-attempt at a later time/date as schedule permits/patient available pending pt medical status.     Thank you,  Brian Bush, PT, DPT

## 2022-06-12 NOTE — PROGRESS NOTES
VANCO DAILY FOLLOW UP NOTE  [facility] Pharmacokinetic Monitoring Service - Vancomycin    Consulting Provider: Dr. Honey Del Rosario   Indication: UTI/Sepsis  Target Concentration: Goal AUC/ALEC 400-600 mg*hr/L  Day of Therapy: 2  Additional Antimicrobials: Ceftriaxone    Pertinent Laboratory Values: Wt Readings from Last 1 Encounters:   06/11/22 171 lb (77.6 kg)     Temp Readings from Last 1 Encounters:   06/12/22 98.6 °F (37 °C) (Oral)     Recent Labs     06/11/22  1210 06/11/22  1614 06/12/22  0451   BUN 24*  --  19   CREATININE 1.60*  --  1.20*   WBC 12.5*  --  7.1   LACACIDPL 3.4* 0.9  --      Estimated Creatinine Clearance: 31 mL/min (A) (based on SCr of 1.2 mg/dL (H)). Lab Results   Component Value Date    VANCOTROUGH 9.5 07/02/2020    VANCORANDOM 18.3 06/12/2022       MRSA Nasal Swab: N/A. Non-respiratory infection. .      Assessment:  Date/Time Dose Concentration AUC         Note: Serum concentrations collected for AUC dosing may appear elevated if collected in close proximity to the dose administered, this is not necessarily an indication of toxicity    Plan:  Patient is showing signs of renal recovery based on laboratory markers  Will schedule a vancomycin regimen of 750 mg every 24 hours for now based on SCR and random concentration, anticipated AUC/Tr of 461/14.9  Repeat vancomycin concentrations will be ordered as clinically appropriate   Pharmacy will continue to monitor patient and adjust therapy as indicated    Thank you for the consult,  Jasvir Golden, [facility]

## 2022-06-12 NOTE — FLOWSHEET NOTE
06/11/22 1830   Dual Clinician Skin Assessment   Dual Skin Assessment (4 Eyes) WDL   Second Clinical  (First and Last Name) Mehreen Muhammad

## 2022-06-13 PROBLEM — J81.1 PULMONARY EDEMA: Status: ACTIVE | Noted: 2022-06-13

## 2022-06-13 PROBLEM — I48.91 ATRIAL FIBRILLATION (HCC): Status: ACTIVE | Noted: 2022-01-01

## 2022-06-13 LAB
ANION GAP SERPL CALC-SCNC: 10 MMOL/L (ref 7–16)
BUN SERPL-MCNC: 18 MG/DL (ref 8–23)
CALCIUM SERPL-MCNC: 9.2 MG/DL (ref 8.3–10.4)
CHLORIDE SERPL-SCNC: 94 MMOL/L (ref 98–107)
CO2 SERPL-SCNC: 25 MMOL/L (ref 21–32)
CREAT SERPL-MCNC: 1.2 MG/DL (ref 0.6–1)
ERYTHROCYTE [DISTWIDTH] IN BLOOD BY AUTOMATED COUNT: 14.3 % (ref 11.9–14.6)
GLUCOSE BLD STRIP.AUTO-MCNC: 158 MG/DL (ref 65–100)
GLUCOSE BLD STRIP.AUTO-MCNC: 181 MG/DL (ref 65–100)
GLUCOSE BLD STRIP.AUTO-MCNC: 195 MG/DL (ref 65–100)
GLUCOSE BLD STRIP.AUTO-MCNC: 197 MG/DL (ref 65–100)
GLUCOSE SERPL-MCNC: 165 MG/DL (ref 65–100)
HCT VFR BLD AUTO: 37.4 % (ref 35.8–46.3)
HGB BLD-MCNC: 12.6 G/DL (ref 11.7–15.4)
MCH RBC QN AUTO: 30.6 PG (ref 26.1–32.9)
MCHC RBC AUTO-ENTMCNC: 33.7 G/DL (ref 31.4–35)
MCV RBC AUTO: 90.8 FL (ref 79.6–97.8)
MM INDURATION, POC: 0 MM (ref 0–5)
NRBC # BLD: 0 K/UL (ref 0–0.2)
PLATELET # BLD AUTO: 323 K/UL (ref 150–450)
PMV BLD AUTO: 10.1 FL (ref 9.4–12.3)
POTASSIUM SERPL-SCNC: 4.7 MMOL/L (ref 3.5–5.1)
PPD, POC: NEGATIVE
RBC # BLD AUTO: 4.12 M/UL (ref 4.05–5.2)
SERVICE CMNT-IMP: ABNORMAL
SODIUM SERPL-SCNC: 129 MMOL/L (ref 136–145)
WBC # BLD AUTO: 7.7 K/UL (ref 4.3–11.1)

## 2022-06-13 PROCEDURE — 6360000002 HC RX W HCPCS: Performed by: FAMILY MEDICINE

## 2022-06-13 PROCEDURE — 2580000003 HC RX 258: Performed by: INTERNAL MEDICINE

## 2022-06-13 PROCEDURE — 1100000003 HC PRIVATE W/ TELEMETRY

## 2022-06-13 PROCEDURE — 36415 COLL VENOUS BLD VENIPUNCTURE: CPT

## 2022-06-13 PROCEDURE — 51798 US URINE CAPACITY MEASURE: CPT

## 2022-06-13 PROCEDURE — 80048 BASIC METABOLIC PNL TOTAL CA: CPT

## 2022-06-13 PROCEDURE — 2580000003 HC RX 258: Performed by: FAMILY MEDICINE

## 2022-06-13 PROCEDURE — 2580000003 HC RX 258: Performed by: EMERGENCY MEDICINE

## 2022-06-13 PROCEDURE — 6370000000 HC RX 637 (ALT 250 FOR IP): Performed by: INTERNAL MEDICINE

## 2022-06-13 PROCEDURE — 6360000002 HC RX W HCPCS: Performed by: INTERNAL MEDICINE

## 2022-06-13 PROCEDURE — 51701 INSERT BLADDER CATHETER: CPT

## 2022-06-13 PROCEDURE — 82962 GLUCOSE BLOOD TEST: CPT

## 2022-06-13 PROCEDURE — 6370000000 HC RX 637 (ALT 250 FOR IP): Performed by: FAMILY MEDICINE

## 2022-06-13 PROCEDURE — 85027 COMPLETE CBC AUTOMATED: CPT

## 2022-06-13 RX ORDER — FUROSEMIDE 10 MG/ML
40 INJECTION INTRAMUSCULAR; INTRAVENOUS ONCE
Status: COMPLETED | OUTPATIENT
Start: 2022-06-13 | End: 2022-06-13

## 2022-06-13 RX ORDER — METOPROLOL TARTRATE 50 MG/1
50 TABLET, FILM COATED ORAL 2 TIMES DAILY
Status: DISCONTINUED | OUTPATIENT
Start: 2022-06-13 | End: 2022-06-16

## 2022-06-13 RX ADMIN — DILTIAZEM HYDROCHLORIDE 30 MG: 30 TABLET, FILM COATED ORAL at 15:44

## 2022-06-13 RX ADMIN — DILTIAZEM HYDROCHLORIDE 30 MG: 30 TABLET, FILM COATED ORAL at 21:05

## 2022-06-13 RX ADMIN — CEFTRIAXONE 1000 MG: 1 INJECTION, POWDER, FOR SOLUTION INTRAMUSCULAR; INTRAVENOUS at 12:16

## 2022-06-13 RX ADMIN — SODIUM CHLORIDE, PRESERVATIVE FREE 3 ML: 5 INJECTION INTRAVENOUS at 21:07

## 2022-06-13 RX ADMIN — APIXABAN 5 MG: 5 TABLET, FILM COATED ORAL at 21:06

## 2022-06-13 RX ADMIN — LEVOTHYROXINE SODIUM 100 MCG: 0.05 TABLET ORAL at 05:32

## 2022-06-13 RX ADMIN — METOPROLOL TARTRATE 50 MG: 50 TABLET ORAL at 21:06

## 2022-06-13 RX ADMIN — SODIUM CHLORIDE, PRESERVATIVE FREE 10 ML: 5 INJECTION INTRAVENOUS at 21:08

## 2022-06-13 RX ADMIN — TROSPIUM CHLORIDE 20 MG: 20 TABLET, FILM COATED ORAL at 09:17

## 2022-06-13 RX ADMIN — TROSPIUM CHLORIDE 20 MG: 20 TABLET, FILM COATED ORAL at 15:44

## 2022-06-13 RX ADMIN — SUCRALFATE 1 G: 1 TABLET ORAL at 12:16

## 2022-06-13 RX ADMIN — METOPROLOL TARTRATE 25 MG: 25 TABLET, FILM COATED ORAL at 12:16

## 2022-06-13 RX ADMIN — MEMANTINE 5 MG: 5 TABLET ORAL at 09:13

## 2022-06-13 RX ADMIN — DIPHENHYDRAMINE HCL 5 ML: 12.5 SOLUTION ORAL at 09:57

## 2022-06-13 RX ADMIN — SODIUM CHLORIDE, PRESERVATIVE FREE 3 ML: 5 INJECTION INTRAVENOUS at 12:23

## 2022-06-13 RX ADMIN — POLYETHYLENE GLYCOL 3350 17 G: 17 POWDER, FOR SOLUTION ORAL at 09:13

## 2022-06-13 RX ADMIN — METOPROLOL TARTRATE 25 MG: 25 TABLET, FILM COATED ORAL at 09:13

## 2022-06-13 RX ADMIN — APIXABAN 5 MG: 5 TABLET, FILM COATED ORAL at 09:12

## 2022-06-13 RX ADMIN — VANCOMYCIN HYDROCHLORIDE 750 MG: 750 INJECTION, POWDER, LYOPHILIZED, FOR SOLUTION INTRAVENOUS at 21:04

## 2022-06-13 RX ADMIN — FUROSEMIDE 40 MG: 10 INJECTION, SOLUTION INTRAMUSCULAR; INTRAVENOUS at 09:11

## 2022-06-13 RX ADMIN — PANTOPRAZOLE SODIUM 40 MG: 40 TABLET, DELAYED RELEASE ORAL at 05:31

## 2022-06-13 RX ADMIN — SODIUM CHLORIDE, PRESERVATIVE FREE 3 ML: 5 INJECTION INTRAVENOUS at 04:18

## 2022-06-13 RX ADMIN — SODIUM CHLORIDE, PRESERVATIVE FREE 10 ML: 5 INJECTION INTRAVENOUS at 09:14

## 2022-06-13 RX ADMIN — SENNOSIDES 8.6 MG: 8.6 TABLET, FILM COATED ORAL at 21:06

## 2022-06-13 RX ADMIN — SUCRALFATE 1 G: 1 TABLET ORAL at 05:31

## 2022-06-13 RX ADMIN — SUCRALFATE 1 G: 1 TABLET ORAL at 17:07

## 2022-06-13 RX ADMIN — DILTIAZEM HYDROCHLORIDE 30 MG: 30 TABLET, FILM COATED ORAL at 01:13

## 2022-06-13 RX ADMIN — MEMANTINE 5 MG: 5 TABLET ORAL at 21:07

## 2022-06-13 ASSESSMENT — PAIN SCALES - GENERAL: PAINLEVEL_OUTOF10: 0

## 2022-06-13 NOTE — CARE COORDINATION
Spoke to Ms. Michael Steiner in room 601 about discharge planning. She said that she has lived at [facility] ([facility]; [phone]) since she sold her house about 3 years ago. She said it was ok to call her daughter Ms. Schuyler Kelly ([phone]). Left message for daughter at [phone] to contact the  later to discuss discharge planning. Wait for OT and PT recommendations. Case Management to follow and assist with discharge planning. Addendum:  Spoke to patient and daughter in room 601. Confirmed that their plan, if possible, is to return to [facility] when medically stable. Daughter Ms. Schuyler Kelly said that at baseline, Ms. Michael Steiner is wheelchair-bound, but able to independently transfer and roll around (e.g., from her room to dining armstrong, and then back again).        06/13/22 0819   Service Assessment   Cognition Alert   History Provided By Patient   Primary Caregiver Other (Comment)  Agatha Sarkar [facility])   [facility], [facility]   Can patient return to prior living arrangement Unknown at present   Social/Functional History   Type of Home Assisted living   ADL Assistance Needs assistance

## 2022-06-13 NOTE — PROGRESS NOTES
Physician Progress Note      PATIENT:               Leora Gaviria  CSN #:                  [id number]  :                       1926  ADMIT DATE:       2022 12:02 PM  [address] [place] DATE:  RESPONDING  PROVIDER #:        Mariam Arguelles MD          QUERY TEXT:    Patient admitted with sepsis, noted to have atrial fibrillation. If possible,   please document in progress notes and discharge summary further specificity   regarding the type of atrial fibrillation: The medical record reflects the following:  Risk Factors: DM 2, HTN  Clinical Indicators: Hospital Problem list: \"Atrial fibrillation\"  Treatment: Eliquis (currently on hold due to hematoma)    Chronic: nonspecific term that could be referring to paroxysmal, persistent,   or permanent  Longstanding persistent: persistent and continuous, lasting > 1 year. Paroxysmal - self-terminating or intermittent; resolves with or without   intervention within 7 days of onset; may recur with various frequency. Persistent - Fails to terminate within 7 days; Often requires meds or   cardioversion to restore to NSR. Permanent - longstanding & persistent; Medication has been ineffective in   restoring NSR &/or cardioversion is contraindicated    Definitions per MS-DRG Training Guide and Quick Reference Guide, [address] 5   Diseases and Disorders of the Circulatory System; 2019; [employer]. Software content   from the [employer]? Advanced CDI Transformation Program  Options provided:  -- Paroxysmal Atrial Fibrillation  -- Longstanding Persistent Atrial Fibrillation  -- Permanent Atrial Fibrillation  -- Persistent Atrial Fibrillation  -- Chronic Atrial Fibrillation, unspecified  -- Other - I will add my own diagnosis  -- Disagree - Not applicable / Not valid  -- Disagree - Clinically unable to determine / Unknown  -- Refer to Clinical Documentation Reviewer    PROVIDER RESPONSE TEXT:    This patient has paroxysmal atrial fibrillation.     Query created by: Vishnu Lau on 2022 9:26 AM      Electronically signed by:  Medhat Elaine MD 6/13/2022 9:31 AM

## 2022-06-13 NOTE — PROGRESS NOTES
Prior to am med pass telemetry notified this nurse that the pt was 150-160's A Fib, pt resting quietly in bed, MD notified and new orders received. Shortly after Hep gtt was stopped and IV was d/c'd d/t infiltration, pt noted to reddened/bruised area from IV site to [facility] [place], operated by [facility] and telemetry notified this nurse that pt was once again A fib 130's, MD made aware, came to assess pt and MD placed orders  to hold Eilquis tonight and new orders to change lopressor to Bid. Pt had c/o pain to right knee x1, ultram given and effective. Hourly rounding completed during shift. All needs met at this time. Bed L/L with call bell in reach, bed alarm on. Report given to oncoming RN.

## 2022-06-13 NOTE — PROGRESS NOTES
Hospitalist Progress Note   Admit Date:  2022 12:02 PM   Name:  Marlo Vazquez   Age:  80 y.o. Sex:  female  :  1926   MRN:  [id number]   Room:  [facility]    Presenting Complaint: Abdominal Pain and Urinary Retention     Reason(s) for Admission: Septicemia ([facility] [address] [A41.9]  Severe sepsis ([facility] [address] [A41.9, R65.20]     Hospital Course & Interval History:     Patient with past medical history of    Atrial fibrillation, on Eliquis   DM type 2  Edema   GERD   Hypertension   Hypothyroidism   Rheumatoid arthritis    Patient was brought to hospital after experiencing lower abdominal pain, pelvic pain, and decreased urination in the past 2 days prior to admission. No history of fever. Patient came from nursing home. In ER she was found to have tachycardia, and also some mild tachypnea. Creatinine went up to 1.6 from baseline of 1, so she has JAMIE. Also she has sodium of 130 potassium 3.4. CT abdomen and pelvis without contrast showed large hiatal hernia, sigmoid diverticulosis, atherosclerosis. .     Patient was admitted for UTI with sepsis with criteria of tachypnea tachycardia JAMIE with hyponatremia and hypokalemia. Subjective/24hr Events (22):     22   Patient is feeling better since admission. No fever. Patient reported having eaten decent dinner last night. Patient denies abdominal pain this morning. Patient denies dysuria. Patient denies hematuria. No nausea. No vomiting. No fever. No shaking. No chills. No chest pain. No shortness of breath. 22  Patient developed right arm hematoma on 2022 after IV infiltration. Extent of hematoma is improving. Apixaban was held from yesterday. No fever. No shaking. No chills. No chest pain. No shortness of breath.     HR is running on high side           Assessment & Plan:     Principal Problem:    Severe sepsis ([address]    UTI (urinary tract infection)  Continue empiric antibiotics which are ceftriaxone and Vancomycin. Symptomatic treatment. Follow-up on culture results. So far, culture of blood and urine are negative. Active Problems:      Transaminitis  Likely from sepsis   Continue to monitor. Expect to see some improvement after her sepsis and UTI of better treated and under control. Memory loss  Noted. Type 2 diabetes with nephropathy (HCC)  Blood sugar has been in the mid 100s to low 200s ranges. Continue current sliding scale coverage with Humalog. Edema  Noted   Legs are without edema today. Monitor. Essential hypertension, benign  On Metoprolol 25  Mg po bid. That should help with tachycardia as well. Patient denies lightheadedness. Patient denies dizziness. Monitor closely      Acquired hypothyroidism  Continue thyroid hormone supplement. Hyponatremia  Hypokalemia  Likely secondary to poor oral intake and diuretic use prior to coming to hospital.  She is getting gentle hydration. Hypokalemia is resolved. Stop K supplement. OAB (overactive bladder)  Noted   Monitor. Symptomatic treatments     Chest X-ray shows pulmonary congestion. Will give Furosemide 40 mg IV once. Monitor symptoms and response. I have discussed the plan of care with patient. Discharge Planning:    Likely back to nursing home when improved. Diet:  ADULT DIET; Easy to Chew; 4 carb choices (60 gm/meal); Low Fat/Low Chol/High Fiber/2 gm Na  DVT PPx: On Apixaban PO   Code status: Full Code    Hospital Problems:  Principal Problem:    Severe sepsis ([address]  Active Problems:    UTI (urinary tract infection)    Transaminitis    Hypokalemia    Hyponatremia    Memory loss    Type 2 diabetes with nephropathy (HCC)    Edema    Essential hypertension, benign    Acquired hypothyroidism    OAB (overactive bladder)  Resolved Problems:    * No resolved hospital problems.  *      Objective:     Patient Vitals for the past 24 hrs:   Temp Pulse Resp BP SpO2   06/13/22 0719 97.9 °F (36.6 °C) (!) 131 22 (!) 125/97 92 %   06/13/22 0359 98.1 °F (36.7 °C) (!) 144 20 132/86 95 %   06/13/22 0113 -- -- -- (!) 127/92 --   06/12/22 2245 97.9 °F (36.6 °C) (!) 135 18 120/79 94 %   06/12/22 2108 -- (!) 137 -- (!) 120/91 --   06/12/22 1903 97.7 °F (36.5 °C) (!) 137 20 (!) 120/91 94 %   06/12/22 1614 97.3 °F (36.3 °C) (!) 126 26 114/87 97 %   06/12/22 1042 98.6 °F (37 °C) (!) 108 26 117/88 92 %   06/12/22 0923 -- (!) 150 -- -- --       Oxygen Therapy  SpO2: 92 %  Pulse via Oximetry: 100 beats per minute  O2 Device: None (Room air)    Estimated body mass index is 27.92 kg/m² as calculated from the following:    Height as of this encounter: 5' 8\" (1.727 m). Weight as of this encounter: 183 lb 10.3 oz (83.3 kg). Intake/Output Summary (Last 24 hours) at 6/13/2022 0845  Last data filed at 6/13/2022 0811  Gross per 24 hour   Intake 0 ml   Output 500 ml   Net -500 ml         Physical Exam:     Blood pressure (!) 125/97, pulse (!) 131, temperature 97.9 °F (36.6 °C), temperature source Axillary, resp. rate 22, height 5' 8\" (1.727 m), weight 183 lb 10.3 oz (83.3 kg), SpO2 92 %. General:    Well nourished. Patient is lying flat in bed with head up. Patient is awake alert and oriented. Patient appears weak. Answering questions. Head:  Normocephalic, atraumatic, not pale. No icterus. Eyes:  Sclerae appear normal.  Pupils equally round. ENT:  Nares appear normal, no drainage. Moist oral mucosa  Neck:  No restricted ROM. Trachea midline   CV:   RRR. No m/r/g. No jugular venous distension. Lungs:   CTAB. No wheezing, rhonchi, or rales. Respirations even, unlabored  Abdomen: Bowel sounds present. Soft, nontender, nondistended. Extremities: No cyanosis or clubbing. No edema  Skin:     Right arm with hematoma from hand to elbow area. No active bleeding. Extent of hematoma appears less from the day before. Neuro:  CN II-XII grossly intact. Sensation intact. A&Ox3  Psych:  Normal mood and affect.       I have reviewed ordered lab tests and independently visualized imaging below:    Recent Labs:  Recent Results (from the past 48 hour(s))   CBC with Diff    Collection Time: 06/11/22 12:10 PM   Result Value Ref Range    WBC 12.5 (H) 4.3 - 11.1 K/uL    RBC 4.08 4.05 - 5.2 M/uL    Hemoglobin 12.5 11.7 - 15.4 g/dL    Hematocrit 37.2 35.8 - 46.3 %    MCV 91.2 79.6 - 97.8 FL    MCH 30.6 26.1 - 32.9 PG    MCHC 33.6 31.4 - 35.0 g/dL    RDW 14.2 11.9 - 14.6 %    Platelets 628 144 - 323 K/uL    MPV 9.8 9.4 - 12.3 FL    nRBC 0.00 0.0 - 0.2 K/uL    Differential Type AUTOMATED      Seg Neutrophils 89 (H) 43 - 78 %    Lymphocytes 6 (L) 13 - 44 %    Monocytes 3 (L) 4.0 - 12.0 %    Eosinophils % 1 0.5 - 7.8 %    Basophils 0 0.0 - 2.0 %    Immature Granulocytes 1 0.0 - 5.0 %    Segs Absolute 11.2 (H) 1.7 - 8.2 K/UL    Absolute Lymph # 0.8 0.5 - 4.6 K/UL    Absolute Mono # 0.4 0.1 - 1.3 K/UL    Absolute Eos # 0.1 0.0 - 0.8 K/UL    Basophils Absolute 0.0 0.0 - 0.2 K/UL    Absolute Immature Granulocyte 0.1 0.0 - 0.5 K/UL   CMP    Collection Time: 06/11/22 12:10 PM   Result Value Ref Range    Sodium 128 (L) 136 - 145 mmol/L    Potassium 3.4 (L) 3.5 - 5.1 mmol/L    Chloride 87 (L) 98 - 107 mmol/L    CO2 30 21 - 32 mmol/L    Anion Gap 11 7 - 16 mmol/L    Glucose 236 (H) 65 - 100 mg/dL    BUN 24 (H) 8 - 23 MG/DL    CREATININE 1.60 (H) 0.6 - 1.0 MG/DL    GFR  39 (L) >60 ml/min/1.73m2    GFR Non- 32 (L) >60 ml/min/1.73m2    Calcium 9.0 8.3 - 10.4 MG/DL    Total Bilirubin 4.4 (H) 0.2 - 1.1 MG/DL     (H) 12 - 65 U/L     (H) 15 - 37 U/L    Alk Phosphatase 236 (H) 50 - 136 U/L    Total Protein 7.6 6.3 - 8.2 g/dL    Albumin 2.5 (L) 3.2 - 4.6 g/dL    Globulin 5.1 (H) 2.3 - 3.5 g/dL    Albumin/Globulin Ratio 0.5 (L) 1.2 - 3.5     Lipase    Collection Time: 06/11/22 12:10 PM   Result Value Ref Range    Lipase 49 (L) 73 - 393 U/L   Lactic Acid (Select if patient is over 65 to rule out mesenteric ischemia) Collection Time: 06/11/22 12:10 PM   Result Value Ref Range    Lactic Acid, Plasma 3.4 (H) 0.4 - 2.0 MMOL/L   Magnesium    Collection Time: 06/11/22 12:10 PM   Result Value Ref Range    Magnesium 1.7 (L) 1.8 - 2.4 mg/dL   APTT    Collection Time: 06/11/22 12:10 PM   Result Value Ref Range    PTT 42.1 (H) 24.1 - 35.1 SEC   Protime-INR    Collection Time: 06/11/22 12:10 PM   Result Value Ref Range    Protime 24.3 (H) 12.6 - 14.5 sec    INR 2.1     Hemoglobin A1C    Collection Time: 06/11/22 12:10 PM   Result Value Ref Range    Hemoglobin A1C 6.8 (H) 4.20 - 6.30 %    eAG 148 mg/dL   Hepatitis Panel, Acute    Collection Time: 06/11/22 12:11 PM   Result Value Ref Range    Hep A IgM NONREACTIVE NR      Hep B Core Ab, IgM NONREACTIVE NR      Hepatitis B Surface Ag NONREACTIVE NR      Hepatitis C Ab NONREACTIVE NR     Ammonia    Collection Time: 06/11/22 12:22 PM   Result Value Ref Range    Ammonia <10 (L) 11 - 32 UMOL/L   POCT Urinalysis no Micro    Collection Time: 06/11/22 12:24 PM   Result Value Ref Range    Specific Gravity, Urine, POC 1.015 1.001 - 1.023      pH, Urine, POC 5.0 5.0 - 9.0      Protein, Urine, POC 30 (A) NEG mg/dL    Glucose, UA POC Negative NEG mg/dL    KETONES, Urine, POC TRACE (A) NEG mg/dL    Bilirubin, Urine, POC SMALL (A) NEG      Blood, UA POC Negative NEG      URINE UROBILINOGEN POC 1.0 0.2 - 1.0 EU/dL    Nitrate, Urine, POC Negative NEG      Leukocyte Est, UA POC SMALL (A) NEG      Performed by: Gordo Heranndez    Urinalysis w rflx microscopic    Collection Time: 06/11/22 12:33 PM   Result Value Ref Range    Color, UA YELLOW      Appearance CLEAR      Specific Gravity, UA 1.020 1.001 - 1.023      pH, Urine 5.5 5.0 - 9.0      Protein, UA TRACE (A) NEG mg/dL    Glucose,  (A) NEG mg/dL    Ketones, Urine TRACE (A) NEG mg/dL    Bilirubin Urine SMALL (A) NEG      Blood, Urine Negative NEG      Urobilinogen, Urine 1.0 0.2 - 1.0 EU/dL    Nitrite, Urine Positive (A) NEG      Leukocyte Esterase, Urine SMALL (A) NEG      WBC, UA 10-20 0 /hpf    Epithelial Cells UA 20-50 0 /hpf    BACTERIA, URINE 1+ (H) 0 /hpf    AMORPHOUS CRYSTAL 2+ (H) 0   Culture, Urine    Collection Time: 06/11/22 12:33 PM    Specimen: URINE-CLEAN CATCH   Result Value Ref Range    Special Requests NO SPECIAL REQUESTS      Culture        No growth after short period of incubation. Further results to follow after overnight incubation.    Culture, Blood 1    Collection Time: 06/11/22  1:41 PM    Specimen: Blood   Result Value Ref Range    Special Requests LEFT  HAND        Culture NO GROWTH 2 DAYS     Culture, Blood 1    Collection Time: 06/11/22  1:53 PM    Specimen: Blood   Result Value Ref Range    Special Requests LEFT  ARM        Culture NO GROWTH 2 DAYS     Lactic Acid    Collection Time: 06/11/22  4:14 PM   Result Value Ref Range    Lactic Acid, Plasma 0.9 0.4 - 2.0 MMOL/L   POCT Glucose    Collection Time: 06/11/22  8:55 PM   Result Value Ref Range    POC Glucose 194 (H) 65 - 100 mg/dL    Performed by: [employer]    Anti-Xa, Unfractionated Heparin    Collection Time: 06/12/22 12:05 AM   Result Value Ref Range    Anti-XA Unfrac Heparin >2.01 (H) 0.3 - 0.7 IU/mL   Anti-Xa, Unfractionated Heparin    Collection Time: 06/12/22  4:51 AM   Result Value Ref Range    Anti-XA Unfrac Heparin >1.1 (H) 0.3 - 0.7 IU/mL   Comprehensive Metabolic Panel w/ Reflex to MG    Collection Time: 06/12/22  4:51 AM   Result Value Ref Range    Sodium 131 (L) 136 - 145 mmol/L    Potassium 3.3 (L) 3.5 - 5.1 mmol/L    Chloride 92 (L) 98 - 107 mmol/L    CO2 29 21 - 32 mmol/L    Anion Gap 10 7 - 16 mmol/L    Glucose 147 (H) 65 - 100 mg/dL    BUN 19 8 - 23 MG/DL    CREATININE 1.20 (H) 0.6 - 1.0 MG/DL    GFR  54 (L) >60 ml/min/1.73m2    GFR Non- 44 (L) >60 ml/min/1.73m2    Calcium 8.3 8.3 - 10.4 MG/DL    Total Bilirubin 4.3 (H) 0.2 - 1.1 MG/DL     (H) 12 - 65 U/L     (H) 15 - 37 U/L    Alk Phosphatase 228 (H) 50 - 136 U/L    Total Protein 6.1 (L) 6.3 - 8.2 g/dL    Albumin 2.2 (L) 3.2 - 4.6 g/dL    Globulin 3.9 (H) 2.3 - 3.5 g/dL    Albumin/Globulin Ratio 0.6 (L) 1.2 - 3.5     CBC with Auto Differential    Collection Time: 06/12/22  4:51 AM   Result Value Ref Range    WBC 7.1 4.3 - 11.1 K/uL    RBC 3.68 (L) 4.05 - 5.2 M/uL    Hemoglobin 11.5 (L) 11.7 - 15.4 g/dL    Hematocrit 33.1 (L) 35.8 - 46.3 %    MCV 89.9 79.6 - 97.8 FL    MCH 31.3 26.1 - 32.9 PG    MCHC 34.7 31.4 - 35.0 g/dL    RDW 14.0 11.9 - 14.6 %    Platelets 974 464 - 907 K/uL    MPV 10.1 9.4 - 12.3 FL    nRBC 0.00 0.0 - 0.2 K/uL    Differential Type AUTOMATED      Seg Neutrophils 78 43 - 78 %    Lymphocytes 13 13 - 44 %    Monocytes 6 4.0 - 12.0 %    Eosinophils % 2 0.5 - 7.8 %    Basophils 0 0.0 - 2.0 %    Immature Granulocytes 1 0.0 - 5.0 %    Segs Absolute 5.5 1.7 - 8.2 K/UL    Absolute Lymph # 0.9 0.5 - 4.6 K/UL    Absolute Mono # 0.4 0.1 - 1.3 K/UL    Absolute Eos # 0.2 0.0 - 0.8 K/UL    Basophils Absolute 0.0 0.0 - 0.2 K/UL    Absolute Immature Granulocyte 0.0 0.0 - 0.5 K/UL   Vancomycin Level, Random    Collection Time: 06/12/22  4:51 AM   Result Value Ref Range    Vancomycin Rm 18.3 UG/ML   Magnesium    Collection Time: 06/12/22  4:51 AM   Result Value Ref Range    Magnesium 2.0 1.8 - 2.4 mg/dL   POCT Glucose    Collection Time: 06/12/22  7:36 AM   Result Value Ref Range    POC Glucose 152 (H) 65 - 100 mg/dL    Performed by: Heydi    POCT Glucose    Collection Time: 06/12/22 11:17 AM   Result Value Ref Range    POC Glucose 188 (H) 65 - 100 mg/dL    Performed by: Heydi    POCT Glucose    Collection Time: 06/12/22  4:19 PM   Result Value Ref Range    POC Glucose 172 (H) 65 - 100 mg/dL    Performed by: LivanlyPCT    PLEASE READ & DOCUMENT PPD TEST IN 24 HRS    Collection Time: 06/12/22  6:06 PM   Result Value Ref Range    PPD, (POC) Negative Negative    mm Induration o 0 - 5 mm   POCT Glucose    Collection Time: 06/12/22  8:38 PM   Result Value Ref Range    POC Glucose 156 (H) 65 - 100 mg/dL    Performed by: Arvind    CBC    Collection Time: 06/13/22  4:55 AM   Result Value Ref Range    WBC 7.7 4.3 - 11.1 K/uL    RBC 4.12 4.05 - 5.2 M/uL    Hemoglobin 12.6 11.7 - 15.4 g/dL    Hematocrit 37.4 35.8 - 46.3 %    MCV 90.8 79.6 - 97.8 FL    MCH 30.6 26.1 - 32.9 PG    MCHC 33.7 31.4 - 35.0 g/dL    RDW 14.3 11.9 - 14.6 %    Platelets 350 725 - 590 K/uL    MPV 10.1 9.4 - 12.3 FL    nRBC 0.00 0.0 - 0.2 K/uL   Basic Metabolic Panel    Collection Time: 06/13/22  4:55 AM   Result Value Ref Range    Sodium 129 (L) 136 - 145 mmol/L    Potassium 4.7 3.5 - 5.1 mmol/L    Chloride 94 (L) 98 - 107 mmol/L    CO2 25 21 - 32 mmol/L    Anion Gap 10 7 - 16 mmol/L    Glucose 165 (H) 65 - 100 mg/dL    BUN 18 8 - 23 MG/DL    CREATININE 1.20 (H) 0.6 - 1.0 MG/DL    GFR  54 (L) >60 ml/min/1.73m2    GFR Non- 44 (L) >60 ml/min/1.73m2    Calcium 9.2 8.3 - 10.4 MG/DL   POCT Glucose    Collection Time: 06/13/22  7:21 AM   Result Value Ref Range    POC Glucose 158 (H) 65 - 100 mg/dL    Performed by: Eduar Montero        Other Studies:  XR CHEST 1 VIEW   Final Result   1. Diffuse pulmonary interstitial edema. 2. Moderate hiatal hernia. CT ABDOMEN PELVIS WO CONTRAST Additional Contrast? Oral   Final Result   1. Large hiatal hernia. 2. Sigmoid diverticulosis. 3. Atherosclerosis. XR KNEE RIGHT (3 VIEWS)   Final Result   1. Severe medial and lateral compartment degenerative change and moderate medial   compartment degenerative change. 2. No evidence of acute fracture.              Current Meds:  Current Facility-Administered Medications   Medication Dose Route Frequency    furosemide (LASIX) injection 40 mg  40 mg IntraVENous Once    magic (miracle) mouthwash with nystatin  5 mL Swish & Spit 4x Daily PRN    potassium chloride (KLOR-CON M) extended release tablet 40 mEq  40 mEq Oral BID WC    [Held by provider] apixaban (ELIQUIS) tablet 5 mg  5 mg Oral BID    pantoprazole (PROTONIX) tablet 40 mg  40 mg Oral QAM AC    trospium (SANCTURA) tablet 20 mg  20 mg Oral BID AC    vancomycin (VANCOCIN) 750 mg in sodium chloride 0.9 % 250 mL IVPB ([id number])  750 mg IntraVENous Q24H    metoprolol tartrate (LOPRESSOR) tablet 25 mg  25 mg Oral BID    dilTIAZem (CARDIZEM) tablet 30 mg  30 mg Oral Q6H PRN    sodium chloride flush 0.9 % injection 3 mL  3 mL IntraVENous Q8H    sodium chloride flush 0.9 % injection 5-40 mL  5-40 mL IntraVENous 2 times per day    sodium chloride flush 0.9 % injection 5-40 mL  5-40 mL IntraVENous PRN    0.9 % sodium chloride infusion   IntraVENous PRN    ondansetron (ZOFRAN-ODT) disintegrating tablet 4 mg  4 mg Oral Q8H PRN    Or    ondansetron (ZOFRAN) injection 4 mg  4 mg IntraVENous Q6H PRN    polyethylene glycol (GLYCOLAX) packet 17 g  17 g Oral Daily PRN    levothyroxine (SYNTHROID) tablet 100 mcg  100 mcg Oral QAM AC    polyethylene glycol (GLYCOLAX) packet 17 g  17 g Oral Daily    senna (SENOKOT) tablet 8.6 mg  1 tablet Oral Nightly    sucralfate (CARAFATE) tablet 1 g  1 g Oral 4 times per day    traMADol (ULTRAM) tablet 50 mg  50 mg Oral Q8H PRN    memantine (NAMENDA) tablet 5 mg  5 mg Oral BID    cefTRIAXone (ROCEPHIN) 1000 mg IVPB in NS 50ml minibag  1,000 mg IntraVENous Q24H    insulin lispro (HUMALOG) injection vial 0-4 Units  0-4 Units SubCUTAneous TID WC    insulin lispro (HUMALOG) injection vial 0-4 Units  0-4 Units SubCUTAneous Nightly       Signed:  Jeevan Bueno MD    Part of this note may have been written by using a voice dictation software. The note has been proof read but may still contain some grammatical/other typographical errors.

## 2022-06-13 NOTE — PROGRESS NOTES
Occupational Therapy Note:    Attempted to see patient this AM for occupational therapy evaluation session. Patient's HR in 150's at rest and [facility]. Will follow and re-attempt as schedule permits/patient available.  Thank you,    Monda Koyanagi, OT    Rehab Caseload Tracker

## 2022-06-14 ENCOUNTER — APPOINTMENT (OUTPATIENT)
Dept: CT IMAGING | Age: 87
DRG: 872 | End: 2022-06-14
Payer: MEDICARE

## 2022-06-14 LAB
ANION GAP SERPL CALC-SCNC: 12 MMOL/L (ref 7–16)
ANION GAP SERPL CALC-SCNC: 16 MMOL/L (ref 7–16)
BACTERIA SPEC CULT: NORMAL
BUN SERPL-MCNC: 19 MG/DL (ref 8–23)
BUN SERPL-MCNC: 19 MG/DL (ref 8–23)
CALCIUM SERPL-MCNC: 8.8 MG/DL (ref 8.3–10.4)
CALCIUM SERPL-MCNC: 8.9 MG/DL (ref 8.3–10.4)
CHLORIDE SERPL-SCNC: 91 MMOL/L (ref 98–107)
CHLORIDE SERPL-SCNC: 91 MMOL/L (ref 98–107)
CO2 SERPL-SCNC: 20 MMOL/L (ref 21–32)
CO2 SERPL-SCNC: 24 MMOL/L (ref 21–32)
CORTIS BS SERPL-MCNC: 27.3 UG/DL
CREAT SERPL-MCNC: 1.1 MG/DL (ref 0.6–1)
CREAT SERPL-MCNC: 1.2 MG/DL (ref 0.6–1)
GLUCOSE BLD STRIP.AUTO-MCNC: 156 MG/DL (ref 65–100)
GLUCOSE BLD STRIP.AUTO-MCNC: 172 MG/DL (ref 65–100)
GLUCOSE BLD STRIP.AUTO-MCNC: 189 MG/DL (ref 65–100)
GLUCOSE BLD STRIP.AUTO-MCNC: 211 MG/DL (ref 65–100)
GLUCOSE SERPL-MCNC: 155 MG/DL (ref 65–100)
GLUCOSE SERPL-MCNC: 167 MG/DL (ref 65–100)
MM INDURATION, POC: 0 MM (ref 0–5)
OSMOLALITY SERPL: 267 MOSM/KG H2O (ref 280–301)
POTASSIUM SERPL-SCNC: 4.2 MMOL/L (ref 3.5–5.1)
POTASSIUM SERPL-SCNC: 4.5 MMOL/L (ref 3.5–5.1)
PPD, POC: NEGATIVE
SERVICE CMNT-IMP: ABNORMAL
SERVICE CMNT-IMP: NORMAL
SODIUM SERPL-SCNC: 127 MMOL/L (ref 136–145)
TSH, 3RD GENERATION: 6.82 UIU/ML (ref 0.36–3.74)
VANCOMYCIN SERPL-MCNC: 12.7 UG/ML

## 2022-06-14 PROCEDURE — 6360000002 HC RX W HCPCS: Performed by: FAMILY MEDICINE

## 2022-06-14 PROCEDURE — 84443 ASSAY THYROID STIM HORMONE: CPT

## 2022-06-14 PROCEDURE — 1100000003 HC PRIVATE W/ TELEMETRY

## 2022-06-14 PROCEDURE — 82962 GLUCOSE BLOOD TEST: CPT

## 2022-06-14 PROCEDURE — 97112 NEUROMUSCULAR REEDUCATION: CPT

## 2022-06-14 PROCEDURE — 97162 PT EVAL MOD COMPLEX 30 MIN: CPT

## 2022-06-14 PROCEDURE — 6360000002 HC RX W HCPCS: Performed by: TRANSPLANT SURGERY

## 2022-06-14 PROCEDURE — 2580000003 HC RX 258: Performed by: FAMILY MEDICINE

## 2022-06-14 PROCEDURE — 36415 COLL VENOUS BLD VENIPUNCTURE: CPT

## 2022-06-14 PROCEDURE — 80202 ASSAY OF VANCOMYCIN: CPT

## 2022-06-14 PROCEDURE — 80048 BASIC METABOLIC PNL TOTAL CA: CPT

## 2022-06-14 PROCEDURE — 6370000000 HC RX 637 (ALT 250 FOR IP): Performed by: INTERNAL MEDICINE

## 2022-06-14 PROCEDURE — 84295 ASSAY OF SERUM SODIUM: CPT

## 2022-06-14 PROCEDURE — 2580000003 HC RX 258: Performed by: EMERGENCY MEDICINE

## 2022-06-14 PROCEDURE — 82533 TOTAL CORTISOL: CPT

## 2022-06-14 PROCEDURE — 97166 OT EVAL MOD COMPLEX 45 MIN: CPT

## 2022-06-14 PROCEDURE — P9047 ALBUMIN (HUMAN), 25%, 50ML: HCPCS | Performed by: TRANSPLANT SURGERY

## 2022-06-14 PROCEDURE — 97535 SELF CARE MNGMENT TRAINING: CPT

## 2022-06-14 PROCEDURE — 6370000000 HC RX 637 (ALT 250 FOR IP): Performed by: FAMILY MEDICINE

## 2022-06-14 PROCEDURE — 6360000002 HC RX W HCPCS: Performed by: INTERNAL MEDICINE

## 2022-06-14 PROCEDURE — 70450 CT HEAD/BRAIN W/O DYE: CPT

## 2022-06-14 PROCEDURE — 83930 ASSAY OF BLOOD OSMOLALITY: CPT

## 2022-06-14 PROCEDURE — 2580000003 HC RX 258: Performed by: INTERNAL MEDICINE

## 2022-06-14 RX ORDER — INSULIN GLARGINE 100 [IU]/ML
5 INJECTION, SOLUTION SUBCUTANEOUS DAILY
Status: DISCONTINUED | OUTPATIENT
Start: 2022-06-14 | End: 2022-06-19 | Stop reason: HOSPADM

## 2022-06-14 RX ORDER — TORSEMIDE 20 MG/1
10 TABLET ORAL DAILY
Status: DISCONTINUED | OUTPATIENT
Start: 2022-06-14 | End: 2022-06-14

## 2022-06-14 RX ORDER — ALBUMIN (HUMAN) 12.5 G/50ML
25 SOLUTION INTRAVENOUS ONCE
Status: COMPLETED | OUTPATIENT
Start: 2022-06-14 | End: 2022-06-14

## 2022-06-14 RX ORDER — GLUCAGON 1 MG/ML
1 KIT INJECTION PRN
Status: DISCONTINUED | OUTPATIENT
Start: 2022-06-14 | End: 2022-06-19 | Stop reason: HOSPADM

## 2022-06-14 RX ORDER — 3% SODIUM CHLORIDE 3 G/100ML
50 INJECTION, SOLUTION INTRAVENOUS CONTINUOUS
Status: DISCONTINUED | OUTPATIENT
Start: 2022-06-14 | End: 2022-06-14 | Stop reason: DRUGHIGH

## 2022-06-14 RX ORDER — DEXTROSE MONOHYDRATE 50 MG/ML
100 INJECTION, SOLUTION INTRAVENOUS PRN
Status: DISCONTINUED | OUTPATIENT
Start: 2022-06-14 | End: 2022-06-19 | Stop reason: HOSPADM

## 2022-06-14 RX ADMIN — SODIUM CHLORIDE, PRESERVATIVE FREE 3 ML: 5 INJECTION INTRAVENOUS at 04:28

## 2022-06-14 RX ADMIN — METOPROLOL TARTRATE 25 MG: 25 TABLET, FILM COATED ORAL at 00:03

## 2022-06-14 RX ADMIN — SUCRALFATE 1 G: 1 TABLET ORAL at 17:13

## 2022-06-14 RX ADMIN — TROSPIUM CHLORIDE 20 MG: 20 TABLET, FILM COATED ORAL at 05:23

## 2022-06-14 RX ADMIN — INSULIN LISPRO 1 UNITS: 100 INJECTION, SOLUTION INTRAVENOUS; SUBCUTANEOUS at 11:48

## 2022-06-14 RX ADMIN — METOPROLOL TARTRATE 50 MG: 50 TABLET ORAL at 21:13

## 2022-06-14 RX ADMIN — PANTOPRAZOLE SODIUM 40 MG: 40 TABLET, DELAYED RELEASE ORAL at 05:23

## 2022-06-14 RX ADMIN — APIXABAN 5 MG: 5 TABLET, FILM COATED ORAL at 08:57

## 2022-06-14 RX ADMIN — SODIUM CHLORIDE, PRESERVATIVE FREE 3 ML: 5 INJECTION INTRAVENOUS at 21:15

## 2022-06-14 RX ADMIN — SUCRALFATE 1 G: 1 TABLET ORAL at 00:03

## 2022-06-14 RX ADMIN — SUCRALFATE 1 G: 1 TABLET ORAL at 05:23

## 2022-06-14 RX ADMIN — SODIUM CHLORIDE, PRESERVATIVE FREE 10 ML: 5 INJECTION INTRAVENOUS at 21:15

## 2022-06-14 RX ADMIN — ALBUMIN (HUMAN) 25 G: 0.25 INJECTION, SOLUTION INTRAVENOUS at 17:24

## 2022-06-14 RX ADMIN — SODIUM CHLORIDE 50 ML/HR: 3 INJECTION, SOLUTION INTRAVENOUS at 16:18

## 2022-06-14 RX ADMIN — SENNOSIDES 8.6 MG: 8.6 TABLET, FILM COATED ORAL at 21:13

## 2022-06-14 RX ADMIN — CEFTRIAXONE 1000 MG: 1 INJECTION, POWDER, FOR SOLUTION INTRAMUSCULAR; INTRAVENOUS at 13:44

## 2022-06-14 RX ADMIN — SUCRALFATE 1 G: 1 TABLET ORAL at 11:55

## 2022-06-14 RX ADMIN — DILTIAZEM HYDROCHLORIDE 30 MG: 30 TABLET, FILM COATED ORAL at 14:44

## 2022-06-14 RX ADMIN — APIXABAN 5 MG: 5 TABLET, FILM COATED ORAL at 21:13

## 2022-06-14 RX ADMIN — POLYETHYLENE GLYCOL 3350 17 G: 17 POWDER, FOR SOLUTION ORAL at 08:59

## 2022-06-14 RX ADMIN — LEVOTHYROXINE SODIUM 100 MCG: 0.05 TABLET ORAL at 05:23

## 2022-06-14 RX ADMIN — MEMANTINE 5 MG: 5 TABLET ORAL at 08:58

## 2022-06-14 RX ADMIN — TROSPIUM CHLORIDE 20 MG: 20 TABLET, FILM COATED ORAL at 13:45

## 2022-06-14 RX ADMIN — TORSEMIDE 10 MG: 20 TABLET ORAL at 11:55

## 2022-06-14 RX ADMIN — VANCOMYCIN HYDROCHLORIDE 1000 MG: 1 INJECTION, POWDER, LYOPHILIZED, FOR SOLUTION INTRAVENOUS at 21:13

## 2022-06-14 RX ADMIN — SODIUM CHLORIDE, PRESERVATIVE FREE 3 ML: 5 INJECTION INTRAVENOUS at 11:55

## 2022-06-14 RX ADMIN — METOPROLOL TARTRATE 50 MG: 50 TABLET ORAL at 08:58

## 2022-06-14 RX ADMIN — MEMANTINE 5 MG: 5 TABLET ORAL at 21:13

## 2022-06-14 RX ADMIN — SODIUM CHLORIDE, PRESERVATIVE FREE 10 ML: 5 INJECTION INTRAVENOUS at 09:05

## 2022-06-14 RX ADMIN — INSULIN GLARGINE 5 UNITS: 100 INJECTION, SOLUTION SUBCUTANEOUS at 09:34

## 2022-06-14 ASSESSMENT — PAIN SCALES - GENERAL
PAINLEVEL_OUTOF10: 0
PAINLEVEL_OUTOF10: 2
PAINLEVEL_OUTOF10: 2

## 2022-06-14 ASSESSMENT — PAIN DESCRIPTION - LOCATION
LOCATION: BACK;SHOULDER
LOCATION: BACK;SHOULDER

## 2022-06-14 NOTE — PROGRESS NOTES
Hospitalist Progress Note   Admit Date:  2022 12:02 PM   Name:  King Osorio   Age:  80 y.o. Sex:  female  :  1926   MRN:  [id number]   Room:  [facility]/    Presenting Complaint: Abdominal Pain and Urinary Retention     Reason(s) for Admission: Septicemia ([facility] [A41.9]  Severe sepsis ([facility] [address] [A41.9, R65.20]     Hospital Course & Interval History:     Patient with past medical history of    Atrial fibrillation, on Eliquis   DM type 2  Edema   GERD   Hypertension   Hypothyroidism   Rheumatoid arthritis    Patient was brought to hospital after experiencing lower abdominal pain, pelvic pain, and decreased urination in the past 2 days prior to admission. No history of fever. Patient came from nursing home. In ER she was found to have tachycardia, and also some mild tachypnea. Creatinine went up to 1.6 from baseline of 1, so she has JAMIE. Also she has sodium of 130 potassium 3.4. CT abdomen and pelvis without contrast showed large hiatal hernia, sigmoid diverticulosis, atherosclerosis. .     Patient was admitted for UTI with sepsis with criteria of tachypnea tachycardia JAMIE with hyponatremia and hypokalemia. Subjective/24hr Events (22):     22   Patient is feeling better since admission. No fever. Patient reported having eaten decent dinner last night. Patient denies abdominal pain this morning. Patient denies dysuria. Patient denies hematuria. No nausea. No vomiting. No fever. No shaking. No chills. No chest pain. No shortness of breath. 22  Patient developed right arm hematoma on 2022 after IV infiltration. Extent of hematoma is improving. Apixaban was held from yesterday. No fever. No shaking. No chills. No chest pain. No shortness of breath. HR is running on high side      22   BP is elevated especially SBP. Pulse is also elevated. Metoprolol is increased to 50 mg po bid yesterday. No fever. No shaking. No chills.    No chest pain. No shortness of breath. Assessment & Plan:     Principal Problem:    Severe sepsis ([address]    UTI (urinary tract infection)  Continue empiric antibiotics which are ceftriaxone and Vancomycin. Symptomatic treatment. Follow-up on culture results. So far, culture of blood and urine have been negative. Active Problems:      Transaminitis  Likely from sepsis   Continue to monitor. Expect to see some improvement after her sepsis and UTI of better treated and under control. Check CMP tomorrow. Memory loss  Noted. Type 2 diabetes with nephropathy (HCC)  Blood sugar has been in the mid 100s to low 200s ranges. Continue current sliding scale coverage with Humalog. Give Lantus 5 units sc daily. Edema  Noted   Legs are without edema today. Monitor. Essential hypertension, benign  On Metoprolol 50  mg po bid. That should help with tachycardia as well. Patient denies lightheadedness. Patient denies dizziness. Monitor closely      Acquired hypothyroidism  Continue thyroid hormone supplement. Hyponatremia  Hypokalemia  Likely secondary to poor oral intake and diuretic use prior to coming to hospital.  She is getting gentle hydration. Hypokalemia is resolved. Stop K supplement. OAB (overactive bladder)  Noted   Monitor. Symptomatic treatments     Chest X-ray shows pulmonary congestion. given Furosemide 40 mg IV once. Monitor symptoms and response. Will keep patient on Torsemide 10 mg po q day. I have discussed the plan of care with patient. Discharge Planning:    Likely back to nursing home when improved in 1-2 days. Diet:  ADULT DIET; Easy to Chew; 4 carb choices (60 gm/meal);  Low Fat/Low Chol/High Fiber/2 gm Na  DVT PPx: On Apixaban PO   Code status: Full Code    Hospital Problems:  Principal Problem:    Severe sepsis ([address]  Active Problems:    UTI (urinary tract infection)    Transaminitis    Hypokalemia    Hyponatremia Pulmonary edema    Atrial fibrillation (HCC)    Memory loss    Type 2 diabetes with nephropathy (HCC)    Edema    Essential hypertension, benign    Acquired hypothyroidism    OAB (overactive bladder)  Resolved Problems:    * No resolved hospital problems. *      Objective:     Patient Vitals for the past 24 hrs:   Temp Pulse Resp BP SpO2   06/14/22 0725 97.5 °F (36.4 °C) (!) 102 18 (!) 136/91 93 %   06/14/22 0415 -- -- -- 122/87 --   06/14/22 0401 97.9 °F (36.6 °C) 100 17 (!) 191/98 96 %   06/14/22 0003 -- (!) 123 -- (!) 155/94 --   06/13/22 2330 -- -- -- (!) 154/94 --   06/13/22 2324 97.9 °F (36.6 °C) (!) 129 21 (!) 144/115 96 %   06/13/22 2105 -- -- -- (!) 143/104 --   06/13/22 1930 -- -- -- (!) 143/104 --   06/13/22 1912 97.9 °F (36.6 °C) (!) 130 21 (!) 118/104 95 %   06/13/22 1521 97.9 °F (36.6 °C) (!) 118 20 (!) 148/109 --   06/13/22 1141 98.8 °F (37.1 °C) (!) 126 20 (!) 129/94 91 %       Oxygen Therapy  SpO2: 93 %  Pulse via Oximetry: 100 beats per minute  O2 Device: None (Room air)    Estimated body mass index is 27.69 kg/m² as calculated from the following:    Height as of this encounter: 5' 8\" (1.727 m). Weight as of this encounter: 182 lb 1.6 oz (82.6 kg). Intake/Output Summary (Last 24 hours) at 6/14/2022 0844  Last data filed at 6/14/2022 0803  Gross per 24 hour   Intake 0 ml   Output 701 ml   Net -701 ml         Physical Exam:     Blood pressure (!) 136/91, pulse (!) 102, temperature 97.5 °F (36.4 °C), temperature source Oral, resp. rate 18, height 5' 8\" (1.727 m), weight 182 lb 1.6 oz (82.6 kg), SpO2 93 %. General:    Well nourished. Patient is lying flat in bed with head up. Patient is awake alert and oriented. Patient appears stronger. Answering questions well. She is asking about going home. Head:  Normocephalic, atraumatic, not pale. No icterus. Eyes:  Sclerae appear normal.  Pupils equally round. ENT:  Nares appear normal, no drainage. Moist oral mucosa  Neck:  No restricted ROM. Trachea midline   CV:   RRR. No m/r/g. No jugular venous distension. Lungs:   CTAB. No wheezing, rhonchi, or rales. Respirations even, unlabored  Abdomen: Bowel sounds present. Soft, nontender, nondistended. Extremities: No cyanosis or clubbing. No edema  Skin:     Right arm with hematoma from hand to elbow area. No active bleeding. Extent of hematoma appears less from the day before. Neuro:  CN II-XII grossly intact. Sensation intact. A&Ox3  Psych:  Normal mood and affect.       I have reviewed ordered lab tests and independently visualized imaging below:    Recent Labs:  Recent Results (from the past 48 hour(s))   POCT Glucose    Collection Time: 06/12/22 11:17 AM   Result Value Ref Range    POC Glucose 188 (H) 65 - 100 mg/dL    Performed by: Heydi    POCT Glucose    Collection Time: 06/12/22  4:19 PM   Result Value Ref Range    POC Glucose 172 (H) 65 - 100 mg/dL    Performed by: Heydi    PLEASE READ & DOCUMENT PPD TEST IN 24 HRS    Collection Time: 06/12/22  6:06 PM   Result Value Ref Range    PPD, (POC) Negative Negative    mm Induration o 0 - 5 mm   POCT Glucose    Collection Time: 06/12/22  8:38 PM   Result Value Ref Range    POC Glucose 156 (H) 65 - 100 mg/dL    Performed by: Arvind    CBC    Collection Time: 06/13/22  4:55 AM   Result Value Ref Range    WBC 7.7 4.3 - 11.1 K/uL    RBC 4.12 4.05 - 5.2 M/uL    Hemoglobin 12.6 11.7 - 15.4 g/dL    Hematocrit 37.4 35.8 - 46.3 %    MCV 90.8 79.6 - 97.8 FL    MCH 30.6 26.1 - 32.9 PG    MCHC 33.7 31.4 - 35.0 g/dL    RDW 14.3 11.9 - 14.6 %    Platelets 183 757 - 643 K/uL    MPV 10.1 9.4 - 12.3 FL    nRBC 0.00 0.0 - 0.2 K/uL   Basic Metabolic Panel    Collection Time: 06/13/22  4:55 AM   Result Value Ref Range    Sodium 129 (L) 136 - 145 mmol/L    Potassium 4.7 3.5 - 5.1 mmol/L    Chloride 94 (L) 98 - 107 mmol/L    CO2 25 21 - 32 mmol/L    Anion Gap 10 7 - 16 mmol/L    Glucose 165 (H) 65 - 100 mg/dL    BUN 18 8 - 23 MG/DL CREATININE 1.20 (H) 0.6 - 1.0 MG/DL    GFR  54 (L) >60 ml/min/1.73m2    GFR Non- 44 (L) >60 ml/min/1.73m2    Calcium 9.2 8.3 - 10.4 MG/DL   POCT Glucose    Collection Time: 06/13/22  7:21 AM   Result Value Ref Range    POC Glucose 158 (H) 65 - 100 mg/dL    Performed by: Norbert    POCT Glucose    Collection Time: 06/13/22 11:42 AM   Result Value Ref Range    POC Glucose 197 (H) 65 - 100 mg/dL    Performed by: Layvonne Apley    POCT Glucose    Collection Time: 06/13/22  4:29 PM   Result Value Ref Range    POC Glucose 195 (H) 65 - 100 mg/dL    Performed by: Layvonne Apley    PLEASE READ & DOCUMENT PPD TEST IN 48 HRS    Collection Time: 06/13/22  6:05 PM   Result Value Ref Range    PPD, (POC) Negative Negative    mm Induration 0 0 - 5 mm   POCT Glucose    Collection Time: 06/13/22  8:41 PM   Result Value Ref Range    POC Glucose 181 (H) 65 - 100 mg/dL    Performed by: Robert    POCT Glucose    Collection Time: 06/14/22  7:25 AM   Result Value Ref Range    POC Glucose 172 (H) 65 - 100 mg/dL    Performed by: Layvonne Apley        Other Studies:  XR CHEST 1 VIEW   Final Result   1. Diffuse pulmonary interstitial edema. 2. Moderate hiatal hernia. CT ABDOMEN PELVIS WO CONTRAST Additional Contrast? Oral   Final Result   1. Large hiatal hernia. 2. Sigmoid diverticulosis. 3. Atherosclerosis. XR KNEE RIGHT (3 VIEWS)   Final Result   1. Severe medial and lateral compartment degenerative change and moderate medial   compartment degenerative change. 2. No evidence of acute fracture.              Current Meds:  Current Facility-Administered Medications   Medication Dose Route Frequency    magic (miracle) mouthwash with nystatin  5 mL Swish & Spit 4x Daily PRN    metoprolol tartrate (LOPRESSOR) tablet 50 mg  50 mg Oral BID    apixaban (ELIQUIS) tablet 5 mg  5 mg Oral BID    pantoprazole (PROTONIX) tablet 40 mg  40 mg Oral QAM AC    trospium (SANCTURA) tablet 20 mg  20 mg Oral BID AC    vancomycin (VANCOCIN) 750 mg in sodium chloride 0.9 % 250 mL IVPB (Dxia1Low)  750 mg IntraVENous Q24H    dilTIAZem (CARDIZEM) tablet 30 mg  30 mg Oral Q6H PRN    sodium chloride flush 0.9 % injection 3 mL  3 mL IntraVENous Q8H    sodium chloride flush 0.9 % injection 5-40 mL  5-40 mL IntraVENous 2 times per day    sodium chloride flush 0.9 % injection 5-40 mL  5-40 mL IntraVENous PRN    0.9 % sodium chloride infusion   IntraVENous PRN    ondansetron (ZOFRAN-ODT) disintegrating tablet 4 mg  4 mg Oral Q8H PRN    Or    ondansetron (ZOFRAN) injection 4 mg  4 mg IntraVENous Q6H PRN    polyethylene glycol (GLYCOLAX) packet 17 g  17 g Oral Daily PRN    levothyroxine (SYNTHROID) tablet 100 mcg  100 mcg Oral QAM AC    polyethylene glycol (GLYCOLAX) packet 17 g  17 g Oral Daily    senna (SENOKOT) tablet 8.6 mg  1 tablet Oral Nightly    sucralfate (CARAFATE) tablet 1 g  1 g Oral 4 times per day    traMADol (ULTRAM) tablet 50 mg  50 mg Oral Q8H PRN    memantine (NAMENDA) tablet 5 mg  5 mg Oral BID    cefTRIAXone (ROCEPHIN) 1000 mg IVPB in NS 50ml minibag  1,000 mg IntraVENous Q24H    insulin lispro (HUMALOG) injection vial 0-4 Units  0-4 Units SubCUTAneous TID WC    insulin lispro (HUMALOG) injection vial 0-4 Units  0-4 Units SubCUTAneous Nightly       Signed:  Slim Chandler MD    Part of this note may have been written by using a voice dictation software. The note has been proof read but may still contain some grammatical/other typographical errors.

## 2022-06-14 NOTE — PROGRESS NOTES
PHYSICAL THERAPY Initial Assessment, Daily Note and AM  (Link to Caseload Tracking: PT Visit Days : 1  Acknowledge Orders  Time In/Out  PT Charge Capture  Rehab Caseload Tracker    Orly Vaughan is a 80 y.o. female   PRIMARY DIAGNOSIS: Severe sepsis ([facility] [address]  Septicemia ([facility] [address] [A41.9]  Severe sepsis ([facility] [address] [A41.9, R65.20]       Reason for Referral: Generalized Muscle Weakness (M62.81)  Other abnormalities of gait and mobility (R26.89)  Inpatient: Payor: MEDICARE / Plan: MEDICARE PART A AND B / Product Type: *No Product type* /     ASSESSMENT:     REHAB RECOMMENDATIONS:   Recommendation to date pending progress:  Setting:   Short-term Rehab    Equipment:     To Be Determined     ASSESSMENT:  Ms. China Whitney is a 80year old female admitted from [facility] with sepsis/UTI. Patient is seen this AM for initial PT evaluation. Presents oriented to person only. Per report, patient transfers to wheelchair with assistance from staff and is able to self propel manual wheelchair. Today, patient presents with decreased functional strength throughout B LE and trunk and impaired functional activity tolerance Required moderate to maximal assistance with mobility. Recommend STR to progress to PLOF at [facility]. See detailed assessment below. Will follow with stated plan of care during acute stay.      [address] [place] [facility]\"" [address] [address] AM-PAC 6 Clicks Basic Mobility Inpatient Short Form  -PAC Mobility Inpatient   How much difficulty turning over in bed?: A Little  How much difficulty sitting down on / standing up from a chair with arms?: Unable  How much difficulty moving from lying on back to sitting on side of bed?: A Lot  How much help from another person moving to and from a bed to a chair?: A Lot  How much help from another person needed to walk in hospital room?: Total  How much help from another person for climbing 3-5 steps with a railing?: Total  -PAC Inpatient Mobility Raw Score : 10  AM-PAC Inpatient T-Scale Score : 32.29  Mobility Inpatient CMS 0-100% Score: 76.75  Mobility Inpatient CMS G-Code Modifier : CL    SUBJECTIVE:   Ms. Refugio Soulier states, \"I want to go out there and see my friends. \"     Social/Functional Type of Home: Assisted living  Home Equipment: Wheelchair-manual  ADL Assistance: Needs assistance  Transfer Assistance: Needs assistance  OBJECTIVE:     PAIN: VITALS / O2: PRECAUTION / [place] / DRAINS:   Pre Treatment:   Pain Assessment: Face, Legs, Activity, Cry, and Consolability (FLACC)  Pain Level: 2  Pain Location: Back; Shoulder      Post Treatment: FLACC 0 at rest Vitals        Oxygen   86% on room air, replaced 1 L 02 and Sp02 improved to 92% RN aware   IV and Purewick    RESTRICTIONS/PRECAUTIONS:  Restrictions/Precautions: Fall Risk                 GROSS EVALUATION: Intact Impaired (Comments):   AROM []  Decreased hamstring extensibility    PROM []    Strength []  Generalized B LE and trunk   Balance []  Fair statically and fair- dynamically in sitting, posterior lean   Posture [] Trunk Flexion  Knee Flexion  Hip Flexion  posterior pelvic tilt   Sensation [x]  intact light touch distal B LE   Coordination []   N/A   Tone [] N/A   Edema []    Activity Tolerance [] Patient limited by fatigue; off of baseline functionally    []      COGNITION/  PERCEPTION: Intact Impaired (Comments):   Orientation []  Oriented to person only; per daughter report, patient is more confused then bat baseline.     Vision []     Hearing []     Cognition  []       MOBILITY: I Mod I S SBA CGA Min Mod Max Total  NT x2 Comments:   Bed Mobility    Rolling [] [] [] [] [] [] [x] [] [] [] []    Supine to Sit [] [] [] [] [] [] [x] [] [] [] [x]    Scooting [] [] [] [] [] [] [] [x] [] [] [x]    Sit to Supine [] [] [] [] [] [] [] [x] [] [] [x]    Transfers    Sit to Stand [] [] [] [] [] [] [] [x] [] [] [x] Unable to achieve standing    Bed to Chair [] [] [] [] [] [] [] [] [] [x] []    Stand to Sit [] [] [] [] [] [] [] [] [] [x] []     [] [] [] [] [] [] [] [] [] [] []    I=Independent, Mod I=Modified Independent, S=Supervision, SBA=Standby Assistance, CGA=Contact Guard Assistance,   Min=Minimal Assistance, Mod=Moderate Assistance, Max=Maximal Assistance, Total=Total Assistance, NT=Not Tested    GAIT: I Mod I S SBA CGA Min Mod Max Total  NT x2 Comments:   Level of Assistance [] [] [] [] [] [] [] [] [] [x] [] Patient is non-ambulatory   Distance   feet    DME N/A    Gait Quality N/A    Weightbearing Status Restrictions/Precautions  Restrictions/Precautions: Fall Risk    Stairs      I=Independent, Mod I=Modified Independent, S=Supervision, SBA=Standby Assistance, CGA=Contact Guard Assistance,   Min=Minimal Assistance, Mod=Moderate Assistance, Max=Maximal Assistance, Total=Total Assistance, NT=Not Tested    PLAN:   ACUTE PHYSICAL THERAPY GOALS:   (Developed with and agreed upon by patient and/or caregiver.)    1. Patient will perform bed mobility with MINIMAL ASSISTANCE within 7 days. 2. Patient will transfer bed to chair with MINIMAL ASSISTANCE within 7 days. 3. Patient will demonstrate FAIR+ DYNAMIC SITTING balance within 7 day(s). 4. Patient will tolerate 25+ minutes of therapeutic activity/exercise and/or neuromuscular re-education while maintaining stable vitals to improve functional strength and activity tolerance within 7 days.       FREQUENCY AND DURATION: 3 times/week for duration of hospital stay or until stated goals are met, whichever comes first.    THERAPY PROGNOSIS: Good    PROBLEM LIST:   (Skilled intervention is medically necessary to address:)  Decreased ADL/Functional Activities  Decreased Activity Tolerance  Decreased AROM/PROM  Decreased Balance  Decreased Cognition  Decreased Safety Awareness  Decreased Strength  Decreased Transfer Abilities INTERVENTIONS PLANNED:   (Benefits and precautions of physical therapy have been discussed with the patient.)  Therapeutic Activity  Therapeutic Exercise/HEP  Neuromuscular Re-education  Gait Training  Education TREATMENT:   EVALUATION: MODERATE COMPLEXITY: (Untimed Charge)  At this time, patient is appropriate for Co-treatment with occupational therapy due to patient's decreased overall endurance/tolerance levels, as well as need for high level skilled assistance to complete functional transfers/mobility and functional tasks. Dennis Mcgill is appropriate for a multidisciplinary co-treatment of PT and OT to address goals of both disciplines. TREATMENT:   Neuromuscular Re-education (10 Minutes): Neuromuscular Re-education included Balance Training, Functional mobility with facilitation, Postural training and Sitting balance training to improve Balance, Functional Mobility and Postural Control.     TREATMENT GRID:  N/A    AFTER TREATMENT PRECAUTIONS: Alarm Activated, Bed, Call light within reach, Needs within reach, RN notified and Side rails x3    INTERDISCIPLINARY COLLABORATION:  RN/ PCT, PT/ PTA and OT/ CORNELIUS    EDUCATION: Education Given To: Patient  Education Provided: Role of Therapy;Plan of Care;Transfer Training;Family Education  Education Method: Verbal;Demonstration  Barriers to Learning: Cognition  Education Outcome: Unable to demonstrate understanding;Verbalized understanding    TIME IN/OUT:  Time In: 0916  Time Out: 0940  Minutes: [address], PT

## 2022-06-14 NOTE — PROGRESS NOTES
Rounds performed throughout shift. Pt did not sleep at all and was confused most of the shift. MD notified this am of na lab of 129- awaiting response. Pt denies needs at this time. Bed in low position, locked and call light/personal items within reach. Will report to day shift nurse.

## 2022-06-14 NOTE — CONSULTS
Osteoarthrosis, unspecified whether generalized or localized, unspecified site     Other ill-defined conditions(799.89)     hyperlipidemia    Other malaise and fatigue     Pain in joint, lower leg     Pain in joint, shoulder region     Postnasal drip     Rheumatoid arthritis(714.0)     Type II or unspecified type diabetes mellitus without mention of complication, not stated as uncontrolled     Unspecified constipation     Unspecified hemorrhoids with other complication     Unspecified hemorrhoids without mention of complication     Unspecified hereditary and idiopathic peripheral neuropathy     Unspecified hypothyroidism     Urgency of urination     Urinary tract infection, site not specified       Past Surgical History:   Procedure Laterality Date    CHOLECYSTECTOMY      OTHER SURGICAL HISTORY      thyroidectomy    TONSILLECTOMY        Current Facility-Administered Medications   Medication Dose Route Frequency    insulin glargine (LANTUS) injection vial 5 Units  5 Units SubCUTAneous Daily    torsemide (DEMADEX) tablet 10 mg  10 mg Oral Daily    vancomycin (VANCOCIN) 1,000 mg in sodium chloride 0.9 % 250 mL IVPB ([id number])  1,000 mg IntraVENous Q24H    sodium chloride 3 % solution  50 mL/hr IntraVENous Continuous    glucose chewable tablet 16 g  4 tablet Oral PRN    dextrose bolus 10% 125 mL  125 mL IntraVENous PRN    Or    dextrose bolus 10% 250 mL  250 mL IntraVENous PRN    glucagon injection 1 mg  1 mg IntraMUSCular PRN    dextrose 5 % solution  100 mL/hr IntraVENous PRN    magic (miracle) mouthwash with nystatin  5 mL Swish & Spit 4x Daily PRN    metoprolol tartrate (LOPRESSOR) tablet 50 mg  50 mg Oral BID    apixaban (ELIQUIS) tablet 5 mg  5 mg Oral BID    pantoprazole (PROTONIX) tablet 40 mg  40 mg Oral QAM AC    trospium (SANCTURA) tablet 20 mg  20 mg Oral BID AC    dilTIAZem (CARDIZEM) tablet 30 mg  30 mg Oral Q6H PRN    sodium chloride flush 0.9 % injection 3 mL  3 mL IntraVENous Q8H    sodium chloride flush 0.9 % injection 5-40 mL  5-40 mL IntraVENous 2 times per day    sodium chloride flush 0.9 % injection 5-40 mL  5-40 mL IntraVENous PRN    0.9 % sodium chloride infusion   IntraVENous PRN    ondansetron (ZOFRAN-ODT) disintegrating tablet 4 mg  4 mg Oral Q8H PRN    Or    ondansetron (ZOFRAN) injection 4 mg  4 mg IntraVENous Q6H PRN    polyethylene glycol (GLYCOLAX) packet 17 g  17 g Oral Daily PRN    levothyroxine (SYNTHROID) tablet 100 mcg  100 mcg Oral QAM AC    polyethylene glycol (GLYCOLAX) packet 17 g  17 g Oral Daily    senna (SENOKOT) tablet 8.6 mg  1 tablet Oral Nightly    sucralfate (CARAFATE) tablet 1 g  1 g Oral 4 times per day    traMADol (ULTRAM) tablet 50 mg  50 mg Oral Q8H PRN    memantine (NAMENDA) tablet 5 mg  5 mg Oral BID    cefTRIAXone (ROCEPHIN) 1000 mg IVPB in NS 50ml minibag  1,000 mg IntraVENous Q24H    insulin lispro (HUMALOG) injection vial 0-4 Units  0-4 Units SubCUTAneous TID WC    insulin lispro (HUMALOG) injection vial 0-4 Units  0-4 Units SubCUTAneous Nightly     Allergies   Allergen Reactions    Penicillins Hives    Strawberry Extract Itching    Codeine Rash      Social History     Tobacco Use    Smoking status: Never Smoker    Smokeless tobacco: Never Used   Substance Use Topics    Alcohol use: No      Family History   Problem Relation Age of Onset    Rheum Arthritis Daughter     Osteoarthritis Son         Review of Systems  Gen - no fever, no chills, appetite okay  HEENT - no sore throat, no decreased vision, no hearing loss  Neck - no neck mass  CV - new onset a fib  Lung - some shortness of breath, no cough, no hemoptysis  Abd - no tenderness, no nausea/vomiting, no bloody stool  Ext - no edema, no clubbing, + cyanosis  Musculoskeletal - + joint pain, no back pain  Neurologic - no headaches, no dizziness, no seizures but intermittent confusion  Psychiatric - ++ dx of dementia  Skin - no rashes, no pupura  Genitourinary -++ dysuria on admission    Objective:     Vitals:    06/14/22 0725 06/14/22 1119 06/14/22 1239 06/14/22 1538   BP: (!) 136/91 123/89  (!) 132/93   Pulse: (!) 102 (!) 130  (!) 110   Resp: 18 18  18   Temp: 97.5 °F (36.4 °C) 97.7 °F (36.5 °C)  97.7 °F (36.5 °C)   TempSrc: Oral Oral  Oral   SpO2: 93% 98% (!) 86% 100%   Weight:       Height:           Intake/Output Summary (Last 24 hours) at 6/14/2022 1606  Last data filed at 6/14/2022 1337  Gross per 24 hour   Intake 0 ml   Output 301 ml   Net -301 ml     Average U/O over last 48 hours: 500-600 ml with minimal intake. Physical Exam  GEN :in no distress, alert in person  HEENT: anicteric sclerae, eomi. Oropharynx without lesions. Mucous membranes are moist.  Neck - supple without JVD, no thyromegaly. No lymphadenopathy. CV - a fib: irregular/irregular,no rub  Lung - decreased BS in bases  Chest wall - normal appearance  Abd - soft, nontender, bowel sounds present, no hepatosplenomegaly  Ext - no clubbing, no cyanosis, no edema but distal erythema and venous stasis  Neurologic - non focal; intermittent confusion  Genitourinary - bladder nonpalpable  Skin - no rashes, no purpura, + ecchymoses  Psychiatric: Normal mood and affect. No hallucination now. Data Review:     Serum sodium trends:    Recent Labs     06/12/22 0451 06/13/22 0455   WBC 7.1 7.7   HGB 11.5* 12.6   HCT 33.1* 37.4    323     Recent Labs     06/12/22  0451 06/12/22  0451 06/13/22  0455 06/13/22 0455 06/14/22  0550   *  --  129*  --  127*   K 3.3*  --  4.7  --  4.5   CL 92*  --  94*  --  91*   CO2 29  --  25  --  20*   BUN 19  --  18  --  19   CREATININE 1.20*  --  1.20*  --  1.20*   LABGLOM 44*  --  44*  --  44*   GLUCOSE 147*   < > 165*   < > 155*   CALCIUM 8.3  --  9.2  --  8.9   ALBUMIN 0.6*  --   --   --   --    MG 2.0  --   --   --   --     < > = values in this interval not displayed.      Urine: ++ NITRATES and WBC 6/11/2022    Radiographic studies:  Narrative   CT abdomen and pelvis without contrast         History: Elevated LFTs and bilirubin, abdominal pain.       Technique: Helically acquired images were obtained from the upper abdomen to the   ischial tuberosities reconstructed at 5mm intervals after oral contrast.   Intravenous contrast was not administered. Coronal and sagittal reformatted   images were submitted.       Radiation dose reduction techniques were used for this study:  Our CT scanners   use one or all of the following: Automated exposure control, adjustment of the   mA and/or kVp according to patient's size, iterative reconstruction.       Comparison: 06/28/2020       CT ABDOMEN: The lack of IV contrast results in an incomplete assessment of the   solid abdominal viscera. There is a large hiatal hernia The liver and spleen are   unremarkable on this noncontrast study. The adrenal glands are unremarkable. There is fatty atrophy of the pancreas. There are no renal calculi or   hydronephrosis. The gallbladder is absent.       No adenopathy or ascites is present. There are no inflammatory changes. Extensive atherosclerotic changes are present. The appendix is unremarkable.       CT PELVIS: No adenopathy or ascites is present. There are no inflammatory   changes. The pelvic structures are unremarkable on this noncontrast study. There   are remote right superior and inferior pubic ramus fractures. There are several   sigmoid diverticula. There are remote fractures of T12 and L2.           Impression   1. Large hiatal hernia. 2. Sigmoid diverticulosis. 3. Atherosclerosis. 6/12/2022:  EXAM: Single view chest radiograph. INDICATION: Sepsis, assess fluid volume. COMPARISON: Chest radiograph dated June 28, 2020.       FINDINGS:   No pneumothorax or pleural effusion. No focal lung consolidation. There is   diffuse pulmonary interstitial edema. Cardiomegaly. Moderately sized hiatal   hernia evident.           Impression   1. Diffuse pulmonary interstitial edema. 2. Moderate hiatal hernia. Problem List:     Patient Active Problem List    Diagnosis Date Noted    Pulmonary edema 06/13/2022    Atrial fibrillation ([address] 06/13/2022    Hypokalemia 06/12/2022    Hyponatremia 06/12/2022    Severe sepsis ([address] 06/11/2022    UTI (urinary tract infection) 06/11/2022    Transaminitis 06/11/2022    Chest pain 10/29/2021    CAP (community acquired pneumonia) 07/03/2020    Lactic acidosis 06/29/2020    Memory loss     Esophageal reflux     Diarrhea 06/28/2020    Sepsis ([address] 06/28/2020    Colitis 06/28/2020    Person under investigation for COVID-19 06/28/2020    Community acquired pneumonia 06/26/2020    Atrial fibrillation with RVR ([address] 06/23/2020    Depression     Mild depression ([address] 01/28/2019    Type 2 diabetes with nephropathy ([address] 01/17/2019    Palpitations 12/17/2018    Essential hypertension, benign     Acquired hypothyroidism 07/17/2017    OAB (overactive bladder) 07/17/2017    Dyspnea on exertion 06/28/2016    Aortic stenosis 06/10/2016    Diverticulosis 02/17/2016    Diabetes mellitus ([address]     Osteoarthrosis     Endocrine disease     Hypertonicity of bladder     Epistaxis     Edema     Encounter for long-term (current) use of other medications     Cervicalgia     Gastrointestinal malfunction arising from mental factors     Irritable bowel syndrome        Impression:  CKD Stage IIIb likely due to atherosclerotic disease  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 Looking into her sodium serum levels over the last 4 days, the value has remained within the same range ±2 to 3 mg/day deciliter difference and clearly not much lower than yesterday.   I am personally not impressed with her altered mental status changes which could be more related to a hospital delirium in the setting of known multi infarct dementia. Unfortunately the patient has started 3% infusion without collection of appropriate laboratory studies which will make the diagnosis of inappropriate ADH secretion less reliable. It is likely that we are dealing with some degree of SIADH, in the presence of congestive heart failure, atrial fibrillation, possible intermittent urinary obstruction/retention while on diuretics and salt restricted diet. In reality this is multifactorial hyponatremia probably exacerbated in hospital with the use of hypotonic solutions for hypoglycemia and poor oral intake. She was also on loop diuretics. Plan:     My recommendation is to minimize the 3% infusion at this point and hopefully stop it to avoid overcorrection. I will order some electrolytes and serum osmolarity studies for completeness although reliability will be impaired by recent infusion. Agree with the treatment of urinary tract infection with current antibiotic therapy. Hold diuretics at this point. If diet initiated consider increasing the sodium chloride allowance to 4 g/day. Since the hyponatremia is not severe we will hold off on urea sodium given her age and other comorbidities. She would benefit from better A. fib control which will increase renal perfusion and distal sodium delivery. Trend serum sodium and BMP daily with exception in the next 12 hours or so which she would need sodium checks every 4 hours or so. Stop 3% solution and repeat serum sodium levels. She is profoundly hypoalbuminemic so in the case of infusions I will recommend the use of albumin to increase oncotic pressure and also with the help with the hyponatremia.     Alivia Nelson MD, GRACIELA, [facility]  Nephrology  [facility]

## 2022-06-14 NOTE — PROGRESS NOTES
Critical Care [facility] consider allowing more sodium intake as Na 129.

## 2022-06-14 NOTE — PROGRESS NOTES
Patient is confused today. No localized weakness. .     BP (!) 132/93   Pulse (!) 110   Temp 97.7 °F (36.5 °C) (Oral)   Resp 18   Ht 5' 8\" (1.727 m)   Wt 182 lb 1.6 oz (82.6 kg)   SpO2 100%   BMI 27.69 kg/m²      Chest : clear   Patient could not answer questions appropriately. Heart : regular, rate 100/min. Na 127, K 4.5, CO2 20, BUN 18, creatinine 1.20. CT brain : no acute abnormal findings. Impression   Hyponatremia   Will treat with 3% NaCl 100 mL iv to be given over 2 hours.

## 2022-06-14 NOTE — PROGRESS NOTES
VANCO DAILY FOLLOW UP NOTE  [address] [facility] Pharmacokinetic Monitoring Service - Vancomycin    Consulting Provider: Dr. Filiberto Walker   Indication: UTI/Sepsis  Target Concentration: Goal AUC/ALEC 400-600 mg*hr/L  Day of Therapy: 4  Additional Antimicrobials: Ceftriaxone    Pertinent Laboratory Values: Wt Readings from Last 1 Encounters:   06/14/22 182 lb 1.6 oz (82.6 kg)     Temp Readings from Last 1 Encounters:   06/14/22 97.5 °F (36.4 °C) (Oral)     Recent Labs     06/11/22  1210 06/11/22  1210 06/11/22  1614 06/12/22  0451 06/13/22  0455 06/14/22  0550   BUN 24*   < >  --  19 18 19   CREATININE 1.60*   < >  --  1.20* 1.20* 1.20*   WBC 12.5*  --   --  7.1 7.7  --    LACACIDPL 3.4*  --  0.9  --   --   --     < > = values in this interval not displayed. Estimated Creatinine Clearance: 32 mL/min (A) (based on SCr of 1.2 mg/dL (H)). Lab Results   Component Value Date    VANCOTROUGH 9.5 07/02/2020    VANCORANDOM 12.7 06/14/2022       MRSA Nasal Swab: N/A. Non-respiratory infection. .      Assessment:  Date/Time Dose Concentration AUC   6/14 0550 750mg q 24 hours 12.7 383   Note: Serum concentrations collected for AUC dosing may appear elevated if collected in close proximity to the dose administered, this is not necessarily an indication of toxicity    Plan:  Current dosing regimen is sub-therapeutic  Increase dose to 1000mg q 24 hours for predicted AUC/Tr of 500/15.5  Repeat vancomycin concentration ordered for 6/15 @ 0400    Pharmacy will continue to monitor patient and adjust therapy as indicated    Thank you for the consult,  Celena Munroe.  Jillian Yang, PharmD, BCPS  Clinical Pharmacist

## 2022-06-14 NOTE — PROGRESS NOTES
OCCUPATIONAL THERAPY Initial Assessment, Daily Note and AM       OT Visit Days: 1  Acknowledge Orders  Time  OT Charge Capture  Rehab Caseload Tracker      Malachi Martino is a 80 y.o. female   PRIMARY DIAGNOSIS: Severe sepsis ([facility]  Septicemia ([facility] [A41.9]  Severe sepsis ([facility] [A41.9, R65.20]       Reason for Referral: Generalized Muscle Weakness (M62.81)  Other lack of cordination (R27.8)  Repeated Falls (R29.6)  Inpatient: Payor: MEDICARE / Plan: MEDICARE PART A AND B / Product Type: *No Product type* /     ASSESSMENT:     REHAB RECOMMENDATIONS:   Recommendation to date pending progress:  Setting:   Short-term Rehab   with potential to progress to going back to [facility] [place]    Equipment:     To Be Determined     ASSESSMENT:  Ms. Genesis Rosales presents to the hospital from her [facility] with severe sepsis/UTI. Pt is confused this am and oriented to self only. Pt needs encouragement with activity and is perseverating on going out and seeing her friends. Pt is mostly pleasant but did demonstrate some occasional agitation but could be re-directed. Pt required additional time and mod A x 2 with bed mobility. Pt leaning posteriorly in sitting and not able to scoot herself forward. Pt was max A x 2 to scoot to the edge of the bed. Pt worked on sitting tolerance/balance while brushing teeth. Attempted to assist patient with transfer over to chair but barely able to clear bottom with patient pushing back. Pt asking to return back to bed and was positioned in supine with head of bed elevated. Daughter later arrived and reports she does get help with ADL/transfers to WC/toilet at the [facility] but sometimes she is able to do it on her own. Pt has had frequent falls recently (knees buckling and being lowered by staff). Daughter also states this is the most confused she has seen her mom. Pt is currently functioning below baseline and will benefit from OT services to address stated goals and plan of care.       [employer] COGNITION/  PERCEPTION: INTACT IMPAIRED   (See Comments)   Orientation []  oriented to self only   Vision []  appears functional    Hearing []  appears functional    Cognition  [] Overall Cognitive Status: Exceptions  Arousal/Alertness: Appropriate responses to stimuli  Following Commands:  Follows one step commands with repetition  Memory: Decreased short term memory,Decreased long term memory  Safety Judgement: Decreased awareness of need for assistance,Decreased awareness of need for safety  Insights: Decreased awareness of deficits   Perception []       MOBILITY: I Mod I S SBA CGA Min Mod Max Total  NT x2 Comments:   Bed Mobility    Rolling [] [] [] [] [] [] [x] [] [] [] []    Supine to Sit [] [] [] [] [] [] [x] [] [] [] [x]    Scooting [] [] [] [] [] [] [] [x] [] [] [x]    Sit to Supine [] [] [] [] [] [] [x] [] [] [] [x]    Transfers    Sit to Stand [] [] [] [] [] [] [] [] [] [] [] Attempted but unable to clear bottom   Bed to Chair [] [] [] [] [] [] [] [] [] [] [] Attempted but unable to clear bottom   Stand to Sit [] [] [] [] [] [] [] [] [] [x] []    Tub/Shower [] [] [] [] [] [] [] [] [] [x] []     Toilet [] [] [] [] [] [] [] [] [] [x] []      [] [] [] [] [] [] [] [] [] [] []    I=Independent, Mod I=Modified Independent, S=Supervision/Setup, SBA=Standby Assistance, CGA=Contact Guard Assistance, Min=Minimal Assistance, Mod=Moderate Assistance, Max=Maximal Assistance, Total=Total Assistance, NT=Not Tested    ACTIVITIES OF DAILY LIVING: I Mod I S SBA CGA Min Mod Max Total NT Comments   BASIC ADLs:              Upper Body Bathing  [] [] [] [] [] [] [] [] [] [x]    Lower Body Bathing [] [] [] [] [] [] [] [] [] [x]    Toileting [] [] [] [] [] [] [] [] [] [x]    Upper Body Dressing [] [] [] [] [] [] [] [] [] []    Lower Body Dressing [] [] [] [] [] [] [] [] [x] [] Donning socks   Feeding [] [] [] [] [] [] [] [] [] [x]    Grooming [] [] [] [] [] [x] [] [] [] [] Brushing teeth edge of bed   Personal Device Care [] [] [] [] [] [] [] [] [] [x]    Functional Mobility [] [] [] [] [] [] [] [] [] [x]    I=Independent, Mod I=Modified Independent, S=Supervision/Setup, SBA=Standby Assistance, CGA=Contact Guard Assistance, Min=Minimal Assistance, Mod=Moderate Assistance, Max=Maximal Assistance, Total=Total Assistance, NT=Not Tested    PLAN:     [address] [place] [address] of Care: 3 times/week for duration of hospital stay or until stated goals are met, whichever comes first.    ACUTE OCCUPATIONAL THERAPY GOALS:   (Developed with and agreed upon by patient and/or caregiver.)  1. Patient will complete upper body bathing and dressing with minimal assistance and adaptive equipment as needed. 2. Patient will complete bed mobility with minimal assistance to improve positioning for ADL. 3. Patient will tolerate 30 minutes of OT treatment with 2-3 rest breaks to increase activity tolerance for ADLs. 4. Patient will complete sitting balance edge of bed with CGA to improve positioning for ADL. 5. Patient will complete transfer to chair/BSC/WC within 3 visits to demonstrate advancement with functional transfers.      Timeframe: 7 visits         PROBLEM LIST:   (Skilled intervention is medically necessary to address:)  Decreased ADL/Functional Activities  Decreased Activity Tolerance  Decreased AROM/PROM  Decreased Balance  Decreased Cognition  Decreased Coordination  Decreased Gait Ability  Decreased Safety Awareness  Decreased Strength  Decreased Transfer Abilities  Increased Pain   INTERVENTIONS PLANNED:  (Benefits and precautions of occupational therapy have been discussed with the patient.)  Self Care Training  Therapeutic Activity  Therapeutic Exercise/HEP  Neuromuscular Re-education  Manual Therapy  Education         TREATMENT:     EVALUATION: MODERATE COMPLEXITY: (Untimed Charge)    TREATMENT:   Co-Treatment PT/OT necessary due to patient's decreased overall endurance/tolerance levels, as well as need for high level skilled assistance to complete functional transfers/mobility and functional tasks  Self Care (8 minutes): Patient participated in grooming ADLs in unsupported sitting with moderate manual cueing to increase independence, decrease assistance required, increase activity tolerance and increase safety awareness. Patient also participated in functional transfer to decrease assistance required, increase activity tolerance and increase safety awareness.      TREATMENT GRID:  N/A    AFTER TREATMENT PRECAUTIONS: Alarm Activated, Bed, Bed/Chair Locked, Call light within reach, Needs within reach and RN notified    INTERDISCIPLINARY COLLABORATION:  RN/ PCT, PT/ PTA and OT/ CORNELIUS    EDUCATION:  Education Given To: Patient  Education Provided: Role of Therapy;Plan of Care  Education Method: Verbal  Barriers to Learning: Cognition  Education Outcome: Continued education needed    TOTAL TREATMENT DURATION AND TIME:  Time In: 0916  Time Out: 0940  Minutes: 2418 Eva Jacobo OT

## 2022-06-15 LAB
ALBUMIN SERPL-MCNC: 2.7 G/DL (ref 3.2–4.6)
ALBUMIN/GLOB SERPL: 0.6 {RATIO} (ref 1.2–3.5)
ALP SERPL-CCNC: 233 U/L (ref 50–136)
ALT SERPL-CCNC: 69 U/L (ref 12–65)
ANION GAP SERPL CALC-SCNC: 13 MMOL/L (ref 7–16)
AST SERPL-CCNC: 47 U/L (ref 15–37)
BILIRUB SERPL-MCNC: 1.3 MG/DL (ref 0.2–1.1)
BUN SERPL-MCNC: 19 MG/DL (ref 8–23)
CALCIUM SERPL-MCNC: 8.8 MG/DL (ref 8.3–10.4)
CHLORIDE SERPL-SCNC: 92 MMOL/L (ref 98–107)
CHLORIDE UR-SCNC: 39 MMOL/L
CO2 SERPL-SCNC: 24 MMOL/L (ref 21–32)
CREAT SERPL-MCNC: 1 MG/DL (ref 0.6–1)
ERYTHROCYTE [DISTWIDTH] IN BLOOD BY AUTOMATED COUNT: 14.5 % (ref 11.9–14.6)
GLOBULIN SER CALC-MCNC: 4.4 G/DL (ref 2.3–3.5)
GLUCOSE BLD STRIP.AUTO-MCNC: 143 MG/DL (ref 65–100)
GLUCOSE BLD STRIP.AUTO-MCNC: 153 MG/DL (ref 65–100)
GLUCOSE BLD STRIP.AUTO-MCNC: 162 MG/DL (ref 65–100)
GLUCOSE BLD STRIP.AUTO-MCNC: 197 MG/DL (ref 65–100)
GLUCOSE SERPL-MCNC: 141 MG/DL (ref 65–100)
HCT VFR BLD AUTO: 36.6 % (ref 35.8–46.3)
HGB BLD-MCNC: 12.4 G/DL (ref 11.7–15.4)
MCH RBC QN AUTO: 30.5 PG (ref 26.1–32.9)
MCHC RBC AUTO-ENTMCNC: 33.9 G/DL (ref 31.4–35)
MCV RBC AUTO: 89.9 FL (ref 79.6–97.8)
NRBC # BLD: 0.02 K/UL (ref 0–0.2)
NT PRO BNP: ABNORMAL PG/ML
OSMOLALITY UR: 459 MOSM/KG H2O (ref 50–1400)
PLATELET # BLD AUTO: 332 K/UL (ref 150–450)
PMV BLD AUTO: 10 FL (ref 9.4–12.3)
POTASSIUM SERPL-SCNC: 3.7 MMOL/L (ref 3.5–5.1)
POTASSIUM UR-SCNC: 48 MMOL/L
PROT SERPL-MCNC: 7.1 G/DL (ref 6.3–8.2)
RBC # BLD AUTO: 4.07 M/UL (ref 4.05–5.2)
SERVICE CMNT-IMP: ABNORMAL
SODIUM SERPL-SCNC: 129 MMOL/L (ref 136–145)
SODIUM UR-SCNC: 20 MMOL/L
VANCOMYCIN SERPL-MCNC: 19.8 UG/ML
WBC # BLD AUTO: 8.4 K/UL (ref 4.3–11.1)

## 2022-06-15 PROCEDURE — 6360000002 HC RX W HCPCS: Performed by: INTERNAL MEDICINE

## 2022-06-15 PROCEDURE — 6370000000 HC RX 637 (ALT 250 FOR IP): Performed by: INTERNAL MEDICINE

## 2022-06-15 PROCEDURE — 83880 ASSAY OF NATRIURETIC PEPTIDE: CPT

## 2022-06-15 PROCEDURE — 36415 COLL VENOUS BLD VENIPUNCTURE: CPT

## 2022-06-15 PROCEDURE — 1100000000 HC RM PRIVATE

## 2022-06-15 PROCEDURE — 6370000000 HC RX 637 (ALT 250 FOR IP): Performed by: FAMILY MEDICINE

## 2022-06-15 PROCEDURE — 83935 ASSAY OF URINE OSMOLALITY: CPT

## 2022-06-15 PROCEDURE — 2580000003 HC RX 258: Performed by: INTERNAL MEDICINE

## 2022-06-15 PROCEDURE — 97530 THERAPEUTIC ACTIVITIES: CPT

## 2022-06-15 PROCEDURE — 2580000003 HC RX 258: Performed by: FAMILY MEDICINE

## 2022-06-15 PROCEDURE — 84300 ASSAY OF URINE SODIUM: CPT

## 2022-06-15 PROCEDURE — 97535 SELF CARE MNGMENT TRAINING: CPT

## 2022-06-15 PROCEDURE — 82962 GLUCOSE BLOOD TEST: CPT

## 2022-06-15 PROCEDURE — 97112 NEUROMUSCULAR REEDUCATION: CPT

## 2022-06-15 PROCEDURE — 6360000002 HC RX W HCPCS: Performed by: FAMILY MEDICINE

## 2022-06-15 PROCEDURE — 82436 ASSAY OF URINE CHLORIDE: CPT

## 2022-06-15 PROCEDURE — 85027 COMPLETE CBC AUTOMATED: CPT

## 2022-06-15 PROCEDURE — 80202 ASSAY OF VANCOMYCIN: CPT

## 2022-06-15 PROCEDURE — 2580000003 HC RX 258: Performed by: EMERGENCY MEDICINE

## 2022-06-15 PROCEDURE — 80053 COMPREHEN METABOLIC PANEL: CPT

## 2022-06-15 PROCEDURE — 84133 ASSAY OF URINE POTASSIUM: CPT

## 2022-06-15 RX ADMIN — MEMANTINE 5 MG: 5 TABLET ORAL at 09:07

## 2022-06-15 RX ADMIN — SENNOSIDES 8.6 MG: 8.6 TABLET, FILM COATED ORAL at 21:06

## 2022-06-15 RX ADMIN — PANTOPRAZOLE SODIUM 40 MG: 40 TABLET, DELAYED RELEASE ORAL at 05:15

## 2022-06-15 RX ADMIN — SUCRALFATE 1 G: 1 TABLET ORAL at 17:15

## 2022-06-15 RX ADMIN — SODIUM CHLORIDE, PRESERVATIVE FREE 3 ML: 5 INJECTION INTRAVENOUS at 05:16

## 2022-06-15 RX ADMIN — TROSPIUM CHLORIDE 20 MG: 20 TABLET, FILM COATED ORAL at 05:14

## 2022-06-15 RX ADMIN — METOPROLOL TARTRATE 50 MG: 50 TABLET ORAL at 09:07

## 2022-06-15 RX ADMIN — METOPROLOL TARTRATE 50 MG: 50 TABLET ORAL at 21:06

## 2022-06-15 RX ADMIN — MEMANTINE 5 MG: 5 TABLET ORAL at 21:06

## 2022-06-15 RX ADMIN — SUCRALFATE 1 G: 1 TABLET ORAL at 11:36

## 2022-06-15 RX ADMIN — SODIUM CHLORIDE, PRESERVATIVE FREE 3 ML: 5 INJECTION INTRAVENOUS at 11:36

## 2022-06-15 RX ADMIN — POLYETHYLENE GLYCOL 3350 17 G: 17 POWDER, FOR SOLUTION ORAL at 09:07

## 2022-06-15 RX ADMIN — SODIUM CHLORIDE, PRESERVATIVE FREE 5 ML: 5 INJECTION INTRAVENOUS at 09:08

## 2022-06-15 RX ADMIN — CEFTRIAXONE 1000 MG: 1 INJECTION, POWDER, FOR SOLUTION INTRAMUSCULAR; INTRAVENOUS at 13:29

## 2022-06-15 RX ADMIN — LEVOTHYROXINE SODIUM 100 MCG: 0.05 TABLET ORAL at 05:15

## 2022-06-15 RX ADMIN — TROSPIUM CHLORIDE 20 MG: 20 TABLET, FILM COATED ORAL at 17:17

## 2022-06-15 RX ADMIN — APIXABAN 5 MG: 5 TABLET, FILM COATED ORAL at 09:07

## 2022-06-15 RX ADMIN — VANCOMYCIN HYDROCHLORIDE 1000 MG: 1 INJECTION, POWDER, LYOPHILIZED, FOR SOLUTION INTRAVENOUS at 21:28

## 2022-06-15 RX ADMIN — SODIUM CHLORIDE, PRESERVATIVE FREE 3 ML: 5 INJECTION INTRAVENOUS at 21:07

## 2022-06-15 RX ADMIN — APIXABAN 5 MG: 5 TABLET, FILM COATED ORAL at 21:06

## 2022-06-15 RX ADMIN — INSULIN GLARGINE 5 UNITS: 100 INJECTION, SOLUTION SUBCUTANEOUS at 09:13

## 2022-06-15 RX ADMIN — SUCRALFATE 1 G: 1 TABLET ORAL at 05:14

## 2022-06-15 ASSESSMENT — PAIN SCALES - GENERAL
PAINLEVEL_OUTOF10: 0

## 2022-06-15 NOTE — PLAN OF CARE
Problem: Pain  Goal: Verbalizes/displays adequate comfort level or baseline comfort level  6/15/2022 0755 by Ai Morifn RN  Outcome: Progressing  6/14/2022 2008 by Jacqueline Eaton RN  Outcome: Progressing     Problem: Skin/Tissue Integrity  Goal: Absence of new skin breakdown  Description: 1. Monitor for areas of redness and/or skin breakdown  2. Assess vascular access sites hourly  3. Every 4-6 hours minimum:  Change oxygen saturation probe site  4. Every 4-6 hours:  If on nasal continuous positive airway pressure, respiratory therapy assess nares and determine need for appliance change or resting period.   6/15/2022 0755 by Ai Morfin RN  Outcome: Progressing  6/14/2022 2008 by Jacqueline Eaton RN  Outcome: Progressing

## 2022-06-15 NOTE — PROGRESS NOTES
OCCUPATIONAL THERAPY Daily Note and AM     OT Visit Days: 2   Time  OT Charge Capture  Rehab Caseload Tracker  OT Orders    Sesar Lynch is a 80 y.o. female   PRIMARY DIAGNOSIS: Severe sepsis ([facility] [address]  Septicemia ([facility] [address] [A41.9]  Severe sepsis ([facility] [address] [A41.9, R65.20]       Inpatient: Payor: MEDICARE / Plan: MEDICARE PART A AND B / Product Type: *No Product type* /     ASSESSMENT:     REHAB RECOMMENDATIONS: CURRENT LEVEL OF FUNCTION:  (Most Recently Demonstrated)   Recommendation to date pending progress:  Setting:   Short-term Rehab    Equipment:     To Be Determined Bathing:   Not Tested  Dressing:   Not Tested  Feeding/Grooming:   Minimal Assist  Toileting:   Not Tested  Functional Mobility:   Maximal Assist x 2 squat pivot transfers     ASSESSMENT:  Ms. Kathi Nicolas presents supine in the bed sleeping but was easily arousable. Pt is more calm and cooperative today. Pt's daughter was concerned because she said pt didn't recognize her today. Pt was mod A x 2 for bed mobility to sit edge of bed. Pt worked on sitting tolerance and was able to balance better with CGA/SBA for sitting balance today. Pt worked on brushing her hair today with minimal assistance. Pt did start to fatigue in sitting and worked on stand pivot transfer over to the recliner chair with maximal assistance x 2. This was an improvement from yesterday because she did not push posteriorly. Pt has painful R knee with pillows placed for improved comfort and to float heels. Pt appeared a little short of breath with activity and O2 levels reading at 86% on RA. Nasal cannula placed and O2 levels improved to 95%. Pt left up in the recliner with family at bedside. Pt did demonstrate some improvement with today's session. Pt to continue per plan of care. SUBJECTIVE:     Ms. Kathi Nicolas states, Noreen Gottron I lay down? \"     Social/Functional Type of Home: Assisted living  Home Equipment: [address]  ADL Assistance: Needs assistance  Transfer Assistance: Needs assistance    OBJECTIVE:     Delmas Hockey / Laveta Hives / [employer]: Delman Hoof    RESTRICTIONS/PRECAUTIONS:  Restrictions/Precautions  Restrictions/Precautions: Fall Risk,Bed Alarm        PAIN: VITALS / O2:   Pre Treatment:   Pain Assessment: None - Denies Pain  Pain Level: 2  Pain Location: Back,Shoulder          Post Treatment: same Vitals          Oxygen           MOBILITY: I Mod I S SBA CGA Min Mod Max Total  NT x2 Comments:   Bed Mobility    Rolling [] [] [] [] [] [] [x] [] [] [] [x]    Supine to Sit [] [] [] [] [] [] [x] [] [] [] [x]    Scooting [] [] [] [] [] [] [x] [] [] [] [x]    Sit to Supine [] [] [] [] [] [] [x] [] [] [] [x]    Transfers    Sit to Stand [] [] [] [] [] [] [] [] [] [x] []    Bed to Chair [] [] [] [] [] [] [] [x] [] [] [x] Via squat pivot transfer to drop arm chair   Stand to Sit [] [] [] [] [] [] [] [] [] [x] []    Tub/Shower [] [] [] [] [] [] [] [] [] [x] []     Toilet [] [] [] [] [] [] [] [] [] [x] []      [] [] [] [] [] [] [] [] [] [] []    I=Independent, Mod I=Modified Independent, S=Supervision/Setup, SBA=Standby Assistance, CGA=Contact Guard Assistance, Min=Minimal Assistance, Mod=Moderate Assistance, Max=Maximal Assistance, Total=Total Assistance, NT=Not Tested    ACTIVITIES OF DAILY LIVING: I Mod I S SBA CGA Min Mod Max Total NT Comments   BASIC ADLs:              Upper Body   Bathing [] [] [] [] [] [] [] [] [] [x]    Lower Body Bathing [] [] [] [] [] [] [] [] [] [x]    Toileting [] [] [] [] [] [] [] [] [] [x]    Upper Body Dressing [] [] [] [] [] [] [] [] [] [x]    Lower Body Dressing [] [] [] [] [] [] [] [] [] [x]    Feeding [] [] [] [] [] [] [] [] [] [x]    Grooming [] [] [] [] [] [x] [] [] [] [] Brushing hair   Personal Device Care [] [] [] [] [] [] [] [] [] [x]    Functional Mobility [] [] [] [] [] [] [] [] [] [x]    I=Independent, Mod I=Modified Independent, S=Supervision/Setup, SBA=Standby Assistance, CGA=Contact Guard Assistance, Min=Minimal Assistance, Mod=Moderate Assistance, Max=Maximal Assistance, Total=Total Assistance, NT=Not Tested    BALANCE: Good Fair+ Fair Fair- Poor NT Comments   Sitting Static [] [] [x] [] [] []    Sitting Dynamic [] [] [] [x] [] []              Standing Static [] [] [] [] [x] []    Standing Dynamic [] [] [] [] [x] []        PLAN:     FREQUENCY/DURATION   OT Plan of Care: 3 times/week for duration of hospital stay or until stated goals are met, whichever comes first.    ACUTE OCCUPATIONAL THERAPY GOALS:   (Developed with and agreed upon by patient and/or caregiver.)    1. Patient will complete upper body bathing and dressing with minimal assistance and adaptive equipment as needed. 2. Patient will complete bed mobility with minimal assistance to improve positioning for ADL. 3. Patient will tolerate 30 minutes of OT treatment with 2-3 rest breaks to increase activity tolerance for ADLs. 4. Patient will complete sitting balance edge of bed with CGA to improve positioning for ADL. 5. Patient will complete transfer to chair/BSC/WC within 3 visits to demonstrate advancement with functional transfers. MET 6/15/22      Timeframe: 7 visits         TREATMENT:     TREATMENT:   Co-Treatment PT/OT necessary due to patient's decreased overall endurance/tolerance levels, as well as need for high level skilled assistance to complete functional transfers/mobility and functional tasks  Neuromuscular Re-education (31 Minutes): Neuromuscular Re-education included Balance Training, Coordination training, Postural training and Sitting balance training to improve Balance, Coordination and Postural Control. Self Care (8 minutes): Patient participated in grooming ADLs in unsupported sitting with minimal visual, verbal and manual cueing to increase independence, decrease assistance required and increase activity tolerance. Patient also participated in functional transfer to decrease assistance required, increase activity tolerance and increase safety awareness. TREATMENT GRID:  N/A    AFTER TREATMENT PRECAUTIONS: Bed/Chair Locked, Call light within reach, Chair, RN notified and Visitors at bedside    INTERDISCIPLINARY COLLABORATION:  RN/ PCT, PT/ PTA and OT/ CORNELIUS    EDUCATION:       TOTAL TREATMENT DURATION AND TIME:  Time In: 1008  Time Out: 2015 [facility]  Minutes: [address], OT

## 2022-06-15 NOTE — PROGRESS NOTES
PHYSICAL THERAPY Daily Note and AM  (Link to Caseload Tracking: PT Visit Days : 2  Time In/Out PT Charge Capture  Rehab Caseload Tracker  Orders      Lakeshia Kelly is a 80 y.o. female   PRIMARY DIAGNOSIS: Severe sepsis ([facility] [address]  Septicemia ([facility] [address] [A41.9]  Severe sepsis ([facility] [address] [A41.9, R65.20]       Inpatient: Payor: MEDICARE / Plan: MEDICARE PART A AND B / Product Type: *No Product type* /     ASSESSMENT:     REHAB RECOMMENDATIONS:   Recommendation to date pending progress:  Setting:   Short-term Rehab    Equipment:     To Be Determined     ASSESSMENT:  Ms. Glenn Padgett presents supine; pleasantly confused and agreeable to PT. Patient performed supine to sit with mod assist, additional time and cues for technique. Patient demonstrates good static sitting balance during ADL activity seated EOB and LE exercises. Great participation with LE exercises. Patient then transferred to recliner chair with max assist x 2 via squat pivot transfer. Slow progress towards goals. Will continue efforts. SUBJECTIVE:   Ms. Glenn Padgett states, \"What happened to me? \"     Social/Functional Type of Home: Assisted living  Home Equipment: Wheelchair-manual  ADL Assistance: Needs assistance  Transfer Assistance: Needs assistance  OBJECTIVE:     PAIN: VITALS / O2: PRECAUTION / Ayan Bohr / DRAINS:   Pre Treatment:   Pain Level: 0      Post Treatment: 0 Vitals        Oxygen    Purewick    RESTRICTIONS/PRECAUTIONS:  Restrictions/Precautions  Restrictions/Precautions: Fall Risk  Restrictions/Precautions: Fall Risk     MOBILITY: I Mod I S SBA CGA Min Mod Max Total  NT x2 Comments:   Bed Mobility    Rolling [] [] [] [] [] [] [] [] [] [] []    Supine to Sit [] [] [] [] [] [] [x] [] [] [] [x]    Scooting [] [] [] [] [] [] [] [] [] [] []    Sit to Supine [] [] [] [] [] [] [] [] [] [] []    Transfers    Sit to Stand [] [] [] [] [] [] [] [x] [] [] [x]    Bed to Chair [] [] [] [] [] [] [] [x] [] [] [x]    Stand to Sit [] [] [] [] [] [] [] [x] [] [] [x] [] [] [] [] [] [] [] [] [] [] []    I=Independent, Mod I=Modified Independent, S=Supervision, SBA=Standby Assistance, CGA=Contact Guard Assistance,   Min=Minimal Assistance, Mod=Moderate Assistance, Max=Maximal Assistance, Total=Total Assistance, NT=Not Tested    BALANCE: Good Fair+ Fair Fair- Poor NT Comments   Sitting Static [x] [] [] [] [] []    Sitting Dynamic [] [] [] [] [] []              Standing Static [] [] [] [] [x] []    Standing Dynamic [] [] [] [] [] []      GAIT: I Mod I S SBA CGA Min Mod Max Total  NT x2 Comments:   Level of Assistance [] [] [] [] [] [] [] [] [] [] []    Distance   feet    DME N/A    Gait Quality N/A    Weightbearing Status      Stairs      I=Independent, Mod I=Modified Independent, S=Supervision, SBA=Standby Assistance, CGA=Contact Guard Assistance,   Min=Minimal Assistance, Mod=Moderate Assistance, Max=Maximal Assistance, Total=Total Assistance, NT=Not Tested    PLAN:   ACUTE PHYSICAL THERAPY GOALS:   (Developed with and agreed upon by patient and/or caregiver.)     1. Patient will perform bed mobility with MINIMAL ASSISTANCE within 7 days. 2. Patient will transfer bed to chair with MINIMAL ASSISTANCE within 7 days. 3. Patient will demonstrate FAIR+ DYNAMIC SITTING balance within 7 day(s). 4. Patient will tolerate 25+ minutes of therapeutic activity/exercise and/or neuromuscular re-education while maintaining stable vitals to improve functional strength and activity tolerance within 7 days.       FREQUENCY AND DURATION: 3 times/week for duration of hospital stay or until stated goals are met, whichever comes first.    TREATMENT:   TREATMENT:   Co-Treatment PT/OT necessary due to patient's decreased overall endurance/tolerance levels, as well as need for high level skilled assistance to complete functional transfers/mobility and functional tasks  Therapeutic Activity (29 Minutes):  Therapeutic activity included Supine to Sit, Scooting, Transfer Training, Sitting balance , Standing balance and LE exercises to improve functional Activity tolerance, Balance, Mobility and Strength. TREATMENT GRID:  N/A    AFTER TREATMENT PRECAUTIONS: Bed/Chair Locked, Call light within reach, Chair, Needs within reach, RN notified and Visitors at bedside    INTERDISCIPLINARY COLLABORATION:  RN/ PCT, PT/ PTA and OT/ CORNELIUS    EDUCATION:      TIME IN/OUT:  Time In: 1018  Time Out: 2015 [facility]  Minutes: [address] [facility]  MARGARITA Iraheta

## 2022-06-15 NOTE — PROGRESS NOTES
VANCO DAILY FOLLOW UP NOTE  [address] [facility] Pharmacokinetic Monitoring Service - Vancomycin    Consulting Provider: Dr. Lianet Adams   Indication: UTI/Sepsis  Target Concentration: Goal AUC/ALEC 400-600 mg*hr/L  Day of Therapy: 5  Additional Antimicrobials: Ceftriaxone    Pertinent Laboratory Values: Wt Readings from Last 1 Encounters:   06/15/22 182 lb 12.2 oz (82.9 kg)     Temp Readings from Last 1 Encounters:   06/15/22 97.7 °F (36.5 °C) (Axillary)     Recent Labs     06/13/22  0455 06/13/22  0455 06/14/22  0550 06/14/22  1930 06/15/22  0541   BUN 18   < > 19 19 19   CREATININE 1.20*   < > 1.20* 1.10* 1.00   WBC 7.7  --   --   --  8.4    < > = values in this interval not displayed. Estimated Creatinine Clearance: 38 mL/min (based on SCr of 1 mg/dL). Lab Results   Component Value Date    VANCOTROUGH 9.5 07/02/2020    VANCORANDOM 19.8 06/15/2022       MRSA Nasal Swab: N/A. Non-respiratory infection. .      Assessment:  Date/Time Dose Concentration AUC   6/14 0550 750mg q 24 hours 12.7 383   6/15/22 @ 0541 1000 mg Q24H 19.8 469   Note: Serum concentrations collected for AUC dosing may appear elevated if collected in close proximity to the dose administered, this is not necessarily an indication of toxicity    Plan:  Current dosing regimen is therapeutic  Continue vancomycin 1000 mg Q24H for now. Repeat vancomycin concentrations will be ordered as necessary.   Pharmacy will continue to monitor patient and adjust therapy as indicated    Thank you for the consult,  Lizbet Hoskins, PharmD, BCPS  Clinical Pharmacist  Contact via [employer]

## 2022-06-15 NOTE — PROGRESS NOTES
PHYSICAL THERAPY Daily Note and AM  (Link to Caseload Tracking: PT Visit Days : 2  Time In/Out PT Charge Capture  Rehab Caseload Tracker  Orders      Mykel Vallejo is a 80 y.o. female   PRIMARY DIAGNOSIS: Severe sepsis ([facility] [address]  Septicemia ([facility] [address] [A41.9]  Severe sepsis ([facility] [address] [A41.9, R65.20]       Inpatient: Payor: MEDICARE / Plan: MEDICARE PART A AND B / Product Type: *No Product type* /     ASSESSMENT:     REHAB RECOMMENDATIONS:   Recommendation to date pending progress:  Setting:   Short-term Rehab    Equipment:     To Be Determined     ASSESSMENT:  Ms. Neville Montero presents sitting up in recliner chair; pleasantly confused and agreeable to PT. Patient performed squat pivot transfer back to bed with max-total assist x 2. Fair sitting balance EOB noted. Patient returned to supine with max assist x 2. Slow progress towards goals. Will continue efforts. SUBJECTIVE:   Ms. Neville Montero states, \"What did I injure? \"     Social/Functional Type of Home: Assisted living  Home Equipment: Wheelchair-manual  ADL Assistance: Needs assistance  Transfer Assistance: Needs assistance  OBJECTIVE:     PAIN: VITALS / O2: PRECAUTION / Daisy Mike / DRAINS:   Pre Treatment:   Pain Level: 0      Post Treatment: 0 Vitals        Oxygen    Purewick    RESTRICTIONS/PRECAUTIONS:  Restrictions/Precautions  Restrictions/Precautions: Fall Risk  Restrictions/Precautions: Fall Risk     MOBILITY: I Mod I S SBA CGA Min Mod Max Total  NT x2 Comments:   Bed Mobility    Rolling [] [] [] [] [] [] [] [] [] [] []    Supine to Sit [] [] [] [] [] [] [] [] [] [] []    Scooting [] [] [] [] [] [] [] [] [] [] []    Sit to Supine [] [] [] [] [] [] [] [x] [] [] [x]    Transfers    Sit to Stand [] [] [] [] [] [] [] [] [] [] []    Bed to Chair [] [] [] [] [] [] [] [x] [x] [] [x]    Stand to Sit [] [] [] [] [] [] [] [] [] [] []     [] [] [] [] [] [] [] [] [] [] []    I=Independent, Mod I=Modified Independent, S=Supervision, SBA=Standby Assistance, CGA=Contact Guard Assistance,   Min=Minimal Assistance, Mod=Moderate Assistance, Max=Maximal Assistance, Total=Total Assistance, NT=Not Tested    BALANCE: Good Fair+ Fair Fair- Poor NT Comments   Sitting Static [] [] [x] [] [] []    Sitting Dynamic [] [] [] [] [] []              Standing Static [] [] [] [] [x] []    Standing Dynamic [] [] [] [] [] []      GAIT: I Mod I S SBA CGA Min Mod Max Total  NT x2 Comments:   Level of Assistance [] [] [] [] [] [] [] [] [] [] []    Distance   feet    DME N/A    Gait Quality N/A    Weightbearing Status      Stairs      I=Independent, Mod I=Modified Independent, S=Supervision, SBA=Standby Assistance, CGA=Contact Guard Assistance,   Min=Minimal Assistance, Mod=Moderate Assistance, Max=Maximal Assistance, Total=Total Assistance, NT=Not Tested    PLAN:   ACUTE PHYSICAL THERAPY GOALS:   (Developed with and agreed upon by patient and/or caregiver.)     1. Patient will perform bed mobility with MINIMAL ASSISTANCE within 7 days. 2. Patient will transfer bed to chair with MINIMAL ASSISTANCE within 7 days. 3. Patient will demonstrate FAIR+ DYNAMIC SITTING balance within 7 day(s). 4. Patient will tolerate 25+ minutes of therapeutic activity/exercise and/or neuromuscular re-education while maintaining stable vitals to improve functional strength and activity tolerance within 7 days.       FREQUENCY AND DURATION: 3 times/week for duration of hospital stay or until stated goals are met, whichever comes first.    TREATMENT:   TREATMENT:   Co-Treatment PT/OT necessary due to patient's decreased overall endurance/tolerance levels, as well as need for high level skilled assistance to complete functional transfers/mobility and functional tasks  Therapeutic Activity (10 Minutes): Therapeutic activity included Sit to Supine, Scooting, Transfer Training, Sitting balance  and Standing balance to improve functional Activity tolerance, Balance, Mobility and Strength.     TREATMENT GRID:  N/A    AFTER TREATMENT PRECAUTIONS: Alarm Activated, Bed, Bed/Chair Locked, Call light within reach, Needs within reach, RN notified and Visitors at bedside    INTERDISCIPLINARY COLLABORATION:  RN/ PCT, PT/ PTA and OT/ CORNELIUS    EDUCATION:      TIME IN/OUT:  Time In: [address]  Time Out: [address]  Minutes: [address]  MARGARITA Iraheta

## 2022-06-15 NOTE — PROGRESS NOTES
Hospitalist Progress Note   Admit Date:  2022 12:02 PM   Name:  Saman Delatorre   Age:  80 y.o. Sex:  female  :  1926   MRN:  [id number]   Room:  [facility]/    Presenting Complaint: Abdominal Pain and Urinary Retention     Reason(s) for Admission: Septicemia ([facility] [A41.9]  Severe sepsis ([facility] [A41.9, R65.20]     Hospital Course & Interval History:     Patient with past medical history of    Atrial fibrillation, on Eliquis   DM type 2  Edema   GERD   Hypertension   Hypothyroidism   Rheumatoid arthritis    Patient was brought to hospital after experiencing lower abdominal pain, pelvic pain, and decreased urination in the past 2 days prior to admission. No history of fever. Patient came from nursing home. In ER she was found to have tachycardia, and also some mild tachypnea. Creatinine went up to 1.6 from baseline of 1, so she has JAMIE. Also she has sodium of 130 potassium 3.4. CT abdomen and pelvis without contrast showed large hiatal hernia, sigmoid diverticulosis, atherosclerosis. .     Patient was admitted for UTI with sepsis with criteria of tachypnea tachycardia JAMIE with hyponatremia and hypokalemia. Subjective/24hr Events (06/15/22):     22   Patient is feeling better since admission. No fever. Patient reported having eaten decent dinner last night. Patient denies abdominal pain this morning. Patient denies dysuria. Patient denies hematuria. No nausea. No vomiting. No fever. No shaking. No chills. No chest pain. No shortness of breath. 22  Patient developed right arm hematoma on 2022 after IV infiltration. Extent of hematoma is improving. Apixaban was held from yesterday. No fever. No shaking. No chills. No chest pain. No shortness of breath. HR is running on high side      22   BP is elevated especially SBP. Pulse is also elevated. Metoprolol is increased to 50 mg po bid yesterday. No fever. No shaking. No chills.    No chest pain. No shortness of breath. 6/15/22   Patient was confused yesterday. CT brain shows no acute findings. Na 127. Patient received 3% NaCl for 100 mL once. Patient is more alert today. No chest pain. No shortness of breath. Assessment & Plan:     Principal Problem:    Severe sepsis ([address]    UTI (urinary tract infection)  Continue empiric antibiotics which are ceftriaxone and Vancomycin. Symptomatic treatment. Follow-up on culture results. So far, culture of blood and urine have been negative. Active Problems:      Transaminitis  Likely from sepsis   Continue to monitor. Expect to see some improvement after her sepsis and UTI of better treated and under control. AST, ALT are much better since admission. Memory loss  Noted. Type 2 diabetes with nephropathy (HCC)  Blood sugar has been in the mid 100s to low 200s ranges. Continue current sliding scale coverage with Humalog. Give Lantus 5 units sc daily. BS has improved. Since admission. Edema  Noted   Legs are without edema today. Monitor. Essential hypertension, benign  On Metoprolol 50  mg po bid. That should help with tachycardia as well. Patient denies lightheadedness. Patient denies dizziness. Monitor closely      Acquired hypothyroidism  Continue thyroid hormone supplement. Hyponatremia  Hypokalemia  Likely secondary to poor oral intake and diuretic use prior to coming to hospital.  She is getting gentle hydration. Hypokalemia is resolved. Stop K supplement. OAB (overactive bladder)  Noted   Monitor. Symptomatic treatments     Chest X-ray shows pulmonary congestion. given Furosemide 40 mg IV once. Monitor symptoms and response. I have discussed the plan of care with patient. Discharge Planning:    Likely back to nursing home when improved. Diet:  ADULT DIET; Easy to Chew; 4 carb choices (60 gm/meal); Low Fat/Low Chol/High Fiber/2 gm Na;  No Added Salt (3-4 gm); 1200 ml  DVT PPx: On Apixaban PO   Code status: Full Code    Hospital Problems:  Principal Problem:    Severe sepsis ([address]  Active Problems:    UTI (urinary tract infection)    Transaminitis    Hypokalemia    Hyponatremia    Pulmonary edema    Atrial fibrillation (HCC)    Memory loss    Type 2 diabetes with nephropathy (HCC)    Edema    Essential hypertension, benign    Acquired hypothyroidism    OAB (overactive bladder)  Resolved Problems:    * No resolved hospital problems. *      Objective:     Patient Vitals for the past 24 hrs:   Temp Pulse Resp BP SpO2   06/15/22 0813 97.6 °F (36.4 °C) 82 17 (!) 143/90 92 %   06/15/22 0416 97.5 °F (36.4 °C) 95 18 (!) 142/93 93 %   06/14/22 2354 97.3 °F (36.3 °C) 92 18 (!) 152/97 93 %   06/14/22 1954 97.5 °F (36.4 °C) (!) 115 18 (!) 122/90 97 %   06/14/22 1538 97.7 °F (36.5 °C) (!) 110 18 (!) 132/93 100 %   06/14/22 1239 -- -- -- -- (!) 86 %       Oxygen Therapy  SpO2: 92 %  Pulse via Oximetry: 100 beats per minute  O2 Device: None (Room air)    Estimated body mass index is 27.79 kg/m² as calculated from the following:    Height as of this encounter: 5' 8\" (1.727 m). Weight as of this encounter: 182 lb 12.2 oz (82.9 kg). Intake/Output Summary (Last 24 hours) at 6/15/2022 1150  Last data filed at 6/14/2022 1831  Gross per 24 hour   Intake 0 ml   Output 2 ml   Net -2 ml         Physical Exam:     Blood pressure (!) 143/90, pulse 82, temperature 97.6 °F (36.4 °C), temperature source Axillary, resp. rate 17, height 5' 8\" (1.727 m), weight 182 lb 12.2 oz (82.9 kg), SpO2 92 %. General:    Well nourished. Patient is lying flat in bed with head up. Patient is more awake. Answering simple questions. Head:  Normocephalic, atraumatic, not pale. No icterus. Eyes:  Sclerae appear normal.  Pupils equally round. ENT:  Nares appear normal, no drainage. Moist oral mucosa  Neck:  No restricted ROM. Trachea midline   CV:   RRR. No m/r/g.   No jugular venous distension. Lungs:   CTAB. No wheezing, rhonchi, or rales. Respirations even, unlabored  Abdomen: Bowel sounds present. Soft, nontender, nondistended. Extremities: No cyanosis or clubbing. No edema  Skin:     Right arm with hematoma from hand to elbow area. No active bleeding. Extent and severity of hematoma appears much less from the day before. Neuro:  CN II-XII grossly intact. Sensation intact. A&Ox3  Psych:  Normal mood and affect.       I have reviewed ordered lab tests and independently visualized imaging below:    Recent Labs:  Recent Results (from the past 48 hour(s))   POCT Glucose    Collection Time: 06/13/22  4:29 PM   Result Value Ref Range    POC Glucose 195 (H) 65 - 100 mg/dL    Performed by: Gibson Morilloida Tish 95 PPD TEST IN 48 HRS    Collection Time: 06/13/22  6:05 PM   Result Value Ref Range    PPD, (POC) Negative Negative    mm Induration 0 0 - 5 mm   POCT Glucose    Collection Time: 06/13/22  8:41 PM   Result Value Ref Range    POC Glucose 181 (H) 65 - 100 mg/dL    Performed by: Stephanie Frausto    Basic Metabolic Panel    Collection Time: 06/14/22  5:50 AM   Result Value Ref Range    Sodium 127 (L) 136 - 145 mmol/L    Potassium 4.5 3.5 - 5.1 mmol/L    Chloride 91 (L) 98 - 107 mmol/L    CO2 20 (L) 21 - 32 mmol/L    Anion Gap 16 7 - 16 mmol/L    Glucose 155 (H) 65 - 100 mg/dL    BUN 19 8 - 23 MG/DL    CREATININE 1.20 (H) 0.6 - 1.0 MG/DL    GFR  54 (L) >60 ml/min/1.73m2    GFR Non- 44 (L) >60 ml/min/1.73m2    Calcium 8.9 8.3 - 10.4 MG/DL   Vancomycin Level, Random    Collection Time: 06/14/22  5:50 AM   Result Value Ref Range    Vancomycin Rm 12.7 UG/ML   POCT Glucose    Collection Time: 06/14/22  7:25 AM   Result Value Ref Range    POC Glucose 172 (H) 65 - 100 mg/dL    Performed by: Norbert    POCT Glucose    Collection Time: 06/14/22 11:21 AM   Result Value Ref Range    POC Glucose 211 (H) 65 - 100 mg/dL    Performed by: OctaviaJUAN A    POCT Glucose    Collection Time: 06/14/22  3:40 PM   Result Value Ref Range    POC Glucose 189 (H) 65 - 100 mg/dL    Performed by: Norbert    Sodium    Collection Time: 06/14/22  4:39 PM   Result Value Ref Range    Sodium 127 (L) 136 - 145 mmol/L   Osmolality    Collection Time: 06/14/22  7:30 PM   Result Value Ref Range    Serum Osmolality 267 (L) 280 - 301 MOSM/kg H2O   Cortisol, Baseline    Collection Time: 06/14/22  7:30 PM   Result Value Ref Range    Cortisol 27.3 ug/dL   TSH    Collection Time: 06/14/22  7:30 PM   Result Value Ref Range    TSH, 3RD GENERATION 6.820 (H) 0.358 - 3.740 uIU/mL   Basic Metabolic Panel    Collection Time: 06/14/22  7:30 PM   Result Value Ref Range    Sodium 127 (L) 136 - 145 mmol/L    Potassium 4.2 3.5 - 5.1 mmol/L    Chloride 91 (L) 98 - 107 mmol/L    CO2 24 21 - 32 mmol/L    Anion Gap 12 7 - 16 mmol/L    Glucose 167 (H) 65 - 100 mg/dL    BUN 19 8 - 23 MG/DL    CREATININE 1.10 (H) 0.6 - 1.0 MG/DL    GFR  59 (L) >60 ml/min/1.73m2    GFR Non- 49 (L) >60 ml/min/1.73m2    Calcium 8.8 8.3 - 10.4 MG/DL   POCT Glucose    Collection Time: 06/14/22  9:13 PM   Result Value Ref Range    POC Glucose 156 (H) 65 - 100 mg/dL    Performed by: Gayatri    PLEASE READ & DOCUMENT PPD TEST IN 72 HRS    Collection Time: 06/14/22 10:58 PM   Result Value Ref Range    PPD, (POC) Negative Negative    mm Induration 0 0 - 5 mm   Vancomycin Level, Random    Collection Time: 06/15/22  5:41 AM   Result Value Ref Range    Vancomycin Rm 19.8 UG/ML   CBC    Collection Time: 06/15/22  5:41 AM   Result Value Ref Range    WBC 8.4 4.3 - 11.1 K/uL    RBC 4.07 4.05 - 5.2 M/uL    Hemoglobin 12.4 11.7 - 15.4 g/dL    Hematocrit 36.6 35.8 - 46.3 %    MCV 89.9 79.6 - 97.8 FL    MCH 30.5 26.1 - 32.9 PG    MCHC 33.9 31.4 - 35.0 g/dL    RDW 14.5 11.9 - 14.6 %    Platelets 696 852 - 755 K/uL    MPV 10.0 9.4 - 12.3 FL    nRBC 0.02 0.0 - 0.2 K/uL   Comprehensive Metabolic Panel    Collection Time: 06/15/22  5:41 AM   Result Value Ref Range    Sodium 129 (L) 136 - 145 mmol/L    Potassium 3.7 3.5 - 5.1 mmol/L    Chloride 92 (L) 98 - 107 mmol/L    CO2 24 21 - 32 mmol/L    Anion Gap 13 7 - 16 mmol/L    Glucose 141 (H) 65 - 100 mg/dL    BUN 19 8 - 23 MG/DL    CREATININE 1.00 0.6 - 1.0 MG/DL    GFR African American >60 >60 ml/min/1.73m2    GFR Non- 55 (L) >60 ml/min/1.73m2    Calcium 8.8 8.3 - 10.4 MG/DL    Total Bilirubin 1.3 (H) 0.2 - 1.1 MG/DL    ALT 69 (H) 12 - 65 U/L    AST 47 (H) 15 - 37 U/L    Alk Phosphatase 233 (H) 50 - 136 U/L    Total Protein 7.1 6.3 - 8.2 g/dL    Albumin 2.7 (L) 3.2 - 4.6 g/dL    Globulin 4.4 (H) 2.3 - 3.5 g/dL    Albumin/Globulin Ratio 0.6 (L) 1.2 - 3.5     POCT Glucose    Collection Time: 06/15/22  7:11 AM   Result Value Ref Range    POC Glucose 153 (H) 65 - 100 mg/dL    Performed by: PlingaalexandraXAVI    POCT Glucose    Collection Time: 06/15/22 11:16 AM   Result Value Ref Range    POC Glucose 197 (H) 65 - 100 mg/dL    Performed by: ManomasagiselleA        Other Studies:  CT HEAD WO CONTRAST   Final Result   Negative for acute intracranial abnormality. Chronic changes. XR CHEST 1 VIEW   Final Result   1. Diffuse pulmonary interstitial edema. 2. Moderate hiatal hernia. CT ABDOMEN PELVIS WO CONTRAST Additional Contrast? Oral   Final Result   1. Large hiatal hernia. 2. Sigmoid diverticulosis. 3. Atherosclerosis. XR KNEE RIGHT (3 VIEWS)   Final Result   1. Severe medial and lateral compartment degenerative change and moderate medial   compartment degenerative change. 2. No evidence of acute fracture.              Current Meds:  Current Facility-Administered Medications   Medication Dose Route Frequency    insulin glargine (LANTUS) injection vial 5 Units  5 Units SubCUTAneous Daily    vancomycin (VANCOCIN) 1,000 mg in sodium chloride 0.9 % 250 mL IVPB ([id number])  1,000 mg IntraVENous Q24H  glucose chewable tablet 16 g  4 tablet Oral PRN    dextrose bolus 10% 125 mL  125 mL IntraVENous PRN    Or    dextrose bolus 10% 250 mL  250 mL IntraVENous PRN    glucagon injection 1 mg  1 mg IntraMUSCular PRN    dextrose 5 % solution  100 mL/hr IntraVENous PRN    magic (miracle) mouthwash with nystatin  5 mL Swish & Spit 4x Daily PRN    metoprolol tartrate (LOPRESSOR) tablet 50 mg  50 mg Oral BID    apixaban (ELIQUIS) tablet 5 mg  5 mg Oral BID    pantoprazole (PROTONIX) tablet 40 mg  40 mg Oral QAM AC    trospium (SANCTURA) tablet 20 mg  20 mg Oral BID AC    dilTIAZem (CARDIZEM) tablet 30 mg  30 mg Oral Q6H PRN    sodium chloride flush 0.9 % injection 3 mL  3 mL IntraVENous Q8H    sodium chloride flush 0.9 % injection 5-40 mL  5-40 mL IntraVENous 2 times per day    sodium chloride flush 0.9 % injection 5-40 mL  5-40 mL IntraVENous PRN    0.9 % sodium chloride infusion   IntraVENous PRN    ondansetron (ZOFRAN-ODT) disintegrating tablet 4 mg  4 mg Oral Q8H PRN    Or    ondansetron (ZOFRAN) injection 4 mg  4 mg IntraVENous Q6H PRN    polyethylene glycol (GLYCOLAX) packet 17 g  17 g Oral Daily PRN    levothyroxine (SYNTHROID) tablet 100 mcg  100 mcg Oral QAM AC    polyethylene glycol (GLYCOLAX) packet 17 g  17 g Oral Daily    senna (SENOKOT) tablet 8.6 mg  1 tablet Oral Nightly    sucralfate (CARAFATE) tablet 1 g  1 g Oral 4 times per day    traMADol (ULTRAM) tablet 50 mg  50 mg Oral Q8H PRN    memantine (NAMENDA) tablet 5 mg  5 mg Oral BID    cefTRIAXone (ROCEPHIN) 1000 mg IVPB in NS 50ml minibag  1,000 mg IntraVENous Q24H    insulin lispro (HUMALOG) injection vial 0-4 Units  0-4 Units SubCUTAneous TID WC    insulin lispro (HUMALOG) injection vial 0-4 Units  0-4 Units SubCUTAneous Nightly       Signed:  Reza Zapata MD    Part of this note may have been written by using a voice dictation software.   The note has been proof read but may still contain some grammatical/other typographical errors.

## 2022-06-15 NOTE — PROGRESS NOTES
Dispo update:  Spoke to Ms. Geoff Escobedooring daughter Ms. Peggy Kendall in room 601 about discharge planning. Ms. Srinivas Erickson is a resident of [facility]; however, STR at a SNF is recommended by both OT and PT prior to returning to [facility]. From list of choices, they want referral sent to [facility] in [facility]. Referral sent via Epic link. Await their review.

## 2022-06-15 NOTE — PROGRESS NOTES
Rounds performed throughout shift. Pt slept all night. Pt denies needs at this time. Bed in low position, locked and call light/personal items within reach. Will report to day shift nurse.

## 2022-06-15 NOTE — PLAN OF CARE
Problem: Discharge Planning  Goal: Discharge to home or other facility with appropriate resources  Outcome: Progressing  Flowsheets  Taken 6/14/2022 1954 by Naila Duckworth RN  Discharge to home or other facility with appropriate resources: Identify barriers to discharge with patient and caregiver  Taken 6/14/2022 0737 by Roland Jacob RN  Discharge to home or other facility with appropriate resources: Identify barriers to discharge with patient and caregiver     Problem: Pain  Goal: Verbalizes/displays adequate comfort level or baseline comfort level  Outcome: Progressing     Problem: Skin/Tissue Integrity  Goal: Absence of new skin breakdown  Description: 1. Monitor for areas of redness and/or skin breakdown  2. Assess vascular access sites hourly  3. Every 4-6 hours minimum:  Change oxygen saturation probe site  4. Every 4-6 hours:  If on nasal continuous positive airway pressure, respiratory therapy assess nares and determine need for appliance change or resting period.   Outcome: Progressing     Problem: Safety - Adult  Goal: Free from fall injury  Outcome: Progressing  Flowsheets (Taken 6/14/2022 0741 by Roland Jacob RN)  Free From Fall Injury: Instruct family/caregiver on patient safety     Problem: ABCDS Injury Assessment  Goal: Absence of physical injury  Outcome: Progressing  Flowsheets (Taken 6/14/2022 0741 by Roland Jacob RN)  Absence of Physical Injury: Implement safety measures based on patient assessment

## 2022-06-15 NOTE — PROGRESS NOTES
Admit Date: 6/11/2022      Subjective:      Patient is a 89 y. o. female who presented to the ED for cc lower abdominal pain, pelvic pain, and decreased urination for the past two days. Nothing seems to make better or worse. Hx of a fib on Eliquis, DM type II, edema, GERD, HTN, hypothyroidism, and RA.     According to the son and daughter at bedside, she is seeing things at times. She has episodes of confusion at home as well intermittently. Her appetite is poor. The confusion manifestation is mainly: visual hallucinations. She has a diagnosis of demential as well. She was on BID Loop diuretics at home and followed a sodium restricted diet. Received 3% for hyponatremia    Review of Systems  Cardio-vascular: no chest pain, no SOB  GI: no N/V/D  : no dysuria, no hematuria    Objective:     Patient Vitals for the past 8 hrs:   BP Temp Temp src Pulse Resp SpO2 Weight   06/15/22 0813 (!) 143/90 97.6 °F (36.4 °C) Axillary 82 17 92 % --   06/15/22 0416 (!) 142/93 97.5 °F (36.4 °C) Axillary 95 18 93 % 182 lb 12.2 oz (82.9 kg)     No intake/output data recorded. Physical Exam:   Lungs: clear  CV: RR, no JVD  Abdomen: soft, not tender, no rebound.   Ext: no edema          Data Review   Recent Results (from the past 8 hour(s))   Vancomycin Level, Random    Collection Time: 06/15/22  5:41 AM   Result Value Ref Range    Vancomycin Rm 19.8 UG/ML   CBC    Collection Time: 06/15/22  5:41 AM   Result Value Ref Range    WBC 8.4 4.3 - 11.1 K/uL    RBC 4.07 4.05 - 5.2 M/uL    Hemoglobin 12.4 11.7 - 15.4 g/dL    Hematocrit 36.6 35.8 - 46.3 %    MCV 89.9 79.6 - 97.8 FL    MCH 30.5 26.1 - 32.9 PG    MCHC 33.9 31.4 - 35.0 g/dL    RDW 14.5 11.9 - 14.6 %    Platelets 989 649 - 327 K/uL    MPV 10.0 9.4 - 12.3 FL    nRBC 0.02 0.0 - 0.2 K/uL   Comprehensive Metabolic Panel    Collection Time: 06/15/22  5:41 AM   Result Value Ref Range    Sodium 129 (L) 136 - 145 mmol/L    Potassium 3.7 3.5 - 5.1 mmol/L    Chloride 92 (L) 98 - 107 mmol/L    CO2 24 21 - 32 mmol/L    Anion Gap 13 7 - 16 mmol/L    Glucose 141 (H) 65 - 100 mg/dL    BUN 19 8 - 23 MG/DL    CREATININE 1.00 0.6 - 1.0 MG/DL    GFR African American >60 >60 ml/min/1.73m2    GFR Non- 55 (L) >60 ml/min/1.73m2    Calcium 8.8 8.3 - 10.4 MG/DL    Total Bilirubin 1.3 (H) 0.2 - 1.1 MG/DL    ALT 69 (H) 12 - 65 U/L    AST 47 (H) 15 - 37 U/L    Alk Phosphatase 233 (H) 50 - 136 U/L    Total Protein 7.1 6.3 - 8.2 g/dL    Albumin 2.7 (L) 3.2 - 4.6 g/dL    Globulin 4.4 (H) 2.3 - 3.5 g/dL    Albumin/Globulin Ratio 0.6 (L) 1.2 - 3.5     POCT Glucose    Collection Time: 06/15/22  7:11 AM   Result Value Ref Range    POC Glucose 153 (H) 65 - 100 mg/dL    Performed by: Clau            Assessment:     Principal Problem:    Severe sepsis (HCC)  Active Problems:    UTI (urinary tract infection)    Transaminitis    Hypokalemia    Hyponatremia    Pulmonary edema    Atrial fibrillation (HCC)    Memory loss    Type 2 diabetes with nephropathy (HCC)    Edema    Essential hypertension, benign    Acquired hypothyroidism    OAB (overactive bladder)  Resolved Problems:    * No resolved hospital problems. *      Plan:     Hypo osmolar Hyponatremia: mild. S. Na is slightly better today at 129. Pt is off 3% ( received yesterday). Possible CHF vs SIADH  Check urine lytes, BNP.   Continue to Froldan Chandra MD

## 2022-06-15 NOTE — PROGRESS NOTES
Salvatore Reynolds  Admission Date: 6/11/2022         [facility] Progress Note: 6/15/2022    Follow-up for: The patient's chart is reviewed and the patient is discussed with the staff. Subjective:     Pt seen and examined in room. Drowsy, no overall complaints.  Family at the bedside and reports pt did not have much PO intake prior to admission to the hospital.    ROS:  No CP, no SOB, no edema    Current Facility-Administered Medications   Medication Dose Route Frequency    insulin glargine (LANTUS) injection vial 5 Units  5 Units SubCUTAneous Daily    vancomycin (VANCOCIN) 1,000 mg in sodium chloride 0.9 % 250 mL IVPB ([id number])  1,000 mg IntraVENous Q24H    glucose chewable tablet 16 g  4 tablet Oral PRN    dextrose bolus 10% 125 mL  125 mL IntraVENous PRN    Or    dextrose bolus 10% 250 mL  250 mL IntraVENous PRN    glucagon injection 1 mg  1 mg IntraMUSCular PRN    dextrose 5 % solution  100 mL/hr IntraVENous PRN    magic (miracle) mouthwash with nystatin  5 mL Swish & Spit 4x Daily PRN    metoprolol tartrate (LOPRESSOR) tablet 50 mg  50 mg Oral BID    apixaban (ELIQUIS) tablet 5 mg  5 mg Oral BID    pantoprazole (PROTONIX) tablet 40 mg  40 mg Oral QAM AC    trospium (SANCTURA) tablet 20 mg  20 mg Oral BID AC    dilTIAZem (CARDIZEM) tablet 30 mg  30 mg Oral Q6H PRN    sodium chloride flush 0.9 % injection 3 mL  3 mL IntraVENous Q8H    sodium chloride flush 0.9 % injection 5-40 mL  5-40 mL IntraVENous 2 times per day    sodium chloride flush 0.9 % injection 5-40 mL  5-40 mL IntraVENous PRN    0.9 % sodium chloride infusion   IntraVENous PRN    ondansetron (ZOFRAN-ODT) disintegrating tablet 4 mg  4 mg Oral Q8H PRN    Or    ondansetron (ZOFRAN) injection 4 mg  4 mg IntraVENous Q6H PRN    polyethylene glycol (GLYCOLAX) packet 17 g  17 g Oral Daily PRN    levothyroxine (SYNTHROID) tablet 100 mcg  100 mcg Oral QAM AC    polyethylene glycol (GLYCOLAX) packet 17 g  17 g Oral Daily    senna (SENOKOT) tablet 8.6 mg  1 tablet Oral Nightly    sucralfate (CARAFATE) tablet 1 g  1 g Oral 4 times per day    traMADol (ULTRAM) tablet 50 mg  50 mg Oral Q8H PRN    memantine (NAMENDA) tablet 5 mg  5 mg Oral BID    cefTRIAXone (ROCEPHIN) 1000 mg IVPB in NS 50ml minibag  1,000 mg IntraVENous Q24H    insulin lispro (HUMALOG) injection vial 0-4 Units  0-4 Units SubCUTAneous TID WC    insulin lispro (HUMALOG) injection vial 0-4 Units  0-4 Units SubCUTAneous Nightly         Objective:     Vitals:    06/14/22 1954 06/14/22 2354 06/15/22 0416 06/15/22 0813   BP: (!) 122/90 (!) 152/97 (!) 142/93 (!) 143/90   Pulse: (!) 115 92 95 82   Resp: 18 18 18 17   Temp: 97.5 °F (36.4 °C) 97.3 °F (36.3 °C) 97.5 °F (36.4 °C) 97.6 °F (36.4 °C)   TempSrc: Oral Axillary Axillary Axillary   SpO2: 97% 93% 93% 92%   Weight:   182 lb 12.2 oz (82.9 kg)    Height:         Intake and Output:   06/13 1901 - 06/15 0700  In: 0   Out: 102 [Urine:101]  No intake/output data recorded. Physical Exam:   Constitutional:  the patient is well developed and in no acute distress  HEENT:  Sclera clear, pupils equal, oral mucosa moist  Lungs: clear bilaterally  Cardiovascular:  irregular  Abd/GI: soft and non-tender; with positive bowel sounds. Ext: warm without cyanosis. There is no lower leg edema. Musculoskeletal: moves all four extremities with equal strength  Skin:  no jaundice or rashes  Neuro: no gross neuro deficits           LAB  Recent Labs     06/13/22  0455 06/15/22  0541   WBC 7.7 8.4   HGB 12.6 12.4   HCT 37.4 36.6    332     Recent Labs     06/14/22  0550 06/14/22  0550 06/14/22  1639 06/14/22  1930 06/15/22  0541   *   < > 127* 127* 129*   K 4.5  --   --  4.2 3.7   CL 91*  --   --  91* 92*   CO2 20*  --   --  24 24   BUN 19  --   --  19 19   CREATININE 1.20*  --   --  1.10* 1.00    < > = values in this interval not displayed.      No results for input(s): PH, PCO2, PO2, HCO3 in the last 72 hours.      Plan:  (Medical Decision Making)   1. JAMIE - resolving daily    2. Hyponatremia - serum osmo low, possibility of underlying SIADH. Spoke with staff, awaiting urinary studies ordered yesterday to be sent. Will change orders pending results. Continue fluid restriction for now.      3. UTI - manage per primary team      OLGA Lubin - NP  [facility]

## 2022-06-16 LAB
ANION GAP SERPL CALC-SCNC: 10 MMOL/L (ref 7–16)
BACTERIA SPEC CULT: NORMAL
BACTERIA SPEC CULT: NORMAL
BUN SERPL-MCNC: 15 MG/DL (ref 8–23)
CALCIUM SERPL-MCNC: 8.9 MG/DL (ref 8.3–10.4)
CHLORIDE SERPL-SCNC: 90 MMOL/L (ref 98–107)
CO2 SERPL-SCNC: 25 MMOL/L (ref 21–32)
CREAT SERPL-MCNC: 0.9 MG/DL (ref 0.6–1)
GLUCOSE BLD STRIP.AUTO-MCNC: 130 MG/DL (ref 65–100)
GLUCOSE BLD STRIP.AUTO-MCNC: 141 MG/DL (ref 65–100)
GLUCOSE BLD STRIP.AUTO-MCNC: 150 MG/DL (ref 65–100)
GLUCOSE BLD STRIP.AUTO-MCNC: 167 MG/DL (ref 65–100)
GLUCOSE SERPL-MCNC: 142 MG/DL (ref 65–100)
POTASSIUM SERPL-SCNC: 3.7 MMOL/L (ref 3.5–5.1)
SERVICE CMNT-IMP: ABNORMAL
SERVICE CMNT-IMP: NORMAL
SERVICE CMNT-IMP: NORMAL
SODIUM SERPL-SCNC: 125 MMOL/L (ref 136–145)

## 2022-06-16 PROCEDURE — 97112 NEUROMUSCULAR REEDUCATION: CPT

## 2022-06-16 PROCEDURE — 82962 GLUCOSE BLOOD TEST: CPT

## 2022-06-16 PROCEDURE — 6370000000 HC RX 637 (ALT 250 FOR IP): Performed by: INTERNAL MEDICINE

## 2022-06-16 PROCEDURE — 2580000003 HC RX 258: Performed by: EMERGENCY MEDICINE

## 2022-06-16 PROCEDURE — 2580000003 HC RX 258: Performed by: FAMILY MEDICINE

## 2022-06-16 PROCEDURE — 6360000002 HC RX W HCPCS: Performed by: FAMILY MEDICINE

## 2022-06-16 PROCEDURE — 6370000000 HC RX 637 (ALT 250 FOR IP)

## 2022-06-16 PROCEDURE — 97530 THERAPEUTIC ACTIVITIES: CPT

## 2022-06-16 PROCEDURE — 6370000000 HC RX 637 (ALT 250 FOR IP): Performed by: FAMILY MEDICINE

## 2022-06-16 PROCEDURE — 36415 COLL VENOUS BLD VENIPUNCTURE: CPT

## 2022-06-16 PROCEDURE — 80048 BASIC METABOLIC PNL TOTAL CA: CPT

## 2022-06-16 PROCEDURE — 1100000000 HC RM PRIVATE

## 2022-06-16 RX ORDER — METOPROLOL TARTRATE 50 MG/1
100 TABLET, FILM COATED ORAL 2 TIMES DAILY
Status: DISCONTINUED | OUTPATIENT
Start: 2022-06-16 | End: 2022-06-19 | Stop reason: HOSPADM

## 2022-06-16 RX ADMIN — METOPROLOL TARTRATE 100 MG: 50 TABLET ORAL at 20:58

## 2022-06-16 RX ADMIN — APIXABAN 5 MG: 5 TABLET, FILM COATED ORAL at 08:48

## 2022-06-16 RX ADMIN — PANTOPRAZOLE SODIUM 40 MG: 40 TABLET, DELAYED RELEASE ORAL at 05:33

## 2022-06-16 RX ADMIN — TROSPIUM CHLORIDE 20 MG: 20 TABLET, FILM COATED ORAL at 15:42

## 2022-06-16 RX ADMIN — MEMANTINE 5 MG: 5 TABLET ORAL at 20:58

## 2022-06-16 RX ADMIN — SUCRALFATE 1 G: 1 TABLET ORAL at 05:34

## 2022-06-16 RX ADMIN — POLYETHYLENE GLYCOL 3350 17 G: 17 POWDER, FOR SOLUTION ORAL at 08:48

## 2022-06-16 RX ADMIN — LEVOTHYROXINE SODIUM 100 MCG: 0.05 TABLET ORAL at 05:32

## 2022-06-16 RX ADMIN — CEFTRIAXONE 1000 MG: 1 INJECTION, POWDER, FOR SOLUTION INTRAMUSCULAR; INTRAVENOUS at 11:58

## 2022-06-16 RX ADMIN — SODIUM CHLORIDE, PRESERVATIVE FREE 3 ML: 5 INJECTION INTRAVENOUS at 11:56

## 2022-06-16 RX ADMIN — SUCRALFATE 1 G: 1 TABLET ORAL at 17:56

## 2022-06-16 RX ADMIN — SODIUM CHLORIDE, PRESERVATIVE FREE 10 ML: 5 INJECTION INTRAVENOUS at 11:56

## 2022-06-16 RX ADMIN — MEMANTINE 5 MG: 5 TABLET ORAL at 08:48

## 2022-06-16 RX ADMIN — Medication 15 G: at 14:22

## 2022-06-16 RX ADMIN — APIXABAN 5 MG: 5 TABLET, FILM COATED ORAL at 20:58

## 2022-06-16 RX ADMIN — SENNOSIDES 8.6 MG: 8.6 TABLET, FILM COATED ORAL at 20:58

## 2022-06-16 RX ADMIN — SUCRALFATE 1 G: 1 TABLET ORAL at 11:58

## 2022-06-16 RX ADMIN — TRAMADOL HYDROCHLORIDE 50 MG: 50 TABLET, COATED ORAL at 04:15

## 2022-06-16 RX ADMIN — Medication 15 G: at 21:02

## 2022-06-16 RX ADMIN — INSULIN GLARGINE 5 UNITS: 100 INJECTION, SOLUTION SUBCUTANEOUS at 11:58

## 2022-06-16 RX ADMIN — SODIUM CHLORIDE, PRESERVATIVE FREE 10 ML: 5 INJECTION INTRAVENOUS at 20:58

## 2022-06-16 RX ADMIN — SODIUM CHLORIDE, PRESERVATIVE FREE 3 ML: 5 INJECTION INTRAVENOUS at 20:08

## 2022-06-16 RX ADMIN — METOPROLOL TARTRATE 50 MG: 50 TABLET ORAL at 08:48

## 2022-06-16 RX ADMIN — TROSPIUM CHLORIDE 20 MG: 20 TABLET, FILM COATED ORAL at 05:32

## 2022-06-16 RX ADMIN — SUCRALFATE 1 G: 1 TABLET ORAL at 00:13

## 2022-06-16 ASSESSMENT — PAIN SCALES - GENERAL
PAINLEVEL_OUTOF10: 0
PAINLEVEL_OUTOF10: 6

## 2022-06-16 ASSESSMENT — PAIN DESCRIPTION - LOCATION: LOCATION: GENERALIZED;BACK

## 2022-06-16 NOTE — PROGRESS NOTES
OCCUPATIONAL THERAPY Daily Note and AM     OT Visit Days: 3   Time  OT Charge Capture  Rehab Caseload Tracker  OT Orders    King Osorio is a 80 y.o. female   PRIMARY DIAGNOSIS: Severe sepsis ([facility] [address]  Septicemia ([facility] [address] [A41.9]  Severe sepsis ([facility] [address] [A41.9, R65.20]       Inpatient: Payor: MEDICARE / Plan: MEDICARE PART A AND B / Product Type: *No Product type* /     ASSESSMENT:     REHAB RECOMMENDATIONS: CURRENT LEVEL OF FUNCTION:  (Most Recently Demonstrated)   Recommendation to date pending progress:  Setting:   Short-term Rehab    Equipment:     To Be Determined Bathing:   Not Tested  Dressing:   Not Tested  Feeding/Grooming:   Minimal Assist  Toileting:   Not Tested  Functional Mobility:   Maximal Assist x 2 squat pivot transfer     ASSESSMENT:  Ms. Tristan Gibbs is more alert today and daughter states she recognized her today and felt like she was doing somewhat better. Pt did better following commands and was more appropriately responding to questions. Pt does c/o throughout session of some dizziness with vitals monitored and stable throughout. Pt initially needs assistance for balance sitting edge of the bed but was able to assist more with some scooting. Pt completed squat pivot transfer over to the chair with maximal assistance x 2. Pt brushed her hair once up in the chair with minimal assistance. Pt left in the recliner chair with all needs at her side. Pt left up with cardiology and daughter at her side. Pt to continue per plan of care.         SUBJECTIVE:     Ms. Tristan Gibbs states, \"I feel swimmy headed\"     Social/Functional Type of Home: Assisted living  Home Equipment: Wheelchair-manual  ADL Assistance: Needs assistance  Transfer Assistance: Needs assistance    OBJECTIVE:     Ana Maria Beltran / Kingsley Chavez / Sarita Breeding: Arslan Norris    RESTRICTIONS/PRECAUTIONS:  Restrictions/Precautions  Restrictions/Precautions: Fall Risk,Bed Alarm        PAIN: VITALS / O2:   Pre Treatment:   Pain Assessment: None - Denies Pain  Pain Level: 2  Pain Location: Back,Shoulder          Post Treatment: same Vitals   Vitals  BP: 129/88  BP Location: Right upper arm      Oxygen           MOBILITY: I Mod I S SBA CGA Min Mod Max Total  NT x2 Comments:   Bed Mobility    Rolling [] [] [] [] [] [] [x] [] [] [] [x]    Supine to Sit [] [] [] [] [] [] [x] [] [] [] [x]    Scooting [] [] [] [] [] [] [x] [] [] [] [x]    Sit to Supine [] [] [] [] [] [] [x] [] [] [] [x]    Transfers    Sit to Stand [] [] [] [] [] [] [] [] [] [x] []    Bed to Chair [] [] [] [] [] [] [] [x] [] [] [x] Via squat pivot transfer to drop arm chair   Stand to Sit [] [] [] [] [] [] [] [] [] [x] []    Tub/Shower [] [] [] [] [] [] [] [] [] [x] []     Toilet [] [] [] [] [] [] [] [] [] [x] []      [] [] [] [] [] [] [] [] [] [] []    I=Independent, Mod I=Modified Independent, S=Supervision/Setup, SBA=Standby Assistance, CGA=Contact Guard Assistance, Min=Minimal Assistance, Mod=Moderate Assistance, Max=Maximal Assistance, Total=Total Assistance, NT=Not Tested    ACTIVITIES OF DAILY LIVING: I Mod I S SBA CGA Min Mod Max Total NT Comments   BASIC ADLs:              Upper Body   Bathing [] [] [] [] [] [] [] [] [] [x]    Lower Body Bathing [] [] [] [] [] [] [] [] [] [x]    Toileting [] [] [] [] [] [] [] [] [] [x]    Upper Body Dressing [] [] [] [] [] [] [] [] [] [x]    Lower Body Dressing [] [] [] [] [] [] [] [] [] [x]    Feeding [] [] [] [] [] [] [] [] [] [x]    Grooming [] [] [] [] [] [x] [] [] [] [] Brushing hair   Personal Device Care [] [] [] [] [] [] [] [] [] [x]    Functional Mobility [] [] [] [] [] [] [] [] [] [x]    I=Independent, Mod I=Modified Independent, S=Supervision/Setup, SBA=Standby Assistance, CGA=Contact Guard Assistance, Min=Minimal Assistance, Mod=Moderate Assistance, Max=Maximal Assistance, Total=Total Assistance, NT=Not Tested    BALANCE: Good Fair+ Fair Fair- Poor NT Comments   Sitting Static [] [] [x] [] [] []    Sitting Dynamic [] [] [] [x] [] []              Standing Static [] [] [] [] [x] []    Standing Dynamic [] [] [] [] [x] []        PLAN:     FREQUENCY/DURATION   OT Plan of Care: 3 times/week for duration of hospital stay or until stated goals are met, whichever comes first.    ACUTE OCCUPATIONAL THERAPY GOALS:   (Developed with and agreed upon by patient and/or caregiver.)    1. Patient will complete upper body bathing and dressing with minimal assistance and adaptive equipment as needed. 2. Patient will complete bed mobility with minimal assistance to improve positioning for ADL. 3. Patient will tolerate 30 minutes of OT treatment with 2-3 rest breaks to increase activity tolerance for ADLs. 4. Patient will complete sitting balance edge of bed with CGA to improve positioning for ADL. 5. Patient will complete transfer to chair/BSC/WC within 3 visits to demonstrate advancement with functional transfers. MET 6/15/22      Timeframe: 7 visits         TREATMENT:     TREATMENT:   Co-Treatment PT/OT necessary due to patient's decreased overall endurance/tolerance levels, as well as need for high level skilled assistance to complete functional transfers/mobility and functional tasks  Neuromuscular Re-education (25 Minutes): Neuromuscular Re-education included Balance Training, Coordination training, Postural training and Sitting balance training to improve Balance, Coordination and Postural Control.     TREATMENT GRID:  N/A    AFTER TREATMENT PRECAUTIONS: Bed/Chair Locked, Call light within reach, Chair, Needs within reach, RN notified and Visitors at bedside    INTERDISCIPLINARY COLLABORATION:  RN/ PCT, PT/ PTA and OT/ CORNELIUS    EDUCATION:       TOTAL TREATMENT DURATION AND TIME:  Time In: 1107  Time Out: [address]  Minutes: [address] [facility]

## 2022-06-16 NOTE — PROGRESS NOTES
Hospitalist Progress Note   Admit Date:  2022 12:02 PM   Name:  Mykel Vallejo   Age:  80 y.o. Sex:  female  :  1926   MRN:  [id number]   Room:  [facility]/    Presenting Complaint: Abdominal Pain and Urinary Retention     Reason(s) for Admission: Septicemia ([facility] [address] [A41.9]  Severe sepsis ([facility] [address] [A41.9, R65.20]     Hospital Course & Interval History:   Pt is a 81 y/o F with afib, DM, HTN, who presented with decreased urination pelvic pain and poor intake. Admitted with sepsis from UTI. Cultures were negative. Also noted to have low Na so nephrology consulted. Urine osm was high    Subjective/24hr Events (22): Pt tired, but no other complaints. No fevers. DM, HTN and Afib slightly uncontrolled. Assessment & Plan:         Severe sepsis ([facility] [address]    UTI (urinary tract infection)  Cultures negative so stop vanc. Cont rocephin 2 more days       Memory loss  Noted.        Type 2 diabetes with nephropathy ([facility] [address]  -uncontrolled at times.  -cont current regimen       Essential hypertension, benign  -uncontrolled. increase metoprolol today      Afib  -increase metoprolol today  -cont eliquis       Acquired hypothyroidism  Continue thyroid hormone supplement.     Hyponatremia  Hypokalemia  -Na worse today. Await nephrology recs. On fluid restriction  -daily BMP       OAB (overactive bladder)  Noted   Monitor. Discharge Planning:    -has SNF bed     Diet:  ADULT DIET; Easy to Chew; 4 carb choices (60 gm/meal); 1200 ml  DVT PPx: eliquis  Code status: Full Code    Hospital Problems:  Principal Problem:    Severe sepsis ([facility] [address]  Active Problems:    UTI (urinary tract infection)    Transaminitis    Hypokalemia    Hyponatremia    Pulmonary edema    Atrial fibrillation (HCC)    Memory loss    Type 2 diabetes with nephropathy (HCC)    Edema    Essential hypertension, benign    Acquired hypothyroidism    OAB (overactive bladder)  Resolved Problems:    * No resolved hospital problems. *      Objective:     Patient Vitals for the past 24 hrs:   Temp Pulse Resp BP SpO2   06/16/22 1150 97.9 °F (36.6 °C) (!) 118 16 131/88 93 %   06/16/22 0730 97.7 °F (36.5 °C) (!) 110 18 (!) 150/94 92 %   06/16/22 0531 98.1 °F (36.7 °C) 81 16 (!) 120/90 94 %   06/16/22 0415 -- -- 16 -- --   06/15/22 2345 98.1 °F (36.7 °C) 81 18 127/88 97 %   06/15/22 1906 97.8 °F (36.6 °C) 88 18 (!) 140/98 94 %   06/15/22 1655 97.5 °F (36.4 °C) 76 18 (!) 134/92 96 %   06/15/22 1216 97.7 °F (36.5 °C) 96 17 130/89 99 %       Oxygen Therapy  SpO2: 93 %  Pulse via Oximetry: 100 beats per minute  O2 Device: None (Room air)    Estimated body mass index is 27.79 kg/m² as calculated from the following:    Height as of this encounter: 5' 8\" (1.727 m). Weight as of this encounter: 182 lb 12.2 oz (82.9 kg). Intake/Output Summary (Last 24 hours) at 6/16/2022 1200  Last data filed at 6/16/2022 5599  Gross per 24 hour   Intake 310 ml   Output 700 ml   Net -390 ml         Physical Exam:     Blood pressure 131/88, pulse (!) 118, temperature 97.9 °F (36.6 °C), temperature source Oral, resp. rate 16, height 5' 8\" (1.727 m), weight 182 lb 12.2 oz (82.9 kg), SpO2 93 %. General:    Well nourished. Head:  Normocephalic, atraumatic  Eyes:  Sclerae appear normal.  Pupils equally round. ENT:  Nares appear normal, no drainage. Moist oral mucosa  Neck:  No restricted ROM. Trachea midline   CV:   irreg. Santana Matthew No jugular venous distension. Lungs:   Respirations even, unlabored  Abdomen:   nondistended. Extremities: No cyanosis or clubbing. No edema  Skin:     No rashes and normal coloration. Warm and dry. Neuro:  CN II-XII grossly intact. Sensation intact. A&Ox3  Psych:  Normal mood and affect.       I have reviewed ordered lab tests and independently visualized imaging below:    Recent Labs:  Recent Results (from the past 48 hour(s))   POCT Glucose    Collection Time: 06/14/22  3:40 PM   Result Value Ref Range    POC Glucose 189 (H) 65 - 100 mg/dL    Performed by: Viviane Velasco    Sodium    Collection Time: 06/14/22  4:39 PM   Result Value Ref Range    Sodium 127 (L) 136 - 145 mmol/L   Osmolality    Collection Time: 06/14/22  7:30 PM   Result Value Ref Range    Serum Osmolality 267 (L) 280 - 301 MOSM/kg H2O   Cortisol, Baseline    Collection Time: 06/14/22  7:30 PM   Result Value Ref Range    Cortisol 27.3 ug/dL   TSH    Collection Time: 06/14/22  7:30 PM   Result Value Ref Range    TSH, 3RD GENERATION 6.820 (H) 0.358 - 3.740 uIU/mL   Basic Metabolic Panel    Collection Time: 06/14/22  7:30 PM   Result Value Ref Range    Sodium 127 (L) 136 - 145 mmol/L    Potassium 4.2 3.5 - 5.1 mmol/L    Chloride 91 (L) 98 - 107 mmol/L    CO2 24 21 - 32 mmol/L    Anion Gap 12 7 - 16 mmol/L    Glucose 167 (H) 65 - 100 mg/dL    BUN 19 8 - 23 MG/DL    CREATININE 1.10 (H) 0.6 - 1.0 MG/DL    GFR  59 (L) >60 ml/min/1.73m2    GFR Non- 49 (L) >60 ml/min/1.73m2    Calcium 8.8 8.3 - 10.4 MG/DL   POCT Glucose    Collection Time: 06/14/22  9:13 PM   Result Value Ref Range    POC Glucose 156 (H) 65 - 100 mg/dL    Performed by: Gayatri    PLEASE READ & DOCUMENT PPD TEST IN 72 HRS    Collection Time: 06/14/22 10:58 PM   Result Value Ref Range    PPD, (POC) Negative Negative    mm Induration 0 0 - 5 mm   Vancomycin Level, Random    Collection Time: 06/15/22  5:41 AM   Result Value Ref Range    Vancomycin Rm 19.8 UG/ML   CBC    Collection Time: 06/15/22  5:41 AM   Result Value Ref Range    WBC 8.4 4.3 - 11.1 K/uL    RBC 4.07 4.05 - 5.2 M/uL    Hemoglobin 12.4 11.7 - 15.4 g/dL    Hematocrit 36.6 35.8 - 46.3 %    MCV 89.9 79.6 - 97.8 FL    MCH 30.5 26.1 - 32.9 PG    MCHC 33.9 31.4 - 35.0 g/dL    RDW 14.5 11.9 - 14.6 %    Platelets 197 884 - 236 K/uL    MPV 10.0 9.4 - 12.3 FL    nRBC 0.02 0.0 - 0.2 K/uL   Comprehensive Metabolic Panel    Collection Time: 06/15/22  5:41 AM   Result Value Ref Range    Sodium 129 (L) 136 - 145 mmol/L    Potassium 3.7 3.5 - 5.1 mmol/L    Chloride 92 (L) 98 - 107 mmol/L    CO2 24 21 - 32 mmol/L    Anion Gap 13 7 - 16 mmol/L    Glucose 141 (H) 65 - 100 mg/dL    BUN 19 8 - 23 MG/DL    CREATININE 1.00 0.6 - 1.0 MG/DL    GFR African American >60 >60 ml/min/1.73m2    GFR Non- 55 (L) >60 ml/min/1.73m2    Calcium 8.8 8.3 - 10.4 MG/DL    Total Bilirubin 1.3 (H) 0.2 - 1.1 MG/DL    ALT 69 (H) 12 - 65 U/L    AST 47 (H) 15 - 37 U/L    Alk Phosphatase 233 (H) 50 - 136 U/L    Total Protein 7.1 6.3 - 8.2 g/dL    Albumin 2.7 (L) 3.2 - 4.6 g/dL    Globulin 4.4 (H) 2.3 - 3.5 g/dL    Albumin/Globulin Ratio 0.6 (L) 1.2 - 3.5     POCT Glucose    Collection Time: 06/15/22  7:11 AM   Result Value Ref Range    POC Glucose 153 (H) 65 - 100 mg/dL    Performed by: Clau    Chloride, Random Urine    Collection Time: 06/15/22 11:12 AM   Result Value Ref Range    Chloride 39 MMOL/L   Osmolality, Urine    Collection Time: 06/15/22 11:12 AM   Result Value Ref Range    Osmolality, Ur 459 50 - 1400 MOSM/kg H2O   Potassium, urine, random    Collection Time: 06/15/22 11:12 AM   Result Value Ref Range    POTASSIUM, RANDOM URINE 48 MMOL/L   Sodium, urine, random    Collection Time: 06/15/22 11:12 AM   Result Value Ref Range    SODIUM, RANDOM URINE 20 MMOL/L   POCT Glucose    Collection Time: 06/15/22 11:16 AM   Result Value Ref Range    POC Glucose 197 (H) 65 - 100 mg/dL    Performed by: Clau    proBNP, N-TERMINAL    Collection Time: 06/15/22 11:55 AM   Result Value Ref Range    NT Pro-BNP 11,985 (H) <450 PG/ML   POCT Glucose    Collection Time: 06/15/22  3:54 PM   Result Value Ref Range    POC Glucose 162 (H) 65 - 100 mg/dL    Performed by: Clau    POCT Glucose    Collection Time: 06/15/22  8:56 PM   Result Value Ref Range    POC Glucose 143 (H) 65 - 100 mg/dL    Performed by: Kalina    POCT Glucose    Collection Time: 06/16/22  7:09 AM   Result Value Ref Range    POC Glucose 141 (H) 65 - 100 mg/dL Performed by: [employer]    Basic Metabolic Panel    Collection Time: 06/16/22  9:40 AM   Result Value Ref Range    Sodium 125 (L) 136 - 145 mmol/L    Potassium 3.7 3.5 - 5.1 mmol/L    Chloride 90 (L) 98 - 107 mmol/L    CO2 25 21 - 32 mmol/L    Anion Gap 10 7 - 16 mmol/L    Glucose 142 (H) 65 - 100 mg/dL    BUN 15 8 - 23 MG/DL    CREATININE 0.90 0.6 - 1.0 MG/DL    GFR African American >60 >60 ml/min/1.73m2    GFR Non- >60 >60 ml/min/1.73m2    Calcium 8.9 8.3 - 10.4 MG/DL   POCT Glucose    Collection Time: 06/16/22 10:52 AM   Result Value Ref Range    POC Glucose 150 (H) 65 - 100 mg/dL    Performed by: [employer]        Other Studies:  CT HEAD WO CONTRAST   Final Result   Negative for acute intracranial abnormality. Chronic changes. XR CHEST 1 VIEW   Final Result   1. Diffuse pulmonary interstitial edema. 2. Moderate hiatal hernia. CT ABDOMEN PELVIS WO CONTRAST Additional Contrast? Oral   Final Result   1. Large hiatal hernia. 2. Sigmoid diverticulosis. 3. Atherosclerosis. XR KNEE RIGHT (3 VIEWS)   Final Result   1. Severe medial and lateral compartment degenerative change and moderate medial   compartment degenerative change. 2. No evidence of acute fracture.              Current Meds:  Current Facility-Administered Medications   Medication Dose Route Frequency    urea (URE-NA) packet 15 g  15 g Oral BID    metoprolol tartrate (LOPRESSOR) tablet 100 mg  100 mg Oral BID    insulin glargine (LANTUS) injection vial 5 Units  5 Units SubCUTAneous Daily    glucose chewable tablet 16 g  4 tablet Oral PRN    dextrose bolus 10% 125 mL  125 mL IntraVENous PRN    Or    dextrose bolus 10% 250 mL  250 mL IntraVENous PRN    glucagon injection 1 mg  1 mg IntraMUSCular PRN    dextrose 5 % solution  100 mL/hr IntraVENous PRN    magic (miracle) mouthwash with nystatin  5 mL Swish & Spit 4x Daily PRN    apixaban (ELIQUIS) tablet 5 mg  5 mg Oral BID    pantoprazole (PROTONIX) tablet 40 mg  40 mg Oral QAM AC    trospium (SANCTURA) tablet 20 mg  20 mg Oral BID AC    dilTIAZem (CARDIZEM) tablet 30 mg  30 mg Oral Q6H PRN    sodium chloride flush 0.9 % injection 3 mL  3 mL IntraVENous Q8H    sodium chloride flush 0.9 % injection 5-40 mL  5-40 mL IntraVENous 2 times per day    sodium chloride flush 0.9 % injection 5-40 mL  5-40 mL IntraVENous PRN    0.9 % sodium chloride infusion   IntraVENous PRN    ondansetron (ZOFRAN-ODT) disintegrating tablet 4 mg  4 mg Oral Q8H PRN    Or    ondansetron (ZOFRAN) injection 4 mg  4 mg IntraVENous Q6H PRN    polyethylene glycol (GLYCOLAX) packet 17 g  17 g Oral Daily PRN    levothyroxine (SYNTHROID) tablet 100 mcg  100 mcg Oral QAM AC    polyethylene glycol (GLYCOLAX) packet 17 g  17 g Oral Daily    senna (SENOKOT) tablet 8.6 mg  1 tablet Oral Nightly    sucralfate (CARAFATE) tablet 1 g  1 g Oral 4 times per day    traMADol (ULTRAM) tablet 50 mg  50 mg Oral Q8H PRN    memantine (NAMENDA) tablet 5 mg  5 mg Oral BID    cefTRIAXone (ROCEPHIN) 1000 mg IVPB in NS 50ml minibag  1,000 mg IntraVENous Q24H    insulin lispro (HUMALOG) injection vial 0-4 Units  0-4 Units SubCUTAneous TID WC    insulin lispro (HUMALOG) injection vial 0-4 Units  0-4 Units SubCUTAneous Nightly       Signed:  Waldo Schlatter, MD    Part of this note may have been written by using a voice dictation software. The note has been proof read but may still contain some grammatical/other typographical errors.

## 2022-06-16 NOTE — PROGRESS NOTES
PHYSICAL THERAPY Daily Note and AM  (Link to Caseload Tracking: PT Visit Days : 3  Time In/Out PT Charge Capture  Rehab Caseload Tracker  Orders      Mouna Franz is a 80 y.o. female   PRIMARY DIAGNOSIS: Severe sepsis ([facility] [address]  Septicemia ([facility] [address] [A41.9]  Severe sepsis ([facility] [address] [A41.9, R65.20]       Inpatient: Payor: MEDICARE / Plan: MEDICARE PART A AND B / Product Type: *No Product type* /     ASSESSMENT:     REHAB RECOMMENDATIONS:   Recommendation to date pending progress:  Setting:   Short-term Rehab    Equipment:     To Be Determined     ASSESSMENT:  Ms. Michael Steiner presents supine; pleasantly confused and agreeable to PT. Patient performed supine to sit with max assist x 2, additional time and cues for technique. Patient initially needed assistance to maintain balance seated EOB but improves with time and cues. Patient c/o intermittent dizziness so vital monitored throughout session but remained WNL. Patient transferred to recliner chair via squat pivot transfer with max-total assist x 2. Slow progress towards goals. Will continue efforts. SUBJECTIVE:   Ms. Michael Steiner states, \"How did I get an infection this bad? \"     Social/Functional Type of Home: Assisted living  Home Equipment: Wheelchair-manual  ADL Assistance: Needs assistance  Transfer Assistance: Needs assistance  OBJECTIVE:     PAIN: VITALS / O2: PRECAUTION / [place] / DRAINS:   Pre Treatment:   Pain Level: 0      Post Treatment: 0 Vitals        Oxygen    [place]    RESTRICTIONS/PRECAUTIONS:  Restrictions/Precautions  Restrictions/Precautions: Fall Risk  Restrictions/Precautions: Fall Risk     MOBILITY: I Mod I S SBA CGA Min Mod Max Total  NT x2 Comments:   Bed Mobility    Rolling [] [] [] [] [] [] [] [] [] [] []    Supine to Sit [] [] [] [] [] [] [] [x] [] [] [x]    Scooting [] [] [] [] [] [] [] [] [] [] []    Sit to Supine [] [] [] [] [] [] [] [] [] [] []    Transfers    Sit to Stand [] [] [] [] [] [] [] [] [] [] []    Bed to Chair [] [] [] [] [] [] [] [x] [x] [] [x]    Stand to Sit [] [] [] [] [] [] [] [] [] [] []     [] [] [] [] [] [] [] [] [] [] []    I=Independent, Mod I=Modified Independent, S=Supervision, SBA=Standby Assistance, CGA=Contact Guard Assistance,   Min=Minimal Assistance, Mod=Moderate Assistance, Max=Maximal Assistance, Total=Total Assistance, NT=Not Tested    BALANCE: Good Fair+ Fair Fair- Poor NT Comments   Sitting Static [] [] [x] [] [] []    Sitting Dynamic [] [] [] [] [] []              Standing Static [] [] [] [] [x] []    Standing Dynamic [] [] [] [] [] []      GAIT: I Mod I S SBA CGA Min Mod Max Total  NT x2 Comments:   Level of Assistance [] [] [] [] [] [] [] [] [] [] []    Distance   feet    DME N/A    Gait Quality N/A    Weightbearing Status      Stairs      I=Independent, Mod I=Modified Independent, S=Supervision, SBA=Standby Assistance, CGA=Contact Guard Assistance,   Min=Minimal Assistance, Mod=Moderate Assistance, Max=Maximal Assistance, Total=Total Assistance, NT=Not Tested    PLAN:   ACUTE PHYSICAL THERAPY GOALS:   (Developed with and agreed upon by patient and/or caregiver.)     1. Patient will perform bed mobility with MINIMAL ASSISTANCE within 7 days. 2. Patient will transfer bed to chair with MINIMAL ASSISTANCE within 7 days. 3. Patient will demonstrate FAIR+ DYNAMIC SITTING balance within 7 day(s). 4. Patient will tolerate 25+ minutes of therapeutic activity/exercise and/or neuromuscular re-education while maintaining stable vitals to improve functional strength and activity tolerance within 7 days.       FREQUENCY AND DURATION: 3 times/week for duration of hospital stay or until stated goals are met, whichever comes first.    TREATMENT:   TREATMENT:   Co-Treatment PT/OT necessary due to patient's decreased overall endurance/tolerance levels, as well as need for high level skilled assistance to complete functional transfers/mobility and functional tasks  Therapeutic Activity (25 Minutes):  Therapeutic activity included Supine to Sit, Scooting, Transfer Training, Sitting balance , Standing balance and LE exercises to improve functional Activity tolerance, Balance, Mobility and Strength. TREATMENT GRID:  N/A    AFTER TREATMENT PRECAUTIONS: Bed/Chair Locked, Call light within reach, Chair, Needs within reach, RN notified and Visitors at bedside    INTERDISCIPLINARY COLLABORATION:  RN/ PCT, PT/ PTA and OT/ CORNELIUS    EDUCATION:      TIME IN/OUT:  Time In: 1107  Time Out: [address]  Minutes: [address] [facility] [address]  MARGARITA Iraheta

## 2022-06-16 NOTE — PROGRESS NOTES
Dispo update:  Accepted bed offer at [facility], but per Elham Hernandez, will not have a bed until Sunday at the earliest.  Patient and family do not want to consider another facility. Accepted bed offer, with plan for STR and then return to [address]

## 2022-06-16 NOTE — PROGRESS NOTES
VANCO DAILY FOLLOW UP NOTE  [address] [facility] Pharmacokinetic Monitoring Service - Vancomycin    Consulting Provider: Dr. GIBBONS   Indication: UTI/Sepsis  Target Concentration: Goal AUC/ALEC 400-600 mg*hr/L  Day of Therapy: 6  Additional Antimicrobials: Ceftriaxone    Pertinent Laboratory Values: Wt Readings from Last 1 Encounters:   06/15/22 182 lb 12.2 oz (82.9 kg)     Temp Readings from Last 1 Encounters:   06/16/22 97.7 °F (36.5 °C)     Recent Labs     06/14/22  0550 06/14/22  1930 06/15/22  0541   BUN 19 19 19   CREATININE 1.20* 1.10* 1.00   WBC  --   --  8.4     Estimated Creatinine Clearance: 38 mL/min (based on SCr of 1 mg/dL). Lab Results   Component Value Date    VANCOTROUGH 9.5 07/02/2020    VANCORANDOM 19.8 06/15/2022       MRSA Nasal Swab: N/A. Non-respiratory infection. .      Assessment:  Date/Time Dose Concentration AUC   6/14 0550 750mg q 24 hours 12.7 383   6/15/22 @ 0541 1000 mg Q24H 19.8 469   Note: Serum concentrations collected for AUC dosing may appear elevated if collected in close proximity to the dose administered, this is not necessarily an indication of toxicity    Plan:  Current dosing regimen is therapeutic  Continue vancomycin 1000 mg Q24H for now. Repeat vancomycin concentrations will be ordered as necessary.   Pharmacy will continue to monitor patient and adjust therapy as indicated    Thank you for the consult,  Khanh BlancD, BCPS  Clinical Pharmacist  Contact via shipbeat

## 2022-06-16 NOTE — PROGRESS NOTES
Jorge Bell  Admission Date: 6/11/2022         [facility] Progress Note: 6/16/2022    Follow-up for: The patient's chart is reviewed and the patient is discussed with the staff. Subjective:     Pt seen and examined in room. Reports being thirsty, daughter at the bedside.      ROS:  No CP, no SOB, no edema    Current Facility-Administered Medications   Medication Dose Route Frequency    urea (URE-NA) packet 15 g  15 g Oral BID    metoprolol tartrate (LOPRESSOR) tablet 100 mg  100 mg Oral BID    insulin glargine (LANTUS) injection vial 5 Units  5 Units SubCUTAneous Daily    glucose chewable tablet 16 g  4 tablet Oral PRN    dextrose bolus 10% 125 mL  125 mL IntraVENous PRN    Or    dextrose bolus 10% 250 mL  250 mL IntraVENous PRN    glucagon injection 1 mg  1 mg IntraMUSCular PRN    dextrose 5 % solution  100 mL/hr IntraVENous PRN    magic (miracle) mouthwash with nystatin  5 mL Swish & Spit 4x Daily PRN    apixaban (ELIQUIS) tablet 5 mg  5 mg Oral BID    pantoprazole (PROTONIX) tablet 40 mg  40 mg Oral QAM AC    trospium (SANCTURA) tablet 20 mg  20 mg Oral BID AC    dilTIAZem (CARDIZEM) tablet 30 mg  30 mg Oral Q6H PRN    sodium chloride flush 0.9 % injection 3 mL  3 mL IntraVENous Q8H    sodium chloride flush 0.9 % injection 5-40 mL  5-40 mL IntraVENous 2 times per day    sodium chloride flush 0.9 % injection 5-40 mL  5-40 mL IntraVENous PRN    0.9 % sodium chloride infusion   IntraVENous PRN    ondansetron (ZOFRAN-ODT) disintegrating tablet 4 mg  4 mg Oral Q8H PRN    Or    ondansetron (ZOFRAN) injection 4 mg  4 mg IntraVENous Q6H PRN    polyethylene glycol (GLYCOLAX) packet 17 g  17 g Oral Daily PRN    levothyroxine (SYNTHROID) tablet 100 mcg  100 mcg Oral QAM AC    polyethylene glycol (GLYCOLAX) packet 17 g  17 g Oral Daily    senna (SENOKOT) tablet 8.6 mg  1 tablet Oral Nightly    sucralfate (CARAFATE) tablet 1 g  1 g Oral 4 times per day    traMADol (ULTRAM) tablet 50 mg  50 mg Oral Q8H PRN    memantine (NAMENDA) tablet 5 mg  5 mg Oral BID    cefTRIAXone (ROCEPHIN) 1000 mg IVPB in NS 50ml minibag  1,000 mg IntraVENous Q24H    insulin lispro (HUMALOG) injection vial 0-4 Units  0-4 Units SubCUTAneous TID WC    insulin lispro (HUMALOG) injection vial 0-4 Units  0-4 Units SubCUTAneous Nightly         Objective:     Vitals:    06/16/22 0531 06/16/22 0730 06/16/22 1132 06/16/22 1150   BP: (!) 120/90 (!) 150/94 129/88 131/88   Pulse: 81 (!) 110  (!) 118   Resp: 16 18 16   Temp: 98.1 °F (36.7 °C) 97.7 °F (36.5 °C)  97.9 °F (36.6 °C)   TempSrc: Axillary   Oral   SpO2: 94% 92%  93%   Weight:       Height:         Intake and Output:   06/14 1901 - 06/16 0700  In: 310 [I.V.:310]  Out: 700 [Urine:700]  No intake/output data recorded. Physical Exam:   Constitutional:  the patient is well developed and in no acute distress  HEENT:  Sclera clear, pupils equal, oral mucosa moist  Lungs: clear bilaterally  Cardiovascular:  irregular  Abd/GI: soft and non-tender; with positive bowel sounds. Ext: warm without cyanosis. There is no lower leg edema. Musculoskeletal: moves all four extremities with equal strength  Skin:  no jaundice or rashes  Neuro: no gross neuro deficits           LAB  Recent Labs     06/15/22  0541   WBC 8.4   HGB 12.4   HCT 36.6        Recent Labs     06/14/22  1930 06/15/22  0541 06/16/22  0940   * 129* 125*   K 4.2 3.7 3.7   CL 91* 92* 90*   CO2 24 24 25   BUN 19 19 15   CREATININE 1.10* 1.00 0.90     No results for input(s): PH, PCO2, PO2, HCO3 in the last 72 hours. Plan:  (Medical Decision Making)   1. JAMIE - resolved    2. Hyponatremia - serum osmo low/urine osmo high, likely component of CHF as well given her pro-BNP  -Will add UreNa BID today    3.  UTI - manage per primary team      OLGA Palmer - NP  [facility]

## 2022-06-17 LAB
ANION GAP SERPL CALC-SCNC: 11 MMOL/L (ref 7–16)
BUN SERPL-MCNC: 36 MG/DL (ref 8–23)
CALCIUM SERPL-MCNC: 9.1 MG/DL (ref 8.3–10.4)
CHLORIDE SERPL-SCNC: 91 MMOL/L (ref 98–107)
CO2 SERPL-SCNC: 25 MMOL/L (ref 21–32)
CREAT SERPL-MCNC: 1 MG/DL (ref 0.6–1)
GLUCOSE BLD STRIP.AUTO-MCNC: 126 MG/DL (ref 65–100)
GLUCOSE BLD STRIP.AUTO-MCNC: 133 MG/DL (ref 65–100)
GLUCOSE BLD STRIP.AUTO-MCNC: 139 MG/DL (ref 65–100)
GLUCOSE BLD STRIP.AUTO-MCNC: 200 MG/DL (ref 65–100)
GLUCOSE SERPL-MCNC: 125 MG/DL (ref 65–100)
POTASSIUM SERPL-SCNC: 3.7 MMOL/L (ref 3.5–5.1)
SERVICE CMNT-IMP: ABNORMAL
SODIUM SERPL-SCNC: 127 MMOL/L (ref 136–145)

## 2022-06-17 PROCEDURE — 2580000003 HC RX 258: Performed by: FAMILY MEDICINE

## 2022-06-17 PROCEDURE — 6360000002 HC RX W HCPCS: Performed by: FAMILY MEDICINE

## 2022-06-17 PROCEDURE — 6370000000 HC RX 637 (ALT 250 FOR IP)

## 2022-06-17 PROCEDURE — 36415 COLL VENOUS BLD VENIPUNCTURE: CPT

## 2022-06-17 PROCEDURE — 80048 BASIC METABOLIC PNL TOTAL CA: CPT

## 2022-06-17 PROCEDURE — 6370000000 HC RX 637 (ALT 250 FOR IP): Performed by: INTERNAL MEDICINE

## 2022-06-17 PROCEDURE — 6370000000 HC RX 637 (ALT 250 FOR IP): Performed by: FAMILY MEDICINE

## 2022-06-17 PROCEDURE — 82962 GLUCOSE BLOOD TEST: CPT

## 2022-06-17 PROCEDURE — 1100000000 HC RM PRIVATE

## 2022-06-17 RX ADMIN — Medication 15 G: at 10:22

## 2022-06-17 RX ADMIN — APIXABAN 5 MG: 5 TABLET, FILM COATED ORAL at 09:43

## 2022-06-17 RX ADMIN — MEMANTINE 5 MG: 5 TABLET ORAL at 09:43

## 2022-06-17 RX ADMIN — MEMANTINE 5 MG: 5 TABLET ORAL at 20:43

## 2022-06-17 RX ADMIN — SUCRALFATE 1 G: 1 TABLET ORAL at 05:43

## 2022-06-17 RX ADMIN — TROSPIUM CHLORIDE 20 MG: 20 TABLET, FILM COATED ORAL at 05:43

## 2022-06-17 RX ADMIN — SUCRALFATE 1 G: 1 TABLET ORAL at 17:56

## 2022-06-17 RX ADMIN — SUCRALFATE 1 G: 1 TABLET ORAL at 00:02

## 2022-06-17 RX ADMIN — INSULIN GLARGINE 5 UNITS: 100 INJECTION, SOLUTION SUBCUTANEOUS at 10:16

## 2022-06-17 RX ADMIN — LEVOTHYROXINE SODIUM 100 MCG: 0.05 TABLET ORAL at 05:43

## 2022-06-17 RX ADMIN — CEFTRIAXONE 1000 MG: 1 INJECTION, POWDER, FOR SOLUTION INTRAMUSCULAR; INTRAVENOUS at 15:08

## 2022-06-17 RX ADMIN — METOPROLOL TARTRATE 100 MG: 50 TABLET ORAL at 20:44

## 2022-06-17 RX ADMIN — SENNOSIDES 8.6 MG: 8.6 TABLET, FILM COATED ORAL at 20:43

## 2022-06-17 RX ADMIN — SUCRALFATE 1 G: 1 TABLET ORAL at 15:05

## 2022-06-17 RX ADMIN — SODIUM CHLORIDE, PRESERVATIVE FREE 10 ML: 5 INJECTION INTRAVENOUS at 09:47

## 2022-06-17 RX ADMIN — TROSPIUM CHLORIDE 20 MG: 20 TABLET, FILM COATED ORAL at 17:56

## 2022-06-17 RX ADMIN — PANTOPRAZOLE SODIUM 40 MG: 40 TABLET, DELAYED RELEASE ORAL at 05:43

## 2022-06-17 RX ADMIN — METOPROLOL TARTRATE 100 MG: 50 TABLET ORAL at 09:43

## 2022-06-17 RX ADMIN — SODIUM CHLORIDE, PRESERVATIVE FREE 10 ML: 5 INJECTION INTRAVENOUS at 15:13

## 2022-06-17 RX ADMIN — Medication 15 G: at 20:43

## 2022-06-17 RX ADMIN — SODIUM CHLORIDE, PRESERVATIVE FREE 10 ML: 5 INJECTION INTRAVENOUS at 20:44

## 2022-06-17 RX ADMIN — POLYETHYLENE GLYCOL 3350 17 G: 17 POWDER, FOR SOLUTION ORAL at 09:43

## 2022-06-17 RX ADMIN — APIXABAN 5 MG: 5 TABLET, FILM COATED ORAL at 20:43

## 2022-06-17 NOTE — PROGRESS NOTES
Hourly rounds completed this shift. All needs met at this time. Bed low/locked. Bed alarm active on bed. Call light within reach. Will continue to monitor and give bedside report to oncoming nurse.

## 2022-06-17 NOTE — PROGRESS NOTES
Hospitalist Progress Note   Admit Date:  2022 12:02 PM   Name:  Orly Vaughan   Age:  80 y.o. Sex:  female  :  1926   MRN:  [id number]   Room:  [facility]/    Presenting Complaint: Abdominal Pain and Urinary Retention     Reason(s) for Admission: Septicemia ([facility] [address] [A41.9]  Severe sepsis ([facility] [address] [A41.9, R65.20]     Hospital Course & Interval History:   Pt is a 81 y/o F with afib, DM, HTN, who presented with decreased urination pelvic pain and poor intake. Admitted with sepsis from UTI. Cultures were negative. Also noted to have low Na so nephrology consulted. Urine osm was high    Subjective/24hr Events (22): Pt feels well today. No fevers. Na improved slightly. DM, HTN and Afib controlled today      Assessment & Plan:         Severe sepsis ([address]    UTI (urinary tract infection)  Cont rocephin 1 more day       Memory loss  Noted.        Type 2 diabetes with nephropathy ([facility] [address]  -Fairly controlled  -cont current regimen       Essential hypertension, benign  -controlled today. Cont metoprolol      Afib  -Cont metoprolol  -cont eliquis       Acquired hypothyroidism  Continue synthroid     Hyponatremia  Hypokalemia  -Na slightly improved today  -cont urea  -daily BMP       OAB (overactive bladder)  Noted   Monitor. Discharge Planning:    -has SNF bed     Diet:  ADULT DIET; Easy to Chew; 4 carb choices (60 gm/meal); 1200 ml  DVT PPx: eliquis  Code status: Full Code    Hospital Problems:  Principal Problem:    Severe sepsis ([address]  Active Problems:    UTI (urinary tract infection)    Transaminitis    Hypokalemia    Hyponatremia    Pulmonary edema    Atrial fibrillation (HCC)    Memory loss    Type 2 diabetes with nephropathy (HCC)    Edema    Essential hypertension, benign    Acquired hypothyroidism    OAB (overactive bladder)  Resolved Problems:    * No resolved hospital problems.  *      Objective:     Patient Vitals for the past 24 hrs:   Temp Pulse Resp BP SpO2   22 MOSM/kg H2O   Potassium, urine, random    Collection Time: 06/15/22 11:12 AM   Result Value Ref Range    POTASSIUM, RANDOM URINE 48 MMOL/L   Sodium, urine, random    Collection Time: 06/15/22 11:12 AM   Result Value Ref Range    SODIUM, RANDOM URINE 20 MMOL/L   POCT Glucose    Collection Time: 06/15/22 11:16 AM   Result Value Ref Range    POC Glucose 197 (H) 65 - 100 mg/dL    Performed by: Clau    proBNP, N-TERMINAL    Collection Time: 06/15/22 11:55 AM   Result Value Ref Range    NT Pro-BNP 11,985 (H) <450 PG/ML   POCT Glucose    Collection Time: 06/15/22  3:54 PM   Result Value Ref Range    POC Glucose 162 (H) 65 - 100 mg/dL    Performed by: Clau    POCT Glucose    Collection Time: 06/15/22  8:56 PM   Result Value Ref Range    POC Glucose 143 (H) 65 - 100 mg/dL    Performed by: Kalina    POCT Glucose    Collection Time: 06/16/22  7:09 AM   Result Value Ref Range    POC Glucose 141 (H) 65 - 100 mg/dL    Performed by: Andrea Grace    Basic Metabolic Panel    Collection Time: 06/16/22  9:40 AM   Result Value Ref Range    Sodium 125 (L) 136 - 145 mmol/L    Potassium 3.7 3.5 - 5.1 mmol/L    Chloride 90 (L) 98 - 107 mmol/L    CO2 25 21 - 32 mmol/L    Anion Gap 10 7 - 16 mmol/L    Glucose 142 (H) 65 - 100 mg/dL    BUN 15 8 - 23 MG/DL    CREATININE 0.90 0.6 - 1.0 MG/DL    GFR African American >60 >60 ml/min/1.73m2    GFR Non- >60 >60 ml/min/1.73m2    Calcium 8.9 8.3 - 10.4 MG/DL   POCT Glucose    Collection Time: 06/16/22 10:52 AM   Result Value Ref Range    POC Glucose 150 (H) 65 - 100 mg/dL    Performed by: Andrea Grace    POCT Glucose    Collection Time: 06/16/22  5:32 PM   Result Value Ref Range    POC Glucose 167 (H) 65 - 100 mg/dL    Performed by: Ernesto    POCT Glucose    Collection Time: 06/16/22  8:51 PM   Result Value Ref Range    POC Glucose 130 (H) 65 - 100 mg/dL    Performed by: Kalina    Basic Metabolic Panel w/ Reflex to MG Collection Time: 06/17/22  4:51 AM   Result Value Ref Range    Sodium 127 (L) 136 - 145 mmol/L    Potassium 3.7 3.5 - 5.1 mmol/L    Chloride 91 (L) 98 - 107 mmol/L    CO2 25 21 - 32 mmol/L    Anion Gap 11 7 - 16 mmol/L    Glucose 125 (H) 65 - 100 mg/dL    BUN 36 (H) 8 - 23 MG/DL    CREATININE 1.00 0.6 - 1.0 MG/DL    GFR African American >60 >60 ml/min/1.73m2    GFR Non- 55 (L) >60 ml/min/1.73m2    Calcium 9.1 8.3 - 10.4 MG/DL   POCT Glucose    Collection Time: 06/17/22  7:04 AM   Result Value Ref Range    POC Glucose 133 (H) 65 - 100 mg/dL    Performed by: Papo        Other Studies:  CT HEAD WO CONTRAST   Final Result   Negative for acute intracranial abnormality. Chronic changes. XR CHEST 1 VIEW   Final Result   1. Diffuse pulmonary interstitial edema. 2. Moderate hiatal hernia. CT ABDOMEN PELVIS WO CONTRAST Additional Contrast? Oral   Final Result   1. Large hiatal hernia. 2. Sigmoid diverticulosis. 3. Atherosclerosis. XR KNEE RIGHT (3 VIEWS)   Final Result   1. Severe medial and lateral compartment degenerative change and moderate medial   compartment degenerative change. 2. No evidence of acute fracture.              Current Meds:  Current Facility-Administered Medications   Medication Dose Route Frequency    urea (URE-NA) packet 15 g  15 g Oral BID    metoprolol tartrate (LOPRESSOR) tablet 100 mg  100 mg Oral BID    insulin glargine (LANTUS) injection vial 5 Units  5 Units SubCUTAneous Daily    glucose chewable tablet 16 g  4 tablet Oral PRN    dextrose bolus 10% 125 mL  125 mL IntraVENous PRN    Or    dextrose bolus 10% 250 mL  250 mL IntraVENous PRN    glucagon injection 1 mg  1 mg IntraMUSCular PRN    dextrose 5 % solution  100 mL/hr IntraVENous PRN    magic (miracle) mouthwash with nystatin  5 mL Swish & Spit 4x Daily PRN    apixaban (ELIQUIS) tablet 5 mg  5 mg Oral BID    pantoprazole (PROTONIX) tablet 40 mg  40 mg Oral QAM AC    trospium (SANCTURA) tablet 20 mg  20 mg Oral BID AC    dilTIAZem (CARDIZEM) tablet 30 mg  30 mg Oral Q6H PRN    sodium chloride flush 0.9 % injection 3 mL  3 mL IntraVENous Q8H    sodium chloride flush 0.9 % injection 5-40 mL  5-40 mL IntraVENous 2 times per day    sodium chloride flush 0.9 % injection 5-40 mL  5-40 mL IntraVENous PRN    0.9 % sodium chloride infusion   IntraVENous PRN    ondansetron (ZOFRAN-ODT) disintegrating tablet 4 mg  4 mg Oral Q8H PRN    Or    ondansetron (ZOFRAN) injection 4 mg  4 mg IntraVENous Q6H PRN    polyethylene glycol (GLYCOLAX) packet 17 g  17 g Oral Daily PRN    levothyroxine (SYNTHROID) tablet 100 mcg  100 mcg Oral QAM AC    polyethylene glycol (GLYCOLAX) packet 17 g  17 g Oral Daily    senna (SENOKOT) tablet 8.6 mg  1 tablet Oral Nightly    sucralfate (CARAFATE) tablet 1 g  1 g Oral 4 times per day    traMADol (ULTRAM) tablet 50 mg  50 mg Oral Q8H PRN    memantine (NAMENDA) tablet 5 mg  5 mg Oral BID    cefTRIAXone (ROCEPHIN) 1000 mg IVPB in NS 50ml minibag  1,000 mg IntraVENous Q24H    insulin lispro (HUMALOG) injection vial 0-4 Units  0-4 Units SubCUTAneous TID WC    insulin lispro (HUMALOG) injection vial 0-4 Units  0-4 Units SubCUTAneous Nightly       Signed:  Olive Florence MD    Part of this note may have been written by using a voice dictation software. The note has been proof read but may still contain some grammatical/other typographical errors.

## 2022-06-17 NOTE — PROGRESS NOTES
Dennis Mcgill  Admission Date: 6/11/2022         [address] Nephrology Progress Note: 6/17/2022    Follow-up for: The patient's chart is reviewed and the patient is discussed with the staff. Subjective:     Pt seen and examined in room. Reports being thirsty, daughter at the bedside.      ROS:  No CP, no SOB, no edema    Current Facility-Administered Medications   Medication Dose Route Frequency    urea (URE-NA) packet 15 g  15 g Oral BID    metoprolol tartrate (LOPRESSOR) tablet 100 mg  100 mg Oral BID    insulin glargine (LANTUS) injection vial 5 Units  5 Units SubCUTAneous Daily    glucose chewable tablet 16 g  4 tablet Oral PRN    dextrose bolus 10% 125 mL  125 mL IntraVENous PRN    Or    dextrose bolus 10% 250 mL  250 mL IntraVENous PRN    glucagon injection 1 mg  1 mg IntraMUSCular PRN    dextrose 5 % solution  100 mL/hr IntraVENous PRN    magic (miracle) mouthwash with nystatin  5 mL Swish & Spit 4x Daily PRN    apixaban (ELIQUIS) tablet 5 mg  5 mg Oral BID    pantoprazole (PROTONIX) tablet 40 mg  40 mg Oral QAM AC    trospium (SANCTURA) tablet 20 mg  20 mg Oral BID AC    dilTIAZem (CARDIZEM) tablet 30 mg  30 mg Oral Q6H PRN    sodium chloride flush 0.9 % injection 3 mL  3 mL IntraVENous Q8H    sodium chloride flush 0.9 % injection 5-40 mL  5-40 mL IntraVENous 2 times per day    sodium chloride flush 0.9 % injection 5-40 mL  5-40 mL IntraVENous PRN    0.9 % sodium chloride infusion   IntraVENous PRN    ondansetron (ZOFRAN-ODT) disintegrating tablet 4 mg  4 mg Oral Q8H PRN    Or    ondansetron (ZOFRAN) injection 4 mg  4 mg IntraVENous Q6H PRN    polyethylene glycol (GLYCOLAX) packet 17 g  17 g Oral Daily PRN    levothyroxine (SYNTHROID) tablet 100 mcg  100 mcg Oral QAM AC    polyethylene glycol (GLYCOLAX) packet 17 g  17 g Oral Daily    senna (SENOKOT) tablet 8.6 mg  1 tablet Oral Nightly    sucralfate (CARAFATE) tablet 1 g  1 g Oral 4 times per day    traMADol (ULTRAM) tablet 50 mg  50 mg Oral Q8H PRN    memantine (NAMENDA) tablet 5 mg  5 mg Oral BID    cefTRIAXone (ROCEPHIN) 1000 mg IVPB in NS 50ml minibag  1,000 mg IntraVENous Q24H    insulin lispro (HUMALOG) injection vial 0-4 Units  0-4 Units SubCUTAneous TID WC    insulin lispro (HUMALOG) injection vial 0-4 Units  0-4 Units SubCUTAneous Nightly         Objective:     Vitals:    06/17/22 0351 06/17/22 0709 06/17/22 0943 06/17/22 1115   BP: (!) 125/91 128/86 126/86 123/89   Pulse: 89 100 100 (!) 106   Resp: 16 20  20   Temp: 97.3 °F (36.3 °C) 97.3 °F (36.3 °C)  98.2 °F (36.8 °C)   TempSrc: Oral Axillary     SpO2: 93% 97%  97%   Weight:       Height:         Intake and Output:   06/15 1901 - 06/17 0700  In: 550 [P.O.:240; I.V.:310]  Out: 801 [Urine:800]  06/17 0701 - 06/17 1900  In: 120 [P.O.:120]  Out: -     Physical Exam:   Constitutional:  the patient is well developed and in no acute distress  HEENT:  Sclera clear, pupils equal, oral mucosa moist  Lungs: clear bilaterally  Cardiovascular:  irregular  Abd/GI: soft and non-tender; with positive bowel sounds. Ext: warm without cyanosis. There is no lower leg edema. Musculoskeletal: moves all four extremities with equal strength  Skin:  no jaundice or rashes  Neuro: no gross neuro deficits           LAB  Recent Labs     06/15/22  0541   WBC 8.4   HGB 12.4   HCT 36.6        Recent Labs     06/15/22  0541 06/16/22  0940 06/17/22  0451   * 125* 127*   K 3.7 3.7 3.7   CL 92* 90* 91*   CO2 24 25 25   BUN 19 15 36*   CREATININE 1.00 0.90 1.00     No results for input(s): PH, PCO2, PO2, HCO3 in the last 72 hours. Plan:  (Medical Decision Making)   1. JAMIE - resolved    2. Hyponatremia - serum osmo low/urine osmo high, likely component of CHF as well given her pro-BNP  Better on UreNa     3.  UTI - manage per primary team      Kale Goss MD  [facility] Nephrology

## 2022-06-18 LAB
ANION GAP SERPL CALC-SCNC: 11 MMOL/L (ref 7–16)
BUN SERPL-MCNC: 41 MG/DL (ref 8–23)
CALCIUM SERPL-MCNC: 9.3 MG/DL (ref 8.3–10.4)
CHLORIDE SERPL-SCNC: 92 MMOL/L (ref 98–107)
CO2 SERPL-SCNC: 25 MMOL/L (ref 21–32)
CREAT SERPL-MCNC: 0.9 MG/DL (ref 0.6–1)
GLUCOSE BLD STRIP.AUTO-MCNC: 127 MG/DL (ref 65–100)
GLUCOSE BLD STRIP.AUTO-MCNC: 142 MG/DL (ref 65–100)
GLUCOSE BLD STRIP.AUTO-MCNC: 144 MG/DL (ref 65–100)
GLUCOSE BLD STRIP.AUTO-MCNC: 157 MG/DL (ref 65–100)
GLUCOSE SERPL-MCNC: 137 MG/DL (ref 65–100)
MAGNESIUM SERPL-MCNC: 1.9 MG/DL (ref 1.8–2.4)
POTASSIUM SERPL-SCNC: 3.4 MMOL/L (ref 3.5–5.1)
SERVICE CMNT-IMP: ABNORMAL
SODIUM SERPL-SCNC: 128 MMOL/L (ref 136–145)

## 2022-06-18 PROCEDURE — 83735 ASSAY OF MAGNESIUM: CPT

## 2022-06-18 PROCEDURE — 2580000003 HC RX 258: Performed by: FAMILY MEDICINE

## 2022-06-18 PROCEDURE — 1100000000 HC RM PRIVATE

## 2022-06-18 PROCEDURE — 82962 GLUCOSE BLOOD TEST: CPT

## 2022-06-18 PROCEDURE — 2580000003 HC RX 258: Performed by: EMERGENCY MEDICINE

## 2022-06-18 PROCEDURE — 80048 BASIC METABOLIC PNL TOTAL CA: CPT

## 2022-06-18 PROCEDURE — 6370000000 HC RX 637 (ALT 250 FOR IP): Performed by: INTERNAL MEDICINE

## 2022-06-18 PROCEDURE — 6370000000 HC RX 637 (ALT 250 FOR IP): Performed by: FAMILY MEDICINE

## 2022-06-18 PROCEDURE — 6370000000 HC RX 637 (ALT 250 FOR IP)

## 2022-06-18 PROCEDURE — 36415 COLL VENOUS BLD VENIPUNCTURE: CPT

## 2022-06-18 RX ORDER — POTASSIUM CHLORIDE 20 MEQ/1
40 TABLET, EXTENDED RELEASE ORAL ONCE
Status: COMPLETED | OUTPATIENT
Start: 2022-06-18 | End: 2022-06-18

## 2022-06-18 RX ADMIN — APIXABAN 5 MG: 5 TABLET, FILM COATED ORAL at 08:25

## 2022-06-18 RX ADMIN — METOPROLOL TARTRATE 100 MG: 50 TABLET ORAL at 08:24

## 2022-06-18 RX ADMIN — LEVOTHYROXINE SODIUM 100 MCG: 0.05 TABLET ORAL at 05:52

## 2022-06-18 RX ADMIN — INSULIN GLARGINE 5 UNITS: 100 INJECTION, SOLUTION SUBCUTANEOUS at 08:26

## 2022-06-18 RX ADMIN — SUCRALFATE 1 G: 1 TABLET ORAL at 16:45

## 2022-06-18 RX ADMIN — TRAMADOL HYDROCHLORIDE 50 MG: 50 TABLET, COATED ORAL at 02:19

## 2022-06-18 RX ADMIN — SODIUM CHLORIDE, PRESERVATIVE FREE 10 ML: 5 INJECTION INTRAVENOUS at 08:25

## 2022-06-18 RX ADMIN — SODIUM CHLORIDE, PRESERVATIVE FREE 3 ML: 5 INJECTION INTRAVENOUS at 12:34

## 2022-06-18 RX ADMIN — PANTOPRAZOLE SODIUM 40 MG: 40 TABLET, DELAYED RELEASE ORAL at 05:52

## 2022-06-18 RX ADMIN — SUCRALFATE 1 G: 1 TABLET ORAL at 12:35

## 2022-06-18 RX ADMIN — MEMANTINE 5 MG: 5 TABLET ORAL at 08:25

## 2022-06-18 RX ADMIN — SENNOSIDES 8.6 MG: 8.6 TABLET, FILM COATED ORAL at 20:50

## 2022-06-18 RX ADMIN — Medication 15 G: at 20:50

## 2022-06-18 RX ADMIN — SUCRALFATE 1 G: 1 TABLET ORAL at 00:04

## 2022-06-18 RX ADMIN — SUCRALFATE 1 G: 1 TABLET ORAL at 05:52

## 2022-06-18 RX ADMIN — POLYETHYLENE GLYCOL 3350 17 G: 17 POWDER, FOR SOLUTION ORAL at 08:24

## 2022-06-18 RX ADMIN — METOPROLOL TARTRATE 100 MG: 50 TABLET ORAL at 20:50

## 2022-06-18 RX ADMIN — TRAMADOL HYDROCHLORIDE 50 MG: 50 TABLET, COATED ORAL at 20:50

## 2022-06-18 RX ADMIN — TROSPIUM CHLORIDE 20 MG: 20 TABLET, FILM COATED ORAL at 15:54

## 2022-06-18 RX ADMIN — Medication 15 G: at 08:24

## 2022-06-18 RX ADMIN — APIXABAN 5 MG: 5 TABLET, FILM COATED ORAL at 20:50

## 2022-06-18 RX ADMIN — TROSPIUM CHLORIDE 20 MG: 20 TABLET, FILM COATED ORAL at 05:52

## 2022-06-18 RX ADMIN — SODIUM CHLORIDE, PRESERVATIVE FREE 10 ML: 5 INJECTION INTRAVENOUS at 20:51

## 2022-06-18 RX ADMIN — MEMANTINE 5 MG: 5 TABLET ORAL at 20:50

## 2022-06-18 RX ADMIN — SODIUM CHLORIDE, PRESERVATIVE FREE 3 ML: 5 INJECTION INTRAVENOUS at 05:52

## 2022-06-18 RX ADMIN — POTASSIUM CHLORIDE 40 MEQ: 20 TABLET, EXTENDED RELEASE ORAL at 13:54

## 2022-06-18 ASSESSMENT — PAIN - FUNCTIONAL ASSESSMENT
PAIN_FUNCTIONAL_ASSESSMENT: PREVENTS OR INTERFERES SOME ACTIVE ACTIVITIES AND ADLS
PAIN_FUNCTIONAL_ASSESSMENT: PREVENTS OR INTERFERES SOME ACTIVE ACTIVITIES AND ADLS

## 2022-06-18 ASSESSMENT — PAIN SCALES - GENERAL
PAINLEVEL_OUTOF10: 0
PAINLEVEL_OUTOF10: 4
PAINLEVEL_OUTOF10: 4

## 2022-06-18 ASSESSMENT — PAIN DESCRIPTION - DESCRIPTORS
DESCRIPTORS: ACHING;DISCOMFORT
DESCRIPTORS: ACHING;DISCOMFORT

## 2022-06-18 ASSESSMENT — PAIN DESCRIPTION - ORIENTATION
ORIENTATION: RIGHT
ORIENTATION: LOWER

## 2022-06-18 ASSESSMENT — PAIN DESCRIPTION - PAIN TYPE
TYPE: CHRONIC PAIN
TYPE: CHRONIC PAIN

## 2022-06-18 ASSESSMENT — PAIN DESCRIPTION - LOCATION
LOCATION: BACK
LOCATION: BACK

## 2022-06-18 NOTE — PROGRESS NOTES
Hospitalist Progress Note   Admit Date:  2022 12:02 PM   Name:  Saman Delatorre   Age:  80 y.o. Sex:  female  :  1926   MRN:  [id number]   Room:  [facility]/    Presenting Complaint: Abdominal Pain and Urinary Retention     Reason(s) for Admission: Septicemia ([facility] [address] [A41.9]  Severe sepsis ([facility] [address] [A41.9, R65.20]     Hospital Course & Interval History:   Pt is a 81 y/o F with afib, DM, HTN, who presented with decreased urination pelvic pain and poor intake. Admitted with sepsis from UTI. Cultures were negative. Also noted to have low Na so nephrology consulted. Urine osm was high    Subjective/24hr Events (22): No complaints. Na slightly improved. DM, HTN and Afib controlled today      Assessment & Plan:     Hyponatremia  Hypokalemia  -Na slightly improved today  -cont urea  -encourage feeding  -daily BMP        Severe sepsis (HCC)    UTI (urinary tract infection)  S/p 7d rocephin       Memory loss  Noted.        Type 2 diabetes with nephropathy ([facility] [address]  -Fairly controlled  -cont current regimen       Essential hypertension, benign  -controlled today. Cont metoprolol      Afib  -Cont metoprolol  -cont eliquis       Acquired hypothyroidism  Continue synthroid            OAB (overactive bladder)  Noted   Monitor. Discharge Planning:    -has SNF bed     Diet:  ADULT DIET; Easy to Chew; 4 carb choices (60 gm/meal); 1200 ml  DVT PPx: eliquis  Code status: Full Code    Hospital Problems:  Principal Problem:    Severe sepsis ([facility] [address]  Active Problems:    UTI (urinary tract infection)    Transaminitis    Hypokalemia    Hyponatremia    Pulmonary edema    Atrial fibrillation (HCC)    Memory loss    Type 2 diabetes with nephropathy (HCC)    Edema    Essential hypertension, benign    Acquired hypothyroidism    OAB (overactive bladder)  Resolved Problems:    * No resolved hospital problems.  *      Objective:     Patient Vitals for the past 24 hrs:   Temp Pulse Resp BP SpO2   22 1110 98.1 °F (36.7 °C) 95 20 (!) 125/90 97 %   06/18/22 0727 97.3 °F (36.3 °C) 100 24 132/85 97 %   06/18/22 0720 (!) 95.9 °F (35.5 °C) 75 16 (!) 141/100 96 %   06/18/22 0526 98.1 °F (36.7 °C) 52 18 (!) 148/90 97 %   06/18/22 0219 -- -- 18 -- --   06/17/22 2348 97.5 °F (36.4 °C) 65 28 131/89 95 %   06/17/22 2003 97.4 °F (36.3 °C) 75 28 (!) 133/96 95 %   06/17/22 1515 97.5 °F (36.4 °C) 81 28 (!) 150/97 92 %       Oxygen Therapy  SpO2: 97 %  Pulse via Oximetry: 100 beats per minute  O2 Device: None (Room air)    Estimated body mass index is 27.79 kg/m² as calculated from the following:    Height as of this encounter: 5' 8\" (1.727 m). Weight as of this encounter: 182 lb 12.2 oz (82.9 kg). Intake/Output Summary (Last 24 hours) at 6/18/2022 1243  Last data filed at 6/18/2022 0553  Gross per 24 hour   Intake 360 ml   Output 1350 ml   Net -990 ml         Physical Exam:     Blood pressure (!) 125/90, pulse 95, temperature 98.1 °F (36.7 °C), resp. rate 20, height 5' 8\" (1.727 m), weight 182 lb 12.2 oz (82.9 kg), SpO2 97 %. General:    Well nourished. Head:  Normocephalic, atraumatic  Eyes:  Sclerae appear normal.  Pupils equally round. ENT:  Nares appear normal, no drainage. Moist oral mucosa  Neck:  No restricted ROM. Trachea midline   CV:   irreg. Kali Confer No jugular venous distension. Lungs:   Respirations even, unlabored  Abdomen:   nondistended. Extremities: No cyanosis or clubbing. No edema  Skin:     No rashes and normal coloration. Warm and dry. Neuro:  CN II-XII grossly intact. Sensation intact. A&Ox3  Psych:  Normal mood and affect.       I have reviewed ordered lab tests and independently visualized imaging below:    Recent Labs:  Recent Results (from the past 48 hour(s))   POCT Glucose    Collection Time: 06/16/22  5:32 PM   Result Value Ref Range    POC Glucose 167 (H) 65 - 100 mg/dL    Performed by: Ernesto    POCT Glucose    Collection Time: 06/16/22  8:51 PM   Result Value Ref Range POC Glucose 130 (H) 65 - 100 mg/dL    Performed by: [employer]    Basic Metabolic Panel w/ Reflex to MG    Collection Time: 06/17/22  4:51 AM   Result Value Ref Range    Sodium 127 (L) 136 - 145 mmol/L    Potassium 3.7 3.5 - 5.1 mmol/L    Chloride 91 (L) 98 - 107 mmol/L    CO2 25 21 - 32 mmol/L    Anion Gap 11 7 - 16 mmol/L    Glucose 125 (H) 65 - 100 mg/dL    BUN 36 (H) 8 - 23 MG/DL    CREATININE 1.00 0.6 - 1.0 MG/DL    GFR African American >60 >60 ml/min/1.73m2    GFR Non- 55 (L) >60 ml/min/1.73m2    Calcium 9.1 8.3 - 10.4 MG/DL   POCT Glucose    Collection Time: 06/17/22  7:04 AM   Result Value Ref Range    POC Glucose 133 (H) 65 - 100 mg/dL    Performed by: [employer]    POCT Glucose    Collection Time: 06/17/22 11:12 AM   Result Value Ref Range    POC Glucose 200 (H) 65 - 100 mg/dL    Performed by: [employer]    POCT Glucose    Collection Time: 06/17/22  4:18 PM   Result Value Ref Range    POC Glucose 139 (H) 65 - 100 mg/dL    Performed by: [employer]    POCT Glucose    Collection Time: 06/17/22  8:21 PM   Result Value Ref Range    POC Glucose 126 (H) 65 - 100 mg/dL    Performed by: [employer]    Basic Metabolic Panel w/ Reflex to MG    Collection Time: 06/18/22  4:14 AM   Result Value Ref Range    Sodium 128 (L) 136 - 145 mmol/L    Potassium 3.4 (L) 3.5 - 5.1 mmol/L    Chloride 92 (L) 98 - 107 mmol/L    CO2 25 21 - 32 mmol/L    Anion Gap 11 7 - 16 mmol/L    Glucose 137 (H) 65 - 100 mg/dL    BUN 41 (H) 8 - 23 MG/DL    CREATININE 0.90 0.6 - 1.0 MG/DL    GFR African American >60 >60 ml/min/1.73m2    GFR Non- >60 >60 ml/min/1.73m2    Calcium 9.3 8.3 - 10.4 MG/DL   Magnesium    Collection Time: 06/18/22  4:14 AM   Result Value Ref Range    Magnesium 1.9 1.8 - 2.4 mg/dL   POCT Glucose    Collection Time: 06/18/22  7:14 AM   Result Value Ref Range    POC Glucose 144 (H) 65 - 100 mg/dL    Performed by: Heydi    POCT Glucose    Collection Time: 06/18/22 11:05 AM   Result Value Ref Range    POC Glucose 157 (H) 65 - 100 mg/dL    Performed by: Heydi        Other Studies:  CT HEAD WO CONTRAST   Final Result   Negative for acute intracranial abnormality. Chronic changes. XR CHEST 1 VIEW   Final Result   1. Diffuse pulmonary interstitial edema. 2. Moderate hiatal hernia. CT ABDOMEN PELVIS WO CONTRAST Additional Contrast? Oral   Final Result   1. Large hiatal hernia. 2. Sigmoid diverticulosis. 3. Atherosclerosis. XR KNEE RIGHT (3 VIEWS)   Final Result   1. Severe medial and lateral compartment degenerative change and moderate medial   compartment degenerative change. 2. No evidence of acute fracture.              Current Meds:  Current Facility-Administered Medications   Medication Dose Route Frequency    urea (URE-NA) packet 15 g  15 g Oral BID    metoprolol tartrate (LOPRESSOR) tablet 100 mg  100 mg Oral BID    insulin glargine (LANTUS) injection vial 5 Units  5 Units SubCUTAneous Daily    glucose chewable tablet 16 g  4 tablet Oral PRN    dextrose bolus 10% 125 mL  125 mL IntraVENous PRN    Or    dextrose bolus 10% 250 mL  250 mL IntraVENous PRN    glucagon injection 1 mg  1 mg IntraMUSCular PRN    dextrose 5 % solution  100 mL/hr IntraVENous PRN    magic (miracle) mouthwash with nystatin  5 mL Swish & Spit 4x Daily PRN    apixaban (ELIQUIS) tablet 5 mg  5 mg Oral BID    pantoprazole (PROTONIX) tablet 40 mg  40 mg Oral QAM AC    trospium (SANCTURA) tablet 20 mg  20 mg Oral BID AC    dilTIAZem (CARDIZEM) tablet 30 mg  30 mg Oral Q6H PRN    sodium chloride flush 0.9 % injection 3 mL  3 mL IntraVENous Q8H    sodium chloride flush 0.9 % injection 5-40 mL  5-40 mL IntraVENous 2 times per day    sodium chloride flush 0.9 % injection 5-40 mL  5-40 mL IntraVENous PRN    0.9 % sodium chloride infusion   IntraVENous PRN    ondansetron (ZOFRAN-ODT) disintegrating tablet 4 mg  4 mg Oral Q8H PRN    Or  ondansetron (ZOFRAN) injection 4 mg  4 mg IntraVENous Q6H PRN    polyethylene glycol (GLYCOLAX) packet 17 g  17 g Oral Daily PRN    levothyroxine (SYNTHROID) tablet 100 mcg  100 mcg Oral QAM AC    polyethylene glycol (GLYCOLAX) packet 17 g  17 g Oral Daily    senna (SENOKOT) tablet 8.6 mg  1 tablet Oral Nightly    sucralfate (CARAFATE) tablet 1 g  1 g Oral 4 times per day    traMADol (ULTRAM) tablet 50 mg  50 mg Oral Q8H PRN    memantine (NAMENDA) tablet 5 mg  5 mg Oral BID    insulin lispro (HUMALOG) injection vial 0-4 Units  0-4 Units SubCUTAneous TID WC    insulin lispro (HUMALOG) injection vial 0-4 Units  0-4 Units SubCUTAneous Nightly       Signed:  Brisa Richards MD    Part of this note may have been written by using a voice dictation software. The note has been proof read but may still contain some grammatical/other typographical errors.

## 2022-06-19 VITALS
RESPIRATION RATE: 20 BRPM | BODY MASS INDEX: 27.7 KG/M2 | DIASTOLIC BLOOD PRESSURE: 97 MMHG | TEMPERATURE: 97.3 F | HEIGHT: 68 IN | HEART RATE: 72 BPM | WEIGHT: 182.76 LBS | SYSTOLIC BLOOD PRESSURE: 142 MMHG | OXYGEN SATURATION: 96 %

## 2022-06-19 PROBLEM — J81.1 PULMONARY EDEMA: Status: RESOLVED | Noted: 2022-06-13 | Resolved: 2022-06-19

## 2022-06-19 PROBLEM — R65.20 SEVERE SEPSIS (HCC): Status: RESOLVED | Noted: 2022-06-11 | Resolved: 2022-06-19

## 2022-06-19 PROBLEM — A41.9 SEVERE SEPSIS (HCC): Status: RESOLVED | Noted: 2022-01-01 | Resolved: 2022-01-01

## 2022-06-19 PROBLEM — N39.0 UTI (URINARY TRACT INFECTION): Status: RESOLVED | Noted: 2022-01-01 | Resolved: 2022-01-01

## 2022-06-19 PROBLEM — R74.01 TRANSAMINITIS: Status: RESOLVED | Noted: 2022-06-11 | Resolved: 2022-06-19

## 2022-06-19 PROBLEM — E03.9 ACQUIRED HYPOTHYROIDISM: Chronic | Status: ACTIVE | Noted: 2017-07-17

## 2022-06-19 PROBLEM — I48.91 ATRIAL FIBRILLATION (HCC): Chronic | Status: ACTIVE | Noted: 2022-01-01

## 2022-06-19 PROBLEM — E11.21 TYPE 2 DIABETES WITH NEPHROPATHY (HCC): Chronic | Status: ACTIVE | Noted: 2019-01-17

## 2022-06-19 PROBLEM — N32.81 OAB (OVERACTIVE BLADDER): Chronic | Status: ACTIVE | Noted: 2017-07-17

## 2022-06-19 PROBLEM — E87.6 HYPOKALEMIA: Status: RESOLVED | Noted: 2022-01-01 | Resolved: 2022-01-01

## 2022-06-19 LAB
ANION GAP SERPL CALC-SCNC: 10 MMOL/L (ref 7–16)
BUN SERPL-MCNC: 37 MG/DL (ref 8–23)
CALCIUM SERPL-MCNC: 9.4 MG/DL (ref 8.3–10.4)
CHLORIDE SERPL-SCNC: 92 MMOL/L (ref 98–107)
CO2 SERPL-SCNC: 27 MMOL/L (ref 21–32)
CREAT SERPL-MCNC: 0.9 MG/DL (ref 0.6–1)
GLUCOSE BLD STRIP.AUTO-MCNC: 145 MG/DL (ref 65–100)
GLUCOSE BLD STRIP.AUTO-MCNC: 156 MG/DL (ref 65–100)
GLUCOSE SERPL-MCNC: 141 MG/DL (ref 65–100)
POTASSIUM SERPL-SCNC: 3.8 MMOL/L (ref 3.5–5.1)
SARS-COV-2 RDRP RESP QL NAA+PROBE: NOT DETECTED
SERVICE CMNT-IMP: ABNORMAL
SERVICE CMNT-IMP: ABNORMAL
SODIUM SERPL-SCNC: 129 MMOL/L (ref 136–145)
SOURCE: NORMAL

## 2022-06-19 PROCEDURE — 6370000000 HC RX 637 (ALT 250 FOR IP): Performed by: FAMILY MEDICINE

## 2022-06-19 PROCEDURE — 6370000000 HC RX 637 (ALT 250 FOR IP): Performed by: INTERNAL MEDICINE

## 2022-06-19 PROCEDURE — 6370000000 HC RX 637 (ALT 250 FOR IP)

## 2022-06-19 PROCEDURE — 2580000003 HC RX 258: Performed by: FAMILY MEDICINE

## 2022-06-19 PROCEDURE — 82962 GLUCOSE BLOOD TEST: CPT

## 2022-06-19 PROCEDURE — 36415 COLL VENOUS BLD VENIPUNCTURE: CPT

## 2022-06-19 PROCEDURE — 80048 BASIC METABOLIC PNL TOTAL CA: CPT

## 2022-06-19 PROCEDURE — 87635 SARS-COV-2 COVID-19 AMP PRB: CPT

## 2022-06-19 RX ORDER — TRAMADOL HYDROCHLORIDE 50 MG/1
50 TABLET ORAL EVERY 8 HOURS PRN
Qty: 9 TABLET | Refills: 0 | Status: SHIPPED | OUTPATIENT
Start: 2022-06-19 | End: 2022-06-22

## 2022-06-19 RX ORDER — METOPROLOL TARTRATE 100 MG/1
100 TABLET ORAL 2 TIMES DAILY
Qty: 60 TABLET | Refills: 3 | Status: ON HOLD | OUTPATIENT
Start: 2022-06-19 | End: 2022-08-16 | Stop reason: HOSPADM

## 2022-06-19 RX ORDER — TORSEMIDE 20 MG/1
20 TABLET ORAL 2 TIMES DAILY PRN
Qty: 60 TABLET | Refills: 0
Start: 2022-06-19

## 2022-06-19 RX ORDER — ACETAMINOPHEN 650 MG/1
650 SUPPOSITORY RECTAL EVERY 4 HOURS PRN
Status: DISCONTINUED | OUTPATIENT
Start: 2022-06-19 | End: 2022-06-19

## 2022-06-19 RX ORDER — ACETAMINOPHEN 325 MG/1
650 TABLET ORAL EVERY 4 HOURS PRN
Status: DISCONTINUED | OUTPATIENT
Start: 2022-06-19 | End: 2022-06-19 | Stop reason: HOSPADM

## 2022-06-19 RX ORDER — PANTOPRAZOLE SODIUM 40 MG/1
40 TABLET, DELAYED RELEASE ORAL
Qty: 30 TABLET | Refills: 3 | Status: SHIPPED | OUTPATIENT
Start: 2022-06-20

## 2022-06-19 RX ADMIN — ACETAMINOPHEN 650 MG: 325 TABLET ORAL at 02:24

## 2022-06-19 RX ADMIN — POLYETHYLENE GLYCOL 3350 17 G: 17 POWDER, FOR SOLUTION ORAL at 09:08

## 2022-06-19 RX ADMIN — INSULIN GLARGINE 5 UNITS: 100 INJECTION, SOLUTION SUBCUTANEOUS at 09:09

## 2022-06-19 RX ADMIN — MEMANTINE 5 MG: 5 TABLET ORAL at 09:08

## 2022-06-19 RX ADMIN — SUCRALFATE 1 G: 1 TABLET ORAL at 11:44

## 2022-06-19 RX ADMIN — Medication 15 G: at 09:08

## 2022-06-19 RX ADMIN — METOPROLOL TARTRATE 100 MG: 50 TABLET ORAL at 09:08

## 2022-06-19 RX ADMIN — SODIUM CHLORIDE, PRESERVATIVE FREE 10 ML: 5 INJECTION INTRAVENOUS at 09:09

## 2022-06-19 RX ADMIN — LEVOTHYROXINE SODIUM 100 MCG: 0.05 TABLET ORAL at 05:44

## 2022-06-19 RX ADMIN — PANTOPRAZOLE SODIUM 40 MG: 40 TABLET, DELAYED RELEASE ORAL at 05:44

## 2022-06-19 RX ADMIN — APIXABAN 5 MG: 5 TABLET, FILM COATED ORAL at 09:08

## 2022-06-19 RX ADMIN — SUCRALFATE 1 G: 1 TABLET ORAL at 00:05

## 2022-06-19 RX ADMIN — SUCRALFATE 1 G: 1 TABLET ORAL at 05:43

## 2022-06-19 RX ADMIN — TROSPIUM CHLORIDE 20 MG: 20 TABLET, FILM COATED ORAL at 05:43

## 2022-06-19 ASSESSMENT — PAIN DESCRIPTION - PAIN TYPE: TYPE: CHRONIC PAIN

## 2022-06-19 ASSESSMENT — PAIN DESCRIPTION - DESCRIPTORS: DESCRIPTORS: ACHING;DISCOMFORT

## 2022-06-19 ASSESSMENT — PAIN - FUNCTIONAL ASSESSMENT: PAIN_FUNCTIONAL_ASSESSMENT: PREVENTS OR INTERFERES SOME ACTIVE ACTIVITIES AND ADLS

## 2022-06-19 ASSESSMENT — PAIN SCALES - GENERAL
PAINLEVEL_OUTOF10: 0
PAINLEVEL_OUTOF10: 3

## 2022-06-19 ASSESSMENT — PAIN DESCRIPTION - ONSET: ONSET: AWAKENED FROM SLEEP

## 2022-06-19 ASSESSMENT — PAIN DESCRIPTION - LOCATION: LOCATION: KNEE;BACK;GENERALIZED

## 2022-06-19 NOTE — PROGRESS NOTES
Patient were discussed in [facility] team meeting this AM.  Patient has d/c orders for today. Patient has been accepted and approved for [facility]. CM made contact with [facility] and provided room#311 / report # [phone]. [employer] will transport patient at 1:45pm.  Patient has met treatment goals / milestones. CM will continue to monitor and remain available for any needs or concerns that may occur.         ASSESSMENT NOTE    Attending Physician: Shmuel Lemons MD  Admit Problem: Septicemia [facility]) [A41.9]  Severe sepsis ([facility] [A41.9, R65.20]  Date/Time of Admission: 6/11/2022 12:02 PM  Problem List:  Patient Active Problem List   Diagnosis    Community acquired pneumonia    Lactic acidosis    Depression    Endocrine disease    Memory loss    Diarrhea    Dyspnea on exertion    Type 2 diabetes with nephropathy ([address]    Hypertonicity of bladder    Epistaxis    Diverticulosis    CAP (community acquired pneumonia)    Esophageal reflux    Osteoarthrosis    Encounter for long-term (current) use of other medications    Cervicalgia    Essential hypertension, benign    Palpitations    Sepsis ([address]    Chest pain    Aortic stenosis    Colitis    Gastrointestinal malfunction arising from mental factors    Person under investigation for COVID-19    Atrial fibrillation with RVR ([address]    Irritable bowel syndrome    Acquired hypothyroidism    Diabetes mellitus ([address]    OAB (overactive bladder)    Mild depression (HCC)    Hyponatremia    Atrial fibrillation [facility])       Service Assessment  Patient Orientation     Cognition Alert   History Provided By Patient   Primary Caregiver Other (Comment) Alina Hernandez [facility])   Accompanied By/Relationship     [address] is:     PCP Verified by CM     Last Visit to PCP     Prior Functional Level     Current Functional Level     Can patient return to prior living arrangement Unknown at present Ability to make needs known:     Family able to assist with home care needs:     Would you like for me to discuss the discharge plan with any other family members/significant others, and if so, who? Financial Resources     Community Resources     CM/SW Referral       Social/Functional History  Lives With     Type of Home Assisted living   [address] - Number of Steps     Entrance Stairs - Rails     Bathroom Shower/Tub     Bathroom Toilet     [address] [address] Help From     [address] Work     Driving     Shopping          Other (Comment)     [id number] Paying/Finance [facility]     Other (Comment)     Ambuation Assistance     Transfer Assisstance Needs assistance   Active      Patient's  Info     Mode of Transportation     Education     Occupation     Type of Occupation       Discharge Planning   Type of [address] Prior To Admission     Potential Assistance Needed     DME     DME     DME Ordered? Potential Assistance Purchasing Medications     Meds-to-Beds: Does the patient want to have any new prescriptions delivered to bedside prior to discharge? Type of Home Care Services     Patient expects to be discharged to: Follow Up Appointment: Best Day/Time     One/Two Story Residence:     # of Interior Steps     Height of Each Step (in)     [employer] Available     History of Falls?        Services At/After Discharge  Transition of Care Consult (CM Consult): Internal Home Health     Internal Hospice     Reason Outside Agency [address]     Partner SNF     Reason Why Partner SNF Not Chosen     Internal Comfort Care     Reason Outside [address] Discharge     [address] Provided? Mode of Transport at [facility] Time of Discharge     Confirm Follow Up Transport       Condition of Participation: Discharge Planning  The plan for Transition of Care is related to the following treatment goals: The Patient and/or Patient Representative was provided with a Choice of Provider? Name of the Patient Representative who was provided with the Choice of Provider and agrees with the Discharge Plan? The Patient and/or Patient Representative Agree with the Discharge Plan? Freedom of Choice list was provided with basic dialogue that supports the individualized plan of care/goals, treatment preferences, and shares the quality data associated with the providers?        Documentation for Discharge Appeal  Discharge Appealed by     Date notified by [facility] of appeal request:     Time notified by [facility] of appeal request:     Detailed Notice of Discharge given to:     Date Notice of Discharge given:     Time Notice of Discharge given:     Date records sent to [facility]     Time records sent to Justin Campbell     Date Notified of Outcome     Time Notified of Outcome     Outcome of appeal           NOAH Perrin 06/19/22 1:13 PM

## 2022-06-19 NOTE — PROGRESS NOTES
Susana Gotti  Admission Date: 6/11/2022         [address] Nephrology Progress Note: 6/19/2022    Follow-up for: The patient's chart is reviewed and the patient is discussed with the staff. Subjective:     Pt seen and examined in room.   ROS:  No CP, no SOB, no edema    Current Facility-Administered Medications   Medication Dose Route Frequency    acetaminophen (TYLENOL) tablet 650 mg  650 mg Oral Q4H PRN    urea (URE-NA) packet 15 g  15 g Oral BID    metoprolol tartrate (LOPRESSOR) tablet 100 mg  100 mg Oral BID    insulin glargine (LANTUS) injection vial 5 Units  5 Units SubCUTAneous Daily    glucose chewable tablet 16 g  4 tablet Oral PRN    dextrose bolus 10% 125 mL  125 mL IntraVENous PRN    Or    dextrose bolus 10% 250 mL  250 mL IntraVENous PRN    glucagon injection 1 mg  1 mg IntraMUSCular PRN    dextrose 5 % solution  100 mL/hr IntraVENous PRN    magic (miracle) mouthwash with nystatin  5 mL Swish & Spit 4x Daily PRN    apixaban (ELIQUIS) tablet 5 mg  5 mg Oral BID    pantoprazole (PROTONIX) tablet 40 mg  40 mg Oral QAM AC    trospium (SANCTURA) tablet 20 mg  20 mg Oral BID AC    dilTIAZem (CARDIZEM) tablet 30 mg  30 mg Oral Q6H PRN    sodium chloride flush 0.9 % injection 3 mL  3 mL IntraVENous Q8H    sodium chloride flush 0.9 % injection 5-40 mL  5-40 mL IntraVENous 2 times per day    sodium chloride flush 0.9 % injection 5-40 mL  5-40 mL IntraVENous PRN    0.9 % sodium chloride infusion   IntraVENous PRN    ondansetron (ZOFRAN-ODT) disintegrating tablet 4 mg  4 mg Oral Q8H PRN    Or    ondansetron (ZOFRAN) injection 4 mg  4 mg IntraVENous Q6H PRN    polyethylene glycol (GLYCOLAX) packet 17 g  17 g Oral Daily PRN    levothyroxine (SYNTHROID) tablet 100 mcg  100 mcg Oral QAM AC    polyethylene glycol (GLYCOLAX) packet 17 g  17 g Oral Daily    senna (SENOKOT) tablet 8.6 mg  1 tablet Oral Nightly    sucralfate (CARAFATE) tablet 1 g  1 g Oral 4 times per day    traMADol (ULTRAM) tablet 50 mg  50 mg Oral Q8H PRN    memantine (NAMENDA) tablet 5 mg  5 mg Oral BID    insulin lispro (HUMALOG) injection vial 0-4 Units  0-4 Units SubCUTAneous TID WC    insulin lispro (HUMALOG) injection vial 0-4 Units  0-4 Units SubCUTAneous Nightly         Objective:     Vitals:    06/18/22 1946 06/18/22 2316 06/19/22 0349 06/19/22 0710   BP: (!) 140/93 (!) 144/85 (!) 143/91 139/85   Pulse: 86 79 52 88   Resp: 16 16 16 16   Temp: 97.7 °F (36.5 °C) 98.1 °F (36.7 °C) 97.5 °F (36.4 °C) 97.7 °F (36.5 °C)   TempSrc: Oral Oral Oral Oral   SpO2: 95% 96% 97% 96%   Weight:       Height:         Intake and Output:   06/17 1901 - 06/19 0700  In: 720 [P.O.:720]  Out: 2750 [Urine:2750]  No intake/output data recorded. Physical Exam:   Constitutional:  the patient is well developed and in no acute distress  HEENT:  Sclera clear, pupils equal, oral mucosa moist  Lungs: clear bilaterally  Cardiovascular:  irregular  Abd/GI: soft and non-tender; with positive bowel sounds. Ext: warm without cyanosis. There is no lower leg edema. Musculoskeletal: moves all four extremities with equal strength  Skin:  no jaundice or rashes  Neuro: no gross neuro deficits           LAB  No results for input(s): WBC, HGB, HCT, PLT, INR in the last 72 hours. Recent Labs     06/17/22  0451 06/18/22  0414 06/19/22  0614   * 128* 129*   K 3.7 3.4* 3.8   CL 91* 92* 92*   CO2 25 25 27   BUN 36* 41* 37*   CREATININE 1.00 0.90 0.90   MG  --  1.9  --      No results for input(s): PH, PCO2, PO2, HCO3 in the last 72 hours. Plan:  (Medical Decision Making)   1. JAMIE - resolved    2. Hyo osmolar Hyponatremia -likely component of CHF   Better on UreNa     3.  UTI -      Lore Johns MD  [facility] Nephrology

## 2022-06-19 NOTE — PLAN OF CARE
Problem: Discharge Planning  Goal: Discharge to home or other facility with appropriate resources  Outcome: Progressing  Flowsheets (Taken 6/18/2022 1902)  Discharge to home or other facility with appropriate resources: Identify barriers to discharge with patient and caregiver     Problem: Pain  Goal: Verbalizes/displays adequate comfort level or baseline comfort level  Outcome: Progressing     Problem: Skin/Tissue Integrity  Goal: Absence of new skin breakdown  Description: 1. Monitor for areas of redness and/or skin breakdown  2. Assess vascular access sites hourly  3. Every 4-6 hours minimum:  Change oxygen saturation probe site  4. Every 4-6 hours:  If on nasal continuous positive airway pressure, respiratory therapy assess nares and determine need for appliance change or resting period.   Outcome: Progressing     Problem: Safety - Adult  Goal: Free from fall injury  Outcome: Progressing  Flowsheets (Taken 6/18/2022 1901)  Free From Fall Injury: Instruct family/caregiver on patient safety     Problem: ABCDS Injury Assessment  Goal: Absence of physical injury  Outcome: Progressing  Flowsheets (Taken 6/18/2022 1901)  Absence of Physical Injury: Implement safety measures based on patient assessment     Problem: Chronic Conditions and Co-morbidities  Goal: Patient's chronic conditions and co-morbidity symptoms are monitored and maintained or improved  Outcome: Progressing  Flowsheets (Taken 6/18/2022 1902)  Care Plan - Patient's Chronic Conditions and Co-Morbidity Symptoms are Monitored and Maintained or Improved: Monitor and assess patient's chronic conditions and comorbid symptoms for stability, deterioration, or improvement

## 2022-06-19 NOTE — DISCHARGE SUMMARY
Hospitalist Discharge Summary   Admit Date:  2022 12:02 PM   DC Note date: 2022  Name:  Saman Delatorre   Age:  80 y.o. Sex:  female  :  1926   MRN:  [id number]   Room:  [facility]  PCP:  Elder Perez MD    Presenting Complaint: Abdominal Pain and Urinary Retention     Initial Admission Diagnosis: Septicemia ([address] [A41.9]  Severe sepsis ([address] [A41.9, R65.20]     Problem List for this Hospitalization (present on admission):    Principal Problem (Resolved):    Severe sepsis ([address]  Active Problems:    Hyponatremia    Atrial fibrillation ([address]    Memory loss    Type 2 diabetes with nephropathy ([address]    Essential hypertension, benign    Acquired hypothyroidism    OAB (overactive bladder)  Resolved Problems:    UTI (urinary tract infection)    Transaminitis    Hypokalemia    Pulmonary edema    Edema    Did Patient have Sepsis (YES OR NO): yes    Hospital Course:  Pt is a 81 y/o F with afib, DM, HTN, who presented with decreased urination pelvic pain and poor intake. Admitted with sepsis from UTI. Cultures were negative. Also noted to have low Na so nephrology consulted. Urine osm was high. Nephrology consulted and started on urea. Na improving slowly. Will continue this for now. Her intake has been poor and not overloaded appearing so will not continue her home diuretic for now; she can take PRN in future. meds can be adjusted further based on clinical status. She completed 7d rocephin here.     Family was resistant to hospice but should be considered if she has recurrent hospitalizations or ER presentations    Discharge delayed 3-4 days due to family wanting specific SNF and no others    Disposition: SNF  Diet: ADULT DIET; Easy to Chew; 4 carb choices (60 gm/meal); 1200 ml  Code Status: Full Code    Follow Ups:   Contact information for follow-up providers     Go to Elder Perez MD.    Specialty: Internal Medicine  Why: when discharged from rehab  Contact information:  [address] [address] [phone]                   Contact information for after-discharge care     [facility] [address] Go today. Service: Skilled Nursing  Contact information:  Juan Alberto Watkins  [address]  [phone]                           Follow up labs/diagnostics (ultimately defer to outpatient provider):  CBC, BMP    Time spent in patient discharge and coordination 35 minutes. Plan was discussed with pt, family. All questions answered. Patient was stable at time of discharge. Instructions given to call a physician or return if any concerns.     Current Discharge Medication List      START taking these medications    Details   pantoprazole (PROTONIX) 40 MG tablet Take 1 tablet by mouth every morning (before breakfast)  Qty: 30 tablet, Refills: 3      urea (URE-NA) 15 g PACK packet Take 15 g by mouth in the morning and at bedtime  Qty: 20 each, Refills: 0         CONTINUE these medications which have CHANGED    Details   metoprolol tartrate (LOPRESSOR) 100 MG tablet Take 1 tablet by mouth 2 times daily  Qty: 60 tablet, Refills: 3      torsemide (DEMADEX) 20 MG tablet Take 1 tablet by mouth 2 times daily as needed (volume overload, edema)  Qty: 60 tablet, Refills: 0         CONTINUE these medications which have NOT CHANGED    Details   acetaminophen (TYLENOL) 500 MG tablet Take 1,000 mg by mouth daily      amLODIPine (NORVASC) 5 MG tablet TAKE ONE TABLET BY MOUTH EVERY DAY      apixaban (ELIQUIS) 5 MG TABS tablet Take 5 mg by mouth 2 times daily      cetirizine (ZYRTEC) 10 MG tablet Take 10 mg by mouth daily      vitamin D 25 MCG (1000 UT) CAPS Take 1,000 Units by mouth daily      diclofenac (FLECTOR) 1.3 % PTCH patch Place 1 patch onto the skin every 12 hours      levothyroxine (SYNTHROID) 100 MCG tablet Take by mouth every morning (before breakfast)      Lidocaine HCl 4 % CREA Apply topically      memantine (NAMENDA) 10 MG tablet Take 10 mg by mouth 2 times daily      mirabegron (MYRBETRIQ) 25 MG TB24 Take 25 mg by mouth daily      nitroGLYCERIN (NITRODUR) 0.4 MG/HR Place 1 patch onto the skin daily      nitroGLYCERIN (NITROSTAT) 0.4 MG SL tablet Place under the tongue      ondansetron (ZOFRAN) 4 MG tablet Take 4 mg by mouth every 8 hours as needed      polyethylene glycol (GLYCOLAX) 17 GM/SCOOP powder Take 17 g by mouth daily      senna (SENOKOT) 8.6 MG tablet Take 2 tablet PO at bedtime      traMADol (ULTRAM) 50 MG tablet Take 50 mg by mouth every 8 hours as needed. STOP taking these medications       hydrALAZINE (APRESOLINE) 25 MG tablet Comments:   Reason for Stopping:         lansoprazole (PREVACID) 15 MG delayed release capsule Comments:   Reason for Stopping:         potassium chloride (KLOR-CON M) 20 MEQ extended release tablet Comments:   Reason for Stopping:         sucralfate (CARAFATE) 1 GM/10ML suspension Comments:   Reason for Stopping:               Procedures done this admission:  * No surgery found *    Consults this admission:  IP CONSULT TO PHARMACY  IP CONSULT TO PHARMACY  IP CONSULT TO NEPHROLOGY    Echocardiogram results:  No results found for this or any previous visit. Diagnostic Imaging/Tests:   CT ABDOMEN PELVIS WO CONTRAST Additional Contrast? Oral    Result Date: 6/11/2022  CT abdomen and pelvis without contrast  History: Elevated LFTs and bilirubin, abdominal pain. Technique: Helically acquired images were obtained from the upper abdomen to the ischial tuberosities reconstructed at 5mm intervals after oral contrast. Intravenous contrast was not administered. Coronal and sagittal reformatted images were submitted. Radiation dose reduction techniques were used for this study:  Our CT scanners use one or all of the following: Automated exposure control, adjustment of the mA and/or kVp according to patient's size, iterative reconstruction.  Comparison: 06/28/2020 CT ABDOMEN: The MG  --  1.9  --       CBC No results for input(s): WBC, RBC, HGB, HCT, PLT, MONOS, YANET in the last 72 hours. Invalid input(s): GRANS, LYMPH, EOS, BASOS, IG, ANEU, ABL, ABM, ABB, AIG   LFT No results for input(s): ALT, TP, ALB, GLOB in the last 72 hours.     Invalid input(s): SGOT, TBIL, AP, AGRAT, GPT   Cardiac Testing Lab Results   Component Value Date     10/12/2021    BNP 2,277 06/28/2020    BNP 9,629 06/23/2020      Coagulation Tests Lab Results   Component Value Date    INR 2.1 06/11/2022    APTT 42.1 06/11/2022      A1c No results found for: HBA1C   Lipid Panel Lab Results   Component Value Date    CHOL 188 10/19/2020    HDL 46 10/19/2020    VLDL 27 10/19/2020      Thyroid Panel Lab Results   Component Value Date    TSH 0.641 06/23/2020    TSH 0.133 08/27/2019        Most Recent UA No results found for: MUCUS, UCOM       All Labs from Last 24 Hrs:  Recent Results (from the past 24 hour(s))   POCT Glucose    Collection Time: 06/18/22 11:05 AM   Result Value Ref Range    POC Glucose 157 (H) 65 - 100 mg/dL    Performed by: [employer]annaNonlinear DynamicsCT    POCT Glucose    Collection Time: 06/18/22  5:12 PM   Result Value Ref Range    POC Glucose 142 (H) 65 - 100 mg/dL    Performed by: LivanBalihooCT    POCT Glucose    Collection Time: 06/18/22  8:50 PM   Result Value Ref Range    POC Glucose 127 (H) 65 - 100 mg/dL    Performed by: Marky Bruner    Basic Metabolic Panel w/ Reflex to MG    Collection Time: 06/19/22  6:14 AM   Result Value Ref Range    Sodium 129 (L) 136 - 145 mmol/L    Potassium 3.8 3.5 - 5.1 mmol/L    Chloride 92 (L) 98 - 107 mmol/L    CO2 27 21 - 32 mmol/L    Anion Gap 10 7 - 16 mmol/L    Glucose 141 (H) 65 - 100 mg/dL    BUN 37 (H) 8 - 23 MG/DL    CREATININE 0.90 0.6 - 1.0 MG/DL    GFR African American >60 >60 ml/min/1.73m2    GFR Non- >60 >60 ml/min/1.73m2    Calcium 9.4 8.3 - 10.4 MG/DL   POCT Glucose    Collection Time: 06/19/22  7:13 AM   Result Value Ref Range    POC Glucose 156 (H) 65 - 100 mg/dL    Performed by: Heydi        Allergies   Allergen Reactions    Penicillins Hives    Strawberry Extract Itching    Codeine Rash     Immunization History   Administered Date(s) Administered    Influenza Virus Vaccine 10/26/2014    Influenza, High Dose (Fluzone 65 yrs and older) 09/19/2011, 10/01/2015, 10/20/2017, 09/01/2018    Influenza, MDCK Quadv, IM, PF (Flucelvax 2 yrs and older) 10/24/2019    Influenza, Triv, inactivated, subunit, adjuvanted, IM (Fluad 65 yrs and older) 10/30/2018    PPD Test 06/24/2020, 06/29/2020, 06/11/2022    Pneumococcal Vaccine 01/30/2015    Tdap (Boostrix, Adacel) 12/15/2012       Recent Vital Data:  Patient Vitals for the past 24 hrs:   Temp Pulse Resp BP SpO2   06/19/22 0710 97.7 °F (36.5 °C) 88 16 139/85 96 %   06/19/22 0349 97.5 °F (36.4 °C) 52 16 (!) 143/91 97 %   06/18/22 2316 98.1 °F (36.7 °C) 79 16 (!) 144/85 96 %   06/18/22 1946 97.7 °F (36.5 °C) 86 16 (!) 140/93 95 %   06/18/22 1721 -- 84 -- (!) 145/113 --   06/18/22 1719 97.3 °F (36.3 °C) 60 22 -- 95 %   06/18/22 1110 98.1 °F (36.7 °C) 95 20 (!) 125/90 97 %       Oxygen Therapy  SpO2: 96 %  Pulse via Oximetry: 100 beats per minute  O2 Device: None (Room air)    Estimated body mass index is 27.79 kg/m² as calculated from the following:    Height as of this encounter: 5' 8\" (1.727 m). Weight as of this encounter: 182 lb 12.2 oz (82.9 kg). Intake/Output Summary (Last 24 hours) at 6/19/2022 0907  Last data filed at 6/19/2022 0603  Gross per 24 hour   Intake 360 ml   Output 2050 ml   Net -1690 ml         Physical Exam:    General:    Well nourished. No overt distress  Head:  Normocephalic, atraumatic  Eyes:  Sclerae appear normal.  Pupils equally round. HENT:  Nares appear normal, no drainage. Moist mucous membranes  Neck:  No restricted ROM. Trachea midline  CV:   RRR. No JVD  Lungs:   CTAB. Even, unlabored  Abdomen:   nondistended. Extremities: Warm and dry. No edema. Skin:     No rashes. Normal coloration  Neuro:  CN II-XII grossly intact. Psych:  Normal mood and affect. Signed:  Waldo Schlatter, MD    Part of this note may have been written by using a voice dictation software. The note has been proof read but may still contain some grammatical/other typographical errors.

## 2022-06-19 NOTE — PROGRESS NOTES
Hourly rounds completed this shift. All needs met at this time. Bed low/locked. Call light within reach. Will continue to monitor and give bedside report to oncoming nurse. Pain managed per MAR.

## 2022-06-28 NOTE — PROGRESS NOTES
Physician Progress Note      PATIENT:               Dorothea Downey  CSN #:                  [id number]  :                       1926  ADMIT DATE:       2022 12:02 PM  [address] [place] DATE:        2022 1:56 PM  RESPONDING  PROVIDER #:        Anibal Martin MD          QUERY TEXT:    Patient admitted with sepsis. Noted documentation of UTI in H&P on 22 and   on the Problem list in the discharge summary. In order to support the   diagnosis of UTI, please include additional clinical indicators in your   documentation. Or please document if the diagnosis of UTI has been ruled out   after further study. The medical record reflects the following:  Risk Factors: elderly female patient with history of DM2, overactive bladder  Clinical Indicators: UA 1+ bacteria, positive nitrates, small leukocyte   esterase, Urine culture negative  Treatment: IV Rocephin    email: [email or web address]  Options provided:  -- UTI present as evidenced by, Please document evidence.   -- UTI was ruled out  -- Other - I will add my own diagnosis  -- Disagree - Not applicable / Not valid  -- Disagree - Clinically unable to determine / Unknown  -- Refer to Clinical Documentation Reviewer    PROVIDER RESPONSE TEXT:    UTI is present as evidenced by Risk Factors: elderly female patient with   history of DM2, overactive bladder    Query created by: Tano Main on 2022 1:37 PM      Electronically signed by:  Anibal Martin MD 2022 1:49 PM

## 2022-08-06 ENCOUNTER — HOSPITAL ENCOUNTER (INPATIENT)
Age: 87
LOS: 8 days | Discharge: HOSPICE | DRG: 082 | End: 2022-08-16
Admitting: INTERNAL MEDICINE
Payer: MEDICARE

## 2022-08-06 ENCOUNTER — APPOINTMENT (OUTPATIENT)
Dept: CT IMAGING | Age: 87
DRG: 082 | End: 2022-08-06
Payer: MEDICARE

## 2022-08-06 DIAGNOSIS — Z51.5 HOSPICE CARE PATIENT: ICD-10-CM

## 2022-08-06 DIAGNOSIS — Z16.12 UTI DUE TO EXTENDED-SPECTRUM BETA LACTAMASE (ESBL) PRODUCING ESCHERICHIA COLI: ICD-10-CM

## 2022-08-06 DIAGNOSIS — G30.1 LATE ONSET ALZHEIMER'S DEMENTIA WITHOUT BEHAVIORAL DISTURBANCE (HCC): Chronic | ICD-10-CM

## 2022-08-06 DIAGNOSIS — I61.9 CEREBRAL BRAIN HEMORRHAGE (HCC): ICD-10-CM

## 2022-08-06 DIAGNOSIS — S06.310A: ICD-10-CM

## 2022-08-06 DIAGNOSIS — J81.0 ACUTE PULMONARY EDEMA (HCC): ICD-10-CM

## 2022-08-06 DIAGNOSIS — I67.9 CEREBROVASCULAR DISEASE: ICD-10-CM

## 2022-08-06 DIAGNOSIS — N39.0 UTI DUE TO EXTENDED-SPECTRUM BETA LACTAMASE (ESBL) PRODUCING ESCHERICHIA COLI: ICD-10-CM

## 2022-08-06 DIAGNOSIS — I50.20 HFREF (HEART FAILURE WITH REDUCED EJECTION FRACTION) (HCC): ICD-10-CM

## 2022-08-06 DIAGNOSIS — B96.29 UTI DUE TO EXTENDED-SPECTRUM BETA LACTAMASE (ESBL) PRODUCING ESCHERICHIA COLI: ICD-10-CM

## 2022-08-06 DIAGNOSIS — F02.80 LATE ONSET ALZHEIMER'S DEMENTIA WITHOUT BEHAVIORAL DISTURBANCE (HCC): Chronic | ICD-10-CM

## 2022-08-06 DIAGNOSIS — S09.90XA CLOSED HEAD INJURY, INITIAL ENCOUNTER: Primary | ICD-10-CM

## 2022-08-06 PROBLEM — I48.91 ATRIAL FIBRILLATION WITH RVR (HCC): Status: RESOLVED | Noted: 2020-06-23 | Resolved: 2022-08-06

## 2022-08-06 PROBLEM — J18.9 CAP (COMMUNITY ACQUIRED PNEUMONIA): Status: RESOLVED | Noted: 2020-07-03 | Resolved: 2022-01-01

## 2022-08-06 PROBLEM — R19.7 DIARRHEA: Status: RESOLVED | Noted: 2020-06-28 | Resolved: 2022-01-01

## 2022-08-06 PROBLEM — J18.9 COMMUNITY ACQUIRED PNEUMONIA: Status: RESOLVED | Noted: 2020-06-26 | Resolved: 2022-08-06

## 2022-08-06 PROBLEM — R07.9 CHEST PAIN: Status: RESOLVED | Noted: 2021-10-29 | Resolved: 2022-01-01

## 2022-08-06 PROBLEM — A41.9 SEPSIS (HCC): Status: RESOLVED | Noted: 2020-06-28 | Resolved: 2022-08-06

## 2022-08-06 PROBLEM — R00.2 PALPITATIONS: Status: RESOLVED | Noted: 2018-12-17 | Resolved: 2022-08-06

## 2022-08-06 PROBLEM — E87.20 LACTIC ACIDOSIS: Status: RESOLVED | Noted: 2020-06-29 | Resolved: 2022-08-06

## 2022-08-06 PROBLEM — Z20.822 PERSON UNDER INVESTIGATION FOR COVID-19: Status: RESOLVED | Noted: 2020-06-28 | Resolved: 2022-08-06

## 2022-08-06 PROBLEM — E87.1 HYPONATREMIA: Status: RESOLVED | Noted: 2022-06-12 | Resolved: 2022-08-06

## 2022-08-06 PROBLEM — K52.9 COLITIS: Status: RESOLVED | Noted: 2020-06-28 | Resolved: 2022-01-01

## 2022-08-06 LAB
ANION GAP SERPL CALC-SCNC: 7 MMOL/L (ref 7–16)
APPEARANCE UR: ABNORMAL
APTT PPP: 37.6 SEC (ref 24.1–35.1)
BACTERIA URNS QL MICRO: ABNORMAL /HPF
BASOPHILS # BLD: 0 K/UL (ref 0–0.2)
BASOPHILS NFR BLD: 0 % (ref 0–2)
BILIRUB UR QL: NEGATIVE
BUN SERPL-MCNC: 14 MG/DL (ref 8–23)
CALCIUM SERPL-MCNC: 8.8 MG/DL (ref 8.3–10.4)
CHLORIDE SERPL-SCNC: 100 MMOL/L (ref 98–107)
CO2 SERPL-SCNC: 28 MMOL/L (ref 21–32)
COLOR UR: ABNORMAL
CREAT SERPL-MCNC: 1.1 MG/DL (ref 0.6–1)
DIFFERENTIAL METHOD BLD: ABNORMAL
EOSINOPHIL # BLD: 0.1 K/UL (ref 0–0.8)
EOSINOPHIL NFR BLD: 1 % (ref 0.5–7.8)
EPI CELLS #/AREA URNS HPF: ABNORMAL /HPF
ERYTHROCYTE [DISTWIDTH] IN BLOOD BY AUTOMATED COUNT: 17.8 % (ref 11.9–14.6)
GLUCOSE BLD STRIP.AUTO-MCNC: 111 MG/DL (ref 65–100)
GLUCOSE BLD STRIP.AUTO-MCNC: 116 MG/DL (ref 65–100)
GLUCOSE BLD STRIP.AUTO-MCNC: 120 MG/DL (ref 65–100)
GLUCOSE BLD STRIP.AUTO-MCNC: 140 MG/DL (ref 65–100)
GLUCOSE SERPL-MCNC: 125 MG/DL (ref 65–100)
GLUCOSE UR STRIP.AUTO-MCNC: NEGATIVE MG/DL
HCT VFR BLD AUTO: 48 % (ref 35.8–46.3)
HGB BLD-MCNC: 15.7 G/DL (ref 11.7–15.4)
HGB UR QL STRIP: NEGATIVE
IMM GRANULOCYTES # BLD AUTO: 0 K/UL (ref 0–0.5)
IMM GRANULOCYTES NFR BLD AUTO: 0 % (ref 0–5)
INR PPP: 1.2
KETONES UR QL STRIP.AUTO: ABNORMAL MG/DL
LEUKOCYTE ESTERASE UR QL STRIP.AUTO: ABNORMAL
LYMPHOCYTES # BLD: 2.7 K/UL (ref 0.5–4.6)
LYMPHOCYTES NFR BLD: 29 % (ref 13–44)
MAGNESIUM SERPL-MCNC: 1.6 MG/DL (ref 1.8–2.4)
MCH RBC QN AUTO: 31.1 PG (ref 26.1–32.9)
MCHC RBC AUTO-ENTMCNC: 32.7 G/DL (ref 31.4–35)
MCV RBC AUTO: 95 FL (ref 79.6–97.8)
MONOCYTES # BLD: 0.7 K/UL (ref 0.1–1.3)
MONOCYTES NFR BLD: 8 % (ref 4–12)
NEUTS SEG # BLD: 5.9 K/UL (ref 1.7–8.2)
NEUTS SEG NFR BLD: 62 % (ref 43–78)
NITRITE UR QL STRIP.AUTO: POSITIVE
NRBC # BLD: 0 K/UL (ref 0–0.2)
OTHER OBSERVATIONS: ABNORMAL
PH UR STRIP: 6 [PH] (ref 5–9)
PLATELET # BLD AUTO: 275 K/UL (ref 150–450)
PMV BLD AUTO: 9.8 FL (ref 9.4–12.3)
POTASSIUM SERPL-SCNC: 3.4 MMOL/L (ref 3.5–5.1)
PROT UR STRIP-MCNC: ABNORMAL MG/DL
PROTHROMBIN TIME: 15.6 SEC (ref 12.6–14.5)
RBC # BLD AUTO: 5.05 M/UL (ref 4.05–5.2)
RBC #/AREA URNS HPF: ABNORMAL /HPF
SERVICE CMNT-IMP: ABNORMAL
SODIUM SERPL-SCNC: 135 MMOL/L (ref 136–145)
SP GR UR REFRACTOMETRY: 1.01 (ref 1–1.02)
T4 FREE SERPL-MCNC: 1.2 NG/DL (ref 0.78–1.46)
TSH W FREE THYROID IF ABNORMAL: 22 UIU/ML (ref 0.36–3.74)
UROBILINOGEN UR QL STRIP.AUTO: 1 EU/DL (ref 0.2–1)
WBC # BLD AUTO: 9.5 K/UL (ref 4.3–11.1)
WBC URNS QL MICRO: ABNORMAL /HPF

## 2022-08-06 PROCEDURE — 96365 THER/PROPH/DIAG IV INF INIT: CPT

## 2022-08-06 PROCEDURE — 83735 ASSAY OF MAGNESIUM: CPT

## 2022-08-06 PROCEDURE — G0378 HOSPITAL OBSERVATION PER HR: HCPCS

## 2022-08-06 PROCEDURE — 92610 EVALUATE SWALLOWING FUNCTION: CPT

## 2022-08-06 PROCEDURE — 80048 BASIC METABOLIC PNL TOTAL CA: CPT

## 2022-08-06 PROCEDURE — 85610 PROTHROMBIN TIME: CPT

## 2022-08-06 PROCEDURE — 6360000002 HC RX W HCPCS: Performed by: INTERNAL MEDICINE

## 2022-08-06 PROCEDURE — 6370000000 HC RX 637 (ALT 250 FOR IP): Performed by: FAMILY MEDICINE

## 2022-08-06 PROCEDURE — 82962 GLUCOSE BLOOD TEST: CPT

## 2022-08-06 PROCEDURE — 2580000003 HC RX 258: Performed by: INTERNAL MEDICINE

## 2022-08-06 PROCEDURE — 84439 ASSAY OF FREE THYROXINE: CPT

## 2022-08-06 PROCEDURE — 6370000000 HC RX 637 (ALT 250 FOR IP): Performed by: INTERNAL MEDICINE

## 2022-08-06 PROCEDURE — 70450 CT HEAD/BRAIN W/O DYE: CPT

## 2022-08-06 PROCEDURE — 87086 URINE CULTURE/COLONY COUNT: CPT

## 2022-08-06 PROCEDURE — 84443 ASSAY THYROID STIM HORMONE: CPT

## 2022-08-06 PROCEDURE — 96368 THER/DIAG CONCURRENT INF: CPT

## 2022-08-06 PROCEDURE — 85730 THROMBOPLASTIN TIME PARTIAL: CPT

## 2022-08-06 PROCEDURE — 2580000003 HC RX 258: Performed by: FAMILY MEDICINE

## 2022-08-06 PROCEDURE — 36415 COLL VENOUS BLD VENIPUNCTURE: CPT

## 2022-08-06 PROCEDURE — 2100000000 HC CCU R&B

## 2022-08-06 PROCEDURE — 81001 URINALYSIS AUTO W/SCOPE: CPT

## 2022-08-06 PROCEDURE — 96366 THER/PROPH/DIAG IV INF ADDON: CPT

## 2022-08-06 PROCEDURE — 2500000003 HC RX 250 WO HCPCS: Performed by: INTERNAL MEDICINE

## 2022-08-06 PROCEDURE — 85025 COMPLETE CBC W/AUTO DIFF WBC: CPT

## 2022-08-06 PROCEDURE — 87088 URINE BACTERIA CULTURE: CPT

## 2022-08-06 PROCEDURE — 99285 EMERGENCY DEPT VISIT HI MDM: CPT

## 2022-08-06 PROCEDURE — 87186 SC STD MICRODIL/AGAR DIL: CPT

## 2022-08-06 RX ORDER — MAGNESIUM SULFATE IN WATER 40 MG/ML
2000 INJECTION, SOLUTION INTRAVENOUS PRN
Status: DISCONTINUED | OUTPATIENT
Start: 2022-08-06 | End: 2022-08-11

## 2022-08-06 RX ORDER — POLYETHYLENE GLYCOL 3350 17 G/17G
17 POWDER, FOR SOLUTION ORAL DAILY
Status: DISCONTINUED | OUTPATIENT
Start: 2022-08-06 | End: 2022-08-16 | Stop reason: HOSPADM

## 2022-08-06 RX ORDER — DEXTROSE MONOHYDRATE 100 MG/ML
INJECTION, SOLUTION INTRAVENOUS CONTINUOUS PRN
Status: DISCONTINUED | OUTPATIENT
Start: 2022-08-06 | End: 2022-08-16 | Stop reason: HOSPADM

## 2022-08-06 RX ORDER — PROMETHAZINE HYDROCHLORIDE 25 MG/1
12.5 TABLET ORAL EVERY 6 HOURS PRN
Status: DISCONTINUED | OUTPATIENT
Start: 2022-08-06 | End: 2022-08-16 | Stop reason: HOSPADM

## 2022-08-06 RX ORDER — CETIRIZINE HYDROCHLORIDE 10 MG/1
10 TABLET ORAL DAILY
Status: DISCONTINUED | OUTPATIENT
Start: 2022-08-06 | End: 2022-08-16 | Stop reason: HOSPADM

## 2022-08-06 RX ORDER — MAGNESIUM HYDROXIDE/ALUMINUM HYDROXICE/SIMETHICONE 120; 1200; 1200 MG/30ML; MG/30ML; MG/30ML
30 SUSPENSION ORAL EVERY 6 HOURS PRN
Status: DISCONTINUED | OUTPATIENT
Start: 2022-08-06 | End: 2022-08-16 | Stop reason: HOSPADM

## 2022-08-06 RX ORDER — SODIUM CHLORIDE 9 MG/ML
INJECTION, SOLUTION INTRAVENOUS PRN
Status: DISCONTINUED | OUTPATIENT
Start: 2022-08-06 | End: 2022-08-16 | Stop reason: HOSPADM

## 2022-08-06 RX ORDER — SODIUM CHLORIDE 0.9 % (FLUSH) 0.9 %
5-40 SYRINGE (ML) INJECTION EVERY 12 HOURS SCHEDULED
Status: DISCONTINUED | OUTPATIENT
Start: 2022-08-06 | End: 2022-08-16 | Stop reason: HOSPADM

## 2022-08-06 RX ORDER — INSULIN LISPRO 100 [IU]/ML
0-8 INJECTION, SOLUTION INTRAVENOUS; SUBCUTANEOUS
Status: DISCONTINUED | OUTPATIENT
Start: 2022-08-06 | End: 2022-08-16 | Stop reason: HOSPADM

## 2022-08-06 RX ORDER — POTASSIUM CHLORIDE 7.45 MG/ML
10 INJECTION INTRAVENOUS PRN
Status: DISCONTINUED | OUTPATIENT
Start: 2022-08-06 | End: 2022-08-11

## 2022-08-06 RX ORDER — ONDANSETRON 2 MG/ML
4 INJECTION INTRAMUSCULAR; INTRAVENOUS EVERY 6 HOURS PRN
Status: DISCONTINUED | OUTPATIENT
Start: 2022-08-06 | End: 2022-08-16 | Stop reason: HOSPADM

## 2022-08-06 RX ORDER — ACETAMINOPHEN 325 MG/1
650 TABLET ORAL EVERY 6 HOURS PRN
Status: DISCONTINUED | OUTPATIENT
Start: 2022-08-06 | End: 2022-08-16 | Stop reason: HOSPADM

## 2022-08-06 RX ORDER — NITROGLYCERIN 80 MG/1
1 PATCH TRANSDERMAL DAILY
Status: DISCONTINUED | OUTPATIENT
Start: 2022-08-06 | End: 2022-08-16 | Stop reason: HOSPADM

## 2022-08-06 RX ORDER — TROSPIUM CHLORIDE 20 MG/1
20 TABLET, FILM COATED ORAL NIGHTLY
Status: DISCONTINUED | OUTPATIENT
Start: 2022-08-06 | End: 2022-08-16 | Stop reason: HOSPADM

## 2022-08-06 RX ORDER — SENNA PLUS 8.6 MG/1
2 TABLET ORAL NIGHTLY
Status: DISCONTINUED | OUTPATIENT
Start: 2022-08-06 | End: 2022-08-16 | Stop reason: HOSPADM

## 2022-08-06 RX ORDER — AMLODIPINE BESYLATE 5 MG/1
5 TABLET ORAL DAILY
Status: DISCONTINUED | OUTPATIENT
Start: 2022-08-06 | End: 2022-08-11

## 2022-08-06 RX ORDER — LEVOTHYROXINE SODIUM 0.05 MG/1
100 TABLET ORAL
Status: DISCONTINUED | OUTPATIENT
Start: 2022-08-06 | End: 2022-08-16 | Stop reason: HOSPADM

## 2022-08-06 RX ORDER — ACETAMINOPHEN 650 MG/1
650 SUPPOSITORY RECTAL EVERY 6 HOURS PRN
Status: DISCONTINUED | OUTPATIENT
Start: 2022-08-06 | End: 2022-08-16 | Stop reason: HOSPADM

## 2022-08-06 RX ORDER — MEMANTINE HYDROCHLORIDE 5 MG/1
10 TABLET ORAL 2 TIMES DAILY
Status: DISCONTINUED | OUTPATIENT
Start: 2022-08-06 | End: 2022-08-16 | Stop reason: HOSPADM

## 2022-08-06 RX ORDER — POTASSIUM CHLORIDE 20 MEQ/1
40 TABLET, EXTENDED RELEASE ORAL PRN
Status: DISCONTINUED | OUTPATIENT
Start: 2022-08-06 | End: 2022-08-11

## 2022-08-06 RX ORDER — PANTOPRAZOLE SODIUM 40 MG/1
40 TABLET, DELAYED RELEASE ORAL
Status: DISCONTINUED | OUTPATIENT
Start: 2022-08-06 | End: 2022-08-16 | Stop reason: HOSPADM

## 2022-08-06 RX ORDER — SODIUM CHLORIDE 0.9 % (FLUSH) 0.9 %
5-40 SYRINGE (ML) INJECTION PRN
Status: DISCONTINUED | OUTPATIENT
Start: 2022-08-06 | End: 2022-08-16 | Stop reason: HOSPADM

## 2022-08-06 RX ORDER — INSULIN LISPRO 100 [IU]/ML
0-4 INJECTION, SOLUTION INTRAVENOUS; SUBCUTANEOUS NIGHTLY
Status: DISCONTINUED | OUTPATIENT
Start: 2022-08-06 | End: 2022-08-16 | Stop reason: HOSPADM

## 2022-08-06 RX ADMIN — SODIUM CHLORIDE, PRESERVATIVE FREE 10 ML: 5 INJECTION INTRAVENOUS at 08:43

## 2022-08-06 RX ADMIN — SODIUM CHLORIDE 5 MG/HR: 9 INJECTION, SOLUTION INTRAVENOUS at 11:52

## 2022-08-06 RX ADMIN — SODIUM CHLORIDE, PRESERVATIVE FREE 10 ML: 5 INJECTION INTRAVENOUS at 20:28

## 2022-08-06 RX ADMIN — MEMANTINE 10 MG: 5 TABLET ORAL at 20:37

## 2022-08-06 RX ADMIN — ACETAMINOPHEN 650 MG: 325 TABLET, FILM COATED ORAL at 15:12

## 2022-08-06 RX ADMIN — METOPROLOL TARTRATE 25 MG: 25 TABLET, FILM COATED ORAL at 20:37

## 2022-08-06 RX ADMIN — LEVOTHYROXINE SODIUM 100 MCG: 0.05 TABLET ORAL at 05:54

## 2022-08-06 RX ADMIN — ACETAMINOPHEN 650 MG: 325 TABLET, FILM COATED ORAL at 23:49

## 2022-08-06 RX ADMIN — CEFTRIAXONE 1000 MG: 1 INJECTION, POWDER, FOR SOLUTION INTRAMUSCULAR; INTRAVENOUS at 20:21

## 2022-08-06 ASSESSMENT — PAIN DESCRIPTION - DESCRIPTORS
DESCRIPTORS: ACHING
DESCRIPTORS: ACHING
DESCRIPTORS: ACHING;SORE

## 2022-08-06 ASSESSMENT — PAIN - FUNCTIONAL ASSESSMENT
PAIN_FUNCTIONAL_ASSESSMENT: ACTIVITIES ARE NOT PREVENTED
PAIN_FUNCTIONAL_ASSESSMENT: 0-10

## 2022-08-06 ASSESSMENT — PAIN DESCRIPTION - LOCATION
LOCATION: HEAD
LOCATION: CHEST

## 2022-08-06 ASSESSMENT — PAIN SCALES - GENERAL
PAINLEVEL_OUTOF10: 2
PAINLEVEL_OUTOF10: 2
PAINLEVEL_OUTOF10: 3
PAINLEVEL_OUTOF10: 0

## 2022-08-06 ASSESSMENT — PAIN DESCRIPTION - ORIENTATION
ORIENTATION: RIGHT;MID
ORIENTATION: RIGHT;MID

## 2022-08-06 ASSESSMENT — ENCOUNTER SYMPTOMS
RESPIRATORY NEGATIVE: 1
SHORTNESS OF BREATH: 0
EYES NEGATIVE: 1
GASTROINTESTINAL NEGATIVE: 1

## 2022-08-06 ASSESSMENT — PAIN DESCRIPTION - ONSET: ONSET: GRADUAL

## 2022-08-06 ASSESSMENT — PAIN DESCRIPTION - FREQUENCY: FREQUENCY: INTERMITTENT

## 2022-08-06 ASSESSMENT — PAIN DESCRIPTION - PAIN TYPE: TYPE: ACUTE PAIN

## 2022-08-06 NOTE — H&P
Hospitalist History and Physical   Admit Date:  2022  1:43 AM   Name:  Mook Oswald   Age:  80 y.o. Sex:  female  :  1926   MRN:  [id number]   Room:  [facility]/    Presenting Complaint: Fall     Reason(s) for Admission: Cerebral brain hemorrhage ([facility] [I61.9]     History of Present Illness:   Patient was discussed with the ER provider prior to seeing the patient. Mook Oswald is a 80 y.o. female with medical history of hypertension, atrial fibrillation, and diabetes, who presented with fall and laceration to her head. Patient is sent to the emergency room from her nursing home after having fallen out of bed twice today. The first time she did not have any notable injuries. However the second time she did hit her head and had a laceration to her right temporal area. ER work-up included placing Steri-Strips on her skin tear/laceration. CT scan of her head did show a small bleed/contusion to her right parietal area. ER did discuss this with neurosurgery who recommended observation with repeat CT scan later in the morning. Her daughter did have some concerns that she might not be getting her medications appropriately since being in the nursing home. In particular, she is worried about her thyroid medication. Patient is also noted to be on Eliquis for her atrial fibrillation. Review of Systems:  10 systems reviewed and negative except as noted in HPI. Assessment & Plan:     Principal Problem:    Cerebral brain hemorrhage ([facility]  Plan:  To be admitted for overnight observation  Hold all anticoagulation  Repeat head CT in about 8 hours  Active Problems:    Atrial fibrillation (HCC)  Plan: Continue metoprolol for rate control  Discontinue anticoagulation, would not recommend restarting anything stronger than a baby aspirin in the future    Type 2 diabetes with nephropathy ([facility]  Plan: Diet controlled  Will place on sliding scale insulin while inpatient    Essential Type II or unspecified type diabetes mellitus without mention of complication, not stated as uncontrolled     Unspecified constipation     Unspecified hemorrhoids with other complication     Unspecified hemorrhoids without mention of complication     Unspecified hereditary and idiopathic peripheral neuropathy     Unspecified hypothyroidism     Urgency of urination     Urinary tract infection, site not specified      Past Surgical History:   Procedure Laterality Date    CHOLECYSTECTOMY      OTHER SURGICAL HISTORY      thyroidectomy    TONSILLECTOMY        Allergies   Allergen Reactions    Penicillins Hives    Strawberry Extract Itching    Codeine Rash      Social History     Tobacco Use    Smoking status: Never    Smokeless tobacco: Never   Substance Use Topics    Alcohol use: No      Family History   Problem Relation Age of Onset    Rheum Arthritis Daughter     Osteoarthritis Son       Family history reviewed and negative except as noted above.     Immunization History   Administered Date(s) Administered    Influenza Virus Vaccine 10/26/2014    Influenza, High Dose (Fluzone 65 yrs and older) 09/19/2011, 10/01/2015, 10/20/2017, 09/01/2018    Influenza, MDCK Quadv, IM, PF (Flucelvax 2 yrs and older) 10/24/2019    Influenza, Triv, inactivated, subunit, adjuvanted, IM (Fluad 65 yrs and older) 10/30/2018    PPD Test 06/24/2020, 06/29/2020, 06/11/2022    Pneumococcal Vaccine 01/30/2015    Tdap (Boostrix, Adacel) 12/15/2012     Prior to Admit Medications:  Current Outpatient Medications   Medication Instructions    acetaminophen (TYLENOL) 1,000 mg, Oral, DAILY    amLODIPine (NORVASC) 5 MG tablet TAKE ONE TABLET BY MOUTH EVERY DAY    apixaban (ELIQUIS) 5 mg, Oral, 2 TIMES DAILY    cetirizine (ZYRTEC) 10 mg, Oral, DAILY    diclofenac (FLECTOR) 1.3 % PTCH patch 1 patch, TransDERmal, EVERY 12 HOURS    levothyroxine (SYNTHROID) 100 MCG tablet Oral, DAILY BEFORE BREAKFAST    Lidocaine HCl 4 % CREA Topical    memantine (NAMENDA) 10 mg, Oral, 2 TIMES DAILY    metoprolol (LOPRESSOR) 100 mg, Oral, 2 TIMES DAILY    mirabegron (MYRBETRIQ) 25 mg, Oral, DAILY    nitroGLYCERIN (NITRODUR) 0.4 MG/HR 1 patch, TransDERmal, DAILY    nitroGLYCERIN (NITROSTAT) 0.4 MG SL tablet SubLINGual    ondansetron (ZOFRAN) 4 mg, Oral, EVERY 8 HOURS PRN    pantoprazole (PROTONIX) 40 mg, Oral, DAILY BEFORE BREAKFAST    polyethylene glycol (GLYCOLAX) 17 g, Oral, DAILY    senna (SENOKOT) 8.6 MG tablet Take 2 tablet PO at bedtime    torsemide (DEMADEX) 20 mg, Oral, 2 TIMES DAILY PRN    urea (URE-NA) 15 g, Oral, 2 times daily    vitamin D 1,000 Units, Oral, DAILY         Objective:   Patient Vitals for the past 24 hrs:   Temp Pulse Resp BP SpO2   08/06/22 0448 98.8 °F (37.1 °C) (!) 120 -- (!) 149/98 --   08/06/22 0350 -- (!) 112 -- (!) 145/108 --   08/06/22 0203 -- (!) 111 22 -- 96 %   08/06/22 0139 97.5 °F (36.4 °C) (!) 110 13 (!) 149/121 93 %       Estimated body mass index is 24.02 kg/m² as calculated from the following:    Height as of this encounter: 5' 8\" (1.727 m). Weight as of this encounter: 158 lb (71.7 kg). No intake or output data in the 24 hours ending 08/06/22 0510      Physical Exam:    Blood pressure (!) 149/98, pulse (!) 120, temperature 98.8 °F (37.1 °C), temperature source Axillary, resp. rate 22, height 5' 8\" (1.727 m), weight 158 lb (71.7 kg), SpO2 96 %. General:    WD and WN, No apparent distress. Eyes:  PERRL; EOMI; sclera normal/non-icteric  HENT:  Normocephalic, without obvious abnormality; Oropharynx is clear with tacky mucous membranes ; Steri-Strips noted to right temple  Neck:  No restricted ROM. Trachea midline   Resp:    Clear to auscultation bilaterally. Resp are even and unlabored  CVS/Heart: Regular rate and rhythm,  No LE edema    GI:    Soft, non-tender. Not distended. Bowel sounds normal.     Musc/SK: Muscle strength is appropriate for age/condition; No cyanosis.  No clubbing  Skin:  No rashes and normal coloration. Warm and dry. Neurologic: CN II - XII are grossly intact - Eye exam as noted above; moves extremities equally  Psych: Alert and oriented x 2;  Judgement and insight are impaired    I have reviewed ordered lab tests and independently visualized imaging below:    Last 24hr Labs:  No results found for this or any previous visit (from the past 24 hour(s)). Other Studies:  CT HEAD WO CONTRAST    Result Date: 8/6/2022  EXAM: Noncontrast CT head. INDICATION: Pain, fall injury. COMPARISON: Prior CT head on June 14, 2022. TECHNIQUE: Noncontrast CT images of the head were obtained. Radiation dose reduction techniques were used for this study. Our CT scanners use one or all of the following:  Automated exposure control, adjustment of the mA or kV according to patient size, iterative reconstruction. FINDINGS: Again noted is moderate generalized volume loss and chronic small vessel ischemic changes in the white matter. Along the right parietal lobe cortex near the vertex, there is an acute 6 mm hemorrhagic cortical contusion. There is no mass effect, midline shift or depressed fracture. The visualized paranasal sinuses and mastoid air cells are clear. Acute 6 mm hemorrhagic contusion along the right parietal lobe cortex, without mass effect or midline shift. Dr. Anand Hall verbally notified at 2:36 AM (DC 5). No results found for this or any previous visit.        amLODIPine  5 mg Oral Daily    cetirizine  10 mg Oral Daily    levothyroxine  100 mcg Oral QAM AC    memantine  10 mg Oral BID    metoprolol  100 mg Oral BID    trospium  20 mg Oral Nightly    nitroGLYCERIN  1 patch TransDERmal Daily    pantoprazole  40 mg Oral QAM AC    polyethylene glycol  17 g Oral Daily    senna  2 tablet Oral Nightly    sodium chloride flush  5-40 mL IntraVENous 2 times per day    insulin lispro  0-8 Units SubCUTAneous TID WC    insulin lispro  0-4 Units SubCUTAneous Nightly         Signed:  CINTHIA BOYD, MD    Part of this note may have been written by using a voice dictation software. The note has been proof read but may still contain some grammatical/other typographical errors.

## 2022-08-06 NOTE — PROGRESS NOTES
Long term goal:  Patient to tolerate regular diet with no signs of aspiration. - MET    SPEECH LANGUAGE PATHOLOGY: DYSPHAGIA  Initial Assessment    NAME: Justice Angeles  : 1926  MRN: [id number]    ADMISSION DATE: 2022  PRIMARY DIAGNOSIS: Cerebral brain hemorrhage (HCC)  Cerebral brain hemorrhage ([facility] [I61.9]    ICD-10: Treatment Diagnosis: R13.12 Dysphagia, Oropharyngeal Phase    RECOMMENDATIONS   Diet:  Regular consistency with thin liquids  Patient sitting upright at 90 degrees for all PO intake          Medications: All medications orally         Compensatory Swallowing Strategies:  Sitting upright at 90 degrees   Therapeutic Intervention:  None indicated at this time. Patient does not require skilled intervention at this time. ASSESSMENT       Patient is a 80year old resident who has been a resident of SNF since the last week. Patient with poor PO intake prior. Patient given ice / water / applesauce / fruit cocktail / crackers with no signs of aspiration or penetration. Patient required moderate encouragement to accept presentations but did clear oral cavity. Patient has natural dentition with no labial or lingual deviations noted. Patient accepted water via cup and via straw. Recommend Regular consistency diet with thin liquids with patient sitting upright at 90 degrees for all PO intake.     GENERAL    History of Present Injury/Illness: Ms. Leora Quiroz  has a past medical history of Abdominal pain, epigastric, Abdominal pain, unspecified site, Anxiety state, unspecified, Aortic stenosis, Aortic valve disorders, Atherosclerosis of aorta ([facility]), Atrial fibrillation ([address] Backache, unspecified, Benign neoplasm of colon, Cervicalgia, Closed fracture of unspecified bone, Depressive disorder, not elsewhere classified, Diabetes ([address] Diarrhea, Disorders of magnesium metabolism, Edema, Elevated sedimentation rate, Encounter for long-term (current) use of other medications, Endocrine disease, Epistaxis, Esophageal reflux, Essential hypertension, benign, Gastrointestinal malfunction arising from mental factors, Hypertension, Hypertension, benign, Hypertonicity of bladder, Hypopotassemia, Insomnia, unspecified, Irritable bowel syndrome, Memory loss, Nausea alone, Osteoarthrosis, unspecified whether generalized or localized, unspecified site, Other ill-defined conditions(799.89), Other malaise and fatigue, Pain in joint, lower leg, Pain in joint, shoulder region, Postnasal drip, Rheumatoid arthritis(714.0), Type II or unspecified type diabetes mellitus without mention of complication, not stated as uncontrolled, Unspecified constipation, Unspecified hemorrhoids with other complication, Unspecified hemorrhoids without mention of complication, Unspecified hereditary and idiopathic peripheral neuropathy, Unspecified hypothyroidism, Urgency of urination, and Urinary tract infection, site not specified. . She also  has a past surgical history that includes other surgical history; Tonsillectomy; and Cholecystectomy. Pain:                                            OBJECTIVE      Patient followed 1 - step directions with no difficulty  Natural lower and upper dentition  No labial or lingual deviations  No loss of bolus anteriorly  No change in vocal quality or coughing noted during bedside presentations                 Oropharyngeal Phase: WFL at bedside                    PLAN    Duration/Frequency: No treatment indicated at this time    Dysphagia Outcome and Severity Scale (WAN)  -   Score 7 (8-6-2022)     Interpretation of Tool: The Dysphagia Outcome and Severity Scale (WAN) is a simple, easy-to-use, 7-point scale developed to systematically rate the functional severity of dysphagia based on objective assessment and make recommendations for diet level, independence level, and type of nutrition.    Normal(7), Functional(6), Mild(5), Mild-Moderate(4), Moderate(3), Moderate-Severe(2), Severe(1)         Education:     Discussed results of bedside assessment with patient's family present in room and patient's nurse. Current Medications:   No current facility-administered medications on file prior to encounter.      Current Outpatient Medications on File Prior to Encounter   Medication Sig Dispense Refill    metoprolol tartrate (LOPRESSOR) 100 MG tablet Take 1 tablet by mouth 2 times daily 60 tablet 3    pantoprazole (PROTONIX) 40 MG tablet Take 1 tablet by mouth every morning (before breakfast) 30 tablet 3    urea (URE-NA) 15 g PACK packet Take 15 g by mouth in the morning and at bedtime 20 each 0    torsemide (DEMADEX) 20 MG tablet Take 1 tablet by mouth 2 times daily as needed (volume overload, edema) 60 tablet 0    acetaminophen (TYLENOL) 500 MG tablet Take 1,000 mg by mouth daily      amLODIPine (NORVASC) 5 MG tablet TAKE ONE TABLET BY MOUTH EVERY DAY      apixaban (ELIQUIS) 5 MG TABS tablet Take 5 mg by mouth 2 times daily      cetirizine (ZYRTEC) 10 MG tablet Take 10 mg by mouth daily      vitamin D 25 MCG (1000 UT) CAPS Take 1,000 Units by mouth daily      diclofenac (FLECTOR) 1.3 % PTCH patch Place 1 patch onto the skin every 12 hours      levothyroxine (SYNTHROID) 100 MCG tablet Take by mouth every morning (before breakfast)      Lidocaine HCl 4 % CREA Apply topically      memantine (NAMENDA) 10 MG tablet Take 10 mg by mouth 2 times daily      mirabegron (MYRBETRIQ) 25 MG TB24 Take 25 mg by mouth daily      nitroGLYCERIN (NITRODUR) 0.4 MG/HR Place 1 patch onto the skin daily      nitroGLYCERIN (NITROSTAT) 0.4 MG SL tablet Place under the tongue      ondansetron (ZOFRAN) 4 MG tablet Take 4 mg by mouth every 8 hours as needed      polyethylene glycol (GLYCOLAX) 17 GM/SCOOP powder Take 17 g by mouth daily      senna (SENOKOT) 8.6 MG tablet Take 2 tablet PO at bedtime         PRECAUTIONS/ALLERGIES: Penicillins, Strawberry extract, and Codeine        Therapy Time Courtney Betancur, SLP  8/6/2022 1:37 PM

## 2022-08-06 NOTE — H&P
Hospitalist History and Physical   Admit Date:  2022  1:43 AM   Name:  Flaquita Yung   Age:  80 y.o. Sex:  female  :  1926   MRN:  [id number]   Room:  [address] [place] [address]    Presenting Complaint: Fall     Reason(s) for Admission: Cerebral brain hemorrhage ([address] [I61.9]     History of Present Illness:   Patient was discussed with the ER provider prior to seeing the patient. Flaquita Yung is a 80 y.o. female with a medical history of dementia who presented with a laceration to her right temporal area following a fall out of her bed for the second time at her nursing home. She complains of dizziness. She has a Hx of thyroidectomy and her daughter states she has not been taking her thyroid medication lately. She fell at 2300 yesterday night. She is currently on blood thinner. Bleeding was controlled by ED and steri-strips were placed over lesion. A head CT was performed in the ED and showed a 6mm hemorrhagic contusion to the right parietal lobe. She has no other current neurological changes from baseline. Review of Systems:  10 systems reviewed and negative except as noted in HPI. Assessment & Plan:     Principal Problem:    Cerebral brain hemorrhage ([address]  Plan: Repeat CT w/o contrast to evaluate hemorrhage enlargement. Hold current anticoagulation medication. Neuro checks. Consult neurosurgery/neurology for evaluation. Active Problems:    Essential hypertension, benign  Plan: Continue metoprolol and amlodipine. Dispo/Discharge Planning:   Admit  Continuous monitoring of heart rate, BP, SpO2.   Check TSH  Acetaminophen prn      Diet: Regular  VTE ppx: None  Code status: Full    Hospital Problems             Last Modified POA    * (Principal) Cerebral brain hemorrhage ([address] 2022 Yes    Atrial fibrillation ([address] (Chronic) 2022 Yes    Essential hypertension, benign (Chronic) 2022 Yes    Overview Signed 3/19/2022  7:43 AM by Geoffrey Corral     The blood pressure reading have been in the target range. Past History:  Past Medical History:   Diagnosis Date    Abdominal pain, epigastric     Abdominal pain, unspecified site     Anxiety state, unspecified     Aortic stenosis 6/10/2016    No sever SOB, no chest pain or syncope.  Echo showed mod AS, mean grad 22mm Hg, nl EF, mild LVH    Aortic valve disorders     Atherosclerosis of aorta (HCC)     Atrial fibrillation (HCC)     Backache, unspecified     Benign neoplasm of colon     Cervicalgia     Closed fracture of unspecified bone     Depressive disorder, not elsewhere classified     Diabetes ([facility]     Diarrhea     Disorders of magnesium metabolism     Edema     Elevated sedimentation rate     Encounter for long-term (current) use of other medications     Endocrine disease     Epistaxis     Esophageal reflux     Essential hypertension, benign     Gastrointestinal malfunction arising from mental factors     Hypertension     Hypertension, benign     Hypertonicity of bladder     Hypopotassemia     Insomnia, unspecified     Irritable bowel syndrome     Memory loss     Nausea alone     Osteoarthrosis, unspecified whether generalized or localized, unspecified site     Other ill-defined conditions(799.89)     hyperlipidemia    Other malaise and fatigue     Pain in joint, lower leg     Pain in joint, shoulder region     Postnasal drip     Rheumatoid arthritis(714.0)     Type II or unspecified type diabetes mellitus without mention of complication, not stated as uncontrolled     Unspecified constipation     Unspecified hemorrhoids with other complication     Unspecified hemorrhoids without mention of complication     Unspecified hereditary and idiopathic peripheral neuropathy     Unspecified hypothyroidism     Urgency of urination     Urinary tract infection, site not specified      Past Surgical History:   Procedure Laterality Date    CHOLECYSTECTOMY      OTHER SURGICAL HISTORY      thyroidectomy    TONSILLECTOMY        Allergies Allergen Reactions    Penicillins Hives    Strawberry Extract Itching    Codeine Rash      Social History     Tobacco Use    Smoking status: Never    Smokeless tobacco: Never   Substance Use Topics    Alcohol use: No      Family History   Problem Relation Age of Onset    Rheum Arthritis Daughter     Osteoarthritis Son       Family history reviewed and negative except as noted above.   Immunization History   Administered Date(s) Administered    Influenza Virus Vaccine 10/26/2014    Influenza, High Dose (Fluzone 65 yrs and older) 09/19/2011, 10/01/2015, 10/20/2017, 09/01/2018    Influenza, MDCK Quadv, IM, PF (Flucelvax 2 yrs and older) 10/24/2019    Influenza, Triv, inactivated, subunit, adjuvanted, IM (Fluad 65 yrs and older) 10/30/2018    PPD Test 06/24/2020, 06/29/2020, 06/11/2022    Pneumococcal Vaccine 01/30/2015    Tdap (Boostrix, Adacel) 12/15/2012     Prior to Admit Medications:  Current Outpatient Medications   Medication Instructions    acetaminophen (TYLENOL) 1,000 mg, Oral, DAILY    amLODIPine (NORVASC) 5 MG tablet TAKE ONE TABLET BY MOUTH EVERY DAY    apixaban (ELIQUIS) 5 mg, Oral, 2 TIMES DAILY    cetirizine (ZYRTEC) 10 mg, Oral, DAILY    diclofenac (FLECTOR) 1.3 % PTCH patch 1 patch, TransDERmal, EVERY 12 HOURS    levothyroxine (SYNTHROID) 100 MCG tablet Oral, DAILY BEFORE BREAKFAST    Lidocaine HCl 4 % CREA Topical    memantine (NAMENDA) 10 mg, Oral, 2 TIMES DAILY    metoprolol (LOPRESSOR) 100 mg, Oral, 2 TIMES DAILY    mirabegron (MYRBETRIQ) 25 mg, Oral, DAILY    nitroGLYCERIN (NITRODUR) 0.4 MG/HR 1 patch, TransDERmal, DAILY    nitroGLYCERIN (NITROSTAT) 0.4 MG SL tablet SubLINGual    ondansetron (ZOFRAN) 4 mg, Oral, EVERY 8 HOURS PRN    pantoprazole (PROTONIX) 40 mg, Oral, DAILY BEFORE BREAKFAST    polyethylene glycol (GLYCOLAX) 17 g, Oral, DAILY    senna (SENOKOT) 8.6 MG tablet Take 2 tablet PO at bedtime    torsemide (DEMADEX) 20 mg, Oral, 2 TIMES DAILY PRN    urea (URE-NA) 15 g, Oral, 2 times daily    vitamin D 1,000 Units, Oral, DAILY         Objective:   Patient Vitals for the past 24 hrs:   Temp Pulse Resp BP SpO2   08/06/22 0350 -- (!) 112 -- (!) 145/108 --   08/06/22 0203 -- (!) 111 22 -- 96 %   08/06/22 0139 97.5 °F (36.4 °C) (!) 110 13 (!) 149/121 93 %       Estimated body mass index is 26 kg/m² as calculated from the following:    Height as of this encounter: 5' 8\" (1.727 m). Weight as of this encounter: 171 lb (77.6 kg). No intake or output data in the 24 hours ending 08/06/22 0449      Physical Exam:    Blood pressure (!) 145/108, pulse (!) 112, temperature 97.5 °F (36.4 °C), temperature source Oral, resp. rate 22, height 5' 8\" (1.727 m), weight 171 lb (77.6 kg), SpO2 96 %. General:    WD and WN, No apparent distress. Non ambulatory. Eyes:  PERRL; EOMI; sclera normal/non-icteric  HENT:  Normocephalic, without obvious abnormality; Oropharynx is clear with tacky mucous membranes   Neck:  No restricted ROM. Trachea midline   Resp:    Clear to auscultation bilaterally. Resp are even and unlabored  CVS/Heart: Regular rhythm, Tachycardic  No LE edema  GI:    Soft, non-tender. Not distended. Bowel sounds normal.     Musc/SK: Muscle strength is appropriate for age/condition; No cyanosis. No clubbing  Skin:  No rashes and normal coloration. Warm and dry. Neurologic: CN II - XII are grossly intact - Eye exam as noted above; moves extremities equally. GCS 14.  Psych:  Alert and oriented x 2;  Judgement and insight are impaired due to dementia. I have reviewed ordered lab tests and independently visualized imaging below:    Last 24hr Labs:  No results found for this or any previous visit (from the past 24 hour(s)). Other Studies:  CT HEAD WO CONTRAST    Result Date: 8/6/2022  EXAM: Noncontrast CT head. INDICATION: Pain, fall injury. COMPARISON: Prior CT head on June 14, 2022. TECHNIQUE: Noncontrast CT images of the head were obtained.   Radiation dose reduction techniques were used for this study. Our CT scanners use one or all of the following:  Automated exposure control, adjustment of the mA or kV according to patient size, iterative reconstruction. FINDINGS: Again noted is moderate generalized volume loss and chronic small vessel ischemic changes in the white matter. Along the right parietal lobe cortex near the vertex, there is an acute 6 mm hemorrhagic cortical contusion. There is no mass effect, midline shift or depressed fracture. The visualized paranasal sinuses and mastoid air cells are clear. Acute 6 mm hemorrhagic contusion along the right parietal lobe cortex, without mass effect or midline shift. Dr. Mitra Fischer verbally notified at 2:36 AM (DC 5). No results found for this or any previous visit. amLODIPine  1 tablet Oral Daily    cetirizine  10 mg Oral Daily    levothyroxine  100 mcg Oral QAM AC    memantine  10 mg Oral BID    metoprolol  100 mg Oral BID    trospium  20 mg Oral Nightly    nitroGLYCERIN  1 patch TransDERmal Daily    pantoprazole  40 mg Oral QAM AC    polyethylene glycol  17 g Oral Daily    senna  2 tablet Oral Nightly    sodium chloride flush  5-40 mL IntraVENous 2 times per day    insulin lispro  0-8 Units SubCUTAneous TID WC    insulin lispro  0-4 Units SubCUTAneous Nightly         Signed:  Hoyt Osler    Part of this note may have been written by using a voice dictation software. The note has been proof read but may still contain some grammatical/other typographical errors.

## 2022-08-06 NOTE — ED PROVIDER NOTES
[employer] Emergency Department Provider Note                     PCP:                Todd Jackson MD               Age: 80 y.o. Sex: female           ICD-10-CM    1. Closed head injury, initial encounter  S09.90XA       2. Contusion of right cerebral hemisphere, without loss of consciousness, initial encounter [facility])  S06.310A           DISPOSITION Admitted 08/06/2022 03:43:18 AM       Current Discharge Medication List          MDM  Number of Diagnoses or Management Options  Diagnosis management comments: Steri-Strips been placed on the wound prior to arrival has good hemostasis and adequate cosmetic value. Amount and/or Complexity of Data Reviewed  Tests in the radiology section of CPT®: ordered and reviewed    Risk of Complications, Morbidity, and/or Mortality  Presenting problems: moderate  Diagnostic procedures: moderate  Management options: moderate    Patient Progress  Patient progress: stable      Orders Placed This Encounter   Procedures    CT HEAD WO CONTRAST    CT HEAD WO CONTRAST    Basic Metabolic Panel w/ Reflex to MG    CBC with Auto Differential    Urinalysis w rflx microscopic    Protime-INR    APTT    Hemoglobin A1c    TSH with Reflex    Magnesium    T4, Free    ADULT DIET; Regular    Admission/Observation order previously placed    Vital signs per unit routine    Notify physician    Up with assistance    Daily weights    Monitor for signs/symptoms of urinary retention    Notify Provider    HYPOGLYCEMIA TREATMENT: blood glucose LESS THAN 70 mg/dL and patient ALERT and TOLERATING PO    HYPOGLYCEMIA TREATMENT: blood glucose LESS THAN 70 mg/dL and patient NOT ALERT or NPO    Turn or assist with turn approximately every 2 hours if patient is unable to turn self.  Remind patient to turn if necessary    Maintain HOB at the lowest elevation consistent with medical plan of care    Assess skin per unit guidelines    Maintain heels off of bed at all times    Pad/offload medical devices Use lift equipment for lifting patient    Full code    Initiate Oxygen Therapy Protocol    SLP eval and treat    POCT glucose    POCT Glucose    POCT Glucose    EKG 12 Lead    Place in Observation Service        No follow-up provider specified. Randi Espinal is a 80 y.o. female who presents to the Emergency Department with chief complaint of    Chief Complaint   Patient presents with    Fall      80year-old female family states fell out of the bed twice today. Second time she fell out of bed she struck her head and has a laceration abrasion and skin tear to the right temporal area. Patient is resident of a nursing home. The history is provided by the nursing home and a relative. Fall  The accident occurred 1 to 2 hours ago. The fall occurred from a bed. She fell from a height of 1 to 2 ft. Pertinent negatives include no fever. Review of Systems   Constitutional: Negative. Negative for activity change, appetite change, chills, fatigue and fever. HENT: Negative. Eyes: Negative. Respiratory: Negative. Negative for shortness of breath. Cardiovascular: Negative. Negative for chest pain. Gastrointestinal: Negative. Endocrine: Negative. Musculoskeletal: Negative. Neurological: Negative. Hematological: Negative. Psychiatric/Behavioral: Negative. All other systems reviewed and are negative. All other systems reviewed and are negative. Past Medical History:   Diagnosis Date    Abdominal pain, epigastric     Abdominal pain, unspecified site     Anxiety state, unspecified     Aortic stenosis 6/10/2016    No sever SOB, no chest pain or syncope.  Echo showed mod AS, mean grad 22mm Hg, nl EF, mild LVH    Aortic valve disorders     Atherosclerosis of aorta (HCC)     Atrial fibrillation (HCC)     Backache, unspecified     Benign neoplasm of colon     Cervicalgia     Closed fracture of unspecified bone     Depressive disorder, not elsewhere classified     Diabetes ([facility] [address] Diarrhea     Disorders of magnesium metabolism     Edema     Elevated sedimentation rate     Encounter for long-term (current) use of other medications     Endocrine disease     Epistaxis     Esophageal reflux     Essential hypertension, benign     Gastrointestinal malfunction arising from mental factors     Hypertension     Hypertension, benign     Hypertonicity of bladder     Hypopotassemia     Insomnia, unspecified     Irritable bowel syndrome     Memory loss     Nausea alone     Osteoarthrosis, unspecified whether generalized or localized, unspecified site     Other ill-defined conditions(799.89)     hyperlipidemia    Other malaise and fatigue     Pain in joint, lower leg     Pain in joint, shoulder region     Postnasal drip     Rheumatoid arthritis(714.0)     Type II or unspecified type diabetes mellitus without mention of complication, not stated as uncontrolled     Unspecified constipation     Unspecified hemorrhoids with other complication     Unspecified hemorrhoids without mention of complication     Unspecified hereditary and idiopathic peripheral neuropathy     Unspecified hypothyroidism     Urgency of urination     Urinary tract infection, site not specified         Past Surgical History:   Procedure Laterality Date    CHOLECYSTECTOMY      OTHER SURGICAL HISTORY      thyroidectomy    TONSILLECTOMY          Family History   Problem Relation Age of Onset    Rheum Arthritis Daughter     Osteoarthritis Son         Social Connections: Not on file        Allergies   Allergen Reactions    Penicillins Hives    Strawberry Extract Itching    Codeine Rash        Vitals signs and nursing note reviewed. Patient Vitals for the past 4 hrs:   Pulse Resp BP SpO2   08/06/22 0832 99 24 (!) 170/79 95 %   08/06/22 0801 (!) 120 30 (!) 156/115 96 %   08/06/22 0747 (!) 101 -- (!) 146/108 95 %          Physical Exam  Vitals and nursing note reviewed. Constitutional:       Appearance: Normal appearance.  She is not ill-appearing. HENT:      Head: Normocephalic. Abrasion present. Right Ear: External ear normal.      Left Ear: External ear normal.      Nose: Nose normal.      Mouth/Throat:      Mouth: Mucous membranes are moist.   Eyes:      Extraocular Movements: Extraocular movements intact. Conjunctiva/sclera: Conjunctivae normal.      Pupils: Pupils are equal, round, and reactive to light. Cardiovascular:      Rate and Rhythm: Normal rate and regular rhythm. Pulses: Normal pulses. Heart sounds: Normal heart sounds. Pulmonary:      Effort: Pulmonary effort is normal. No respiratory distress. Breath sounds: Normal breath sounds. Abdominal:      General: Bowel sounds are normal. There is no distension. Palpations: Abdomen is soft. Musculoskeletal:         General: No swelling or signs of injury. Normal range of motion. Cervical back: Normal range of motion. No rigidity. Skin:     General: Skin is warm and dry. Capillary Refill: Capillary refill takes less than 2 seconds. Neurological:      General: No focal deficit present. Mental Status: She is alert and oriented to person, place, and time. Mental status is at baseline. Psychiatric:         Mood and Affect: Mood normal.         Behavior: Behavior normal.         Thought Content:  Thought content normal.         Judgment: Judgment normal.        Procedures    Labs Reviewed   BASIC METABOLIC PANEL W/ REFLEX TO MG FOR LOW K - Abnormal; Notable for the following components:       Result Value    Sodium 135 (*)     Potassium 3.4 (*)     Glucose 125 (*)     Creatinine 1.10 (*)     GFR  59 (*)     GFR Non- 49 (*)     All other components within normal limits   CBC WITH AUTO DIFFERENTIAL - Abnormal; Notable for the following components:    Hemoglobin 15.7 (*)     Hematocrit 48.0 (*)     RDW 17.8 (*)     All other components within normal limits   PROTIME-INR - Abnormal; Notable for the following components:    Protime 15.6 (*)     All other components within normal limits   APTT - Abnormal; Notable for the following components:    PTT 37.6 (*)     All other components within normal limits   TSH WITH REFLEX - Abnormal; Notable for the following components:    TSH w Free Thyroid if Abnormal 22.00 (*)     All other components within normal limits   MAGNESIUM - Abnormal; Notable for the following components:    Magnesium 1.6 (*)     All other components within normal limits   POCT GLUCOSE - Abnormal; Notable for the following components:    POC Glucose 120 (*)     All other components within normal limits   T4, FREE   URINALYSIS   POCT GLUCOSE   POCT GLUCOSE   POCT GLUCOSE        CT HEAD WO CONTRAST   Final Result   Acute 6 mm hemorrhagic contusion along the right parietal lobe   cortex, without mass effect or midline shift. Dr. Max Man verbally notified at   2:36 AM (DC 5). CT HEAD WO CONTRAST    (Results Pending)        NIH Stroke Scale  Interval: Hand-off/Transfer  Level of Consciousness (1a): Alert  LOC Questions (1b): Answers both correctly  LOC Commands (1c): Performs both tasks correctly  Best Gaze (2): Normal  Visual (3): No visual loss  Facial Palsy (4): Normal symmetrical movement  Motor Arm, Left (5a): No drift  Motor Arm, Right (5b): No drift  Motor Leg, Left (6a): No drift  Motor Leg, Right (6b): No drift  Limb Ataxia (7): Absent  Sensory (8): Normal  Best Language (9): No aphasia  Dysarthria (10): Normal  Extinction and Inattention (11): No abnormality  Total: 0     [place] Coma Scale  Eye Opening: Spontaneous  Best Verbal Response: Confused  Best Motor Response: Obeys commands  [place] Coma Scale Score: 14                     WA Assessment  BP: (!) 170/79  Heart Rate: 99                       Voice dictation software was used during the making of this note. This software is not perfect and grammatical and other typographical errors may be present.   This note has not been completely proofread for errors.         Dustin Abbott MD  08/06/22 7071

## 2022-08-06 NOTE — PROGRESS NOTES
Hospitalist Progress Note   Admit Date:  2022  1:43 AM   Name:  Bryson Casarez   Age:  80 y.o. Sex:  female  :  1926   MRN:  [id number]   Room:  [facility]/    Presenting Complaint: Fall     Reason(s) for Admission: Cerebral brain hemorrhage [facility]) [I61.9]     Hospital Course & Interval History:     Bryson Casarez is a 80 y.o. female with medical history of hypertension, atrial fibrillation, and diabetes, who presented with fall and laceration to her head. Patient is sent to the emergency room from her nursing home after having fallen out of bed twice today. The first time she did not have any notable injuries. However the second time she did hit her head and had a laceration to her right temporal area. ER work-up included placing Steri-Strips on her skin tear/laceration. CT scan of her head did show a small bleed/contusion to her right parietal area. ER did discuss this with neurosurgery who recommended observation with repeat CT scan later in the morning. Her daughter did have some concerns that she might not be getting her medications appropriately since being in the nursing home. In particular, she is worried about her thyroid medication. Patient is also noted to be on Eliquis for her atrial fibrillation. Subjective/24hr Events (22): Patient examined at bedside. No acute overnight events. Did have delirium overnight, much improved today with family at bedside. Denies any new symptoms when asked. Denies new headache, blurry vision, pain/weakness/numbness in extremities. Family has not noticed facial droop or slurred speech or other changes. 10 point ROS negative except for HPI above.    Assessment & Plan:     Principal Problem:    Cerebral brain hemorrhage ([facility] [address]  - frequent neuro checks  - CT head imaging at 2 AM (post-24 hour melody)  - Mri brain ordered  - statin  - telemetry  - goal SBP<140, did not tolerate Cardene gtt  - avoid all anti-platelets and AC    # Afib  - hold NOAC  - discussed with patient's family both high risk for CVA and high risk for bleeding, family wishes to elect to come off [facility] going forward, can always revisit or reconsider on outpatient basis  - currently rate controlled on BB    # Alzheimer's disease  - nonpharmacologic interventions  - avoid narcotics and benzos  - avoid restraints    # DM type II   - basal/bolus regimen  - SSI and serial CBGs    # HTN  - current goal SBP<140  - metoprolol  - amlodipine on hold  - did not tolerate Cardene gtt, since discontinued      Discharge Planning:      Can transfer to floor after 24 hour melody, will go back to facility at discharge. Diet:  ADULT DIET; Regular  DVT PPx: SCDs  Code status: DNR    Patient is critically ill. Without intervention, there is a high probability of acute organ impairment or life-threatening deterioration in the patient's condition from: intracerebral hemorrhage  Critical care interventions: frequent neuro checks  Total critical care time spent: 40 minutes. Time is indicative of direct patient attendance at bedside and on the patient's floor nearby. Includes time spent at bedside performing history and exam, performing chart review, discussing findings and treatment plan with patient and/or family, discussing patient with nursing staff, consultants and colleagues, and ordering/reviewing pertinent laboratory and radiographic evaluations. Time excludes procedures. CPT:  78398: First 30-74 minutes  45627: Each block of 30 min. beyond 76     Hospital Problems:  Principal Problem:    Cerebral brain hemorrhage ([facility] [address]  Active Problems:    Atrial fibrillation ([facility] [address]    Late onset Alzheimer's dementia without behavioral disturbance (HCC)    Type 2 diabetes with nephropathy ([facility] [address]    Essential hypertension, benign  Resolved Problems:    * No resolved hospital problems.  *      Objective:   Patient Vitals for the past 24 hrs:   Temp Pulse Resp BP SpO2   08/06/22 1716 -- (!) 104 18 (!) 134/93 96 %   08/06/22 1701 -- (!) 117 26 118/72 --   08/06/22 1646 -- (!) 101 29 132/68 --   08/06/22 1631 -- (!) 109 24 136/81 99 %   08/06/22 1616 -- (!) 104 28 126/68 91 %   08/06/22 1601 -- (!) 112 28 128/64 91 %   08/06/22 1546 -- 96 -- 114/71 (!) 88 %   08/06/22 1531 -- (!) 101 29 103/61 94 %   08/06/22 1516 -- (!) 124 18 117/62 96 %   08/06/22 1501 98.2 °F (36.8 °C) (!) 127 28 107/87 95 %   08/06/22 1446 -- (!) 102 27 119/77 92 %   08/06/22 1431 -- (!) 120 27 118/74 93 %   08/06/22 1416 -- (!) 103 (!) 31 123/77 96 %   08/06/22 1401 -- (!) 105 28 (!) 142/88 97 %   08/06/22 1346 -- (!) 101 -- 114/66 95 %   08/06/22 1331 -- (!) 110 26 130/65 94 %   08/06/22 1301 -- -- -- 101/71 --   08/06/22 1246 -- -- -- 90/61 93 %   08/06/22 1231 -- (!) 115 28 125/86 94 %   08/06/22 1216 -- (!) 111 27 135/75 95 %   08/06/22 1201 -- (!) 102 26 (!) 153/89 97 %   08/06/22 1130 98.1 °F (36.7 °C) 95 29 (!) 156/107 95 %   08/06/22 1100 -- 98 27 (!) 169/92 91 %   08/06/22 1000 -- (!) 102 24 (!) 161/84 94 %   08/06/22 0930 -- (!) 108 25 (!) 168/94 95 %   08/06/22 0904 -- (!) 105 29 (!) 149/81 95 %   08/06/22 0832 -- 99 24 (!) 170/79 95 %   08/06/22 0801 -- (!) 120 30 (!) 156/115 96 %   08/06/22 0747 97.7 °F (36.5 °C) (!) 101 25 (!) 146/108 95 %   08/06/22 0448 98.8 °F (37.1 °C) (!) 120 -- (!) 149/98 --   08/06/22 0350 -- (!) 112 -- (!) 145/108 --   08/06/22 0203 -- (!) 111 22 -- 96 %   08/06/22 0139 97.5 °F (36.4 °C) (!) 110 13 (!) 149/121 93 %       Oxygen Therapy  SpO2: 96 %  Pulse Oximetry Type: Continuous  Pulse via Oximetry: 110 beats per minute  O2 Device: None (Room air)    Estimated body mass index is 24.02 kg/m² as calculated from the following:    Height as of this encounter: 5' 8\" (1.727 m). Weight as of this encounter: 158 lb (71.7 kg).     Intake/Output Summary (Last 24 hours) at 8/6/2022 1811  Last data filed at 8/6/2022 1711  Gross per 24 hour   Intake 74 ml   Output 100 ml   Net -26 ml         Physical Exam: Blood pressure (!) 134/93, pulse (!) 104, temperature 98.2 °F (36.8 °C), temperature source Oral, resp. rate 18, height 5' 8\" (1.727 m), weight 158 lb (71.7 kg), SpO2 96 %. General:    Well nourished. Head:  Normocephalic, bruise to temporal orbit region without active bleeding  Eyes:  Sclerae appear normal.  Pupils equally round. ENT:  Nares appear normal, no drainage. Moist oral mucosa  Neck:  No restricted ROM. Trachea midline   CV:   Tachycardic rate. No jugular venous distension. Lungs:   CTAB. No wheezing, rhonchi, or rales. Symmetric expansion. Abdomen: Bowel sounds present. Soft, nontender, nondistended. Extremities: No cyanosis or clubbing. No edema  Skin:     No rashes and normal coloration. Warm and dry. Neuro:  CN II-XII grossly intact. Sensation intact. A&Ox3  Psych:  Normal mood and affect.       I have personally reviewed labs and tests showing:  Recent Labs:  Recent Results (from the past 48 hour(s))   Basic Metabolic Panel w/ Reflex to MG    Collection Time: 08/06/22  5:07 AM   Result Value Ref Range    Sodium 135 (L) 136 - 145 mmol/L    Potassium 3.4 (L) 3.5 - 5.1 mmol/L    Chloride 100 98 - 107 mmol/L    CO2 28 21 - 32 mmol/L    Anion Gap 7 7 - 16 mmol/L    Glucose 125 (H) 65 - 100 mg/dL    BUN 14 8 - 23 MG/DL    Creatinine 1.10 (H) 0.6 - 1.0 MG/DL    GFR  59 (L) >60 ml/min/1.73m2    GFR Non- 49 (L) >60 ml/min/1.73m2    Calcium 8.8 8.3 - 10.4 MG/DL   CBC with Auto Differential    Collection Time: 08/06/22  5:07 AM   Result Value Ref Range    WBC 9.5 4.3 - 11.1 K/uL    RBC 5.05 4.05 - 5.2 M/uL    Hemoglobin 15.7 (H) 11.7 - 15.4 g/dL    Hematocrit 48.0 (H) 35.8 - 46.3 %    MCV 95.0 79.6 - 97.8 FL    MCH 31.1 26.1 - 32.9 PG    MCHC 32.7 31.4 - 35.0 g/dL    RDW 17.8 (H) 11.9 - 14.6 %    Platelets 608 627 - 753 K/uL    MPV 9.8 9.4 - 12.3 FL    nRBC 0.00 0.0 - 0.2 K/uL    Differential Type AUTOMATED      Seg Neutrophils 62 43 - 78 % Lymphocytes 29 13 - 44 %    Monocytes 8 4.0 - 12.0 %    Eosinophils % 1 0.5 - 7.8 %    Basophils 0 0.0 - 2.0 %    Immature Granulocytes 0 0.0 - 5.0 %    Segs Absolute 5.9 1.7 - 8.2 K/UL    Absolute Lymph # 2.7 0.5 - 4.6 K/UL    Absolute Mono # 0.7 0.1 - 1.3 K/UL    Absolute Eos # 0.1 0.0 - 0.8 K/UL    Basophils Absolute 0.0 0.0 - 0.2 K/UL    Absolute Immature Granulocyte 0.0 0.0 - 0.5 K/UL   Protime-INR    Collection Time: 08/06/22  5:07 AM   Result Value Ref Range    Protime 15.6 (H) 12.6 - 14.5 sec    INR 1.2     APTT    Collection Time: 08/06/22  5:07 AM   Result Value Ref Range    PTT 37.6 (H) 24.1 - 35.1 SEC   TSH with Reflex    Collection Time: 08/06/22  5:07 AM   Result Value Ref Range    TSH w Free Thyroid if Abnormal 22.00 (H) 0.358 - 3.740 UIU/ML   Magnesium    Collection Time: 08/06/22  5:07 AM   Result Value Ref Range    Magnesium 1.6 (L) 1.8 - 2.4 mg/dL   T4, Free    Collection Time: 08/06/22  5:07 AM   Result Value Ref Range    T4 Free 1.2 0.78 - 1.46 NG/DL   POCT Glucose    Collection Time: 08/06/22  8:43 AM   Result Value Ref Range    POC Glucose 120 (H) 65 - 100 mg/dL    Performed by: Sofia    Urinalysis w rflx microscopic    Collection Time: 08/06/22 11:17 AM   Result Value Ref Range    Color, UA YELLOW/STRAW      Appearance CLOUDY      Specific [place], UA 1.011 1.001 - 1.023      pH, Urine 6.0 5.0 - 9.0      Protein, UA TRACE (A) NEG mg/dL    Glucose, UA Negative mg/dL    Ketones, Urine TRACE (A) NEG mg/dL    Bilirubin Urine Negative NEG      Blood, Urine Negative NEG      Urobilinogen, Urine 1.0 0.2 - 1.0 EU/dL    Nitrite, Urine Positive (A) NEG      Leukocyte Esterase, Urine LARGE (A) NEG      WBC, UA 20-50 0 /hpf    RBC, UA 0-3 0 /hpf    Epithelial Cells UA 3-5 0 /hpf    BACTERIA, URINE 4+ (H) 0 /hpf    OTHER OBSERVATIONS RESULTS VERIFIED MANUALLY     POCT Glucose    Collection Time: 08/06/22 11:50 AM   Result Value Ref Range    POC Glucose 116 (H) 65 - 100 mg/dL    Performed by: Amanda Orona    POCT Glucose    Collection Time: 08/06/22  5:05 PM   Result Value Ref Range    POC Glucose 140 (H) 65 - 100 mg/dL    Performed by: Amanda Orona        I have personally reviewed imaging studies showing: Other Studies:  CT HEAD WO CONTRAST   Final Result   1. Stable focal density closely associated with a right parietal sulcus which   could represent minimal subarachnoid hemorrhage or a tiny cortical contusion in   the setting of acute trauma. .          This report was made using voice transcription. Despite my best efforts to avoid   any, transcription errors may persist. If there is any question about the   accuracy of the report or need for clarification, then please call [phone], or text me through [email or web address] for clarification or correction. CT HEAD WO CONTRAST   Final Result   Acute 6 mm hemorrhagic contusion along the right parietal lobe   cortex, without mass effect or midline shift. Dr. Nikko Agarwal verbally notified at   2:36 AM (DC 5).       CT HEAD WO CONTRAST    (Results Pending)   MRI BRAIN WO CONTRAST    (Results Pending)       Current Meds:  Current Facility-Administered Medications   Medication Dose Route Frequency    [Held by provider] amLODIPine (NORVASC) tablet 5 mg  5 mg Oral Daily    cetirizine (ZYRTEC) tablet 10 mg  10 mg Oral Daily    levothyroxine (SYNTHROID) tablet 100 mcg  100 mcg Oral QAM AC    memantine (NAMENDA) tablet 10 mg  10 mg Oral BID    trospium (SANCTURA) tablet 20 mg  20 mg Oral Nightly    nitroGLYCERIN (NITRODUR) 0.4 MG/HR 1 patch  1 patch TransDERmal Daily    pantoprazole (PROTONIX) tablet 40 mg  40 mg Oral QAM AC    polyethylene glycol (GLYCOLAX) packet 17 g  17 g Oral Daily    senna (SENOKOT) tablet 17.2 mg  2 tablet Oral Nightly    sodium chloride flush 0.9 % injection 5-40 mL  5-40 mL IntraVENous 2 times per day    sodium chloride flush 0.9 % injection 5-40 mL  5-40 mL IntraVENous PRN    0.9 % sodium chloride infusion

## 2022-08-06 NOTE — ED TRIAGE NOTES
Patient arrives via EMS from [facility] after falling. Patient fell earlier today but did not have any injuries. Tonight she fell around 2300, has laceration to face. Patient has history of dementia and afib, is on blood thinners.

## 2022-08-07 ENCOUNTER — APPOINTMENT (OUTPATIENT)
Dept: CT IMAGING | Age: 87
DRG: 082 | End: 2022-08-07
Payer: MEDICARE

## 2022-08-07 LAB
ANION GAP SERPL CALC-SCNC: 6 MMOL/L (ref 7–16)
BASOPHILS # BLD: 0 K/UL (ref 0–0.2)
BASOPHILS NFR BLD: 0 % (ref 0–2)
BUN SERPL-MCNC: 11 MG/DL (ref 8–23)
CALCIUM SERPL-MCNC: 8.1 MG/DL (ref 8.3–10.4)
CHLORIDE SERPL-SCNC: 104 MMOL/L (ref 98–107)
CO2 SERPL-SCNC: 26 MMOL/L (ref 21–32)
CREAT SERPL-MCNC: 0.97 MG/DL (ref 0.6–1)
DIFFERENTIAL METHOD BLD: ABNORMAL
EOSINOPHIL # BLD: 0.2 K/UL (ref 0–0.8)
EOSINOPHIL NFR BLD: 3 % (ref 0.5–7.8)
ERYTHROCYTE [DISTWIDTH] IN BLOOD BY AUTOMATED COUNT: 18.4 % (ref 11.9–14.6)
EST. AVERAGE GLUCOSE BLD GHB EST-MCNC: 140 MG/DL
GLUCOSE BLD STRIP.AUTO-MCNC: 108 MG/DL (ref 65–100)
GLUCOSE BLD STRIP.AUTO-MCNC: 140 MG/DL (ref 65–100)
GLUCOSE BLD STRIP.AUTO-MCNC: 146 MG/DL (ref 65–100)
GLUCOSE BLD STRIP.AUTO-MCNC: 166 MG/DL (ref 65–100)
GLUCOSE SERPL-MCNC: 132 MG/DL (ref 65–100)
HBA1C MFR BLD: 6.5 % (ref 4.8–5.6)
HCT VFR BLD AUTO: 38.8 % (ref 35.8–46.3)
HGB BLD-MCNC: 13.2 G/DL (ref 11.7–15.4)
IMM GRANULOCYTES # BLD AUTO: 0 K/UL (ref 0–0.5)
IMM GRANULOCYTES NFR BLD AUTO: 0 % (ref 0–5)
LYMPHOCYTES # BLD: 2 K/UL (ref 0.5–4.6)
LYMPHOCYTES NFR BLD: 31 % (ref 13–44)
MCH RBC QN AUTO: 33 PG (ref 26.1–32.9)
MCHC RBC AUTO-ENTMCNC: 34 G/DL (ref 31.4–35)
MCV RBC AUTO: 97 FL (ref 79.6–97.8)
MONOCYTES # BLD: 0.6 K/UL (ref 0.1–1.3)
MONOCYTES NFR BLD: 9 % (ref 4–12)
NEUTS SEG # BLD: 3.7 K/UL (ref 1.7–8.2)
NEUTS SEG NFR BLD: 57 % (ref 43–78)
NRBC # BLD: 0 K/UL (ref 0–0.2)
PLATELET # BLD AUTO: 186 K/UL (ref 150–450)
PMV BLD AUTO: 10 FL (ref 9.4–12.3)
POTASSIUM SERPL-SCNC: 3.1 MMOL/L (ref 3.5–5.1)
RBC # BLD AUTO: 4 M/UL (ref 4.05–5.2)
SERVICE CMNT-IMP: ABNORMAL
SODIUM SERPL-SCNC: 136 MMOL/L (ref 136–145)
WBC # BLD AUTO: 6.5 K/UL (ref 4.3–11.1)

## 2022-08-07 PROCEDURE — 2580000003 HC RX 258: Performed by: INTERNAL MEDICINE

## 2022-08-07 PROCEDURE — 96372 THER/PROPH/DIAG INJ SC/IM: CPT

## 2022-08-07 PROCEDURE — 2700000000 HC OXYGEN THERAPY PER DAY

## 2022-08-07 PROCEDURE — 82962 GLUCOSE BLOOD TEST: CPT

## 2022-08-07 PROCEDURE — 85025 COMPLETE CBC W/AUTO DIFF WBC: CPT

## 2022-08-07 PROCEDURE — 6370000000 HC RX 637 (ALT 250 FOR IP): Performed by: INTERNAL MEDICINE

## 2022-08-07 PROCEDURE — 83036 HEMOGLOBIN GLYCOSYLATED A1C: CPT

## 2022-08-07 PROCEDURE — 6360000002 HC RX W HCPCS: Performed by: INTERNAL MEDICINE

## 2022-08-07 PROCEDURE — 2580000003 HC RX 258: Performed by: FAMILY MEDICINE

## 2022-08-07 PROCEDURE — 6360000002 HC RX W HCPCS: Performed by: FAMILY MEDICINE

## 2022-08-07 PROCEDURE — 80048 BASIC METABOLIC PNL TOTAL CA: CPT

## 2022-08-07 PROCEDURE — 36415 COLL VENOUS BLD VENIPUNCTURE: CPT

## 2022-08-07 PROCEDURE — 2500000003 HC RX 250 WO HCPCS: Performed by: INTERNAL MEDICINE

## 2022-08-07 PROCEDURE — 70450 CT HEAD/BRAIN W/O DYE: CPT

## 2022-08-07 PROCEDURE — 96375 TX/PRO/DX INJ NEW DRUG ADDON: CPT

## 2022-08-07 PROCEDURE — 6370000000 HC RX 637 (ALT 250 FOR IP): Performed by: FAMILY MEDICINE

## 2022-08-07 PROCEDURE — 97162 PT EVAL MOD COMPLEX 30 MIN: CPT

## 2022-08-07 PROCEDURE — G0378 HOSPITAL OBSERVATION PER HR: HCPCS

## 2022-08-07 PROCEDURE — 96366 THER/PROPH/DIAG IV INF ADDON: CPT

## 2022-08-07 PROCEDURE — 97530 THERAPEUTIC ACTIVITIES: CPT

## 2022-08-07 RX ORDER — OLANZAPINE 5 MG/1
5 TABLET, ORALLY DISINTEGRATING ORAL ONCE
Status: DISCONTINUED | OUTPATIENT
Start: 2022-08-07 | End: 2022-08-11

## 2022-08-07 RX ORDER — HYDRALAZINE HYDROCHLORIDE 20 MG/ML
5 INJECTION INTRAMUSCULAR; INTRAVENOUS EVERY 8 HOURS PRN
Status: DISCONTINUED | OUTPATIENT
Start: 2022-08-07 | End: 2022-08-16 | Stop reason: HOSPADM

## 2022-08-07 RX ADMIN — MEMANTINE 10 MG: 5 TABLET ORAL at 20:54

## 2022-08-07 RX ADMIN — POTASSIUM BICARBONATE 40 MEQ: 782 TABLET, EFFERVESCENT ORAL at 09:03

## 2022-08-07 RX ADMIN — SODIUM CHLORIDE, PRESERVATIVE FREE 10 ML: 5 INJECTION INTRAVENOUS at 20:55

## 2022-08-07 RX ADMIN — SENNOSIDES 17.2 MG: 8.6 TABLET, FILM COATED ORAL at 20:54

## 2022-08-07 RX ADMIN — TROSPIUM CHLORIDE 20 MG: 20 TABLET, FILM COATED ORAL at 20:54

## 2022-08-07 RX ADMIN — Medication 5 UNITS: at 12:15

## 2022-08-07 RX ADMIN — OLANZAPINE 5 MG: 10 INJECTION, POWDER, FOR SOLUTION INTRAMUSCULAR at 19:28

## 2022-08-07 RX ADMIN — HYDRALAZINE HYDROCHLORIDE 5 MG: 20 INJECTION INTRAMUSCULAR; INTRAVENOUS at 13:28

## 2022-08-07 RX ADMIN — MEMANTINE 10 MG: 5 TABLET ORAL at 09:05

## 2022-08-07 RX ADMIN — BENZOCAINE AND MENTHOL 1 LOZENGE: 15; 3.6 LOZENGE ORAL at 14:22

## 2022-08-07 RX ADMIN — SODIUM CHLORIDE, PRESERVATIVE FREE 10 ML: 5 INJECTION INTRAVENOUS at 12:17

## 2022-08-07 RX ADMIN — LEVOTHYROXINE SODIUM 100 MCG: 0.05 TABLET ORAL at 05:53

## 2022-08-07 RX ADMIN — CETIRIZINE HYDROCHLORIDE 10 MG: 10 TABLET ORAL at 09:05

## 2022-08-07 RX ADMIN — ONDANSETRON 4 MG: 2 INJECTION INTRAMUSCULAR; INTRAVENOUS at 10:32

## 2022-08-07 RX ADMIN — CEFTRIAXONE 1000 MG: 1 INJECTION, POWDER, FOR SOLUTION INTRAMUSCULAR; INTRAVENOUS at 19:48

## 2022-08-07 RX ADMIN — METOPROLOL TARTRATE 25 MG: 25 TABLET, FILM COATED ORAL at 09:04

## 2022-08-07 RX ADMIN — METOPROLOL TARTRATE 25 MG: 25 TABLET, FILM COATED ORAL at 20:55

## 2022-08-07 RX ADMIN — AMLODIPINE BESYLATE 5 MG: 5 TABLET ORAL at 09:05

## 2022-08-07 ASSESSMENT — PAIN SCALES - GENERAL
PAINLEVEL_OUTOF10: 0

## 2022-08-07 NOTE — PROGRESS NOTES
Hospitalist Progress Note   Admit Date:  2022  1:43 AM   Name:  Peyman Bales   Age:  80 y.o. Sex:  female  :  1926   MRN:  [id number]   Room:  [facility]/    Presenting Complaint: Fall     Reason(s) for Admission: Cerebral brain hemorrhage [facility]) [I61.9]     Hospital Course & Interval History:     Peyman Bales is a 80 y.o. female with medical history of hypertension, atrial fibrillation, and diabetes, who presented with fall and laceration to her head. Patient is sent to the emergency room from her nursing home after having fallen out of bed twice today. The first time she did not have any notable injuries. However the second time she did hit her head and had a laceration to her right temporal area. ER work-up included placing Steri-Strips on her skin tear/laceration. CT scan of her head did show a small bleed/contusion to her right parietal area. ER did discuss this with neurosurgery who recommended observation with repeat CT scan later in the morning. Her daughter did have some concerns that she might not be getting her medications appropriately since being in the nursing home. In particular, she is worried about her thyroid medication. Patient is also noted to be on Eliquis for her atrial fibrillation. Subjective/24hr Events (22): Patient examined at bedside. No acute overnight events. Biggest complaint is \"a ore throat\". Has not noticed any new symptoms. Denies headache, blurry vision or changes in her arms or legs. 10 point ROS negative except for HPI above.    Assessment & Plan:     Principal Problem:    Cerebral brain hemorrhage ([address]  - repeat CT imaging at 24 hour melody shows no interval worsening of known parietal bleeding  - telmetry and neuro checks  - Mri brain ordered and pending  - TTE  - statin  - telemetry  - goal SBP<140, weaned off Cardene  - avoid all anti-platelets and AC    # Afib  - hold NOAC  - discussed with patient's family both high risk for CVA and high risk for bleeding, family wishes to elect to come off [facility] going forward, can always revisit or reconsider on outpatient basis  - currently rate controlled on BB    # Alzheimer's disease  - nonpharmacologic interventions  - avoid narcotics and benzos  - avoid restraints    # DM type II   - basal/bolus regimen  - SSI and serial CBGs    # HTN  - current goal SBP<140  - metoprolol  - amlodipine on hold  - did not tolerate Cardene gtt, since discontinued      Discharge Planning:      Transfer from ICU to floor. PT/OT consults and PPD ordered. Diet:  ADULT DIET; Regular  DVT PPx: SCDs  Code status: DNR    Hospital Problems:  Principal Problem:    Cerebral brain hemorrhage ([facility]  Active Problems:    Atrial fibrillation ([facility] [address]    Late onset Alzheimer's dementia without behavioral disturbance (HCC)    Type 2 diabetes with nephropathy ([facility] [address]    Essential hypertension, benign  Resolved Problems:    * No resolved hospital problems.  *      Objective:   Patient Vitals for the past 24 hrs:   Temp Pulse Resp BP SpO2   08/07/22 0831 -- 98 26 130/87 99 %   08/07/22 0801 -- 89 26 114/67 97 %   08/07/22 0731 -- (!) 106 13 134/76 99 %   08/07/22 0701 -- (!) 103 26 (!) 153/75 96 %   08/07/22 0630 -- 85 22 -- 98 %   08/07/22 0615 -- 91 21 120/82 97 %   08/07/22 0530 -- 86 28 114/67 96 %   08/07/22 0459 -- (!) 103 25 (!) 107/53 95 %   08/07/22 0433 -- 90 25 (!) 157/74 99 %   08/07/22 0331 97.5 °F (36.4 °C) 95 12 (!) 146/88 --   08/07/22 0330 -- 92 15 -- 97 %   08/07/22 0300 -- 95 18 (!) 150/72 99 %   08/07/22 0245 -- 90 26 -- 98 %   08/07/22 0235 -- 86 25 131/85 98 %   08/07/22 0232 -- 87 11 -- 96 %   08/07/22 0130 -- 84 12 -- 96 %   08/07/22 0105 -- 89 29 (!) 146/101 94 %   08/07/22 0100 -- 85 27 (!) 146/101 (!) 88 %   08/07/22 0046 -- (!) 101 (!) 37 (!) 123/96 97 %   08/07/22 0031 -- 87 25 114/77 96 %   08/07/22 0017 -- 92 23 132/85 98 %   08/07/22 0001 -- 98 26 135/78 98 %   08/06/22 2346 -- 98 20 (!) 128/92 -- -- -- 90/61 93 %   08/06/22 1231 -- (!) 115 28 125/86 94 %   08/06/22 1216 -- (!) 111 27 135/75 95 %   08/06/22 1201 -- (!) 102 26 (!) 153/89 97 %   08/06/22 1130 98.1 °F (36.7 °C) 95 29 (!) 156/107 95 %   08/06/22 1100 -- 98 27 (!) 169/92 91 %   08/06/22 1000 -- (!) 102 24 (!) 161/84 94 %   08/06/22 0930 -- (!) 108 25 (!) 168/94 95 %   08/06/22 0904 -- (!) 105 29 (!) 149/81 95 %         Oxygen Therapy  SpO2: 99 %  Pulse Oximetry Type: Continuous  Pulse via Oximetry: 95 beats per minute  Pulse Oximeter Device Mode: Continuous  Pulse Oximeter Device Location: Finger  O2 Device: Nasal cannula  Oximetry Probe Site Changed: No  Skin Assessment: Clean, dry, & intact  Skin Protection for O2 Device: N/A  O2 Flow Rate (L/min): 2 L/min    Estimated body mass index is 24.27 kg/m² as calculated from the following:    Height as of this encounter: 5' 8\" (1.727 m). Weight as of this encounter: 159 lb 9.6 oz (72.4 kg). Intake/Output Summary (Last 24 hours) at 8/7/2022 0853  Last data filed at 8/7/2022 0552  Gross per 24 hour   Intake 161.08 ml   Output 400 ml   Net -238.92 ml           Physical Exam:     Blood pressure 130/87, pulse 98, temperature 97.5 °F (36.4 °C), temperature source Oral, resp. rate 26, height 5' 8\" (1.727 m), weight 159 lb 9.6 oz (72.4 kg), SpO2 99 %. General:    Well nourished. Tired appearing  Head:  Normocephalic, bruise to temporal orbit region without active bleeding  Eyes:  Sclerae appear normal.  Pupils equally round. ENT:  Nares appear normal, no drainage. Moist oral mucosa  Neck:  No restricted ROM. Trachea midline   CV:   Regular rate. No jugular venous distension. Lungs:   CTAB. No wheezing, rhonchi, or rales. Symmetric expansion. Abdomen: Bowel sounds present. Soft, nontender, nondistended. Extremities: No cyanosis or clubbing. No edema  Skin:     No rashes and normal coloration. Warm and dry. Neuro:  CN II-XII grossly intact. Sensation intact.   A&Ox3  Psych:  Normal mood and affect.       I have personally reviewed labs and tests showing:  Recent Labs:  Recent Results (from the past 48 hour(s))   Basic Metabolic Panel w/ Reflex to MG    Collection Time: 08/06/22  5:07 AM   Result Value Ref Range    Sodium 135 (L) 136 - 145 mmol/L    Potassium 3.4 (L) 3.5 - 5.1 mmol/L    Chloride 100 98 - 107 mmol/L    CO2 28 21 - 32 mmol/L    Anion Gap 7 7 - 16 mmol/L    Glucose 125 (H) 65 - 100 mg/dL    BUN 14 8 - 23 MG/DL    Creatinine 1.10 (H) 0.6 - 1.0 MG/DL    GFR  59 (L) >60 ml/min/1.73m2    GFR Non- 49 (L) >60 ml/min/1.73m2    Calcium 8.8 8.3 - 10.4 MG/DL   CBC with Auto Differential    Collection Time: 08/06/22  5:07 AM   Result Value Ref Range    WBC 9.5 4.3 - 11.1 K/uL    RBC 5.05 4.05 - 5.2 M/uL    Hemoglobin 15.7 (H) 11.7 - 15.4 g/dL    Hematocrit 48.0 (H) 35.8 - 46.3 %    MCV 95.0 79.6 - 97.8 FL    MCH 31.1 26.1 - 32.9 PG    MCHC 32.7 31.4 - 35.0 g/dL    RDW 17.8 (H) 11.9 - 14.6 %    Platelets 589 280 - 019 K/uL    MPV 9.8 9.4 - 12.3 FL    nRBC 0.00 0.0 - 0.2 K/uL    Differential Type AUTOMATED      Seg Neutrophils 62 43 - 78 %    Lymphocytes 29 13 - 44 %    Monocytes 8 4.0 - 12.0 %    Eosinophils % 1 0.5 - 7.8 %    Basophils 0 0.0 - 2.0 %    Immature Granulocytes 0 0.0 - 5.0 %    Segs Absolute 5.9 1.7 - 8.2 K/UL    Absolute Lymph # 2.7 0.5 - 4.6 K/UL    Absolute Mono # 0.7 0.1 - 1.3 K/UL    Absolute Eos # 0.1 0.0 - 0.8 K/UL    Basophils Absolute 0.0 0.0 - 0.2 K/UL    Absolute Immature Granulocyte 0.0 0.0 - 0.5 K/UL   Protime-INR    Collection Time: 08/06/22  5:07 AM   Result Value Ref Range    Protime 15.6 (H) 12.6 - 14.5 sec    INR 1.2     APTT    Collection Time: 08/06/22  5:07 AM   Result Value Ref Range    PTT 37.6 (H) 24.1 - 35.1 SEC   TSH with Reflex    Collection Time: 08/06/22  5:07 AM   Result Value Ref Range    TSH w Free Thyroid if Abnormal 22.00 (H) 0.358 - 3.740 UIU/ML   Magnesium    Collection Time: 08/06/22  5:07 AM   Result Value Ref Range    Magnesium 1.6 (L) 1.8 - 2.4 mg/dL   T4, Free    Collection Time: 08/06/22  5:07 AM   Result Value Ref Range    T4 Free 1.2 0.78 - 1.46 NG/DL   POCT Glucose    Collection Time: 08/06/22  8:43 AM   Result Value Ref Range    POC Glucose 120 (H) 65 - 100 mg/dL    Performed by: Sofia    Urinalysis w rflx microscopic    Collection Time: 08/06/22 11:17 AM   Result Value Ref Range    Color, UA YELLOW/STRAW      Appearance CLOUDY      Specific [place], UA 1.011 1.001 - 1.023      pH, Urine 6.0 5.0 - 9.0      Protein, UA TRACE (A) NEG mg/dL    Glucose, UA Negative mg/dL    Ketones, Urine TRACE (A) NEG mg/dL    Bilirubin Urine Negative NEG      Blood, Urine Negative NEG      Urobilinogen, Urine 1.0 0.2 - 1.0 EU/dL    Nitrite, Urine Positive (A) NEG      Leukocyte Esterase, Urine LARGE (A) NEG      WBC, UA 20-50 0 /hpf    RBC, UA 0-3 0 /hpf    Epithelial Cells UA 3-5 0 /hpf    BACTERIA, URINE 4+ (H) 0 /hpf    OTHER OBSERVATIONS RESULTS VERIFIED MANUALLY     POCT Glucose    Collection Time: 08/06/22 11:50 AM   Result Value Ref Range    POC Glucose 116 (H) 65 - 100 mg/dL    Performed by: Sofia    POCT Glucose    Collection Time: 08/06/22  5:05 PM   Result Value Ref Range    POC Glucose 140 (H) 65 - 100 mg/dL    Performed by: Sofia    POCT Glucose    Collection Time: 08/06/22  8:27 PM   Result Value Ref Range    POC Glucose 111 (H) 65 - 100 mg/dL    Performed by: Nish Boucher    Hemoglobin A1c    Collection Time: 08/07/22  4:28 AM   Result Value Ref Range    Hemoglobin A1C 6.5 (H) 4.8 - 5.6 %    eAG 140 mg/dL   CBC with Auto Differential    Collection Time: 08/07/22  4:28 AM   Result Value Ref Range    WBC 6.5 4.3 - 11.1 K/uL    RBC 4.00 (L) 4.05 - 5.2 M/uL    Hemoglobin 13.2 11.7 - 15.4 g/dL    Hematocrit 38.8 35.8 - 46.3 %    MCV 97.0 79.6 - 97.8 FL    MCH 33.0 (H) 26.1 - 32.9 PG    MCHC 34.0 31.4 - 35.0 g/dL    RDW 18.4 (H) 11.9 - 14.6 %    Platelets 149 501 - 574 K/uL    MPV 10.0 9.4 - 12.3 FL    nRBC 0.00 0.0 - 0.2 K/uL    Differential Type AUTOMATED      Seg Neutrophils 57 43 - 78 %    Lymphocytes 31 13 - 44 %    Monocytes 9 4.0 - 12.0 %    Eosinophils % 3 0.5 - 7.8 %    Basophils 0 0.0 - 2.0 %    Immature Granulocytes 0 0.0 - 5.0 %    Segs Absolute 3.7 1.7 - 8.2 K/UL    Absolute Lymph # 2.0 0.5 - 4.6 K/UL    Absolute Mono # 0.6 0.1 - 1.3 K/UL    Absolute Eos # 0.2 0.0 - 0.8 K/UL    Basophils Absolute 0.0 0.0 - 0.2 K/UL    Absolute Immature Granulocyte 0.0 0.0 - 0.5 K/UL   Basic Metabolic Panel    Collection Time: 08/07/22  4:28 AM   Result Value Ref Range    Sodium 136 136 - 145 mmol/L    Potassium 3.1 (L) 3.5 - 5.1 mmol/L    Chloride 104 98 - 107 mmol/L    CO2 26 21 - 32 mmol/L    Anion Gap 6 (L) 7 - 16 mmol/L    Glucose 132 (H) 65 - 100 mg/dL    BUN 11 8 - 23 MG/DL    Creatinine 0.97 0.6 - 1.0 MG/DL    GFR African American >60 >60 ml/min/1.73m2    GFR Non- 57 (L) >60 ml/min/1.73m2    Calcium 8.1 (L) 8.3 - 10.4 MG/DL       I have personally reviewed imaging studies showing: Other Studies:  CT HEAD WO CONTRAST   Final Result   Unchanged small right parietal lobe hemorrhage, with no mass effect   or midline shift. CT HEAD WO CONTRAST   Final Result   1. Stable focal density closely associated with a right parietal sulcus which   could represent minimal subarachnoid hemorrhage or a tiny cortical contusion in   the setting of acute trauma. .          This report was made using voice transcription. Despite my best efforts to avoid   any, transcription errors may persist. If there is any question about the   accuracy of the report or need for clarification, then please call [phone], or text me through [email or web address] for clarification or correction. CT HEAD WO CONTRAST   Final Result   Acute 6 mm hemorrhagic contusion along the right parietal lobe   cortex, without mass effect or midline shift.  Dr. Nicholas Hardy verbally notified at 2:36 AM (DC 5).       MRI BRAIN WO CONTRAST    (Results Pending)       Current Meds:  Current Facility-Administered Medications   Medication Dose Route Frequency    hydrALAZINE (APRESOLINE) injection 5 mg  5 mg IntraVENous Q8H PRN    benzocaine-menthol (CEPACOL SORE THROAT) lozenge 1 lozenge  1 lozenge Oral Q2H PRN    amLODIPine (NORVASC) tablet 5 mg  5 mg Oral Daily    cetirizine (ZYRTEC) tablet 10 mg  10 mg Oral Daily    levothyroxine (SYNTHROID) tablet 100 mcg  100 mcg Oral QAM AC    memantine (NAMENDA) tablet 10 mg  10 mg Oral BID    trospium (SANCTURA) tablet 20 mg  20 mg Oral Nightly    nitroGLYCERIN (NITRODUR) 0.4 MG/HR 1 patch  1 patch TransDERmal Daily    pantoprazole (PROTONIX) tablet 40 mg  40 mg Oral QAM AC    polyethylene glycol (GLYCOLAX) packet 17 g  17 g Oral Daily    senna (SENOKOT) tablet 17.2 mg  2 tablet Oral Nightly    sodium chloride flush 0.9 % injection 5-40 mL  5-40 mL IntraVENous 2 times per day    sodium chloride flush 0.9 % injection 5-40 mL  5-40 mL IntraVENous PRN    0.9 % sodium chloride infusion   IntraVENous PRN    potassium chloride (KLOR-CON M) extended release tablet 40 mEq  40 mEq Oral PRN    Or    potassium bicarb-citric acid (EFFER-K) effervescent tablet 40 mEq  40 mEq Oral PRN    Or    potassium chloride 10 mEq/100 mL IVPB (Peripheral Line)  10 mEq IntraVENous PRN    magnesium sulfate 2000 mg in 50 mL IVPB premix  2,000 mg IntraVENous PRN    promethazine (PHENERGAN) tablet 12.5 mg  12.5 mg Oral Q6H PRN    Or    ondansetron (ZOFRAN) injection 4 mg  4 mg IntraVENous Q6H PRN    aluminum & magnesium hydroxide-simethicone (MAALOX) 200-200-20 MG/5ML suspension 30 mL  30 mL Oral Q6H PRN    acetaminophen (TYLENOL) tablet 650 mg  650 mg Oral Q6H PRN    Or    acetaminophen (TYLENOL) suppository 650 mg  650 mg Rectal Q6H PRN    insulin lispro (HUMALOG) injection vial 0-8 Units  0-8 Units SubCUTAneous TID WC    insulin lispro (HUMALOG) injection vial 0-4 Units  0-4 Units SubCUTAneous Nightly    glucose chewable tablet 16 g  4 tablet Oral PRN    dextrose bolus 10% 125 mL  125 mL IntraVENous PRN    Or    dextrose bolus 10% 250 mL  250 mL IntraVENous PRN    glucagon (rDNA) injection 1 mg  1 mg SubCUTAneous PRN    dextrose 10 % infusion   IntraVENous Continuous PRN    niCARdipine (CARDENE) 25 mg in sodium chloride 0.9 % 250 mL infusion ([id number])  2.5-15 mg/hr IntraVENous Continuous    metoprolol tartrate (LOPRESSOR) tablet 25 mg  25 mg Oral BID    cefTRIAXone (ROCEPHIN) 1,000 mg in sodium chloride 0.9 % 50 mL IVPB mini-bag  1,000 mg IntraVENous Q24H       Signed:  JANKI ARELLANO,     Part of this note may have been written by using a voice dictation software. The note has been proof read but may still contain some grammatical/other typographical errors.

## 2022-08-07 NOTE — PROGRESS NOTES
Patient becoming more agitated at this time, notified Dr. Liang Other, orders received to try Zyprexa 5mg ODT.

## 2022-08-07 NOTE — PROGRESS NOTES
PHYSICAL THERAPY Initial Assessment and PM  (Link to Caseload Tracking: PT Visit Days : 1  Acknowledge Orders  Time In/Out  PT Charge Capture  Rehab Caseload Tracker    Justice Angeles is a 80 y.o. female   PRIMARY DIAGNOSIS: Cerebral brain hemorrhage ([facility] [address]  Cerebral brain hemorrhage ([facility] [address] [I61.9]       Reason for Referral: Generalized Muscle Weakness (M62.81)  Difficulty in walking, Not elsewhere classified (R26.2)  Observation: Payor: MEDICARE / Plan: MEDICARE PART A AND B / Product Type: *No Product type* /     ASSESSMENT:     REHAB RECOMMENDATIONS:   Recommendation to date pending progress:  Setting:  Back to facility    Equipment:    None     ASSESSMENT:  Ms. Leora Quiroz was supine at arrival needing encouragement from dtr Kat Mc. She attempted on her own to move to EOB only by moving head initially refusing asst, but dtr encouraging. maxA to EOB. Then needing about 6min worth of encouragement to try to stand. Stood with maxA in flexed posture for 20sec. Returned back to supine with maxA to asst RN change/clean pt. Hand off to RN. Pt will benefit from skilled and sophisticated PT to address and improve impairments.    .     [address] [facility] [address] [id number] AM-PAC 6 Clicks Basic Mobility Inpatient Short Form  -PAC Mobility Inpatient   How much difficulty turning over in bed?: A Lot  How much difficulty sitting down on / standing up from a chair with arms?: A Lot  How much difficulty moving from lying on back to sitting on side of bed?: A Lot  How much help from another person moving to and from a bed to a chair?: A Lot  How much help from another person needed to walk in hospital room?: Total  How much help from another person for climbing 3-5 steps with a railing?: Total  -PAC Inpatient Mobility Raw Score : 10  AM-PAC Inpatient T-Scale Score : 32.29  Mobility Inpatient CMS 0-100% Score: 76.75  Mobility Inpatient CMS G-Code Modifier : CL    SUBJECTIVE:   Ms. Leora Quiroz states, \"ill do this tomorrow\" Social/Functional Lives With: Home care staff  Type of Home: Facility    OBJECTIVE:     PAIN: Celio Otter / O2: Dorean Danas / Isaiah Kristie / Velvet Mix:   Pre Treatment:   Pain Assessment: None - Denies Pain      Post Treatment: 0 Vitals        Oxygen      Continuous Pulse Oximetry, Purewick, and Telemetry     RESTRICTIONS/PRECAUTIONS:                    GROSS EVALUATION: Intact Impaired (Comments):   AROM [] AROM RLE (degrees)  RLE AROM: Exceptions   PROM []    Strength [] Strength RLE  Strength RLE: Exception   Balance [] Posture: Fair  Sitting - Static: Fair  Sitting - Dynamic: Fair  Standing - Static: Poor  Standing - Dynamic: Poor   Posture [] Forward Head  Rounded Shoulders  Thoracic Kyphosis  Trunk Flexion  Knee Flexion  Hip Flexion   Sensation []     Coordination []      Tone []     Edema []    Activity Tolerance []      []      COGNITION/  PERCEPTION: Intact Impaired (Comments):   Orientation [] Oriented to person   Vision [x]     Hearing [x]     Cognition  []  slow     MOBILITY: I Mod I S SBA CGA Min Mod Max Total  NT x2 Comments:   Bed Mobility    Rolling [] [] [] [] [] [] [] [x] [] [] []    Supine to Sit [] [] [] [] [] [] [] [x] [] [] []    Scooting [] [] [] [] [] [] [] [x] [] [] []    Sit to Supine [] [] [] [] [] [] [] [x] [] [] []    Transfers    Sit to Stand [] [] [] [] [] [] [] [x] [] [] []    Bed to Chair [] [] [] [] [] [] [] [] [] [x] []    Stand to Sit [] [] [] [] [] [] [] [x] [] [] []     [] [] [] [] [] [] [] [] [] [] []    I=Independent, Mod I=Modified Independent, S=Supervision, SBA=Standby Assistance, CGA=Contact Guard Assistance,   Min=Minimal Assistance, Mod=Moderate Assistance, Max=Maximal Assistance, Total=Total Assistance, NT=Not Tested    GAIT: I Mod I S SBA CGA Min Mod Max Total  NT x2 Comments:   Level of Assistance [] [] [] [] [] [] [] [] [] [x] []    Distance   feet    DME N/A    Gait Quality N/A    Weightbearing Status      Stairs      I=Independent, Mod I=Modified Independent, S=Supervision, SBA=Standby Assistance, CGA=Contact Guard Assistance,   Min=Minimal Assistance, Mod=Moderate Assistance, Max=Maximal Assistance, Total=Total Assistance, NT=Not Tested    PLAN:   ACUTE PHYSICAL THERAPY GOALS:   (Developed with and agreed upon by patient and/or caregiver. )  LTG:  (1.)Ms. Brittny Franklin will move from supine to sit and sit to supine , scoot up and down, and roll side to side in bed with MINIMAL ASSIST within 7 treatment day(s). (2.)Ms. Brittny Franklin will transfer from bed to chair and chair to bed with MINIMAL ASSIST using the least restrictive device within 7 treatment day(s). (3.)Ms. Brittny Franklin will tolerate OOB activity  with CONTACT GUARD ASSIST for 24min within 7 treatment day(s). ________________________________________________________________________________________________      FREQUENCY AND DURATION: 3 times/week for duration of hospital stay or until stated goals are met, whichever comes first.    THERAPY PROGNOSIS: Fair    PROBLEM LIST:   (Skilled intervention is medically necessary to address:)  Decreased ADL/Functional Activities  Decreased Activity Tolerance  Decreased AROM/PROM  Decreased Balance  Decreased Cognition  Decreased Coordination  Decreased Gait Ability  Decreased Safety Awareness  Decreased Strength  Decreased Transfer Abilities INTERVENTIONS PLANNED:   (Benefits and precautions of physical therapy have been discussed with the patient.)  Therapeutic Activity  Therapeutic Exercise/HEP  Neuromuscular Re-education  Gait Training  Education       TREATMENT:   EVALUATION: HIGH COMPLEXITY: (Untimed Charge)    TREATMENT:   Therapeutic Activity (9 Minutes): Therapeutic activity included Rolling, Supine to Sit, Sit to Supine, Scooting, Transfer Training, Sitting balance , and Standing balance to improve functional Activity tolerance, Balance, Coordination, Mobility, and Strength.     TREATMENT GRID:  N/A    AFTER TREATMENT PRECAUTIONS: Bed, Bed/Chair Locked, Call light within reach, Needs within reach, RN at bedside, and Visitors at bedside    INTERDISCIPLINARY COLLABORATION:  RN/ PCT and PT/ PTA    EDUCATION: Education Given To: Patient  Education Provided: Role of Therapy;Plan of Care  Education Method: Verbal  Barriers to Learning: None  Education Outcome: Verbalized understanding;Continued education needed    TIME IN/OUT:  Time In: 1515  Time Out: Torsten 16  Minutes: 350 Yvonne Watkins PT

## 2022-08-08 ENCOUNTER — APPOINTMENT (OUTPATIENT)
Dept: CT IMAGING | Age: 87
DRG: 082 | End: 2022-08-08
Payer: MEDICARE

## 2022-08-08 LAB
ANION GAP SERPL CALC-SCNC: 12 MMOL/L (ref 7–16)
BASOPHILS # BLD: 0 K/UL (ref 0–0.2)
BASOPHILS NFR BLD: 1 % (ref 0–2)
BUN SERPL-MCNC: 11 MG/DL (ref 8–23)
CALCIUM SERPL-MCNC: 8.7 MG/DL (ref 8.3–10.4)
CHLORIDE SERPL-SCNC: 103 MMOL/L (ref 98–107)
CO2 SERPL-SCNC: 24 MMOL/L (ref 21–32)
CREAT SERPL-MCNC: 1.1 MG/DL (ref 0.6–1)
DIFFERENTIAL METHOD BLD: ABNORMAL
EOSINOPHIL # BLD: 0.1 K/UL (ref 0–0.8)
EOSINOPHIL NFR BLD: 1 % (ref 0.5–7.8)
ERYTHROCYTE [DISTWIDTH] IN BLOOD BY AUTOMATED COUNT: 16.9 % (ref 11.9–14.6)
GLUCOSE BLD STRIP.AUTO-MCNC: 109 MG/DL (ref 65–100)
GLUCOSE BLD STRIP.AUTO-MCNC: 121 MG/DL (ref 65–100)
GLUCOSE BLD STRIP.AUTO-MCNC: 121 MG/DL (ref 65–100)
GLUCOSE BLD STRIP.AUTO-MCNC: 128 MG/DL (ref 65–100)
GLUCOSE SERPL-MCNC: 136 MG/DL (ref 65–100)
HCT VFR BLD AUTO: 49.9 % (ref 35.8–46.3)
HGB BLD-MCNC: 16 G/DL (ref 11.7–15.4)
IMM GRANULOCYTES # BLD AUTO: 0 K/UL (ref 0–0.5)
IMM GRANULOCYTES NFR BLD AUTO: 1 % (ref 0–5)
LYMPHOCYTES # BLD: 1.5 K/UL (ref 0.5–4.6)
LYMPHOCYTES NFR BLD: 20 % (ref 13–44)
MCH RBC QN AUTO: 30.7 PG (ref 26.1–32.9)
MCHC RBC AUTO-ENTMCNC: 32.1 G/DL (ref 31.4–35)
MCV RBC AUTO: 95.6 FL (ref 79.6–97.8)
MM INDURATION, POC: 0 MM (ref 0–5)
MONOCYTES # BLD: 0.8 K/UL (ref 0.1–1.3)
MONOCYTES NFR BLD: 10 % (ref 4–12)
NEUTS SEG # BLD: 5 K/UL (ref 1.7–8.2)
NEUTS SEG NFR BLD: 68 % (ref 43–78)
NRBC # BLD: 0.05 K/UL (ref 0–0.2)
PLATELET # BLD AUTO: 193 K/UL (ref 150–450)
PMV BLD AUTO: 9.8 FL (ref 9.4–12.3)
POTASSIUM SERPL-SCNC: 4.2 MMOL/L (ref 3.5–5.1)
PPD, POC: NEGATIVE
RBC # BLD AUTO: 5.22 M/UL (ref 4.05–5.2)
SERVICE CMNT-IMP: ABNORMAL
SODIUM SERPL-SCNC: 139 MMOL/L (ref 136–145)
WBC # BLD AUTO: 7.5 K/UL (ref 4.3–11.1)

## 2022-08-08 PROCEDURE — 96375 TX/PRO/DX INJ NEW DRUG ADDON: CPT

## 2022-08-08 PROCEDURE — 2580000003 HC RX 258: Performed by: INTERNAL MEDICINE

## 2022-08-08 PROCEDURE — 6360000002 HC RX W HCPCS: Performed by: INTERNAL MEDICINE

## 2022-08-08 PROCEDURE — 36415 COLL VENOUS BLD VENIPUNCTURE: CPT

## 2022-08-08 PROCEDURE — 97165 OT EVAL LOW COMPLEX 30 MIN: CPT

## 2022-08-08 PROCEDURE — 2500000003 HC RX 250 WO HCPCS: Performed by: INTERNAL MEDICINE

## 2022-08-08 PROCEDURE — 97535 SELF CARE MNGMENT TRAINING: CPT

## 2022-08-08 PROCEDURE — 85025 COMPLETE CBC W/AUTO DIFF WBC: CPT

## 2022-08-08 PROCEDURE — 80048 BASIC METABOLIC PNL TOTAL CA: CPT

## 2022-08-08 PROCEDURE — 97530 THERAPEUTIC ACTIVITIES: CPT

## 2022-08-08 PROCEDURE — 1100000000 HC RM PRIVATE

## 2022-08-08 PROCEDURE — 2500000003 HC RX 250 WO HCPCS: Performed by: HOSPITALIST

## 2022-08-08 PROCEDURE — 6370000000 HC RX 637 (ALT 250 FOR IP): Performed by: FAMILY MEDICINE

## 2022-08-08 PROCEDURE — 6370000000 HC RX 637 (ALT 250 FOR IP): Performed by: INTERNAL MEDICINE

## 2022-08-08 PROCEDURE — 70450 CT HEAD/BRAIN W/O DYE: CPT

## 2022-08-08 PROCEDURE — 96376 TX/PRO/DX INJ SAME DRUG ADON: CPT

## 2022-08-08 PROCEDURE — 82962 GLUCOSE BLOOD TEST: CPT

## 2022-08-08 PROCEDURE — 2580000003 HC RX 258: Performed by: HOSPITALIST

## 2022-08-08 PROCEDURE — 2580000003 HC RX 258: Performed by: FAMILY MEDICINE

## 2022-08-08 PROCEDURE — 92526 ORAL FUNCTION THERAPY: CPT

## 2022-08-08 PROCEDURE — G0378 HOSPITAL OBSERVATION PER HR: HCPCS

## 2022-08-08 RX ORDER — DILTIAZEM HYDROCHLORIDE 5 MG/ML
10 INJECTION INTRAVENOUS ONCE
Status: COMPLETED | OUTPATIENT
Start: 2022-08-08 | End: 2022-08-08

## 2022-08-08 RX ORDER — 0.9 % SODIUM CHLORIDE 0.9 %
500 INTRAVENOUS SOLUTION INTRAVENOUS ONCE
Status: COMPLETED | OUTPATIENT
Start: 2022-08-08 | End: 2022-08-09

## 2022-08-08 RX ORDER — METOPROLOL TARTRATE 5 MG/5ML
5 INJECTION INTRAVENOUS ONCE
Status: COMPLETED | OUTPATIENT
Start: 2022-08-08 | End: 2022-08-08

## 2022-08-08 RX ADMIN — METOPROLOL TARTRATE 5 MG: 5 INJECTION INTRAVENOUS at 00:25

## 2022-08-08 RX ADMIN — ACETAMINOPHEN 650 MG: 325 TABLET, FILM COATED ORAL at 20:54

## 2022-08-08 RX ADMIN — SODIUM CHLORIDE 500 ML: 9 INJECTION, SOLUTION INTRAVENOUS at 22:56

## 2022-08-08 RX ADMIN — SODIUM CHLORIDE, PRESERVATIVE FREE 10 ML: 5 INJECTION INTRAVENOUS at 09:27

## 2022-08-08 RX ADMIN — LEVOTHYROXINE SODIUM 100 MCG: 0.05 TABLET ORAL at 05:42

## 2022-08-08 RX ADMIN — SODIUM CHLORIDE, PRESERVATIVE FREE 10 ML: 5 INJECTION INTRAVENOUS at 20:55

## 2022-08-08 RX ADMIN — HYDRALAZINE HYDROCHLORIDE 5 MG: 20 INJECTION INTRAMUSCULAR; INTRAVENOUS at 01:52

## 2022-08-08 RX ADMIN — MEMANTINE 10 MG: 5 TABLET ORAL at 20:54

## 2022-08-08 RX ADMIN — CEFTRIAXONE 1000 MG: 1 INJECTION, POWDER, FOR SOLUTION INTRAMUSCULAR; INTRAVENOUS at 20:08

## 2022-08-08 RX ADMIN — SENNOSIDES 17.2 MG: 8.6 TABLET, FILM COATED ORAL at 20:55

## 2022-08-08 RX ADMIN — METOPROLOL TARTRATE 25 MG: 25 TABLET, FILM COATED ORAL at 20:55

## 2022-08-08 RX ADMIN — METOPROLOL TARTRATE 5 MG: 5 INJECTION INTRAVENOUS at 09:23

## 2022-08-08 RX ADMIN — DILTIAZEM HYDROCHLORIDE 10 MG: 5 INJECTION INTRAVENOUS at 02:45

## 2022-08-08 ASSESSMENT — PAIN SCALES - GENERAL
PAINLEVEL_OUTOF10: 0
PAINLEVEL_OUTOF10: 2
PAINLEVEL_OUTOF10: 0
PAINLEVEL_OUTOF10: 3
PAINLEVEL_OUTOF10: 0
PAINLEVEL_OUTOF10: 0

## 2022-08-08 ASSESSMENT — PAIN DESCRIPTION - ORIENTATION: ORIENTATION: MID;RIGHT

## 2022-08-08 ASSESSMENT — PAIN DESCRIPTION - FREQUENCY: FREQUENCY: INTERMITTENT

## 2022-08-08 ASSESSMENT — PAIN - FUNCTIONAL ASSESSMENT: PAIN_FUNCTIONAL_ASSESSMENT: ACTIVITIES ARE NOT PREVENTED

## 2022-08-08 ASSESSMENT — PAIN DESCRIPTION - DESCRIPTORS: DESCRIPTORS: ACHING;SORE

## 2022-08-08 ASSESSMENT — PAIN DESCRIPTION - LOCATION: LOCATION: CHEST

## 2022-08-08 ASSESSMENT — PAIN DESCRIPTION - PAIN TYPE: TYPE: ACUTE PAIN

## 2022-08-08 ASSESSMENT — PAIN DESCRIPTION - ONSET: ONSET: SUDDEN

## 2022-08-08 NOTE — PROGRESS NOTES
LTG: Patient will tolerate least restrictive oral diet without overt s/sx of airway compromise. STG: Patient will participate in ongoing po trials with SLP in attempt to identify least restrictive oral diet. STG: Patient will participate in modified barium swallow study to objectively assess swallow function as medically indicated. SPEECH LANGUAGE PATHOLOGY: DYSPHAGIA  Re-evaluation    NAME: Scarlet Carrion  : 1926  MRN: [id number]    ADMISSION DATE: 2022  PRIMARY DIAGNOSIS: Cerebral brain hemorrhage (HCC)  Cerebral brain hemorrhage ([facility] [address] [I61.9]    ICD-10: Treatment Diagnosis: R13.12 Dysphagia, Oropharyngeal Phase    RECOMMENDATIONS   Diet:  Diet Solids Recommendation: NPO  Liquid Consistency Recommendation: NPO    Medications: Via alternative means of nutrition     Recommendations: NPO; Therapeutic feeds with SLP only; Dysphagia treatment     Compensatory Swallowing Strategies: (Q 3 hour oral care)     Therapeutic Intervention:Patient/Family education     Patient continues to require skilled intervention: Yes  D/C Recommendations: Ongoing speech therapy is recommended during this hospitalization       ASSESSMENT    Dysphagia Impression : Moderate-severe dysphagia related to poor alertness. Not at safe candidate for po intake at this time. Recommend NPO with non-oral medications. Speech to follow along for ongoing po trials in attempt to identify least restrictive oral diet. GENERAL    Chart Reviewed: Yes  Subjective: Patient evaluated by speech therapy on  with recommendations for regular diet and thin liquids. Per RN, decreased alertness starting yesterday. Poor alertness today at time of evaluation. Behavior/Cognition: Pleasant mood; Lethargic  Communication Observation: Dysarthria  Follows Directions:  (No command following)  Respiratory Status: O2 via nasual cannula              History of Present Injury/Illness: Ms. Suyapa Robles  has a past medical history of Abdominal pain, epigastric, Abdominal pain, unspecified site, Anxiety state, unspecified, Aortic stenosis, Aortic valve disorders, Atherosclerosis of aorta (HCC), Atrial fibrillation ([facility] [address] Backache, unspecified, Benign neoplasm of colon, Cervicalgia, Closed fracture of unspecified bone, Depressive disorder, not elsewhere classified, Diabetes ([facility] [address] Diarrhea, Disorders of magnesium metabolism, Edema, Elevated sedimentation rate, Encounter for long-term (current) use of other medications, Endocrine disease, Epistaxis, Esophageal reflux, Essential hypertension, benign, Gastrointestinal malfunction arising from mental factors, Hypertension, Hypertension, benign, Hypertonicity of bladder, Hypopotassemia, Insomnia, unspecified, Irritable bowel syndrome, Memory loss, Nausea alone, Osteoarthrosis, unspecified whether generalized or localized, unspecified site, Other ill-defined conditions(799.89), Other malaise and fatigue, Pain in joint, lower leg, Pain in joint, shoulder region, Postnasal drip, Rheumatoid arthritis(714.0), Type II or unspecified type diabetes mellitus without mention of complication, not stated as uncontrolled, Unspecified constipation, Unspecified hemorrhoids with other complication, Unspecified hemorrhoids without mention of complication, Unspecified hereditary and idiopathic peripheral neuropathy, Unspecified hypothyroidism, Urgency of urination, and Urinary tract infection, site not specified. . She also  has a past surgical history that includes other surgical history; Tonsillectomy; and Cholecystectomy. Prior Dysphagia History: Evaluated by speech on 8/6- regular diet and thin liquids recommended. RN reports decreased alertness yesterday and some coughing observed. Poor alertness today    Pain:   Patient does not appear in pain           OBJECTIVE    Oral Motor Assessment  Patient Positioning: Upright in bed      Dentition: Adequate    Oropharyngeal Phase:   Poor alertness during po trials.  Patient would briefly open eyes and attempt to respond to questions, but verbal responses were unintelligible. She pursed lips when ice chip was presented via tsp. Manual presentation required for her to fully accept ice chips x2 trials. Appropriate oral prep with and swallow with 2 trials that were presented with no overt s/sx of airway compromise. Additional trials were deferred as she could not maintain adequate alertness for additional po trials. PLAN    Duration/Frequency: Continue to follow patient 3x/week for duration of hospitalization and/or until goals met    Dysphagia Outcome and Severity Scale (WAN)  Dysphagia Outcome Severity Scale: Level 2: Moderate Severe dysphagia- Maximum assistance or maximum use of strategies with partial PO only  Interpretation of Tool: The Dysphagia Outcome and Severity Scale (WAN) is a simple, easy-to-use, 7-point scale developed to systematically rate the functional severity of dysphagia based on objective assessment and make recommendations for diet level, independence level, and type of nutrition. Normal(7), Functional(6), Mild(5), Mild-Moderate(4), Moderate(3), Moderate-Severe(2), Severe(1)    Speech Therapy Prognosis  Prognosis: Fair  Prognosis Considerations: Age;Medical Diagnosis;Participation Level    Education: Patient, RN  Patient Education: Diet recommendations  Patient Education Response: Needs reinforcement, No evidence of learning    Current Medications:   No current facility-administered medications on file prior to encounter.      Current Outpatient Medications on File Prior to Encounter   Medication Sig Dispense Refill    metoprolol tartrate (LOPRESSOR) 100 MG tablet Take 1 tablet by mouth 2 times daily 60 tablet 3    pantoprazole (PROTONIX) 40 MG tablet Take 1 tablet by mouth every morning (before breakfast) 30 tablet 3    urea (URE-NA) 15 g PACK packet Take 15 g by mouth in the morning and at bedtime 20 each 0    torsemide (DEMADEX) 20 MG tablet Take 1 tablet by mouth 2 times daily as needed (volume overload, edema) 60 tablet 0    acetaminophen (TYLENOL) 500 MG tablet Take 1,000 mg by mouth daily      amLODIPine (NORVASC) 5 MG tablet TAKE ONE TABLET BY MOUTH EVERY DAY      apixaban (ELIQUIS) 5 MG TABS tablet Take 5 mg by mouth 2 times daily      cetirizine (ZYRTEC) 10 MG tablet Take 10 mg by mouth daily      vitamin D 25 MCG (1000 UT) CAPS Take 1,000 Units by mouth daily      diclofenac (FLECTOR) 1.3 % PTCH patch Place 1 patch onto the skin every 12 hours      levothyroxine (SYNTHROID) 100 MCG tablet Take by mouth every morning (before breakfast)      Lidocaine HCl 4 % CREA Apply topically      memantine (NAMENDA) 10 MG tablet Take 10 mg by mouth 2 times daily      mirabegron (MYRBETRIQ) 25 MG TB24 Take 25 mg by mouth daily      nitroGLYCERIN (NITRODUR) 0.4 MG/HR Place 1 patch onto the skin daily      nitroGLYCERIN (NITROSTAT) 0.4 MG SL tablet Place under the tongue      ondansetron (ZOFRAN) 4 MG tablet Take 4 mg by mouth every 8 hours as needed      polyethylene glycol (GLYCOLAX) 17 GM/SCOOP powder Take 17 g by mouth daily      senna (SENOKOT) 8.6 MG tablet Take 2 tablet PO at bedtime         PRECAUTIONS/ALLERGIES: Penicillins, Strawberry extract, and Codeine   Safety Devices in place: Yes  Type of devices: Left in bed, Nurse notified    Therapy Time  SLP Individual Minutes  Time In: 7875  Time Out: 2500 [facility]  Minutes: 2776 [facility] [address] [facility], [facility]  8/8/2022 12:56 PM

## 2022-08-08 NOTE — INTERDISCIPLINARY ROUNDS
Multi-D Rounds/Checklist (leapfrog):  Lines: can any be removed?: None     External Urinary Catheter (Active)      DVT Prophylaxis: Contraindicated  Vent: N/A  Nutrition Ordered/appropriate: Ordered  Bowel Movement last 2 days: Yes  Can antibiotics or other drugs be stopped? Is Nozin performed: Yes/End Date set  Consults needed: None  A: Is pain control adequate? (has PRNs? Stop drip?) Yes  B: Sedation break and SBT? N/A  C: Is sedation choice appropriate? N/A  D: Delirium/CAM-ICU? Yes  E: Mobility goals/appropriateness? Yes  F: Family update and plan? child is primary contact and is being updated daily by primary attending and nursing staff.     Darrell Rush, APRN - CNP

## 2022-08-08 NOTE — PROGRESS NOTES
Hospitalist Progress Note   Admit Date:  2022  1:43 AM   Name:  Peyman Bales   Age:  80 y.o. Sex:  female  :  1926   MRN:  [id number]   Room:  [facility]/    Presenting Complaint: Fall     Reason(s) for Admission: Cerebral brain hemorrhage [facility]) [I61.9]     Hospital Course & Interval History:     Peyman Bales is a 80 y.o. female with medical history of hypertension, atrial fibrillation, and diabetes, who presented with fall and laceration to her head. Patient is sent to the emergency room from her nursing home after having fallen out of bed twice today. The first time she did not have any notable injuries. However the second time she did hit her head and had a laceration to her right temporal area. ER work-up included placing Steri-Strips on her skin tear/laceration. CT scan of her head did show a small bleed/contusion to her right parietal area. ER did discuss this with neurosurgery who recommended observation with repeat CT scan later in the morning. Her daughter did have some concerns that she might not be getting her medications appropriately since being in the nursing home. In particular, she is worried about her thyroid medication. Patient is also noted to be on Eliquis for her atrial fibrillation. Subjective/24hr Events (22): Patient examined at bedside. Had worsening confusion and delirium overnight and did not sleep much at all. At bedside, she mumbles but does not really answer questions. She will move her extremities when prompted. 10 point ROS unable to be completed due to clinical condition.    Assessment & Plan:     Principal Problem:    Cerebral brain hemorrhage (HCC)  - interval worsening mentation  - STAT repeat CT head now  - previous repeat CT imaging at 24 hour melody shows no interval worsening of known parietal bleeding  - telemetry and neuro checks  - MRI brain ordered and pending  - TTE  - statin  - telemetry  - goal SBP<140, weaned off Cardene  - avoid all anti-platelets and AC    # Afib  - hold NOAC  - still with episodes of Hrs jumping into 120s   - Lopressor  mg IV now  - discussed with patient's family both high risk for CVA and high risk for bleeding, family wishes to elect to come off [facility] going forward, can always revisit or reconsider on outpatient basis  - currently rate controlled on BB    # Alzheimer's disease  - nonpharmacologic interventions  - avoid narcotics and benzos  - avoid restraints    # DM type II   - basal/bolus regimen  - SSI and serial CBGs    # HTN  - current goal SBP<140  - metoprolol  - amlodipine on hold  - did not tolerate Cardene gtt, since discontinued      Discharge Planning: Will hold in ICU for now to reassess clinical bleeding. PT/OT consults and PPD ordered. Discussed over the phone with her daughter, Edgar Sheldon. All questions answered. Patient is critically ill. Without intervention, there is a high probability of acute organ impairment or life-threatening deterioration in the patient's condition from: worsening mentation and potential worsening intraparenchymal bleeding, Afib with episodes of RVR  Critical care interventions: frequent neuro checks and repeat CT head STAT, Lopressor STAT  Total critical care time spent: 32 minutes. Time is indicative of direct patient attendance at bedside and on the patient's floor nearby. Includes time spent at bedside performing history and exam, performing chart review, discussing findings and treatment plan with patient and/or family, discussing patient with nursing staff, consultants and colleagues, and ordering/reviewing pertinent laboratory and radiographic evaluations. Time excludes procedures. CPT:  43702: First 30-74 minutes  63190: Each block of 30 min. beyond 76     Diet:  ADULT DIET;  Regular  DVT PPx: SCDs  Code status: DNR    Hospital Problems:  Principal Problem:    Cerebral brain hemorrhage [facility])  Active Problems:    Atrial fibrillation ([facility] Late onset Alzheimer's dementia without behavioral disturbance ([facility]    Type 2 diabetes with nephropathy ([facility]    Essential hypertension, benign  Resolved Problems:    * No resolved hospital problems.  *      Objective:   Patient Vitals for the past 24 hrs:   Temp Pulse Resp BP SpO2   08/08/22 0733 -- (!) 109 -- (!) 134/94 96 %   08/08/22 0703 98.1 °F (36.7 °C) (!) 114 29 133/77 90 %   08/08/22 0603 -- (!) 115 27 133/77 96 %   08/08/22 0501 -- (!) 119 (!) 31 (!) 145/72 98 %   08/08/22 0431 -- (!) 101 (!) 32 113/78 (!) 84 %   08/08/22 0401 97.9 °F (36.6 °C) (!) 107 29 133/77 94 %   08/08/22 0331 -- (!) 109 20 133/81 96 %   08/08/22 0301 -- 95 (!) 34 120/68 92 %   08/08/22 0231 -- (!) 128 -- 111/75 97 %   08/08/22 0201 -- (!) 117 -- 115/67 97 %   08/08/22 0131 -- (!) 120 -- (!) 150/102 97 %   08/08/22 0101 -- (!) 120 -- (!) 142/78 94 %   08/08/22 0031 -- (!) 130 -- (!) 156/107 97 %   08/08/22 0001 99.1 °F (37.3 °C) (!) 141 22 (!) 133/95 100 %   08/07/22 2332 -- (!) 108 (!) 35 (!) 143/82 96 %   08/07/22 2304 -- -- -- (!) 128/90 --   08/07/22 2301 -- (!) 128 26 (!) 128/90 (!) 79 %   08/07/22 2233 -- (!) 132 29 -- 91 %   08/07/22 2201 -- (!) 122 (!) 37 (!) 146/80 94 %   08/07/22 2132 -- (!) 136 26 (!) 135/92 91 %   08/07/22 2101 -- -- -- (!) 140/94 --   08/07/22 2058 -- (!) 128 25 (!) 140/94 96 %   08/07/22 2039 -- (!) 128 (!) 32 134/77 93 %   08/07/22 2002 98.5 °F (36.9 °C) (!) 118 (!) 32 (!) 144/91 93 %   08/07/22 1935 -- (!) 134 (!) 32 96/60 90 %   08/07/22 1902 -- (!) 136 29 127/68 94 %   08/07/22 1831 -- (!) 126 -- 124/84 93 %   08/07/22 1801 -- (!) 133 30 131/78 91 %   08/07/22 1731 -- (!) 135 -- 113/74 93 %   08/07/22 1701 -- (!) 128 -- 111/67 95 %   08/07/22 1631 -- (!) 115 -- 105/75 95 %   08/07/22 1601 -- (!) 109 30 139/63 94 %   08/07/22 1546 97.8 °F (36.6 °C) (!) 121 27 (!) 140/57 91 %   08/07/22 1501 -- (!) 122 -- (!) 140/59 97 %   08/07/22 1431 -- (!) 104 -- 127/78 95 %   08/07/22 1402 -- (!) 114 28 128/75 96 %   08/07/22 1331 -- (!) 109 -- (!) 147/88 94 %   08/07/22 1328 -- -- -- (!) 158/107 --   08/07/22 1232 -- (!) 110 -- (!) 139/91 94 %   08/07/22 1129 97.9 °F (36.6 °C) 95 13 118/74 98 %   08/07/22 1101 -- 94 -- (!) 146/69 94 %   08/07/22 1031 -- 98 -- 133/88 95 %   08/07/22 1001 -- 93 14 137/84 99 %   08/07/22 0931 -- 85 24 137/87 98 %   08/07/22 0904 -- (!) 105 -- 138/62 --   08/07/22 0901 -- 95 16 138/62 (!) 84 %         Oxygen Therapy  SpO2: 96 %  Pulse Oximetry Type: Continuous  Pulse via Oximetry: 102 beats per minute  Pulse Oximeter Device Mode: Continuous  Pulse Oximeter Device Location: Finger  O2 Device: None (Room air)  Oximetry Probe Site Changed: No  Skin Assessment: Clean, dry, & intact  Skin Protection for O2 Device: N/A  O2 Flow Rate (L/min): 2 L/min    Estimated body mass index is 23.69 kg/m² as calculated from the following:    Height as of this encounter: 5' 8\" (1.727 m). Weight as of this encounter: 155 lb 12.8 oz (70.7 kg). Intake/Output Summary (Last 24 hours) at 8/8/2022 0844  Last data filed at 8/7/2022 1727  Gross per 24 hour   Intake 240 ml   Output 300 ml   Net -60 ml           Physical Exam:     Blood pressure (!) 134/94, pulse (!) 109, temperature 98.1 °F (36.7 °C), temperature source Oral, resp. rate 29, height 5' 8\" (1.727 m), weight 155 lb 12.8 oz (70.7 kg), SpO2 96 %. General:    Well nourished. Tired appearing and more confused  Head:  Normocephalic, bruise to temporal orbit region without active bleeding  Eyes:  Sclerae appear normal.  Pupils equally round. ENT:  Nares appear normal, no drainage. Moist oral mucosa  Neck:  No restricted ROM. Trachea midline   CV:   Regular rate. No jugular venous distension. Lungs:   CTAB. No wheezing, rhonchi, or rales. Symmetric expansion. Abdomen: Bowel sounds present. Soft, nontender, nondistended. Extremities: No cyanosis or clubbing. No edema  Skin:     No rashes and normal coloration. Warm and dry.     Neuro:  CN II-XII grossly intact. Moves all extremities. More confused and somnolent  Psych:  Normal mood and affect. I have personally reviewed labs and tests showing:  Recent Labs:  Recent Results (from the past 48 hour(s))   Urinalysis w rflx microscopic    Collection Time: 08/06/22 11:17 AM   Result Value Ref Range    Color, UA YELLOW/STRAW      Appearance CLOUDY      Specific [place], UA 1.011 1.001 - 1.023      pH, Urine 6.0 5.0 - 9.0      Protein, UA TRACE (A) NEG mg/dL    Glucose, UA Negative mg/dL    Ketones, Urine TRACE (A) NEG mg/dL    Bilirubin Urine Negative NEG      Blood, Urine Negative NEG      Urobilinogen, Urine 1.0 0.2 - 1.0 EU/dL    Nitrite, Urine Positive (A) NEG      Leukocyte Esterase, Urine LARGE (A) NEG      WBC, UA 20-50 0 /hpf    RBC, UA 0-3 0 /hpf    Epithelial Cells UA 3-5 0 /hpf    BACTERIA, URINE 4+ (H) 0 /hpf    OTHER OBSERVATIONS RESULTS VERIFIED MANUALLY     Culture, Urine    Collection Time: 08/06/22 11:17 AM    Specimen: URINE-CLEAN CATCH   Result Value Ref Range    Special Requests NO SPECIAL REQUESTS      Culture        No growth after short period of incubation. Further results to follow after overnight incubation.    POCT Glucose    Collection Time: 08/06/22 11:50 AM   Result Value Ref Range    POC Glucose 116 (H) 65 - 100 mg/dL    Performed by: Sofia    POCT Glucose    Collection Time: 08/06/22  5:05 PM   Result Value Ref Range    POC Glucose 140 (H) 65 - 100 mg/dL    Performed by: Sofia    POCT Glucose    Collection Time: 08/06/22  8:27 PM   Result Value Ref Range    POC Glucose 111 (H) 65 - 100 mg/dL    Performed by: Jered Whitfield    Hemoglobin A1c    Collection Time: 08/07/22  4:28 AM   Result Value Ref Range    Hemoglobin A1C 6.5 (H) 4.8 - 5.6 %    eAG 140 mg/dL   CBC with Auto Differential    Collection Time: 08/07/22  4:28 AM   Result Value Ref Range    WBC 6.5 4.3 - 11.1 K/uL    RBC 4.00 (L) 4.05 - 5.2 M/uL    Hemoglobin 13.2 11.7 - 15.4 g/dL Hematocrit 38.8 35.8 - 46.3 %    MCV 97.0 79.6 - 97.8 FL    MCH 33.0 (H) 26.1 - 32.9 PG    MCHC 34.0 31.4 - 35.0 g/dL    RDW 18.4 (H) 11.9 - 14.6 %    Platelets 998 572 - 573 K/uL    MPV 10.0 9.4 - 12.3 FL    nRBC 0.00 0.0 - 0.2 K/uL    Differential Type AUTOMATED      Seg Neutrophils 57 43 - 78 %    Lymphocytes 31 13 - 44 %    Monocytes 9 4.0 - 12.0 %    Eosinophils % 3 0.5 - 7.8 %    Basophils 0 0.0 - 2.0 %    Immature Granulocytes 0 0.0 - 5.0 %    Segs Absolute 3.7 1.7 - 8.2 K/UL    Absolute Lymph # 2.0 0.5 - 4.6 K/UL    Absolute Mono # 0.6 0.1 - 1.3 K/UL    Absolute Eos # 0.2 0.0 - 0.8 K/UL    Basophils Absolute 0.0 0.0 - 0.2 K/UL    Absolute Immature Granulocyte 0.0 0.0 - 0.5 K/UL   Basic Metabolic Panel    Collection Time: 08/07/22  4:28 AM   Result Value Ref Range    Sodium 136 136 - 145 mmol/L    Potassium 3.1 (L) 3.5 - 5.1 mmol/L    Chloride 104 98 - 107 mmol/L    CO2 26 21 - 32 mmol/L    Anion Gap 6 (L) 7 - 16 mmol/L    Glucose 132 (H) 65 - 100 mg/dL    BUN 11 8 - 23 MG/DL    Creatinine 0.97 0.6 - 1.0 MG/DL    GFR African American >60 >60 ml/min/1.73m2    GFR Non- 57 (L) >60 ml/min/1.73m2    Calcium 8.1 (L) 8.3 - 10.4 MG/DL   POCT Glucose    Collection Time: 08/07/22  9:00 AM   Result Value Ref Range    POC Glucose 108 (H) 65 - 100 mg/dL    Performed by: Sofia    POCT Glucose    Collection Time: 08/07/22 12:20 PM   Result Value Ref Range    POC Glucose 140 (H) 65 - 100 mg/dL    Performed by: Precious    POCT Glucose    Collection Time: 08/07/22  5:11 PM   Result Value Ref Range    POC Glucose 166 (H) 65 - 100 mg/dL    Performed by: Ch (Alverson)    POCT Glucose    Collection Time: 08/07/22  8:53 PM   Result Value Ref Range    POC Glucose 146 (H) 65 - 100 mg/dL    Performed by: Wilian    Basic Metabolic Panel    Collection Time: 08/08/22  4:10 AM   Result Value Ref Range    Sodium 139 136 - 145 mmol/L    Potassium 4.2 3.5 - 5.1 mmol/L    Chloride 103 98 - 107 mmol/L    CO2 24 21 - 32 mmol/L    Anion Gap 12 7 - 16 mmol/L    Glucose 136 (H) 65 - 100 mg/dL    BUN 11 8 - 23 MG/DL    Creatinine 1.10 (H) 0.6 - 1.0 MG/DL    GFR  59 (L) >60 ml/min/1.73m2    GFR Non- 49 (L) >60 ml/min/1.73m2    Calcium 8.7 8.3 - 10.4 MG/DL   CBC with Auto Differential    Collection Time: 08/08/22  4:10 AM   Result Value Ref Range    WBC 7.5 4.3 - 11.1 K/uL    RBC 5.22 (H) 4.05 - 5.2 M/uL    Hemoglobin 16.0 (H) 11.7 - 15.4 g/dL    Hematocrit 49.9 (H) 35.8 - 46.3 %    MCV 95.6 79.6 - 97.8 FL    MCH 30.7 26.1 - 32.9 PG    MCHC 32.1 31.4 - 35.0 g/dL    RDW 16.9 (H) 11.9 - 14.6 %    Platelets 948 752 - 489 K/uL    MPV 9.8 9.4 - 12.3 FL    nRBC 0.05 0.0 - 0.2 K/uL    Differential Type AUTOMATED      Seg Neutrophils 68 43 - 78 %    Lymphocytes 20 13 - 44 %    Monocytes 10 4.0 - 12.0 %    Eosinophils % 1 0.5 - 7.8 %    Basophils 1 0.0 - 2.0 %    Immature Granulocytes 1 0.0 - 5.0 %    Segs Absolute 5.0 1.7 - 8.2 K/UL    Absolute Lymph # 1.5 0.5 - 4.6 K/UL    Absolute Mono # 0.8 0.1 - 1.3 K/UL    Absolute Eos # 0.1 0.0 - 0.8 K/UL    Basophils Absolute 0.0 0.0 - 0.2 K/UL    Absolute Immature Granulocyte 0.0 0.0 - 0.5 K/UL   POCT Glucose    Collection Time: 08/08/22  7:43 AM   Result Value Ref Range    POC Glucose 128 (H) 65 - 100 mg/dL    Performed by: Sofia        I have personally reviewed imaging studies showing: Other Studies:  CT HEAD WO CONTRAST   Final Result   Unchanged small right parietal lobe hemorrhage, with no mass effect   or midline shift. CT HEAD WO CONTRAST   Final Result   1. Stable focal density closely associated with a right parietal sulcus which   could represent minimal subarachnoid hemorrhage or a tiny cortical contusion in   the setting of acute trauma. .          This report was made using voice transcription.  Despite my best efforts to avoid   any, transcription errors may persist. If there is any question about the   accuracy of the report or need for clarification, then please call [phone], or text me through [email or web address] for clarification or correction. CT HEAD WO CONTRAST   Final Result   Acute 6 mm hemorrhagic contusion along the right parietal lobe   cortex, without mass effect or midline shift. Dr. Federica Arechiga verbally notified at   2:36 AM (DC 5).       MRI BRAIN WO CONTRAST    (Results Pending)   CT HEAD WO CONTRAST    (Results Pending)       Current Meds:  Current Facility-Administered Medications   Medication Dose Route Frequency    metoprolol (LOPRESSOR) injection 5 mg  5 mg IntraVENous Once    hydrALAZINE (APRESOLINE) injection 5 mg  5 mg IntraVENous Q8H PRN    benzocaine-menthol (CEPACOL SORE THROAT) lozenge 1 lozenge  1 lozenge Oral Q2H PRN    tuberculin injection 5 Units  5 Units IntraDERmal Once    OLANZapine zydis (ZYPREXA) disintegrating tablet 5 mg  5 mg Oral Once    amLODIPine (NORVASC) tablet 5 mg  5 mg Oral Daily    cetirizine (ZYRTEC) tablet 10 mg  10 mg Oral Daily    levothyroxine (SYNTHROID) tablet 100 mcg  100 mcg Oral QAM AC    memantine (NAMENDA) tablet 10 mg  10 mg Oral BID    trospium (SANCTURA) tablet 20 mg  20 mg Oral Nightly    nitroGLYCERIN (NITRODUR) 0.4 MG/HR 1 patch  1 patch TransDERmal Daily    pantoprazole (PROTONIX) tablet 40 mg  40 mg Oral QAM AC    polyethylene glycol (GLYCOLAX) packet 17 g  17 g Oral Daily    senna (SENOKOT) tablet 17.2 mg  2 tablet Oral Nightly    sodium chloride flush 0.9 % injection 5-40 mL  5-40 mL IntraVENous 2 times per day    sodium chloride flush 0.9 % injection 5-40 mL  5-40 mL IntraVENous PRN    0.9 % sodium chloride infusion   IntraVENous PRN    potassium chloride (KLOR-CON M) extended release tablet 40 mEq  40 mEq Oral PRN    Or    potassium bicarb-citric acid (EFFER-K) effervescent tablet 40 mEq  40 mEq Oral PRN    Or    potassium chloride 10 mEq/100 mL IVPB (Peripheral Line)  10 mEq IntraVENous PRN    magnesium sulfate 2000 mg in 50 mL IVPB premix  2,000 mg IntraVENous PRN    promethazine (PHENERGAN) tablet 12.5 mg  12.5 mg Oral Q6H PRN    Or    ondansetron (ZOFRAN) injection 4 mg  4 mg IntraVENous Q6H PRN    aluminum & magnesium hydroxide-simethicone (MAALOX) 200-200-20 MG/5ML suspension 30 mL  30 mL Oral Q6H PRN    acetaminophen (TYLENOL) tablet 650 mg  650 mg Oral Q6H PRN    Or    acetaminophen (TYLENOL) suppository 650 mg  650 mg Rectal Q6H PRN    insulin lispro (HUMALOG) injection vial 0-8 Units  0-8 Units SubCUTAneous TID WC    insulin lispro (HUMALOG) injection vial 0-4 Units  0-4 Units SubCUTAneous Nightly    glucose chewable tablet 16 g  4 tablet Oral PRN    dextrose bolus 10% 125 mL  125 mL IntraVENous PRN    Or    dextrose bolus 10% 250 mL  250 mL IntraVENous PRN    glucagon (rDNA) injection 1 mg  1 mg SubCUTAneous PRN    dextrose 10 % infusion   IntraVENous Continuous PRN    metoprolol tartrate (LOPRESSOR) tablet 25 mg  25 mg Oral BID    cefTRIAXone (ROCEPHIN) 1,000 mg in sodium chloride 0.9 % 50 mL IVPB mini-bag  1,000 mg IntraVENous Q24H       Signed:  JANKI ARELLANO DO    Part of this note may have been written by using a voice dictation software. The note has been proof read but may still contain some grammatical/other typographical errors.

## 2022-08-08 NOTE — PROGRESS NOTES
Assistance, Mod=Moderate Assistance, Max=Maximal Assistance, Total=Total Assistance, NT=Not Tested    BALANCE: Good Fair+ Fair Fair- Poor NT Comments   Sitting Static [] [] [x] [] [] []    Sitting Dynamic [] [] [] [x] [] []              Standing Static [] [] [] [] [] []    Standing Dynamic [] [] [] [] [] []      GAIT: I Mod I S SBA CGA Min Mod Max Total  NT x2 Comments:   Level of Assistance [] [] [] [] [] [] [] [] [] [x] []    Distance   feet    DME N/A    Gait Quality N/A    Weightbearing Status      Stairs      I=Independent, Mod I=Modified Independent, S=Supervision, SBA=Standby Assistance, CGA=Contact Guard Assistance,   Min=Minimal Assistance, Mod=Moderate Assistance, Max=Maximal Assistance, Total=Total Assistance, NT=Not Tested    PLAN:   ACUTE PHYSICAL THERAPY GOALS:   (Developed with and agreed upon by patient and/or caregiver. )  LTG:  (1.)Ms. Mikala York will move from supine to sit and sit to supine , scoot up and down, and roll side to side in bed with MINIMAL ASSIST within 7 treatment day(s). (2.)Ms. Mikala York will transfer from bed to chair and chair to bed with MINIMAL ASSIST using the least restrictive device within 7 treatment day(s). (3.)Ms. Mikala York will tolerate OOB activity  with CONTACT GUARD ASSIST for 24min within 7 treatment day(s). FREQUENCY AND DURATION: 3 times/week for duration of hospital stay or until stated goals are met, whichever comes first.    TREATMENT:   TREATMENT:   Co-Treatment PT/OT necessary due to patient's decreased overall endurance/tolerance levels, as well as need for high level skilled assistance to complete functional transfers/mobility and functional tasks  Therapeutic Activity (23 Minutes): Therapeutic activity included Rolling, Supine to Sit, Sit to Supine, Scooting, and Sitting balance  to improve functional Activity tolerance, Balance, Coordination, Mobility, and Strength.     TREATMENT GRID:  N/A    AFTER TREATMENT PRECAUTIONS: Alarm Activated, Bed, Bed/Chair Locked, RN notified, and Side rails x3    INTERDISCIPLINARY COLLABORATION:  RN/ PCT, PT/ PTA, and OT/ CORNELIUS    EDUCATION:      TIME IN/OUT:  Time In: 1010  Time Out: [facility]  Minutes: [address] [facility] [address] Jc Matos

## 2022-08-08 NOTE — ACP (ADVANCE CARE PLANNING)
Advance Care Planning     General Advance Care Planning (ACP) Conversation    Date of Conversation: 8/8/2022      Healthcare Decision Maker:    Primary Decision Maker: Heidy Knight - Child - [phone]    Primary Decision Maker: Lyudmila Simon - Child - [phone]  Click here to complete Healthcare Decision Makers including selection of the Healthcare Decision Maker Relationship (ie \"Primary\"). Today we documented Decision Maker(s) consistent with ACP documents on file. Content/Action Overview:  HCPOA on file. Agents listed above. DNR per MD orders.        Length of Voluntary ACP Conversation in minutes:  <16 minutes (Non-Billable)    Kolby Padgett RN

## 2022-08-08 NOTE — PROGRESS NOTES
OCCUPATIONAL THERAPY Initial Assessment, Daily Note, and AM       OT Visit Days: 1   Acknowledge Orders  Time  OT Charge Capture  Rehab Caseload Tracker      Janora Denver is a 80 y.o. female   PRIMARY DIAGNOSIS: Cerebral brain hemorrhage ([facility] [address]  Cerebral brain hemorrhage ([facility] [address] [I61.9]       Reason for Referral: Generalized Muscle Weakness (M62.81)  Observation: Payor: MEDICARE / Plan: MEDICARE PART A AND B / Product Type: *No Product type* /     ASSESSMENT:     REHAB RECOMMENDATIONS:   Recommendation to date pending progress:  Setting:  Return back to nursing facility    Equipment:    To Be Determined     ASSESSMENT:  Ms. Deepti Gomes presents with deficits in overall strength, activity tolerance, ADL performance, and functional mobility. Presents for cerebral brain hemorrhage s/p fall at her nursing facility. Mild confusion noted today however pt able to oriented to place and time today. BUE (3+/5) are generally decreased but WFL. Mod A x 2 for rolling bed mobility to facilitate -care/brief change. Upon completion, pt transferred to sitting EOB with mod A x 2; fair + EOB unsupported sitting balance. Patient participated in total body bathing tasks with mod A required for UB and LB bathing as well as verbal cues for task facilitation. Returned back to supine at end of session. At this time, Janora Denver is functioning below baseline for ADL performance and functional mobility. Patient would benefit from skilled OT services to address goals and plan of care.       [address] [facility] [address] [id number] AM-PAC 6 Clicks Daily Activity Inpatient Short Form:    AM-PAC Daily Activity Inpatient   How much help for putting on and taking off regular lower body clothing?: A Lot  How much help for Bathing?: A Lot  How much help for Toileting?: A Lot  How much help for putting on and taking off regular upper body clothing?: A Little  How much help for taking care of personal grooming?: A Little  How much help for eating meals?: A Little  AM-PAC Inpatient Daily Activity Raw Score: 15  AM-PAC Inpatient ADL T-Scale Score : 34.69  ADL Inpatient CMS 0-100% Score: 56.46  ADL Inpatient CMS G-Code Modifier : CK           SUBJECTIVE:     Ms. Angel Helms states, \"I'm at the hospital.\"     Social/Functional Lives With: Home care staff  Type of Home: Facility  Unclear PLOF however pt states she spends majority of her day in a w.c at her facility and requires assistance for bathing, dressing, and toileting tasks.    OBJECTIVE:     [place] / [place] [address] / [employer]: IV    RESTRICTIONS/PRECAUTIONS:       PAIN: VITALS / O2:   Pre Treatment:       Numeric 0-10  0/10    Post Treatment:   0/10       Vitals        WFL    Oxygen     Stable on 2L 02       GROSS EVALUATION: INTACT IMPAIRED   (See Comments)   UE AROM [] []   UE PROM [] []   Strength []  Generally decreased on 2L      Posture / Balance []  Fair + EOB sitting  Standing not tested   Sensation [x]     Coordination [x]       Tone [x]       Edema [x]    Activity Tolerance []  Generally decreased on 2L     Hand Dominance R [] L []      COGNITION/  PERCEPTION: INTACT IMPAIRED   (See Comments)   Orientation [x]     Vision [x]     Hearing [x]     Cognition  []  Mild confusion during conversation   Perception []  Decreased insight into situation/deficits     MOBILITY: I Mod I S SBA CGA Min Mod Max Total  NT x2 Comments:   Bed Mobility    Rolling [] [] [] [] [] [] [x] [] [] [] [x]    Supine to Sit [] [] [] [] [] [] [x] [] [] [] [x]    Scooting [] [] [] [] [] [] [] [x] [] [] [x]    Sit to Supine [] [] [] [] [] [] [x] [] [] [] [x]    Transfers    Sit to Stand [] [] [] [] [] [] [] [] [] [x] []    Bed to Chair [] [] [] [] [] [] [] [] [] [x] []    Stand to Sit [] [] [] [] [] [] [] [] [] [x] []    Tub/Shower [] [] [] [] [] [] [] [] [] [x] []     Toilet [] [] [] [] [] [] [] [] [] [] []      [] [] [] [] [] [] [] [] [] [] []    I=Independent, Mod I=Modified Independent, S=Supervision/Setup, SBA=Standby Assistance, CGA=Contact Guard Assistance, Min=Minimal Assistance, Mod=Moderate Assistance, Max=Maximal Assistance, Total=Total Assistance, NT=Not Tested    ACTIVITIES OF DAILY LIVING: I Mod I S SBA CGA Min Mod Max Total NT Comments   BASIC ADLs:              Upper Body Bathing  [] [] [] [] [] [] [x] [] [] []    Lower Body Bathing [] [] [] [] [] [] [x] [] [] []    Toileting [] [] [] [] [] [] [] [x] [] [] [employer]   Upper Body Dressing [] [] [] [] [] [x] [] [] [] [] Donning new gown   Lower Body Dressing [] [] [] [] [] [] [] [] [] [x]    Feeding [] [] [] [] [] [] [] [] [] [x]    Grooming [] [] [] [x] [] [] [] [] [] [] Washing face seated EOB   Personal Device Care [] [] [] [] [] [] [] [] [] [x]    Functional Mobility [] [] [] [] [] [] [x] [] [] [] X 2 for bed mobility   I=Independent, Mod I=Modified Independent, S=Supervision/Setup, SBA=Standby Assistance, CGA=Contact Guard Assistance, Min=Minimal Assistance, Mod=Moderate Assistance, Max=Maximal Assistance, Total=Total Assistance, NT=Not Tested    PLAN:     FREQUENCY/DURATION   OT Plan of Care: 3 times/week for duration of hospital stay or until stated goals are met, whichever comes first.    ACUTE OCCUPATIONAL THERAPY GOALS:   (Developed with and agreed upon by patient and/or caregiver.)  1. Patient will complete upper body bathing and dressing with SBA and adaptive equipment as needed. 2. Patient will complete self-grooming tasks in unsupported sitting with MOD I and adaptive equipment as needed. 3. Patient will tolerate 30 minutes of OT treatment with 1-2 rest breaks to increase activity tolerance for ADLs. 4. Patient will complete functional transfers with min A and adaptive equipment as needed. 5. Patient will complete functional activity while seated edge of bed with SBA and adaptive equipment as needed. 6. Patient will tolerate 25 minutes BUE therapeutic activities to increase use of BUE during ADL performance.      Timeframe: 7 visits         PROBLEM LIST:

## 2022-08-09 LAB
ANION GAP SERPL CALC-SCNC: 1 MMOL/L (ref 7–16)
BASOPHILS # BLD: 0 K/UL (ref 0–0.2)
BASOPHILS NFR BLD: 1 % (ref 0–2)
BUN SERPL-MCNC: 13 MG/DL (ref 8–23)
CALCIUM SERPL-MCNC: 8 MG/DL (ref 8.3–10.4)
CHLORIDE SERPL-SCNC: 105 MMOL/L (ref 98–107)
CO2 SERPL-SCNC: 28 MMOL/L (ref 21–32)
CREAT SERPL-MCNC: 1.2 MG/DL (ref 0.6–1)
DIFFERENTIAL METHOD BLD: ABNORMAL
EOSINOPHIL # BLD: 0.2 K/UL (ref 0–0.8)
EOSINOPHIL NFR BLD: 3 % (ref 0.5–7.8)
ERYTHROCYTE [DISTWIDTH] IN BLOOD BY AUTOMATED COUNT: 16.8 % (ref 11.9–14.6)
GLUCOSE BLD STRIP.AUTO-MCNC: 107 MG/DL (ref 65–100)
GLUCOSE BLD STRIP.AUTO-MCNC: 131 MG/DL (ref 65–100)
GLUCOSE BLD STRIP.AUTO-MCNC: 172 MG/DL (ref 65–100)
GLUCOSE BLD STRIP.AUTO-MCNC: 200 MG/DL (ref 65–100)
GLUCOSE SERPL-MCNC: 114 MG/DL (ref 65–100)
HCT VFR BLD AUTO: 41.9 % (ref 35.8–46.3)
HGB BLD-MCNC: 13.8 G/DL (ref 11.7–15.4)
IMM GRANULOCYTES # BLD AUTO: 0 K/UL (ref 0–0.5)
IMM GRANULOCYTES NFR BLD AUTO: 0 % (ref 0–5)
LYMPHOCYTES # BLD: 2.2 K/UL (ref 0.5–4.6)
LYMPHOCYTES NFR BLD: 36 % (ref 13–44)
MCH RBC QN AUTO: 31 PG (ref 26.1–32.9)
MCHC RBC AUTO-ENTMCNC: 32.9 G/DL (ref 31.4–35)
MCV RBC AUTO: 94.2 FL (ref 79.6–97.8)
MM INDURATION, POC: 0 MM (ref 0–5)
MONOCYTES # BLD: 0.7 K/UL (ref 0.1–1.3)
MONOCYTES NFR BLD: 12 % (ref 4–12)
NEUTS SEG # BLD: 2.9 K/UL (ref 1.7–8.2)
NEUTS SEG NFR BLD: 48 % (ref 43–78)
NRBC # BLD: 0 K/UL (ref 0–0.2)
PLATELET # BLD AUTO: 206 K/UL (ref 150–450)
PMV BLD AUTO: 10.2 FL (ref 9.4–12.3)
POTASSIUM SERPL-SCNC: 3.6 MMOL/L (ref 3.5–5.1)
PPD, POC: NEGATIVE
RBC # BLD AUTO: 4.45 M/UL (ref 4.05–5.2)
SERVICE CMNT-IMP: ABNORMAL
SODIUM SERPL-SCNC: 134 MMOL/L (ref 136–145)
WBC # BLD AUTO: 6.1 K/UL (ref 4.3–11.1)

## 2022-08-09 PROCEDURE — 6370000000 HC RX 637 (ALT 250 FOR IP): Performed by: FAMILY MEDICINE

## 2022-08-09 PROCEDURE — 85025 COMPLETE CBC W/AUTO DIFF WBC: CPT

## 2022-08-09 PROCEDURE — 2580000003 HC RX 258: Performed by: INTERNAL MEDICINE

## 2022-08-09 PROCEDURE — 36415 COLL VENOUS BLD VENIPUNCTURE: CPT

## 2022-08-09 PROCEDURE — 80048 BASIC METABOLIC PNL TOTAL CA: CPT

## 2022-08-09 PROCEDURE — 2580000003 HC RX 258: Performed by: FAMILY MEDICINE

## 2022-08-09 PROCEDURE — 1100000000 HC RM PRIVATE

## 2022-08-09 PROCEDURE — 82962 GLUCOSE BLOOD TEST: CPT

## 2022-08-09 PROCEDURE — 6370000000 HC RX 637 (ALT 250 FOR IP): Performed by: INTERNAL MEDICINE

## 2022-08-09 PROCEDURE — 2700000000 HC OXYGEN THERAPY PER DAY

## 2022-08-09 PROCEDURE — 1100000003 HC PRIVATE W/ TELEMETRY

## 2022-08-09 PROCEDURE — 6360000002 HC RX W HCPCS: Performed by: INTERNAL MEDICINE

## 2022-08-09 RX ADMIN — METOPROLOL TARTRATE 25 MG: 25 TABLET, FILM COATED ORAL at 09:07

## 2022-08-09 RX ADMIN — LEVOTHYROXINE SODIUM 100 MCG: 0.05 TABLET ORAL at 05:49

## 2022-08-09 RX ADMIN — SENNOSIDES 17.2 MG: 8.6 TABLET, FILM COATED ORAL at 20:37

## 2022-08-09 RX ADMIN — METOPROLOL TARTRATE 25 MG: 25 TABLET, FILM COATED ORAL at 20:41

## 2022-08-09 RX ADMIN — CEFTRIAXONE 1000 MG: 1 INJECTION, POWDER, FOR SOLUTION INTRAMUSCULAR; INTRAVENOUS at 20:36

## 2022-08-09 RX ADMIN — CETIRIZINE HYDROCHLORIDE 10 MG: 10 TABLET ORAL at 09:07

## 2022-08-09 RX ADMIN — MEMANTINE 10 MG: 5 TABLET ORAL at 09:07

## 2022-08-09 RX ADMIN — SODIUM CHLORIDE, PRESERVATIVE FREE 10 ML: 5 INJECTION INTRAVENOUS at 09:10

## 2022-08-09 RX ADMIN — POLYETHYLENE GLYCOL 3350 17 G: 17 POWDER, FOR SOLUTION ORAL at 09:10

## 2022-08-09 RX ADMIN — SODIUM CHLORIDE, PRESERVATIVE FREE 5 ML: 5 INJECTION INTRAVENOUS at 20:43

## 2022-08-09 RX ADMIN — AMLODIPINE BESYLATE 5 MG: 5 TABLET ORAL at 09:07

## 2022-08-09 RX ADMIN — TROSPIUM CHLORIDE 20 MG: 20 TABLET, FILM COATED ORAL at 20:37

## 2022-08-09 RX ADMIN — MEMANTINE 10 MG: 5 TABLET ORAL at 20:37

## 2022-08-09 ASSESSMENT — PAIN SCALES - GENERAL
PAINLEVEL_OUTOF10: 0

## 2022-08-09 NOTE — PROGRESS NOTES
Hospitalist Progress Note   Admit Date:  2022  1:43 AM   Name:  Molly Villanueva   Age:  80 y.o. Sex:  female  :  1926   MRN:  [id number]   Room:  [facility]/    Presenting Complaint: Fall     Reason(s) for Admission: Cerebral brain hemorrhage [facility]) [I61.9]     Hospital Course & Interval History:     Molly Villanueva is a 80 y.o. female with medical history of hypertension, atrial fibrillation, and diabetes, who presented with fall and laceration to her head. Patient is sent to the emergency room from her nursing home after having fallen out of bed twice today. The first time she did not have any notable injuries. However the second time she did hit her head and had a laceration to her right temporal area. ER work-up included placing Steri-Strips on her skin tear/laceration. CT scan of her head did show a small bleed/contusion to her right parietal area. ER did discuss this with neurosurgery who recommended observation with repeat CT scan later in the morning. Her daughter did have some concerns that she might not be getting her medications appropriately since being in the nursing home. In particular, she is worried about her thyroid medication. Patient is also noted to be on Eliquis for her atrial fibrillation. Subjective/24hr Events (22): Patient examined at bedside. No cute overnight events. Some confusion this morning but says \"I'm hungry and I want something to eat! \" She denies headache, fevers/chills, chest pain, shortness of breath. No changes in arms or legs. 10 point ROS unable to be completed due to clinical condition.    Assessment & Plan:     Principal Problem:    Cerebral brain hemorrhage ([facility] [address]  - no new focal neurologic changes, patient likely with concurrent hospital-associated delirium that complicates her clinical picture  - repeat CT head imaging has been reassuring  - telemetry and neuro checks  - TTE  - statin  - telemetry  - goal SBP<140, weaned off Cardene tt  - avoid all anti-platelets and AC    # Afib  - hold NOAC  - Lopressor IV PRN Hrs>130  - discussed with patient's family both high risk for CVA and high risk for bleeding, family wishes to elect to come off [facility] going forward, can always revisit or reconsider on outpatient basis  - currently rate controlled on BB    # Alzheimer's disease  - nonpharmacologic interventions  - avoid narcotics and benzos  - avoid restraints    # DM type II   - basal/bolus regimen  - SSI and serial CBGs    # HTN  - current goal SBP<140  - metoprolol  - amlodipine on hold  - did not tolerate Cardene gtt, since discontinued      Discharge Planning:    Transfer from ICU to floor with plan as above. PT/OT consults and PPD ordered. Discussed with patient at bedside. All questions answered. Diet:  ADULT DIET; Regular  DVT PPx: SCDs  Code status: DNR    Hospital Problems:  Principal Problem:    Cerebral brain hemorrhage ([facility] [address]  Active Problems:    Atrial fibrillation ([facility] [address]    Late onset Alzheimer's dementia without behavioral disturbance (HCC)    Type 2 diabetes with nephropathy ([facility] [address]    Essential hypertension, benign  Resolved Problems:    * No resolved hospital problems.  *      Objective:   Patient Vitals for the past 24 hrs:   Temp Pulse Resp BP SpO2   08/09/22 0840 -- 96 26 -- 97 %   08/09/22 0730 97.6 °F (36.4 °C) (!) 102 22 (!) 156/110 96 %   08/09/22 0700 -- 96 28 (!) 146/97 95 %   08/09/22 0630 -- (!) 103 10 -- 94 %   08/09/22 0602 -- 91 28 127/72 96 %   08/09/22 0501 -- 90 24 (!) 162/88 99 %   08/09/22 0431 -- 87 23 115/74 93 %   08/09/22 0400 -- 95 26 -- 96 %   08/09/22 0337 -- 85 27 (!) 90/57 96 %   08/09/22 0331 97.9 °F (36.6 °C) 86 25 (!) 90/57 91 %   08/09/22 0300 -- 81 28 -- 96 %   08/09/22 0230 -- 81 28 -- 94 %   08/09/22 0204 -- 95 28 138/82 97 %   08/09/22 0133 -- 84 26 (!) 154/102 96 %   08/09/22 0117 -- 80 30 114/88 98 %   08/09/22 0101 -- 94 24 106/74 97 %   08/09/22 0046 -- 90 25 103/68 93 % 08/09/22 0032 -- 97 26 (!) 96/53 96 %   08/08/22 2332 97.7 °F (36.5 °C) 96 26 117/78 98 %   08/08/22 2317 -- 85 (!) 31 138/76 99 %   08/08/22 2301 -- 98 25 (!) 90/55 96 %   08/08/22 2201 -- (!) 118 30 (!) 96/57 94 %   08/08/22 2132 -- (!) 121 27 114/62 98 %   08/08/22 1930 97.4 °F (36.3 °C) (!) 103 (!) 31 134/78 91 %   08/08/22 1801 -- (!) 116 26 (!) 136/94 (!) 86 %   08/08/22 1731 -- (!) 119 24 127/74 91 %   08/08/22 1704 -- (!) 102 25 131/87 96 %   08/08/22 1634 -- (!) 105 13 125/83 96 %   08/08/22 1604 -- (!) 107 20 (!) 117/59 95 %   08/08/22 1501 98.4 °F (36.9 °C) (!) 103 27 (!) 137/90 91 %   08/08/22 1431 -- 96 28 (!) 147/99 96 %   08/08/22 1401 -- (!) 104 26 (!) 141/77 (!) 77 %   08/08/22 1336 -- (!) 107 15 124/88 94 %   08/08/22 1303 -- 100 29 137/67 97 %   08/08/22 1233 -- (!) 101 24 138/82 (!) 89 %   08/08/22 1203 -- (!) 101 29 101/67 95 %   08/08/22 1133 -- 97 -- 121/71 94 %   08/08/22 1103 98.6 °F (37 °C) 98 29 (!) 105/53 97 %   08/08/22 1033 -- (!) 102 -- 130/83 (!) 88 %   08/08/22 0933 -- (!) 103 11 (!) 149/83 91 %         Oxygen Therapy  SpO2: 97 %  Pulse Oximetry Type: Continuous  Pulse via Oximetry: 99 beats per minute  Pulse Oximeter Device Mode: Continuous  Pulse Oximeter Device Location: Finger  O2 Device: Nasal cannula  Oximetry Probe Site Changed: Yes  Skin Assessment: Clean, dry, & intact  Skin Protection for O2 Device: N/A  O2 Flow Rate (L/min): 2 L/min    Estimated body mass index is 23.77 kg/m² as calculated from the following:    Height as of this encounter: 5' 8\" (1.727 m). Weight as of this encounter: 156 lb 4.8 oz (70.9 kg). No intake or output data in the 24 hours ending 08/09/22 0909        Physical Exam:     Blood pressure (!) 156/110, pulse 96, temperature 97.6 °F (36.4 °C), temperature source Oral, resp. rate 26, height 5' 8\" (1.727 m), weight 156 lb 4.8 oz (70.9 kg), SpO2 97 %. General:    Well nourished. More awake.  No acute distress  Head:  Normocephalic, bruise to temporal orbit region without active bleeding  Eyes:  Sclerae appear normal.  Pupils equally round. ENT:  Nares appear normal, no drainage. Moist oral mucosa  Neck:  No restricted ROM. Trachea midline   CV:   Regular rate. No jugular venous distension. Lungs:   CTAB. No wheezing, rhonchi, or rales. Symmetric expansion. Abdomen: Bowel sounds present. Soft, nontender, nondistended. Extremities: No cyanosis or clubbing. No edema  Skin:     No rashes and normal coloration. Warm and dry. Neuro:  CN II-XII grossly intact. Moves all extremities. More awake today  Psych:  Normal mood and affect.       I have personally reviewed labs and tests showing:  Recent Labs:  Recent Results (from the past 48 hour(s))   POCT Glucose    Collection Time: 08/07/22 12:20 PM   Result Value Ref Range    POC Glucose 140 (H) 65 - 100 mg/dL    Performed by: Precious    POCT Glucose    Collection Time: 08/07/22  5:11 PM   Result Value Ref Range    POC Glucose 166 (H) 65 - 100 mg/dL    Performed by: Ch (Alverson)    POCT Glucose    Collection Time: 08/07/22  8:53 PM   Result Value Ref Range    POC Glucose 146 (H) 65 - 100 mg/dL    Performed by: Wilian    PLEASE READ & DOCUMENT PPD TEST IN 24 HRS    Collection Time: 08/08/22 12:00 AM   Result Value Ref Range    PPD, (POC) Negative Negative    mm Induration 0 0 - 5 mm   Basic Metabolic Panel    Collection Time: 08/08/22  4:10 AM   Result Value Ref Range    Sodium 139 136 - 145 mmol/L    Potassium 4.2 3.5 - 5.1 mmol/L    Chloride 103 98 - 107 mmol/L    CO2 24 21 - 32 mmol/L    Anion Gap 12 7 - 16 mmol/L    Glucose 136 (H) 65 - 100 mg/dL    BUN 11 8 - 23 MG/DL    Creatinine 1.10 (H) 0.6 - 1.0 MG/DL    GFR  59 (L) >60 ml/min/1.73m2    GFR Non- 49 (L) >60 ml/min/1.73m2    Calcium 8.7 8.3 - 10.4 MG/DL   CBC with Auto Differential    Collection Time: 08/08/22  4:10 AM   Result Value Ref Range    WBC 7.5 4.3 - 11.1 K/uL    RBC 5.22 (H) 4.05 - 5.2 M/uL    Hemoglobin 16.0 (H) 11.7 - 15.4 g/dL    Hematocrit 49.9 (H) 35.8 - 46.3 %    MCV 95.6 79.6 - 97.8 FL    MCH 30.7 26.1 - 32.9 PG    MCHC 32.1 31.4 - 35.0 g/dL    RDW 16.9 (H) 11.9 - 14.6 %    Platelets 573 106 - 688 K/uL    MPV 9.8 9.4 - 12.3 FL    nRBC 0.05 0.0 - 0.2 K/uL    Differential Type AUTOMATED      Seg Neutrophils 68 43 - 78 %    Lymphocytes 20 13 - 44 %    Monocytes 10 4.0 - 12.0 %    Eosinophils % 1 0.5 - 7.8 %    Basophils 1 0.0 - 2.0 %    Immature Granulocytes 1 0.0 - 5.0 %    Segs Absolute 5.0 1.7 - 8.2 K/UL    Absolute Lymph # 1.5 0.5 - 4.6 K/UL    Absolute Mono # 0.8 0.1 - 1.3 K/UL    Absolute Eos # 0.1 0.0 - 0.8 K/UL    Basophils Absolute 0.0 0.0 - 0.2 K/UL    Absolute Immature Granulocyte 0.0 0.0 - 0.5 K/UL   POCT Glucose    Collection Time: 08/08/22  7:43 AM   Result Value Ref Range    POC Glucose 128 (H) 65 - 100 mg/dL    Performed by: Sofia    POCT Glucose    Collection Time: 08/08/22 11:20 AM   Result Value Ref Range    POC Glucose 121 (H) 65 - 100 mg/dL    Performed by: Sofia    POCT Glucose    Collection Time: 08/08/22  5:26 PM   Result Value Ref Range    POC Glucose 121 (H) 65 - 100 mg/dL    Performed by: Sofia    POCT Glucose    Collection Time: 08/08/22  8:12 PM   Result Value Ref Range    POC Glucose 109 (H) 65 - 100 mg/dL    Performed by: Kirsty Reyna    POCT Glucose    Collection Time: 08/09/22  2:18 AM   Result Value Ref Range    POC Glucose 107 (H) 65 - 100 mg/dL    Performed by: Kirsty Reyna    Basic Metabolic Panel    Collection Time: 08/09/22  2:59 AM   Result Value Ref Range    Sodium 134 (L) 136 - 145 mmol/L    Potassium 3.6 3.5 - 5.1 mmol/L    Chloride 105 98 - 107 mmol/L    CO2 28 21 - 32 mmol/L    Anion Gap 1 (L) 7 - 16 mmol/L    Glucose 114 (H) 65 - 100 mg/dL    BUN 13 8 - 23 MG/DL    Creatinine 1.20 (H) 0.6 - 1.0 MG/DL    GFR  54 (L) >60 ml/min/1.73m2    GFR Non-African American 44 (L) >60 ml/min/1.73m2    Calcium 8.0 (L) 8.3 - 10.4 MG/DL   CBC with Auto Differential    Collection Time: 08/09/22  2:59 AM   Result Value Ref Range    WBC 6.1 4.3 - 11.1 K/uL    RBC 4.45 4.05 - 5.2 M/uL    Hemoglobin 13.8 11.7 - 15.4 g/dL    Hematocrit 41.9 35.8 - 46.3 %    MCV 94.2 79.6 - 97.8 FL    MCH 31.0 26.1 - 32.9 PG    MCHC 32.9 31.4 - 35.0 g/dL    RDW 16.8 (H) 11.9 - 14.6 %    Platelets 445 627 - 310 K/uL    MPV 10.2 9.4 - 12.3 FL    nRBC 0.00 0.0 - 0.2 K/uL    Differential Type AUTOMATED      Seg Neutrophils 48 43 - 78 %    Lymphocytes 36 13 - 44 %    Monocytes 12 4.0 - 12.0 %    Eosinophils % 3 0.5 - 7.8 %    Basophils 1 0.0 - 2.0 %    Immature Granulocytes 0 0.0 - 5.0 %    Segs Absolute 2.9 1.7 - 8.2 K/UL    Absolute Lymph # 2.2 0.5 - 4.6 K/UL    Absolute Mono # 0.7 0.1 - 1.3 K/UL    Absolute Eos # 0.2 0.0 - 0.8 K/UL    Basophils Absolute 0.0 0.0 - 0.2 K/UL    Absolute Immature Granulocyte 0.0 0.0 - 0.5 K/UL       I have personally reviewed imaging studies showing: Other Studies:  CT HEAD WO CONTRAST   Final Result   1. Punctate hyperdensity in a right parietal lobe sulcus near the vertex   unchanged. Although this could represent a tiny subarachnoid hemorrhage, could   also represent a prominent cortical vessel. No areas of new hemorrhage or acute   abnormality. 2. Stable cerebral atrophy and chronic white matter disease. CT HEAD WO CONTRAST   Final Result   Unchanged small right parietal lobe hemorrhage, with no mass effect   or midline shift. CT HEAD WO CONTRAST   Final Result   1. Stable focal density closely associated with a right parietal sulcus which   could represent minimal subarachnoid hemorrhage or a tiny cortical contusion in   the setting of acute trauma. .          This report was made using voice transcription.  Despite my best efforts to avoid   any, transcription errors may persist. If there is any question about the   accuracy of the report or need for clarification, then please call [phone], or text me through [employer] for clarification or correction. CT HEAD WO CONTRAST   Final Result   Acute 6 mm hemorrhagic contusion along the right parietal lobe   cortex, without mass effect or midline shift. Dr. Edmond Daugherty verbally notified at   2:36 AM (DC 5).       MRI BRAIN WO CONTRAST    (Results Pending)       Current Meds:  Current Facility-Administered Medications   Medication Dose Route Frequency    hydrALAZINE (APRESOLINE) injection 5 mg  5 mg IntraVENous Q8H PRN    benzocaine-menthol (CEPACOL SORE THROAT) lozenge 1 lozenge  1 lozenge Oral Q2H PRN    OLANZapine zydis (ZYPREXA) disintegrating tablet 5 mg  5 mg Oral Once    amLODIPine (NORVASC) tablet 5 mg  5 mg Oral Daily    cetirizine (ZYRTEC) tablet 10 mg  10 mg Oral Daily    levothyroxine (SYNTHROID) tablet 100 mcg  100 mcg Oral QAM AC    memantine (NAMENDA) tablet 10 mg  10 mg Oral BID    trospium (SANCTURA) tablet 20 mg  20 mg Oral Nightly    nitroGLYCERIN (NITRODUR) 0.4 MG/HR 1 patch  1 patch TransDERmal Daily    pantoprazole (PROTONIX) tablet 40 mg  40 mg Oral QAM AC    polyethylene glycol (GLYCOLAX) packet 17 g  17 g Oral Daily    senna (SENOKOT) tablet 17.2 mg  2 tablet Oral Nightly    sodium chloride flush 0.9 % injection 5-40 mL  5-40 mL IntraVENous 2 times per day    sodium chloride flush 0.9 % injection 5-40 mL  5-40 mL IntraVENous PRN    0.9 % sodium chloride infusion   IntraVENous PRN    potassium chloride (KLOR-CON M) extended release tablet 40 mEq  40 mEq Oral PRN    Or    potassium bicarb-citric acid (EFFER-K) effervescent tablet 40 mEq  40 mEq Oral PRN    Or    potassium chloride 10 mEq/100 mL IVPB (Peripheral Line)  10 mEq IntraVENous PRN    magnesium sulfate 2000 mg in 50 mL IVPB premix  2,000 mg IntraVENous PRN    promethazine (PHENERGAN) tablet 12.5 mg  12.5 mg Oral Q6H PRN    Or    ondansetron (ZOFRAN) injection 4 mg  4 mg IntraVENous Q6H PRN    aluminum & magnesium hydroxide-simethicone (MAALOX) 882-620-17 MG/5ML suspension 30 mL  30 mL Oral Q6H PRN    acetaminophen (TYLENOL) tablet 650 mg  650 mg Oral Q6H PRN    Or    acetaminophen (TYLENOL) suppository 650 mg  650 mg Rectal Q6H PRN    insulin lispro (HUMALOG) injection vial 0-8 Units  0-8 Units SubCUTAneous TID WC    insulin lispro (HUMALOG) injection vial 0-4 Units  0-4 Units SubCUTAneous Nightly    glucose chewable tablet 16 g  4 tablet Oral PRN    dextrose bolus 10% 125 mL  125 mL IntraVENous PRN    Or    dextrose bolus 10% 250 mL  250 mL IntraVENous PRN    glucagon (rDNA) injection 1 mg  1 mg SubCUTAneous PRN    dextrose 10 % infusion   IntraVENous Continuous PRN    metoprolol tartrate (LOPRESSOR) tablet 25 mg  25 mg Oral BID    cefTRIAXone (ROCEPHIN) 1,000 mg in sodium chloride 0.9 % 50 mL IVPB mini-bag  1,000 mg IntraVENous Q24H       Signed:  JANKI ARELLANO DO    Part of this note may have been written by using a voice dictation software. The note has been proof read but may still contain some grammatical/other typographical errors.

## 2022-08-09 NOTE — PROGRESS NOTES
LTG: Patient will tolerate least restrictive oral diet without overt s/sx of airway compromise. MET 2022  STG: Patient will participate in ongoing po trials with SLP in attempt to identify least restrictive oral diet. MET 2022  STG: Patient will participate in modified barium swallow study to objectively assess swallow function as medically indicated. NOT INDICATED             SPEECH LANGUAGE PATHOLOGY: DYSPHAGIA  Daily Note #1    NAME: Justice Angeles  : 1926  MRN: [id number]    ADMISSION DATE: 2022  PRIMARY DIAGNOSIS: Cerebral brain hemorrhage ([facility] [address]  Cerebral brain hemorrhage ([facility] [address] [I61.9]    ICD-10: Treatment Diagnosis: R13.12 Dysphagia, Oropharyngeal Phase    RECOMMENDATIONS   Diet:  Diet Solids Recommendation: Regular  Liquid Consistency Recommendation: Thin    Medications: PO     Recommendations:  (No dysphagia treatment indicated)     Compensatory Swallowing Strategies:Upright as possible for all oral intake; Alternate solids and liquids     Therapeutic Intervention:Patient/Family education     Patient continues to require skilled intervention: Yes  D/C Recommendations:  (Possible follow up for cognitive-linguistic evaluation)       ASSESSMENT    Dysphagia Diagnosis: Swallow function appears WFL    Significant improvement in swallow function compared to session yesterday. Patient sitting upright in bed and able to self-feed all trials. Unremarkable oral and pharyngeal stages of swallow. Recommend regular diet and thin liquids. Medications with liquid wash. No additional dysphagia treatment at this time. Possible cognitive-linguistic evaluation later this admission. GENERAL    Chart Reviewed: Yes  Subjective: Patient fully awake and conversant. \"Am I going home soon? I have things to do\". Behavior/Cognition: Alert; Cooperative;Pleasant mood  Patient Position: Upright in bed  Communication Observation: Functional  Follows Directions: Simple        O2 Device: Nasal cannula History of Present Injury/Illness: Ms. Mireya Juan  has a past medical history of Abdominal pain, epigastric, Abdominal pain, unspecified site, Anxiety state, unspecified, Aortic stenosis, Aortic valve disorders, Atherosclerosis of aorta (HCC), Atrial fibrillation ([facility] [address] Backache, unspecified, Benign neoplasm of colon, Cervicalgia, Closed fracture of unspecified bone, Depressive disorder, not elsewhere classified, Diabetes ([facility] [address] Diarrhea, Disorders of magnesium metabolism, Edema, Elevated sedimentation rate, Encounter for long-term (current) use of other medications, Endocrine disease, Epistaxis, Esophageal reflux, Essential hypertension, benign, Gastrointestinal malfunction arising from mental factors, Hypertension, Hypertension, benign, Hypertonicity of bladder, Hypopotassemia, Insomnia, unspecified, Irritable bowel syndrome, Memory loss, Nausea alone, Osteoarthrosis, unspecified whether generalized or localized, unspecified site, Other ill-defined conditions(799.89), Other malaise and fatigue, Pain in joint, lower leg, Pain in joint, shoulder region, Postnasal drip, Rheumatoid arthritis(714.0), Type II or unspecified type diabetes mellitus without mention of complication, not stated as uncontrolled, Unspecified constipation, Unspecified hemorrhoids with other complication, Unspecified hemorrhoids without mention of complication, Unspecified hereditary and idiopathic peripheral neuropathy, Unspecified hypothyroidism, Urgency of urination, and Urinary tract infection, site not specified. . She also  has a past surgical history that includes other surgical history; Tonsillectomy; and Cholecystectomy. Prior Dysphagia History: Evaluated by speech on 8/6- regular diet and thin liquids recommended. RN reports decreased alertness yesterday and some coughing observed.  Poor alertness today     Current Diet : NPO  Current Liquid Diet : NPO    Pain:   Patient does not c/o pain                                Hearing: Exceptions to [facility]    Hearing Exceptions: Hard of hearing/hearing concerns    OBJECTIVE    Oral Motor Assessment  Patient Positioning: Upright in bed      Dentition: Adequate             Oropharyngeal Phase:     Assessment Method(s): Observation;Palpation  Patient Position: Upright in bed  Vocal Quality: No Impairment  Consistency Presented: Mixed consistency; Regular;Pureed; Thin  How Presented: Self-fed/presented;Cup/sip;Cup/gulp; Spoon;Straw;Successive Swallows  Bolus Acceptance: No impairment  Bolus Formation/Control: No impairment  Propulsion: No impairment  Oral Residue: None  Initiation of Swallow: No impairment  Laryngeal Elevation: Functional  Aspiration Signs/Symptoms: None  Pharyngeal Phase Characteristics: No impairment, issues, or problems  Effective Modifications: None        Oral Phase - Comment: WNL  Pharyngeal Phase: WNL    PLAN    Duration/Frequency: Possible cognitive-linguistic evaluation later this admission     Dysphagia Outcome and Severity Scale (WAN)  Dysphagia Outcome Severity Scale: Level 7: Normal in all situations  Interpretation of Tool: The Dysphagia Outcome and Severity Scale (WAN) is a simple, easy-to-use, 7-point scale developed to systematically rate the functional severity of dysphagia based on objective assessment and make recommendations for diet level, independence level, and type of nutrition. Normal(7), Functional(6), Mild(5), Mild-Moderate(4), Moderate(3), Moderate-Severe(2), Severe(1)    Speech Therapy Prognosis  Prognosis: Excellent  Prognosis Considerations: Age; Severity of Impairments;Participation Level    Education: Patient, RN  Patient Education: Diet recommendations  Patient Education Response: Verbalizes understanding    Current Medications:   No current facility-administered medications on file prior to encounter.      Current Outpatient Medications on File Prior to Encounter   Medication Sig Dispense Refill    metoprolol tartrate (LOPRESSOR) 100 MG tablet Take 1 tablet by mouth 2 times daily 60 tablet 3    pantoprazole (PROTONIX) 40 MG tablet Take 1 tablet by mouth every morning (before breakfast) 30 tablet 3    urea (URE-NA) 15 g PACK packet Take 15 g by mouth in the morning and at bedtime 20 each 0    torsemide (DEMADEX) 20 MG tablet Take 1 tablet by mouth 2 times daily as needed (volume overload, edema) 60 tablet 0    acetaminophen (TYLENOL) 500 MG tablet Take 1,000 mg by mouth daily      amLODIPine (NORVASC) 5 MG tablet TAKE ONE TABLET BY MOUTH EVERY DAY      apixaban (ELIQUIS) 5 MG TABS tablet Take 5 mg by mouth 2 times daily      cetirizine (ZYRTEC) 10 MG tablet Take 10 mg by mouth daily      vitamin D 25 MCG (1000 UT) CAPS Take 1,000 Units by mouth daily      diclofenac (FLECTOR) 1.3 % PTCH patch Place 1 patch onto the skin every 12 hours      levothyroxine (SYNTHROID) 100 MCG tablet Take by mouth every morning (before breakfast)      Lidocaine HCl 4 % CREA Apply topically      memantine (NAMENDA) 10 MG tablet Take 10 mg by mouth 2 times daily      mirabegron (MYRBETRIQ) 25 MG TB24 Take 25 mg by mouth daily      nitroGLYCERIN (NITRODUR) 0.4 MG/HR Place 1 patch onto the skin daily      nitroGLYCERIN (NITROSTAT) 0.4 MG SL tablet Place under the tongue      ondansetron (ZOFRAN) 4 MG tablet Take 4 mg by mouth every 8 hours as needed      polyethylene glycol (GLYCOLAX) 17 GM/SCOOP powder Take 17 g by mouth daily      senna (SENOKOT) 8.6 MG tablet Take 2 tablet PO at bedtime         PRECAUTIONS/ALLERGIES: Penicillins, Strawberry extract, and Codeine   Safety Devices in place: Yes  Type of devices: Nurse notified, Left in bed, Call light within reach    Therapy Time  SLP Individual Minutes  Time In: 1046  Time Out: [facility]  Minutes: [facility] TIA Hernandez  8/9/2022 11:45 AM

## 2022-08-09 NOTE — PROGRESS NOTES
TRANSFER - OUT REPORT:    Verbal report given to ALLISON Penaloza on [address]  being transferred to 7th floor for routine progression of patient care       Report consisted of patient's Situation, Background, Assessment and   Recommendations(SBAR). Information from the following report(s) Nurse Handoff Report, ED SBAR, Intake/Output, MAR, Recent Results, Med Rec Status, Cardiac Rhythm afib, and Neuro Assessment was reviewed with the receiving nurse. Lines:   Peripheral IV 08/06/22 Left Antecubital (Active)   Site Assessment Clean, dry & intact 08/09/22 1530   Line Status Flushed;Capped 08/09/22 1530   Line Care Connections checked and tightened 08/09/22 1530   Phlebitis Assessment No symptoms 08/09/22 1530   Infiltration Assessment 0 08/09/22 1530   Alcohol Cap Used Yes 08/09/22 1530   Dressing Status Clean, dry & intact 08/09/22 1530   Dressing Type Transparent 08/09/22 1530        Opportunity for questions and clarification was provided.       Patient transported with:  Monitor and Registered Nurse

## 2022-08-09 NOTE — INTERDISCIPLINARY ROUNDS
Multi-D Rounds/Checklist (leapfrog):  Lines: can any be removed?: None     External Urinary Catheter (Active)      DVT Prophylaxis: Contraindicated  Vent: N/A  Nutrition Ordered/appropriate: Ordered  Bowel Movement last 2 days: Yes  Can antibiotics or other drugs be stopped? Is Nozin performed: Yes/End Date set  Consults needed: None  A: Is pain control adequate? (has PRNs? Stop drip?) Yes  B: Sedation break and SBT? N/A  C: Is sedation choice appropriate? N/A  D: Delirium/CAM-ICU? No  E: Mobility goals/appropriateness? Yes  F: Family update and plan? child is primary contact and is being updated daily by primary attending and nursing staff.     Buzz Flynn, APRN - CNP

## 2022-08-09 NOTE — INTERDISCIPLINARY ROUNDS
Interdisciplinary team rounds were held 8/9/2022 with the following team members:Care Management, Nursing, Nurse Practitioner, Palliative Care, Pastoral Care, Pharmacy, Physician, Respiratory Therapy, and Clinical Coordinator and the patient. Plan of care discussed. See clinical pathway and/or care plan for interventions and desired outcomes.

## 2022-08-09 NOTE — CARE COORDINATION
Referral sent to [facility] for potential return. Daughter has list and is deciding. [address], liaison, aware.

## 2022-08-09 NOTE — PLAN OF CARE
Problem: Discharge Planning  Goal: Discharge to home or other facility with appropriate resources  Outcome: Progressing  Flowsheets (Taken 8/9/2022 2547 by Ruben Yepez RN)  Discharge to home or other facility with appropriate resources:   Identify barriers to discharge with patient and caregiver   Arrange for needed discharge resources and transportation as appropriate   Identify discharge learning needs (meds, wound care, etc)   Refer to discharge planning if patient needs post-hospital services based on physician order or complex needs related to functional status, cognitive ability or social support system     Problem: Skin/Tissue Integrity  Goal: Absence of new skin breakdown  Description: 1. Monitor for areas of redness and/or skin breakdown  2. Assess vascular access sites hourly  3. Every 4-6 hours minimum:  Change oxygen saturation probe site  4. Every 4-6 hours:  If on nasal continuous positive airway pressure, respiratory therapy assess nares and determine need for appliance change or resting period. Outcome: Progressing     Problem: Safety - Adult  Goal: Free from fall injury  Outcome: Progressing     Problem: ABCDS Injury Assessment  Goal: Absence of physical injury  Outcome: Progressing     Problem: Pain  Goal: Verbalizes/displays adequate comfort level or baseline comfort level  Outcome: Progressing     Problem: Confusion  Goal: Confusion, delirium, dementia, or psychosis is improved or at baseline  Description: INTERVENTIONS:  1. Assess for possible contributors to thought disturbance, including medications, impaired vision or hearing, underlying metabolic abnormalities, dehydration, psychiatric diagnoses, and notify attending LIP  2. [place] high risk fall precautions, as indicated  3. Provide frequent short contacts to provide reality reorientation, refocusing and direction  4. Decrease environmental stimuli, including noise as appropriate  5.  Monitor and intervene to maintain adequate nutrition, hydration, elimination, sleep and activity  6. If unable to ensure safety without constant attention obtain sitter and review sitter guidelines with assigned personnel  7.  Initiate Psychosocial CNS and Spiritual Care consult, as indicated  Outcome: Progressing  Flowsheets (Taken 8/9/2022 0730 by Silvestre Ham RN)  Effect of thought disturbance (confusion, delirium, dementia, or psychosis) are managed with adequate functional status:   Assess for contributors to thought disturbance, including medications, impaired vision or hearing, underlying metabolic abnormalities, dehydration, psychiatric diagnoses, notify [facility] high risk fall precautions, as indicated   Provide frequent short contacts to provide reality reorientation, refocusing and direction   Monitor and intervene to maintain adequate nutrition, hydration, elimination, sleep and activity   If unable to ensure safety without constant attention obtain sitter and review sitter guidelines with assigned personnel   Decrease environmental stimuli, including noise as appropriate     Problem: Chronic Conditions and Co-morbidities  Goal: Patient's chronic conditions and co-morbidity symptoms are monitored and maintained or improved  Outcome: Progressing  Flowsheets (Taken 8/9/2022 0730 by Silvestre Ham RN)  Care Plan - Patient's Chronic Conditions and Co-Morbidity Symptoms are Monitored and Maintained or Improved:   Monitor and assess patient's chronic conditions and comorbid symptoms for stability, deterioration, or improvement   Collaborate with multidisciplinary team to address chronic and comorbid conditions and prevent exacerbation or deterioration   Update acute care plan with appropriate goals if chronic or comorbid symptoms are exacerbated and prevent overall improvement and discharge

## 2022-08-10 ENCOUNTER — APPOINTMENT (OUTPATIENT)
Dept: CT IMAGING | Age: 87
DRG: 082 | End: 2022-08-10
Payer: MEDICARE

## 2022-08-10 ENCOUNTER — APPOINTMENT (OUTPATIENT)
Dept: GENERAL RADIOLOGY | Age: 87
DRG: 082 | End: 2022-08-10
Payer: MEDICARE

## 2022-08-10 LAB
BACTERIA SPEC CULT: ABNORMAL
GLUCOSE BLD STRIP.AUTO-MCNC: 135 MG/DL (ref 65–100)
GLUCOSE BLD STRIP.AUTO-MCNC: 138 MG/DL (ref 65–100)
GLUCOSE BLD STRIP.AUTO-MCNC: 164 MG/DL (ref 65–100)
GLUCOSE BLD STRIP.AUTO-MCNC: 204 MG/DL (ref 65–100)
MM INDURATION, POC: 0 MM (ref 0–5)
NT PRO BNP: 2638 PG/ML
PPD, POC: NEGATIVE
SARS-COV-2 RDRP RESP QL NAA+PROBE: NOT DETECTED
SERVICE CMNT-IMP: ABNORMAL
SOURCE: NORMAL

## 2022-08-10 PROCEDURE — 6370000000 HC RX 637 (ALT 250 FOR IP): Performed by: INTERNAL MEDICINE

## 2022-08-10 PROCEDURE — 2580000003 HC RX 258: Performed by: FAMILY MEDICINE

## 2022-08-10 PROCEDURE — 1100000003 HC PRIVATE W/ TELEMETRY

## 2022-08-10 PROCEDURE — 82962 GLUCOSE BLOOD TEST: CPT

## 2022-08-10 PROCEDURE — 87635 SARS-COV-2 COVID-19 AMP PRB: CPT

## 2022-08-10 PROCEDURE — 97112 NEUROMUSCULAR REEDUCATION: CPT

## 2022-08-10 PROCEDURE — 70450 CT HEAD/BRAIN W/O DYE: CPT

## 2022-08-10 PROCEDURE — 6370000000 HC RX 637 (ALT 250 FOR IP): Performed by: FAMILY MEDICINE

## 2022-08-10 PROCEDURE — 36415 COLL VENOUS BLD VENIPUNCTURE: CPT

## 2022-08-10 PROCEDURE — 97530 THERAPEUTIC ACTIVITIES: CPT

## 2022-08-10 PROCEDURE — 6360000002 HC RX W HCPCS: Performed by: PHYSICIAN ASSISTANT

## 2022-08-10 PROCEDURE — 6360000002 HC RX W HCPCS: Performed by: INTERNAL MEDICINE

## 2022-08-10 PROCEDURE — 71045 X-RAY EXAM CHEST 1 VIEW: CPT

## 2022-08-10 PROCEDURE — 97535 SELF CARE MNGMENT TRAINING: CPT

## 2022-08-10 PROCEDURE — 83880 ASSAY OF NATRIURETIC PEPTIDE: CPT

## 2022-08-10 PROCEDURE — 2580000003 HC RX 258: Performed by: INTERNAL MEDICINE

## 2022-08-10 PROCEDURE — 2500000003 HC RX 250 WO HCPCS: Performed by: HOSPITALIST

## 2022-08-10 RX ORDER — METOPROLOL TARTRATE 5 MG/5ML
5 INJECTION INTRAVENOUS EVERY 6 HOURS PRN
Status: DISCONTINUED | OUTPATIENT
Start: 2022-08-10 | End: 2022-08-16 | Stop reason: HOSPADM

## 2022-08-10 RX ORDER — METOPROLOL TARTRATE 5 MG/5ML
5 INJECTION INTRAVENOUS EVERY 6 HOURS
Status: DISCONTINUED | OUTPATIENT
Start: 2022-08-10 | End: 2022-08-10

## 2022-08-10 RX ORDER — FUROSEMIDE 10 MG/ML
10 INJECTION INTRAMUSCULAR; INTRAVENOUS ONCE
Status: COMPLETED | OUTPATIENT
Start: 2022-08-10 | End: 2022-08-10

## 2022-08-10 RX ADMIN — MEMANTINE 10 MG: 5 TABLET ORAL at 20:23

## 2022-08-10 RX ADMIN — AMLODIPINE BESYLATE 5 MG: 5 TABLET ORAL at 09:55

## 2022-08-10 RX ADMIN — METOPROLOL TARTRATE 25 MG: 25 TABLET, FILM COATED ORAL at 09:55

## 2022-08-10 RX ADMIN — CEFTRIAXONE 1000 MG: 1 INJECTION, POWDER, FOR SOLUTION INTRAMUSCULAR; INTRAVENOUS at 20:22

## 2022-08-10 RX ADMIN — LEVOTHYROXINE SODIUM 100 MCG: 0.05 TABLET ORAL at 05:32

## 2022-08-10 RX ADMIN — MEMANTINE 10 MG: 5 TABLET ORAL at 09:53

## 2022-08-10 RX ADMIN — FUROSEMIDE 10 MG: 10 INJECTION, SOLUTION INTRAMUSCULAR; INTRAVENOUS at 15:54

## 2022-08-10 RX ADMIN — ACETAMINOPHEN 650 MG: 325 TABLET, FILM COATED ORAL at 16:36

## 2022-08-10 RX ADMIN — SODIUM CHLORIDE, PRESERVATIVE FREE 10 ML: 5 INJECTION INTRAVENOUS at 21:53

## 2022-08-10 RX ADMIN — METOPROLOL TARTRATE 5 MG: 5 INJECTION INTRAVENOUS at 01:59

## 2022-08-10 RX ADMIN — PANTOPRAZOLE SODIUM 40 MG: 40 TABLET, DELAYED RELEASE ORAL at 05:32

## 2022-08-10 RX ADMIN — BENZOCAINE AND MENTHOL 1 LOZENGE: 15; 3.6 LOZENGE ORAL at 11:41

## 2022-08-10 RX ADMIN — SENNOSIDES 17.2 MG: 8.6 TABLET, FILM COATED ORAL at 20:23

## 2022-08-10 RX ADMIN — POLYETHYLENE GLYCOL 3350 17 G: 17 POWDER, FOR SOLUTION ORAL at 09:49

## 2022-08-10 RX ADMIN — CETIRIZINE HYDROCHLORIDE 10 MG: 10 TABLET ORAL at 09:53

## 2022-08-10 RX ADMIN — ACETAMINOPHEN 650 MG: 325 TABLET, FILM COATED ORAL at 09:54

## 2022-08-10 RX ADMIN — TROSPIUM CHLORIDE 20 MG: 20 TABLET, FILM COATED ORAL at 20:23

## 2022-08-10 RX ADMIN — SODIUM CHLORIDE, PRESERVATIVE FREE 10 ML: 5 INJECTION INTRAVENOUS at 09:59

## 2022-08-10 ASSESSMENT — PAIN SCALES - GENERAL
PAINLEVEL_OUTOF10: 1
PAINLEVEL_OUTOF10: 0
PAINLEVEL_OUTOF10: 3
PAINLEVEL_OUTOF10: 0

## 2022-08-10 ASSESSMENT — PAIN SCALES - WONG BAKER
WONGBAKER_NUMERICALRESPONSE: 0
WONGBAKER_NUMERICALRESPONSE: 0

## 2022-08-10 ASSESSMENT — PAIN DESCRIPTION - LOCATION
LOCATION: GENERALIZED
LOCATION: GENERALIZED

## 2022-08-10 ASSESSMENT — PAIN DESCRIPTION - DESCRIPTORS
DESCRIPTORS: ACHING
DESCRIPTORS: DISCOMFORT;ACHING

## 2022-08-10 NOTE — PLAN OF CARE
Problem: Discharge Planning  Goal: Discharge to home or other facility with appropriate resources  8/9/2022 2236 by Litzy Mark RN  Outcome: Progressing  Flowsheets (Taken 8/9/2022 1952)  Discharge to home or other facility with appropriate resources: Identify barriers to discharge with patient and caregiver  8/9/2022 1741 by Ginger Pickett RN  Outcome: Progressing  Flowsheets (Taken 8/9/2022 0730 by Farzad Mabry RN)  Discharge to home or other facility with appropriate resources:   Identify barriers to discharge with patient and caregiver   Arrange for needed discharge resources and transportation as appropriate   Identify discharge learning needs (meds, wound care, etc)   Refer to discharge planning if patient needs post-hospital services based on physician order or complex needs related to functional status, cognitive ability or social support system     Problem: Skin/Tissue Integrity  Goal: Absence of new skin breakdown  Description: 1. Monitor for areas of redness and/or skin breakdown  2. Assess vascular access sites hourly  3. Every 4-6 hours minimum:  Change oxygen saturation probe site  4. Every 4-6 hours:  If on nasal continuous positive airway pressure, respiratory therapy assess nares and determine need for appliance change or resting period.   8/9/2022 2236 by Litzy Mark RN  Outcome: Progressing  8/9/2022 1741 by Ginger Pickett RN  Outcome: Progressing     Problem: Safety - Adult  Goal: Free from fall injury  8/9/2022 2236 by Litzy Mark RN  Outcome: Progressing  Flowsheets (Taken 8/9/2022 1952)  Free From Fall Injury: Instruct family/caregiver on patient safety  8/9/2022 1741 by Ginger Pickett RN  Outcome: Progressing     Problem: ABCDS Injury Assessment  Goal: Absence of physical injury  8/9/2022 2236 by Litzy Mark RN  Outcome: Progressing  Flowsheets (Taken 8/9/2022 1952)  Absence of Physical Injury: Implement safety measures based on patient assessment  8/9/2022 1741 by Liv Orlando Cydney Bryan RN  Outcome: Progressing     Problem: Pain  Goal: Verbalizes/displays adequate comfort level or baseline comfort level  8/9/2022 2236 by Mo Fountain RN  Outcome: Progressing  8/9/2022 1741 by Efrain Mirza RN  Outcome: Progressing     Problem: Confusion  Goal: Confusion, delirium, dementia, or psychosis is improved or at baseline  Description: INTERVENTIONS:  1. Assess for possible contributors to thought disturbance, including medications, impaired vision or hearing, underlying metabolic abnormalities, dehydration, psychiatric diagnoses, and notify attending LIP  2. [place] high risk fall precautions, as indicated  3. Provide frequent short contacts to provide reality reorientation, refocusing and direction  4. Decrease environmental stimuli, including noise as appropriate  5. Monitor and intervene to maintain adequate nutrition, hydration, elimination, sleep and activity  6. If unable to ensure safety without constant attention obtain sitter and review sitter guidelines with assigned personnel  7.  Initiate Psychosocial CNS and Spiritual Care consult, as indicated  8/9/2022 2236 by Mo Fountain RN  Outcome: Progressing  Flowsheets (Taken 8/9/2022 1952)  Effect of thought disturbance (confusion, delirium, dementia, or psychosis) are managed with adequate functional status: Assess for contributors to thought disturbance, including medications, impaired vision or hearing, underlying metabolic abnormalities, dehydration, psychiatric diagnoses, notify LIP  8/9/2022 1741 by Efrain Mirza RN  Outcome: Progressing  Flowsheets (Taken 8/9/2022 0730 by Jamari Valdivia RN)  Effect of thought disturbance (confusion, delirium, dementia, or psychosis) are managed with adequate functional status:   Assess for contributors to thought disturbance, including medications, impaired vision or hearing, underlying metabolic abnormalities, dehydration, psychiatric diagnoses, notify [facility] high risk fall precautions, as indicated   Provide frequent short contacts to provide reality reorientation, refocusing and direction   Monitor and intervene to maintain adequate nutrition, hydration, elimination, sleep and activity   If unable to ensure safety without constant attention obtain sitter and review sitter guidelines with assigned personnel   Decrease environmental stimuli, including noise as appropriate     Problem: Chronic Conditions and Co-morbidities  Goal: Patient's chronic conditions and co-morbidity symptoms are monitored and maintained or improved  8/9/2022 2236 by Dioni Toscano RN  Outcome: Progressing  Flowsheets (Taken 8/9/2022 1952)  Care Plan - Patient's Chronic Conditions and Co-Morbidity Symptoms are Monitored and Maintained or Improved: Monitor and assess patient's chronic conditions and comorbid symptoms for stability, deterioration, or improvement  8/9/2022 1741 by Arcenio Jackson RN  Outcome: Progressing  Flowsheets (Taken 8/9/2022 0730 by Maria Teresa Win RN)  Care Plan - Patient's Chronic Conditions and Co-Morbidity Symptoms are Monitored and Maintained or Improved:   Monitor and assess patient's chronic conditions and comorbid symptoms for stability, deterioration, or improvement   Collaborate with multidisciplinary team to address chronic and comorbid conditions and prevent exacerbation or deterioration   Update acute care plan with appropriate goals if chronic or comorbid symptoms are exacerbated and prevent overall improvement and discharge

## 2022-08-10 NOTE — PROGRESS NOTES
PHYSICAL THERAPY: Daily Note AM   (Link to Caseload Tracking: PT Visit Days : 3  Time In/Out PT Charge Capture  Rehab Caseload Tracker  Orders    Alex Prado is a 80 y.o. female   PRIMARY DIAGNOSIS: Cerebral brain hemorrhage ([facility] [address]  Cerebral brain hemorrhage ([facility] [address] [I61.9]       Inpatient: Payor: MEDICARE / Plan: MEDICARE PART A AND B / Product Type: *No Product type* /     ASSESSMENT:     REHAB RECOMMENDATIONS:   Recommendation to date pending progress:  Setting:  Short-term Rehab    Equipment:    To Be Determined     ASSESSMENT:  Ms. Cleveland Meeks was supine in bed on arrival and agreeable to therapy. Pt was able to come to sitting today on EOB with maxA x 2. She cont to show post trunk lean while seated but was able to correct with multimodal cueing. This session, pt also able to reach 1/2 stand from EOB c max (A)x2; she did fatigue quickly and was returned to sitting EOB. As previously documented, pt cont to show intermittent confusion but remains able to follow some simple commands. Overall, slow progress. Plan made with Pt/ OT to get into chair during following session.      SUBJECTIVE:   Ms. Cleveland Meeks states, \"I worked in a [address] [place] [address] [employer] for 15-20 years\"     Social/Functional Lives With: Other (comment) (LTC at [facility]/private pay)  Type of Home: Facility  OBJECTIVE:     PAIN: Lupie Sober / O2: PRECAUTION / [employer] / DRAINS:   Pre Treatment:          Post Treatment: 0 Vitals        Oxygen  O2 Device: None (Room air) Continuous Pulse Oximetry, IV, and Telemetry     RESTRICTIONS/PRECAUTIONS:  Restrictions/Precautions  Restrictions/Precautions: Fall Risk  Restrictions/Precautions: Fall Risk     MOBILITY: I Mod I S SBA CGA Min Mod Max Total  NT x2 Comments:   Bed Mobility    Rolling [] [] [] [] [] [] [] [x] [] [] [x]    Supine to Sit [] [] [] [] [] [] [] [x] [] [] [x]    Scooting [] [] [] [] [] [] [] [] [x] [] [x]    Sit to Supine [] [] [] [] [] [] [] [x] [] [] [x]    Transfers    Sit to Stand [] [] [] [] [] [] [] [x] [] [] [x]  1/2 stand    Bed to Chair [] [] [] [] [] [] [] [] [] [] []    Stand to Sit [] [] [] [] [] [] [] [x] [] [] [x]     [] [] [] [] [] [] [] [] [] [] []    I=Independent, Mod I=Modified Independent, S=Supervision, SBA=Standby Assistance, CGA=Contact Guard Assistance,   Min=Minimal Assistance, Mod=Moderate Assistance, Max=Maximal Assistance, Total=Total Assistance, NT=Not Tested    BALANCE: Good Fair+ Fair Fair- Poor NT Comments   Sitting Static [] [] [x] [] [] []    Sitting Dynamic [] [] [] [x] [] []              Standing Static [] [] [] [] [] []    Standing Dynamic [] [] [] [] [] []      GAIT: I Mod I S SBA CGA Min Mod Max Total  NT x2 Comments:   Level of Assistance [] [] [] [] [] [] [] [] [] [x] []    Distance   feet    DME N/A    Gait Quality N/A    Weightbearing Status      Stairs      I=Independent, Mod I=Modified Independent, S=Supervision, SBA=Standby Assistance, CGA=Contact Guard Assistance,   Min=Minimal Assistance, Mod=Moderate Assistance, Max=Maximal Assistance, Total=Total Assistance, NT=Not Tested    PLAN:   ACUTE PHYSICAL THERAPY GOALS:   (Developed with and agreed upon by patient and/or caregiver. )  LTG:  (1.)Ms. Yousif Parker will move from supine to sit and sit to supine , scoot up and down, and roll side to side in bed with MINIMAL ASSIST within 7 treatment day(s). (2.)Ms. Yousif Parker will transfer from bed to chair and chair to bed with MINIMAL ASSIST using the least restrictive device within 7 treatment day(s). (3.)Ms. Yousif Parker will tolerate OOB activity  with CONTACT GUARD ASSIST for 24min within 7 treatment day(s).     FREQUENCY AND DURATION: 3 times/week for duration of hospital stay or until stated goals are met, whichever comes first.    TREATMENT:   TREATMENT:   Co-Treatment PT/OT necessary due to patient's decreased overall endurance/tolerance levels, as well as need for high level skilled assistance to complete functional transfers/mobility and functional tasks  Therapeutic Activity (25 Minutes): Therapeutic activity included Rolling, Supine to Sit, Sit to Supine, Scooting, Lateral Scooting, Ambulation on level ground, Sitting balance , and Standing balance to improve functional Activity tolerance, Balance, Coordination, Mobility, and Strength.     TREATMENT GRID:  N/A    AFTER TREATMENT PRECAUTIONS: Alarm Activated, Bed, Bed/Chair Locked, RN notified, and Side rails x3    INTERDISCIPLINARY COLLABORATION:  RN/ PCT, PT/ PTA, and OT/ CORNELIUS    EDUCATION:      TIME IN/OUT:  Time In: 1515  Time Out: Torsten 16  Minutes: 25    Naeem Rosales PT

## 2022-08-10 NOTE — PROGRESS NOTES
OCCUPATIONAL THERAPY: Daily Note PM   OT Visit Days: 2   Time  OT Charge Capture  Rehab Caseload Tracker  OT Orders    Hiren Head is a 80 y.o. female   PRIMARY DIAGNOSIS: Cerebral brain hemorrhage ([facility] [address]  Cerebral brain hemorrhage ([facility] [address] [I61.9]       Inpatient: Payor: MEDICARE / Plan: MEDICARE PART A AND B / Product Type: *No Product type* /     ASSESSMENT:     REHAB RECOMMENDATIONS: CURRENT LEVEL OF FUNCTION:  (Most Recently Demonstrated)   Recommendation to date pending progress:  Setting:  Short-term Rehab    Equipment:    To Be Determined Bathing:  Not Tested  Dressing:  Not Tested  Feeding/Grooming:  Minimal Assist  Toileting:  Not Tested  Functional Mobility:  Maximal Assist x2 for bed mobility and sit to stand     ASSESSMENT:  Ms. Say Mishra is doing fair today. Pt presents supine upon arrival. Pt continue to require max A x2 for bed mobility and sit to stand attempt. Pt performed simple grooming as well today. Required min A to comb hair for thoroughness. Pt returned to supine and rolled for linen placement. Minimal progress made today. Will continue to benefit from skilled OT during stay.        SUBJECTIVE:     Ms. Say Mishra states, \"I have gave up on walking\"     Social/Functional Lives With: Other (comment) (LTC at [facility]/private pay)  Type of Home: Facility    OBJECTIVE:     Antonina Chacon / Martín Mckenzie / Melany Fluke: NA    RESTRICTIONS/PRECAUTIONS:  Restrictions/Precautions  Restrictions/Precautions: Fall Risk        PAIN: VITALS / O2:   Pre Treatment:              Post Treatment: 0 Vitals          Oxygen            MOBILITY: I Mod I S SBA CGA Min Mod Max Total  NT x2 Comments:   Bed Mobility    Rolling [] [] [] [] [] [] [x] [] [] [] []    Supine to Sit [] [] [] [] [] [] [] [x] [] [] [x]    Scooting [] [] [] [] [] [] [] [x] [] [] [x]    Sit to Supine [] [] [] [] [] [] [] [x] [] [] [x]    Transfers    Sit to Stand [] [] [] [] [] [] [] [x] [] [] [x]    Bed to Chair [] [] [] [] [] [] [] [] [] [x] []    Stand to Sit [] [] [] [] [] [] [] [] [] [x] []    Tub/Shower [] [] [] [] [] [] [] [] [] [x] []     Toilet [] [] [] [] [] [] [] [] [] [x] []      [] [] [] [] [] [] [] [] [] [] []    I=Independent, Mod I=Modified Independent, S=Supervision/Setup, SBA=Standby Assistance, CGA=Contact Guard Assistance, Min=Minimal Assistance, Mod=Moderate Assistance, Max=Maximal Assistance, Total=Total Assistance, NT=Not Tested    ACTIVITIES OF DAILY LIVING: I Mod I S SBA CGA Min Mod Max Total NT Comments   BASIC ADLs:              Upper Body   Bathing [] [] [] [] [] [] [] [] [] [x]    Lower Body Bathing [] [] [] [] [] [] [] [] [] [x]    Toileting [] [] [] [] [] [] [] [] [] [x]    Upper Body Dressing [] [] [] [] [] [] [] [] [] [x]    Lower Body Dressing [] [] [] [] [] [] [] [] [] [x]    Feeding [] [] [] [] [] [] [] [] [] [x]    Grooming [] [] [] [] [] [x] [] [] [] []    Personal Device Care [] [] [] [] [] [] [] [] [] [x]    Functional Mobility [] [] [] [] [] [] [] [x] [] [] x2   I=Independent, Mod I=Modified Independent, S=Supervision/Setup, SBA=Standby Assistance, CGA=Contact Guard Assistance, Min=Minimal Assistance, Mod=Moderate Assistance, Max=Maximal Assistance, Total=Total Assistance, NT=Not Tested    BALANCE: Good Fair+ Fair Fair- Poor NT Comments   Sitting Static [] [] [x] [] [] []    Sitting Dynamic [] [] [] [x] [] []              Standing Static [] [] [] [] [x] []    Standing Dynamic [] [] [] [] [] [x]        PLAN:     FREQUENCY/DURATION   OT Plan of Care: 3 times/week for duration of hospital stay or until stated goals are met, whichever comes first.    ACUTE OCCUPATIONAL THERAPY GOALS:   (Developed with and agreed upon by patient and/or caregiver.)  1. Patient will complete upper body bathing and dressing with SBA and adaptive equipment as needed. 2. Patient will complete self-grooming tasks in unsupported sitting with MOD I and adaptive equipment as needed.   3. Patient will tolerate 30 minutes of OT treatment with 1-2 rest breaks to increase activity tolerance for ADLs. 4. Patient will complete functional transfers with min A and adaptive equipment as needed. 5. Patient will complete functional activity while seated edge of bed with SBA and adaptive equipment as needed. 6. Patient will tolerate 25 minutes BUE therapeutic activities to increase use of BUE during ADL performance. Timeframe: 7 visits   TREATMENT:     TREATMENT:   Neuromuscular Re-education (15 Minutes): Neuromuscular Re-education included Balance Training, Coordination training, Postural training, Sitting balance training, and Standing balance training to improve Balance, Coordination, Functional Mobility, and Postural Control. Self Care (10 minutes): Patient participated in grooming ADLs in unsupported sitting with minimal verbal, manual, and tactile cueing to increase independence, decrease assistance required, and increase activity tolerance. Patient also participated in functional transfer training to increase independence, decrease assistance required, and increase activity tolerance.      TREATMENT GRID:  N/A    AFTER TREATMENT PRECAUTIONS: Bed, Bed/Chair Locked, Call light within reach, and Needs within reach    INTERDISCIPLINARY COLLABORATION:  RN/ PCT, PT/ PTA, and OT/ CORNELIUS    EDUCATION:       TOTAL TREATMENT DURATION AND TIME:  Time In: 1515  Time Out: Torsten 16  Minutes: 25    ARABELLA Dhillon

## 2022-08-10 NOTE — PROGRESS NOTES
and lightly diuresis  - monitor Cr    Active Problems:    UTI  - continue rocephin  - Ucx +E. Coli      Atrial fibrillation (HCC)  - hold NOAC, goal SBP <140  - lopressor PRN  - rate controlled on BB      Late onset Alzheimer's dementia without behavioral disturbance (HCC)  - nonpharmacologic interventions  - avoid narcotics and benzos  - avoid restraints      Type 2 diabetes with nephropathy (HCC)  - SSI while inpatient      Essential hypertension, benign  - as above      Discharge Planning:    - pending placement in a [facility] facility    Diet:  ADULT DIET; Regular  DVT PPx: SCDs  Code status: DNR    Hospital Problems:  Principal Problem:    Cerebral brain hemorrhage ([facility] [address]  Active Problems:    Atrial fibrillation ([facility] [address]    Late onset Alzheimer's dementia without behavioral disturbance (HCC)    Type 2 diabetes with nephropathy ([facility] [address]    Essential hypertension, benign  Resolved Problems:    * No resolved hospital problems.  *      Objective:   Patient Vitals for the past 24 hrs:   Temp Pulse Resp BP SpO2   08/10/22 0954 -- (!) 104 -- (!) 123/96 --   08/10/22 0800 97.7 °F (36.5 °C) (!) 113 22 (!) 141/92 94 %   08/10/22 0400 97.6 °F (36.4 °C) (!) 107 19 128/80 94 %   08/10/22 0010 -- (!) 106 -- -- --   08/10/22 0000 97.9 °F (36.6 °C) (!) 118 18 126/88 94 %   08/09/22 2245 -- (!) 115 -- -- --   08/09/22 2041 -- 96 -- 115/80 --   08/09/22 2000 97.7 °F (36.5 °C) (!) 102 18 104/81 93 %   08/09/22 1703 97.7 °F (36.5 °C) 92 19 113/77 99 %   08/09/22 1600 -- 94 24 (!) 116/58 97 %   08/09/22 1530 -- 92 20 110/63 93 %   08/09/22 1500 -- 95 26 114/85 (!) 85 %   08/09/22 1430 -- 87 (!) 31 83/68 97 %   08/09/22 1400 -- 91 27 121/75 97 %   08/09/22 1330 -- 97 28 111/64 97 %   08/09/22 1300 -- 94 (!) 33 136/75 96 %   08/09/22 1230 -- 100 28 124/68 96 %   08/09/22 1200 -- 92 (!) 38 (!) 116/57 94 %   08/09/22 1130 97.4 °F (36.3 °C) 94 15 106/78 (!) 82 %   08/09/22 1100 -- 98 22 132/70 --       Oxygen Therapy  SpO2: 94 %  Pulse Oximetry Type: Continuous  Pulse via Oximetry: 93 beats per minute  Pulse Oximeter Device Mode: Continuous  Pulse Oximeter Device Location: Finger  O2 Device: Nasal cannula  Oximetry Probe Site Changed: Yes  Skin Assessment: Clean, dry, & intact  Skin Protection for O2 Device: N/A  O2 Flow Rate (L/min): 2 L/min    Estimated body mass index is 23.77 kg/m² as calculated from the following:    Height as of this encounter: 5' 8\" (1.727 m). Weight as of this encounter: 156 lb 4.8 oz (70.9 kg). Intake/Output Summary (Last 24 hours) at 8/10/2022 1058  Last data filed at 8/10/2022 0931  Gross per 24 hour   Intake 240 ml   Output --   Net 240 ml         Physical Exam:   Blood pressure (!) 123/96, pulse (!) 104, temperature 97.7 °F (36.5 °C), temperature source Axillary, resp. rate 22, height 5' 8\" (1.727 m), weight 156 lb 4.8 oz (70.9 kg), SpO2 94 %. General:    Well nourished. Sleeping  Head:  Normocephalic, atraumatic  Eyes:  Sclerae appear normal.  Pupils equally round. ENT:  Nares appear normal, no drainage. Moist oral mucosa  Neck:  No restricted ROM. Trachea midline   CV:   RRR. No m/r/g. No jugular venous distension. Lungs:   CTAB. No wheezing, rhonchi, or rales. Symmetric expansion. Abdomen: Bowel sounds present. Soft, nontender, nondistended. Extremities: No cyanosis or clubbing. No edema  Skin:     No rashes and normal coloration. Warm and dry. Neuro:  CN II-XII grossly intact. Sensation intact. A&Ox3  Psych:  Normal mood and affect.       I have personally reviewed labs and tests showing:  Recent Labs:  Recent Results (from the past 48 hour(s))   POCT Glucose    Collection Time: 08/08/22 11:20 AM   Result Value Ref Range    POC Glucose 121 (H) 65 - 100 mg/dL    Performed by: Sofia    POCT Glucose    Collection Time: 08/08/22  5:26 PM   Result Value Ref Range    POC Glucose 121 (H) 65 - 100 mg/dL    Performed by: Sofia    POCT Glucose    Collection Time: 08/08/22  8:12 PM   Result Value Ref Range    POC Glucose 109 (H) 65 - 100 mg/dL    Performed by: Adamaris Easton READ & DOCUMENT PPD TEST IN 48 HRS    Collection Time: 08/09/22 12:00 AM   Result Value Ref Range    PPD, (POC) Negative Negative    mm Induration 0 0 - 5 mm   POCT Glucose    Collection Time: 08/09/22  2:18 AM   Result Value Ref Range    POC Glucose 107 (H) 65 - 100 mg/dL    Performed by: Dameon Palacio    Basic Metabolic Panel    Collection Time: 08/09/22  2:59 AM   Result Value Ref Range    Sodium 134 (L) 136 - 145 mmol/L    Potassium 3.6 3.5 - 5.1 mmol/L    Chloride 105 98 - 107 mmol/L    CO2 28 21 - 32 mmol/L    Anion Gap 1 (L) 7 - 16 mmol/L    Glucose 114 (H) 65 - 100 mg/dL    BUN 13 8 - 23 MG/DL    Creatinine 1.20 (H) 0.6 - 1.0 MG/DL    GFR  54 (L) >60 ml/min/1.73m2    GFR Non- 44 (L) >60 ml/min/1.73m2    Calcium 8.0 (L) 8.3 - 10.4 MG/DL   CBC with Auto Differential    Collection Time: 08/09/22  2:59 AM   Result Value Ref Range    WBC 6.1 4.3 - 11.1 K/uL    RBC 4.45 4.05 - 5.2 M/uL    Hemoglobin 13.8 11.7 - 15.4 g/dL    Hematocrit 41.9 35.8 - 46.3 %    MCV 94.2 79.6 - 97.8 FL    MCH 31.0 26.1 - 32.9 PG    MCHC 32.9 31.4 - 35.0 g/dL    RDW 16.8 (H) 11.9 - 14.6 %    Platelets 387 923 - 037 K/uL    MPV 10.2 9.4 - 12.3 FL    nRBC 0.00 0.0 - 0.2 K/uL    Differential Type AUTOMATED      Seg Neutrophils 48 43 - 78 %    Lymphocytes 36 13 - 44 %    Monocytes 12 4.0 - 12.0 %    Eosinophils % 3 0.5 - 7.8 %    Basophils 1 0.0 - 2.0 %    Immature Granulocytes 0 0.0 - 5.0 %    Segs Absolute 2.9 1.7 - 8.2 K/UL    Absolute Lymph # 2.2 0.5 - 4.6 K/UL    Absolute Mono # 0.7 0.1 - 1.3 K/UL    Absolute Eos # 0.2 0.0 - 0.8 K/UL    Basophils Absolute 0.0 0.0 - 0.2 K/UL    Absolute Immature Granulocyte 0.0 0.0 - 0.5 K/UL   POCT Glucose    Collection Time: 08/09/22 11:49 AM   Result Value Ref Range    POC Glucose 131 (H) 65 - 100 mg/dL    Performed by: CowdenLaurelBSN    POCT Glucose Collection Time: 08/09/22  4:19 PM   Result Value Ref Range    POC Glucose 200 (H) 65 - 100 mg/dL    Performed by: CowdenLaurelBSN    POCT Glucose    Collection Time: 08/09/22  8:31 PM   Result Value Ref Range    POC Glucose 172 (H) 65 - 100 mg/dL    Performed by: Mary    POCT Glucose    Collection Time: 08/10/22  6:27 AM   Result Value Ref Range    POC Glucose 135 (H) 65 - 100 mg/dL    Performed by: Mary        I have personally reviewed imaging studies showing: Other Studies:  CT HEAD WO CONTRAST   Final Result   1. Punctate hyperdensity in a right parietal lobe sulcus near the vertex   unchanged. Although this could represent a tiny subarachnoid hemorrhage, could   also represent a prominent cortical vessel. No areas of new hemorrhage or acute   abnormality. 2. Stable cerebral atrophy and chronic white matter disease. CT HEAD WO CONTRAST   Final Result   Unchanged small right parietal lobe hemorrhage, with no mass effect   or midline shift. CT HEAD WO CONTRAST   Final Result   1. Stable focal density closely associated with a right parietal sulcus which   could represent minimal subarachnoid hemorrhage or a tiny cortical contusion in   the setting of acute trauma. .          This report was made using voice transcription. Despite my best efforts to avoid   any, transcription errors may persist. If there is any question about the   accuracy of the report or need for clarification, then please call [phone], or text me through [employer] for clarification or correction. CT HEAD WO CONTRAST   Final Result   Acute 6 mm hemorrhagic contusion along the right parietal lobe   cortex, without mass effect or midline shift. Dr. Adrianna Shabazz verbally notified at   2:36 AM (DC 5).       CT HEAD WO CONTRAST    (Results Pending)       Current Meds:  Current Facility-Administered Medications   Medication Dose Route Frequency    metoprolol (LOPRESSOR) injection 5 mg  5 mg IntraVENous Q6H PRN    hydrALAZINE (APRESOLINE) injection 5 mg  5 mg IntraVENous Q8H PRN    benzocaine-menthol (CEPACOL SORE THROAT) lozenge 1 lozenge  1 lozenge Oral Q2H PRN    OLANZapine zydis (ZYPREXA) disintegrating tablet 5 mg  5 mg Oral Once    amLODIPine (NORVASC) tablet 5 mg  5 mg Oral Daily    cetirizine (ZYRTEC) tablet 10 mg  10 mg Oral Daily    levothyroxine (SYNTHROID) tablet 100 mcg  100 mcg Oral QAM AC    memantine (NAMENDA) tablet 10 mg  10 mg Oral BID    trospium (SANCTURA) tablet 20 mg  20 mg Oral Nightly    nitroGLYCERIN (NITRODUR) 0.4 MG/HR 1 patch  1 patch TransDERmal Daily    pantoprazole (PROTONIX) tablet 40 mg  40 mg Oral QAM AC    polyethylene glycol (GLYCOLAX) packet 17 g  17 g Oral Daily    senna (SENOKOT) tablet 17.2 mg  2 tablet Oral Nightly    sodium chloride flush 0.9 % injection 5-40 mL  5-40 mL IntraVENous 2 times per day    sodium chloride flush 0.9 % injection 5-40 mL  5-40 mL IntraVENous PRN    0.9 % sodium chloride infusion   IntraVENous PRN    potassium chloride (KLOR-CON M) extended release tablet 40 mEq  40 mEq Oral PRN    Or    potassium bicarb-citric acid (EFFER-K) effervescent tablet 40 mEq  40 mEq Oral PRN    Or    potassium chloride 10 mEq/100 mL IVPB (Peripheral Line)  10 mEq IntraVENous PRN    magnesium sulfate 2000 mg in 50 mL IVPB premix  2,000 mg IntraVENous PRN    promethazine (PHENERGAN) tablet 12.5 mg  12.5 mg Oral Q6H PRN    Or    ondansetron (ZOFRAN) injection 4 mg  4 mg IntraVENous Q6H PRN    aluminum & magnesium hydroxide-simethicone (MAALOX) 200-200-20 MG/5ML suspension 30 mL  30 mL Oral Q6H PRN    acetaminophen (TYLENOL) tablet 650 mg  650 mg Oral Q6H PRN    Or    acetaminophen (TYLENOL) suppository 650 mg  650 mg Rectal Q6H PRN    insulin lispro (HUMALOG) injection vial 0-8 Units  0-8 Units SubCUTAneous TID WC    insulin lispro (HUMALOG) injection vial 0-4 Units  0-4 Units SubCUTAneous Nightly    glucose chewable tablet 16 g  4 tablet Oral PRN    dextrose bolus 10% 125 mL  125 mL IntraVENous PRN    Or    dextrose bolus 10% 250 mL  250 mL IntraVENous PRN    glucagon (rDNA) injection 1 mg  1 mg SubCUTAneous PRN    dextrose 10 % infusion   IntraVENous Continuous PRN    metoprolol tartrate (LOPRESSOR) tablet 25 mg  25 mg Oral BID    cefTRIAXone (ROCEPHIN) 1,000 mg in sodium chloride 0.9 % 50 mL IVPB mini-bag  1,000 mg IntraVENous Q24H       Signed:  RAMY Morrison

## 2022-08-10 NOTE — PROGRESS NOTES
Physician Progress Note      PATIENT:               Mallory Hinojosa  CSN #:                  [id number]  :                       [id number]  ADMIT DATE:       2022 1:43 AM  DISCH DATE:  RESPONDING  PROVIDER #:        Ponce Hernandes TEXT:    Pt admitted with cerebral hemorrhage. Pt noted to have positive urine   culture. If possible, please document in the progress notes and discharge   summary if you are evaluating and/or treating any of the following: The medical record reflects the following:  Risk Factors: 89yof  Clinical Indicators: UA with bacteria, large Le, positive nitrite. Urine   culture, greater than 100,000 escherichia coli, ESBL  Treatment:  Juan Jose [email or web address]  Options provided:  -- Urinary Tract Infection (UTI)  -- Bacteriuria  -- Other - I will add my own diagnosis  -- Disagree - Not applicable / Not valid  -- Disagree - Clinically unable to determine / Unknown  -- Refer to Clinical Documentation Reviewer    PROVIDER RESPONSE TEXT:    This patient has a UTI. Query created by: Bar Barlow on 8/10/2022 9:57 AM      Electronically signed by:   Kris Wilkins 8/10/2022 2:17 PM

## 2022-08-11 ENCOUNTER — APPOINTMENT (OUTPATIENT)
Dept: CT IMAGING | Age: 87
DRG: 082 | End: 2022-08-11
Payer: MEDICARE

## 2022-08-11 ENCOUNTER — APPOINTMENT (OUTPATIENT)
Dept: NON INVASIVE DIAGNOSTICS | Age: 87
DRG: 082 | End: 2022-08-11
Payer: MEDICARE

## 2022-08-11 PROBLEM — B96.29 UTI DUE TO EXTENDED-SPECTRUM BETA LACTAMASE (ESBL) PRODUCING ESCHERICHIA COLI: Status: ACTIVE | Noted: 2022-06-11

## 2022-08-11 PROBLEM — Z16.12 UTI DUE TO EXTENDED-SPECTRUM BETA LACTAMASE (ESBL) PRODUCING ESCHERICHIA COLI: Status: ACTIVE | Noted: 2022-01-01

## 2022-08-11 PROBLEM — S09.90XA CLOSED HEAD INJURY: Status: ACTIVE | Noted: 2022-08-11

## 2022-08-11 PROBLEM — I50.20 SYSTOLIC CHF (HCC): Status: ACTIVE | Noted: 2022-08-11

## 2022-08-11 LAB
ALBUMIN SERPL-MCNC: 2.1 G/DL (ref 3.2–4.6)
ANION GAP SERPL CALC-SCNC: 8 MMOL/L (ref 7–16)
BUN SERPL-MCNC: 11 MG/DL (ref 8–23)
CALCIUM SERPL-MCNC: 8.4 MG/DL (ref 8.3–10.4)
CHLORIDE SERPL-SCNC: 103 MMOL/L (ref 98–107)
CO2 SERPL-SCNC: 25 MMOL/L (ref 21–32)
CREAT SERPL-MCNC: 1 MG/DL (ref 0.6–1)
ECHO AO ASC DIAM: 3.4 CM
ECHO AO ASCENDING AORTA INDEX: 1.85 CM/M2
ECHO AO ROOT DIAM: 3.2 CM
ECHO AO ROOT INDEX: 1.74 CM/M2
ECHO AV AREA PEAK VELOCITY: 1.1 CM2
ECHO AV AREA VTI: 0.6 CM2
ECHO AV AREA/BSA PEAK VELOCITY: 0.6 CM2/M2
ECHO AV AREA/BSA VTI: 0.3 CM2/M2
ECHO AV MEAN GRADIENT: 17 MMHG
ECHO AV MEAN VELOCITY: 1.9 M/S
ECHO AV PEAK GRADIENT: 24 MMHG
ECHO AV PEAK VELOCITY: 2.5 M/S
ECHO AV VELOCITY RATIO: 0.24
ECHO AV VTI: 49.3 CM
ECHO BSA: 1.84 M2
ECHO EST RA PRESSURE: 5 MMHG
ECHO LA AREA 2C: 29.2 CM2
ECHO LA AREA 4C: 28.6 CM2
ECHO LA MAJOR AXIS: 7.2 CM
ECHO LA MINOR AXIS: 6.6 CM
ECHO LA VOL BP: 103 ML (ref 22–52)
ECHO LA VOL/BSA BIPLANE: 56 ML/M2 (ref 16–34)
ECHO LV EDV A2C: 72 ML
ECHO LV EDV NDEX A2C: 39 ML/M2
ECHO LV EJECTION FRACTION A2C: 38 %
ECHO LV ESV A2C: 45 ML
ECHO LV ESV INDEX A2C: 24 ML/M2
ECHO LV FRACTIONAL SHORTENING: 30 % (ref 28–44)
ECHO LV INTERNAL DIMENSION DIASTOLE INDEX: 2.34 CM/M2
ECHO LV INTERNAL DIMENSION DIASTOLIC: 4.3 CM (ref 3.9–5.3)
ECHO LV INTERNAL DIMENSION SYSTOLIC INDEX: 1.63 CM/M2
ECHO LV INTERNAL DIMENSION SYSTOLIC: 3 CM
ECHO LV IVSD: 1.2 CM (ref 0.6–0.9)
ECHO LV MASS 2D: 173.6 G (ref 67–162)
ECHO LV MASS INDEX 2D: 94.4 G/M2 (ref 43–95)
ECHO LV POSTERIOR WALL DIASTOLIC: 1.1 CM (ref 0.6–0.9)
ECHO LV RELATIVE WALL THICKNESS RATIO: 0.51
ECHO LVOT AREA: 4.2 CM2
ECHO LVOT AV VTI INDEX: 0.15
ECHO LVOT DIAM: 2.3 CM
ECHO LVOT MEAN GRADIENT: 1 MMHG
ECHO LVOT PEAK GRADIENT: 2 MMHG
ECHO LVOT PEAK VELOCITY: 0.6 M/S
ECHO LVOT STROKE VOLUME INDEX: 17.2 ML/M2
ECHO LVOT SV: 31.6 ML
ECHO LVOT VTI: 7.6 CM
ECHO MV AREA VTI: 0.4 CM2
ECHO MV LVOT VTI INDEX: 11.78
ECHO MV MAX VELOCITY: 3.1 M/S
ECHO MV MEAN GRADIENT: 33 MMHG
ECHO MV MEAN VELOCITY: 2.3 M/S
ECHO MV PEAK GRADIENT: 38 MMHG
ECHO MV REGURGITANT PEAK GRADIENT: 121 MMHG
ECHO MV REGURGITANT PEAK VELOCITY: 5.5 M/S
ECHO MV REGURGITANT VTIA: 164 CM
ECHO MV VTI: 89.5 CM
ECHO RIGHT VENTRICULAR SYSTOLIC PRESSURE (RVSP): 33 MMHG
ECHO RV BASAL DIMENSION: 2.6 CM
ECHO TV REGURGITANT MAX VELOCITY: 2.65 M/S
ECHO TV REGURGITANT PEAK GRADIENT: 28 MMHG
EKG ATRIAL RATE: 125 BPM
EKG DIAGNOSIS: NORMAL
EKG Q-T INTERVAL: 322 MS
EKG QRS DURATION: 122 MS
EKG QTC CALCULATION (BAZETT): 435 MS
EKG R AXIS: -75 DEGREES
EKG T AXIS: 115 DEGREES
EKG VENTRICULAR RATE: 110 BPM
GLUCOSE BLD STRIP.AUTO-MCNC: 129 MG/DL (ref 65–100)
GLUCOSE BLD STRIP.AUTO-MCNC: 146 MG/DL (ref 65–100)
GLUCOSE BLD STRIP.AUTO-MCNC: 164 MG/DL (ref 65–100)
GLUCOSE BLD STRIP.AUTO-MCNC: 168 MG/DL (ref 65–100)
GLUCOSE SERPL-MCNC: 141 MG/DL (ref 65–100)
PHOSPHATE SERPL-MCNC: 3.2 MG/DL (ref 2.3–3.7)
POTASSIUM SERPL-SCNC: 4 MMOL/L (ref 3.5–5.1)
SERVICE CMNT-IMP: ABNORMAL
SODIUM SERPL-SCNC: 136 MMOL/L (ref 136–145)
TROPONIN I SERPL HS-MCNC: 10.7 PG/ML (ref 0–14)

## 2022-08-11 PROCEDURE — 70450 CT HEAD/BRAIN W/O DYE: CPT

## 2022-08-11 PROCEDURE — 93005 ELECTROCARDIOGRAM TRACING: CPT | Performed by: STUDENT IN AN ORGANIZED HEALTH CARE EDUCATION/TRAINING PROGRAM

## 2022-08-11 PROCEDURE — 84484 ASSAY OF TROPONIN QUANT: CPT

## 2022-08-11 PROCEDURE — 99221 1ST HOSP IP/OBS SF/LOW 40: CPT | Performed by: INTERNAL MEDICINE

## 2022-08-11 PROCEDURE — 80069 RENAL FUNCTION PANEL: CPT

## 2022-08-11 PROCEDURE — 6370000000 HC RX 637 (ALT 250 FOR IP): Performed by: FAMILY MEDICINE

## 2022-08-11 PROCEDURE — C8929 TTE W OR WO FOL WCON,DOPPLER: HCPCS

## 2022-08-11 PROCEDURE — 6360000004 HC RX CONTRAST MEDICATION: Performed by: FAMILY MEDICINE

## 2022-08-11 PROCEDURE — 6370000000 HC RX 637 (ALT 250 FOR IP): Performed by: NURSE PRACTITIONER

## 2022-08-11 PROCEDURE — 2580000003 HC RX 258: Performed by: FAMILY MEDICINE

## 2022-08-11 PROCEDURE — 6370000000 HC RX 637 (ALT 250 FOR IP): Performed by: INTERNAL MEDICINE

## 2022-08-11 PROCEDURE — 93306 TTE W/DOPPLER COMPLETE: CPT | Performed by: INTERNAL MEDICINE

## 2022-08-11 PROCEDURE — 36415 COLL VENOUS BLD VENIPUNCTURE: CPT

## 2022-08-11 PROCEDURE — 82962 GLUCOSE BLOOD TEST: CPT

## 2022-08-11 PROCEDURE — 1100000003 HC PRIVATE W/ TELEMETRY

## 2022-08-11 PROCEDURE — 51701 INSERT BLADDER CATHETER: CPT

## 2022-08-11 PROCEDURE — 6360000002 HC RX W HCPCS: Performed by: STUDENT IN AN ORGANIZED HEALTH CARE EDUCATION/TRAINING PROGRAM

## 2022-08-11 RX ORDER — DILTIAZEM HYDROCHLORIDE 120 MG/1
120 CAPSULE, COATED, EXTENDED RELEASE ORAL DAILY
Status: DISCONTINUED | OUTPATIENT
Start: 2022-08-12 | End: 2022-08-12

## 2022-08-11 RX ORDER — FUROSEMIDE 10 MG/ML
10 INJECTION INTRAMUSCULAR; INTRAVENOUS ONCE
Status: COMPLETED | OUTPATIENT
Start: 2022-08-11 | End: 2022-08-11

## 2022-08-11 RX ORDER — LEVOFLOXACIN 5 MG/ML
750 INJECTION, SOLUTION INTRAVENOUS
Status: DISCONTINUED | OUTPATIENT
Start: 2022-08-11 | End: 2022-08-16 | Stop reason: HOSPADM

## 2022-08-11 RX ADMIN — LEVOFLOXACIN 750 MG: 5 INJECTION, SOLUTION INTRAVENOUS at 13:21

## 2022-08-11 RX ADMIN — MEMANTINE 10 MG: 5 TABLET ORAL at 22:15

## 2022-08-11 RX ADMIN — SODIUM CHLORIDE, PRESERVATIVE FREE 10 ML: 5 INJECTION INTRAVENOUS at 22:15

## 2022-08-11 RX ADMIN — MEMANTINE 10 MG: 5 TABLET ORAL at 13:18

## 2022-08-11 RX ADMIN — SENNOSIDES 17.2 MG: 8.6 TABLET, FILM COATED ORAL at 22:11

## 2022-08-11 RX ADMIN — METOPROLOL TARTRATE 25 MG: 25 TABLET, FILM COATED ORAL at 13:19

## 2022-08-11 RX ADMIN — SODIUM CHLORIDE, PRESERVATIVE FREE 5 ML: 5 INJECTION INTRAVENOUS at 13:20

## 2022-08-11 RX ADMIN — AMLODIPINE BESYLATE 5 MG: 5 TABLET ORAL at 13:18

## 2022-08-11 RX ADMIN — FUROSEMIDE 10 MG: 20 INJECTION, SOLUTION INTRAMUSCULAR; INTRAVENOUS at 16:19

## 2022-08-11 RX ADMIN — PERFLUTREN 0.45 ML: 6.52 INJECTION, SUSPENSION INTRAVENOUS at 10:32

## 2022-08-11 RX ADMIN — LEVOTHYROXINE SODIUM 100 MCG: 0.05 TABLET ORAL at 06:29

## 2022-08-11 RX ADMIN — CETIRIZINE HYDROCHLORIDE 10 MG: 10 TABLET ORAL at 13:17

## 2022-08-11 RX ADMIN — TROSPIUM CHLORIDE 20 MG: 20 TABLET, FILM COATED ORAL at 22:14

## 2022-08-11 RX ADMIN — METOPROLOL TARTRATE 25 MG: 25 TABLET, FILM COATED ORAL at 22:14

## 2022-08-11 RX ADMIN — PANTOPRAZOLE SODIUM 40 MG: 40 TABLET, DELAYED RELEASE ORAL at 06:29

## 2022-08-11 ASSESSMENT — PAIN SCALES - GENERAL
PAINLEVEL_OUTOF10: 0

## 2022-08-11 ASSESSMENT — PAIN SCALES - WONG BAKER
WONGBAKER_NUMERICALRESPONSE: 0
WONGBAKER_NUMERICALRESPONSE: 0

## 2022-08-11 NOTE — PROGRESS NOTES
Physical Therapy Note:    Attempted to see patient this PM for physical therapy treatment  session. Patient HUMERA at CT at time of attempt. Will follow and re-attempt as schedule permits/patient available.  Thank you,    John Davison PT     Rehab Caseload Tracker

## 2022-08-11 NOTE — PROGRESS NOTES
Hospitalist Progress Note   Admit Date:  2022  1:43 AM   Name:  Bel Rayo   Age:  80 y.o. Sex:  female  :  1926   MRN:  [id number]   Room:  737/01    Presenting Complaint: Fall     Reason(s) for Admission: Cerebral brain hemorrhage [facility]) [I61.9]     Hospital Course & Interval History:   Patient is a 81 y/o female with past medical history hypertension, atrial fibrillation on Eliquis, DM II, hypothyroidism s/p thyroidectomy who presented from SNF to ED after fall with laceration to head. ED workup: steri-strips placed to right temporal laceration, CT head with 6 mm hemorrhagic contusion to right parietal lobe. Neurosurgery was contacted by ED who recommended hospital observation with repeat CT imaging. Eliquis was held. Patient was admitted to ICU until repeat CT imaging at 24 hours which remained unchanged. Patient was then transferred to floor. Found with ESBL UTI with antibiotics adjusted  based on finalized urine culture. PT/OT following with STR recommendations. CM assisting with referrals which are pending. Subjective/24hr Events (22): Patient is lethargic today. She arouses to loud verbal stimuli only but drifts off during exam. \"I just want to get rest.\" She is oriented to self only. She follows commands in bilateral upper extremities however very weak response due to lethargy. Daughter is present at bedside. Reports functionality at baseline is wheelchair use, no walking. Daughter is concerned that she looks worse/lethargic today compared to 48 hours ago.     Assessment & Plan:     Cerebral brain hemorrhage 2/2 fall (HCC)  -Avoid anti-platelets and AC  -Goal SBP < 140  -Repeat CT imaging with no interval change or acute abnormality  -Given lethargy , repeat CT head obtained and neg for acute abnormality, R parietal hemorrhage not visualized    UTI due to extended-spectrum beta lactamase (ESBL) producing Escherichia coli  -Reviewed finalized urine culture, d/c ceftriaxone and initiate renally-dosed Levaquin    Atrial fibrillation with RVR (HCC)  -Cont Metoprolol 12.5 mg po BID, AC held due to above  -Give prn IV Metoprolol and consult cardiology for RVR/further medication adjustments    Late onset Alzheimer's dementia without behavioral disturbance (HCC)  -Avoid narcotics, benzos, restraints  -Nonpharmacologic interventions, encourage good sleep-wake cycle    Type 2 diabetes with nephropathy (HCC)  -Cont SSI ACHS, carb-controlled diet    Essential hypertension, benign  -Cont metoprolol, amlodipine     Hypothyroidism s/p thyroidectomy  -Cont Synthroid    Pulmonary Edema / Volume Overload  -CXR 8/10 with findings of pulmonary edema, suspect CHF, also with elevated pBNP, given Lasix 10 mg IV with 2850 UOP. Order Echocardiogram (prior June 2020). Order renal function panel. Currently stable on RA. Discharge Planning:  pending rehab placement when medically stable, anticipate 24-48 hours    Diet:  ADULT DIET; Regular  DVT PPx: SCD's  Code status: DNR    Hospital Problems:  Principal Problem:    Cerebral brain hemorrhage ([facility] [address]  Active Problems:    UTI due to extended-spectrum beta lactamase (ESBL) producing Escherichia coli    Atrial fibrillation ([facility] [address]    Late onset Alzheimer's dementia without behavioral disturbance (HCC)    Type 2 diabetes with nephropathy (HCC)    Essential hypertension, benign  Resolved Problems:    * No resolved hospital problems.  *      Objective:   Patient Vitals for the past 24 hrs:   Temp Pulse Resp BP SpO2   08/11/22 1115 97.8 °F (36.6 °C) (!) 111 18 (!) 143/105 --   08/11/22 0801 96.8 °F (36 °C) 87 19 (!) 126/93 99 %   08/11/22 0400 97.3 °F (36.3 °C) 61 20 132/85 99 %   08/11/22 0258 -- -- -- -- 95 %   08/11/22 0014 -- -- -- -- 96 %   08/11/22 0000 97.3 °F (36.3 °C) 97 20 136/85 96 %   08/10/22 2000 97.7 °F (36.5 °C) 92 16 104/74 96 %   08/10/22 1958 97.7 °F (36.5 °C) 92 16 104/74 96 %   08/10/22 1600 97.3 °F (36.3 °C) 93 22 122/80 94 % Oxygen Therapy  SpO2: 99 %  Pulse Oximetry Type: Continuous  Pulse via Oximetry: 93 beats per minute  Pulse Oximeter Device Mode: Continuous  Pulse Oximeter Device Location: Finger  O2 Device: None (Room air)  Oximetry Probe Site Changed: Yes  Skin Assessment: Clean, dry, & intact  Skin Protection for O2 Device: N/A  O2 Flow Rate (L/min): 2 L/min    Estimated body mass index is 23.72 kg/m² as calculated from the following:    Height as of this encounter: 5' 8\" (1.727 m). Weight as of this encounter: 156 lb (70.8 kg). Intake/Output Summary (Last 24 hours) at 8/11/2022 1320  Last data filed at 8/11/2022 0441  Gross per 24 hour   Intake 360 ml   Output 2650 ml   Net -2290 ml         Physical Exam:     Blood pressure (!) 143/105, pulse (!) 111, temperature 97.8 °F (36.6 °C), temperature source Axillary, resp. rate 18, height 5' 8\" (1.727 m), weight 156 lb (70.8 kg), SpO2 99 %. General:    Lethargic, frail  Head:  Normocephalic, atraumatic. Bruising to R temporal area extending down lateral cheek, steri-strips intact  Eyes:  Sclerae appear normal.  Pupils equally round. ENT:  Nares appear normal, no drainage. Moist oral mucosa  Neck:  No restricted ROM. Trachea midline   CV:   IRR. No m/r/g. No jugular venous distension. Lungs:   CTAB. No wheezing, rhonchi, or rales. Symmetric expansion. Abdomen: Bowel sounds present. Soft, nontender, nondistended. Extremities: No cyanosis or clubbing. No edema  Skin:     No rashes and normal coloration. Warm and dry. Neuro:  CN II-XII grossly intact. Sensation intact. Lethargic. Oriented to self only. Follows commands in upper extremities. Motor exam 2-3/5 BUE. Psych:  Lethargic.      I have personally reviewed labs and tests showing:  Recent Labs:  Recent Results (from the past 48 hour(s))   POCT Glucose    Collection Time: 08/09/22  4:19 PM   Result Value Ref Range    POC Glucose 200 (H) 65 - 100 mg/dL    Performed by: CowdenLaurelBSN    POCT Glucose Collection Time: 08/09/22  8:31 PM   Result Value Ref Range    POC Glucose 172 (H) 65 - 100 mg/dL    Performed by: Ashley Cardenas PPD TEST IN 72 HRS    Collection Time: 08/10/22 12:00 AM   Result Value Ref Range    PPD, (POC) Negative Negative    mm Induration 0 0 - 5 mm   POCT Glucose    Collection Time: 08/10/22  6:27 AM   Result Value Ref Range    POC Glucose 135 (H) 65 - 100 mg/dL    Performed by: Mary    POCT Glucose    Collection Time: 08/10/22 11:38 AM   Result Value Ref Range    POC Glucose 164 (H) 65 - 100 mg/dL    Performed by: Severo Fisherman    COVID-19, Rapid    Collection Time: 08/10/22 11:41 AM    Specimen: Nasopharyngeal   Result Value Ref Range    Source NASAL      SARS-CoV-2, Rapid Not detected NOTD     POCT Glucose    Collection Time: 08/10/22  4:13 PM   Result Value Ref Range    POC Glucose 138 (H) 65 - 100 mg/dL    Performed by: Johnie    Brain Natriuretic Peptide    Collection Time: 08/10/22  6:37 PM   Result Value Ref Range    NT Pro-BNP 2,638 (H) <450 PG/ML   POCT Glucose    Collection Time: 08/10/22  8:23 PM   Result Value Ref Range    POC Glucose 204 (H) 65 - 100 mg/dL    Performed by: Crista    POCT Glucose    Collection Time: 08/11/22  6:24 AM   Result Value Ref Range    POC Glucose 129 (H) 65 - 100 mg/dL    Performed by:  Crista    Transthoracic echocardiogram (TTE) complete with contrast, bubble, strain, and 3D PRN    Collection Time: 08/11/22 10:32 AM   Result Value Ref Range    LV EDV A2C 72 mL    LV ESV A2C 45 mL    IVSd 1.2 (A) 0.6 - 0.9 cm    LVIDd 4.3 3.9 - 5.3 cm    LVIDs 3.0 cm    LVOT Diameter 2.3 cm    LVOT Mean Gradient 1 mmHg    LVOT VTI 7.6 cm    LVOT Peak Velocity 0.6 m/s    LVOT Peak Gradient 2 mmHg    LVPWd 1.1 (A) 0.6 - 0.9 cm    LV Ejection Fraction A2C 38 %    LVOT Area 4.2 cm2    LVOT SV 31.6 ml    LA Minor Axis 6.6 cm    LA Major Axis 7.2 cm    LA Area 2C 29.2 cm2    LA Area 4C 28.6 cm2 LA Volume  (A) 22 - 52 mL    AV Mean Velocity 1.9 m/s    AV Mean Gradient 17 mmHg    AV VTI 49.3 cm    AV Peak Velocity 2.5 m/s    AV Peak Gradient 24 mmHg    AV Area by VTI 0.6 cm2    AV Area by Peak Velocity 1.1 cm2    Aortic Root 3.2 cm    Ascending Aorta 3.4 cm    MR .0 cm    MV Mean Gradient 33 mmHg    MV VTI 89.5 cm    MV Mean Velocity 2.3 m/s    MR Peak Velocity 5.5 m/s    MR Peak Gradient 121 mmHg    MV Max Velocity 3.1 m/s    MV Peak Gradient 38 mmHg    MV Area by VTI 0.4 cm2    RV Basal Dimension 2.6 cm    TR Max Velocity 2.65 m/s    TR Peak Gradient 28 mmHg    Body Surface Area 1.84 m2    Fractional Shortening 2D 30 28 - 44 %    LV ESV Index A2C 24 mL/m2    LV EDV Index A2C 39 mL/m2    LVIDd Index 2.34 cm/m2    LVIDs Index 1.63 cm/m2    LV RWT Ratio 0.51     LV Mass 2D 173.6 (A) 67 - 162 g    LV Mass 2D Index 94.4 43 - 95 g/m2    LA Volume Index BP 56 (A) 16 - 34 ml/m2    LVOT Stroke Volume Index 17.2 mL/m2    Ao Root Index 1.74 cm/m2    Ascending Aorta Index 1.85 cm/m2    AV Velocity Ratio 0.24     LVOT:AV VTI Index 0.15     SUSANA/BSA VTI 0.3 cm2/m2    SUSANA/BSA Peak Velocity 0.6 cm2/m2    MV:LVOT VTI Index 11.78     Est. RA Pressure 5 mmHg    RVSP 33 mmHg   EKG 12 Lead    Collection Time: 08/11/22 10:52 AM   Result Value Ref Range    Ventricular Rate 110 BPM    Atrial Rate 125 BPM    QRS Duration 122 ms    Q-T Interval 322 ms    QTc Calculation (Bazett) 435 ms    R Axis -75 degrees    T Axis 115 degrees    Diagnosis       Atrial fibrillation with rapid ventricular response   POCT Glucose    Collection Time: 08/11/22 11:32 AM   Result Value Ref Range    POC Glucose 146 (H) 65 - 100 mg/dL    Performed by: [employer] 19        I have personally reviewed imaging studies showing: Other Studies:  CT HEAD WO CONTRAST   Final Result   No significant interval change or acute intracranial abnormality. The suspected punctate hemorrhage in the right parietal lobe is now barely   perceptible.       XR CHEST PORTABLE   Final Result   New bilateral pulmonary edema. CHF suspected. CT HEAD WO CONTRAST   Final Result   1. Punctate hyperdensity in a right parietal lobe sulcus near the vertex   unchanged. Although this could represent a tiny subarachnoid hemorrhage, could   also represent a prominent cortical vessel. No areas of new hemorrhage or acute   abnormality. 2. Stable cerebral atrophy and chronic white matter disease. CT HEAD WO CONTRAST   Final Result   Unchanged small right parietal lobe hemorrhage, with no mass effect   or midline shift. CT HEAD WO CONTRAST   Final Result   1. Stable focal density closely associated with a right parietal sulcus which   could represent minimal subarachnoid hemorrhage or a tiny cortical contusion in   the setting of acute trauma. .          This report was made using voice transcription. Despite my best efforts to avoid   any, transcription errors may persist. If there is any question about the   accuracy of the report or need for clarification, then please call [phone], or text me through [email or web address] for clarification or correction. CT HEAD WO CONTRAST   Final Result   Acute 6 mm hemorrhagic contusion along the right parietal lobe   cortex, without mass effect or midline shift. Dr. Nikko Agarwal verbally notified at   2:36 AM (DC 5).       CT HEAD WO CONTRAST    (Results Pending)       Current Meds:  Current Facility-Administered Medications   Medication Dose Route Frequency    levoFLOXacin (LEVAQUIN) 750 MG/150ML infusion 750 mg  750 mg IntraVENous Q48H    metoprolol tartrate (LOPRESSOR) tablet 25 mg  25 mg Oral Q6H    metoprolol (LOPRESSOR) injection 5 mg  5 mg IntraVENous Q6H PRN    hydrALAZINE (APRESOLINE) injection 5 mg  5 mg IntraVENous Q8H PRN    benzocaine-menthol (CEPACOL SORE THROAT) lozenge 1 lozenge  1 lozenge Oral Q2H PRN    OLANZapine zydis (ZYPREXA) disintegrating tablet 5 mg  5 mg Oral Once    amLODIPine (NORVASC) tablet 5 mg  5 mg Oral Daily    cetirizine (ZYRTEC) tablet 10 mg  10 mg Oral Daily    levothyroxine (SYNTHROID) tablet 100 mcg  100 mcg Oral QAM AC    memantine (NAMENDA) tablet 10 mg  10 mg Oral BID    trospium (SANCTURA) tablet 20 mg  20 mg Oral Nightly    nitroGLYCERIN (NITRODUR) 0.4 MG/HR 1 patch  1 patch TransDERmal Daily    pantoprazole (PROTONIX) tablet 40 mg  40 mg Oral QAM AC    polyethylene glycol (GLYCOLAX) packet 17 g  17 g Oral Daily    senna (SENOKOT) tablet 17.2 mg  2 tablet Oral Nightly    sodium chloride flush 0.9 % injection 5-40 mL  5-40 mL IntraVENous 2 times per day    sodium chloride flush 0.9 % injection 5-40 mL  5-40 mL IntraVENous PRN    0.9 % sodium chloride infusion   IntraVENous PRN    magnesium sulfate 2000 mg in 50 mL IVPB premix  2,000 mg IntraVENous PRN    promethazine (PHENERGAN) tablet 12.5 mg  12.5 mg Oral Q6H PRN    Or    ondansetron (ZOFRAN) injection 4 mg  4 mg IntraVENous Q6H PRN    aluminum & magnesium hydroxide-simethicone (MAALOX) 200-200-20 MG/5ML suspension 30 mL  30 mL Oral Q6H PRN    acetaminophen (TYLENOL) tablet 650 mg  650 mg Oral Q6H PRN    Or    acetaminophen (TYLENOL) suppository 650 mg  650 mg Rectal Q6H PRN    insulin lispro (HUMALOG) injection vial 0-8 Units  0-8 Units SubCUTAneous TID WC    insulin lispro (HUMALOG) injection vial 0-4 Units  0-4 Units SubCUTAneous Nightly    glucose chewable tablet 16 g  4 tablet Oral PRN    dextrose bolus 10% 125 mL  125 mL IntraVENous PRN    Or    dextrose bolus 10% 250 mL  250 mL IntraVENous PRN    glucagon (rDNA) injection 1 mg  1 mg SubCUTAneous PRN    dextrose 10 % infusion   IntraVENous Continuous PRN     Signed: Tressa ESPINOZA AGACNP-BC

## 2022-08-11 NOTE — H&P
[facility] Cardiology Initial Cardiac Evaluation                Date of  Admission: 8/6/2022  1:43 AM     PCP: Victor M Stevens MD  Requested by: Jesenia MCCANN  Primary Cardiologist: Dr Briana Andrews Attending: Dr Beatris Frazier    Reason for Evaluation: A Fib RVR      Katherin Mortimer is a 80 y.o. female with hx of anxiety, aortic stenosis, atrial fibrillation paroxysmal, benign neoplasm of the colon, depressive disorder, obese mellitus, Hypertension, bladder disorders, insomnia, electrolyte disorders, [place] arthritis, unspecified hypothyroidism, and osteoarthritis. Patient presented to the emergency department with reports of laceration of the head secondary to a fall. Patient was transported from her nursing home to hospital after falling twice out of bed today. First when she fell she had no notable injuries. In the emergency department she had Steri-Strips placed over the skin tear laceration CT head did show a small bleed contusion on the right parietal area after discussion with neurosurgery it was felt best to observe and do repeat CT scan. Family also concerned that the patient might not have been receiving all of her meds or meds appropriately at the nursing home. Patient had a goal blood pressure for systolic blood pressure less than 140 and did not tolerate a Cardene drip. Oral anticoagulants and antiplatelets were held. Patient developed worsening mentation with repeat stat CT completed showing punctuate hyperdensity to the right parietal lobe sulcus near the vertex that was unchanged. No new areas of hemorrhage or found. Patient developed some RVR with heart rates going into the 160s and been treated intermittently with beta-blockers with success. Patient's current heart rate is between 100 and 115 still atrial fibrillation at this time.       Past Medical History:   Diagnosis Date    Abdominal pain, epigastric     Abdominal pain, unspecified site     Anxiety state, unspecified     Aortic stenosis 6/10/2016    No sever SOB, no chest pain or syncope.  Echo showed mod AS, mean grad 22mm Hg, nl EF, mild LVH    Aortic valve disorders     Atherosclerosis of aorta (HCC)     Atrial fibrillation (HCC)     Backache, unspecified     Benign neoplasm of colon     Cervicalgia     Closed fracture of unspecified bone     Depressive disorder, not elsewhere classified     Diabetes (HCC)     Diarrhea     Disorders of magnesium metabolism     Edema     Elevated sedimentation rate     Encounter for long-term (current) use of other medications     Endocrine disease     Epistaxis     Esophageal reflux     Essential hypertension, benign     Gastrointestinal malfunction arising from mental factors     Hypertension     Hypertension, benign     Hypertonicity of bladder     Hypopotassemia     Insomnia, unspecified     Irritable bowel syndrome     Memory loss     Nausea alone     Osteoarthrosis, unspecified whether generalized or localized, unspecified site     Other ill-defined conditions(799.89)     hyperlipidemia    Other malaise and fatigue     Pain in joint, lower leg     Pain in joint, shoulder region     Postnasal drip     Rheumatoid arthritis(714.0)     Type II or unspecified type diabetes mellitus without mention of complication, not stated as uncontrolled     Unspecified constipation     Unspecified hemorrhoids with other complication     Unspecified hemorrhoids without mention of complication     Unspecified hereditary and idiopathic peripheral neuropathy     Unspecified hypothyroidism     Urgency of urination     Urinary tract infection, site not specified       Past Surgical History:   Procedure Laterality Date    CHOLECYSTECTOMY      OTHER SURGICAL HISTORY      thyroidectomy    TONSILLECTOMY       Allergies   Allergen Reactions    Penicillins Hives    Strawberry Extract Itching    Codeine Rash      Family History   Problem Relation Age of Onset    Rheum Arthritis Daughter     Osteoarthritis Son       Social History Tobacco History       Smoking Status  Never      Smokeless Tobacco Use  Never              Alcohol History       Alcohol Use Status  No              Drug Use       Drug Use Status  No              Sexual Activity       Sexually Active  Not Asked                     Medications Prior to Admission: metoprolol tartrate (LOPRESSOR) 100 MG tablet, Take 1 tablet by mouth 2 times daily  pantoprazole (PROTONIX) 40 MG tablet, Take 1 tablet by mouth every morning (before breakfast)  urea (URE-NA) 15 g PACK packet, Take 15 g by mouth in the morning and at bedtime  torsemide (DEMADEX) 20 MG tablet, Take 1 tablet by mouth 2 times daily as needed (volume overload, edema)  acetaminophen (TYLENOL) 500 MG tablet, Take 1,000 mg by mouth daily  amLODIPine (NORVASC) 5 MG tablet, TAKE ONE TABLET BY MOUTH EVERY DAY  apixaban (ELIQUIS) 5 MG TABS tablet, Take 5 mg by mouth 2 times daily  cetirizine (ZYRTEC) 10 MG tablet, Take 10 mg by mouth daily  vitamin D 25 MCG (1000 UT) CAPS, Take 1,000 Units by mouth daily  diclofenac (FLECTOR) 1.3 % PTCH patch, Place 1 patch onto the skin every 12 hours  levothyroxine (SYNTHROID) 100 MCG tablet, Take by mouth every morning (before breakfast)  Lidocaine HCl 4 % CREA, Apply topically  memantine (NAMENDA) 10 MG tablet, Take 10 mg by mouth 2 times daily  mirabegron (MYRBETRIQ) 25 MG TB24, Take 25 mg by mouth daily  nitroGLYCERIN (NITRODUR) 0.4 MG/HR, Place 1 patch onto the skin daily  nitroGLYCERIN (NITROSTAT) 0.4 MG SL tablet, Place under the tongue  ondansetron (ZOFRAN) 4 MG tablet, Take 4 mg by mouth every 8 hours as needed  polyethylene glycol (GLYCOLAX) 17 GM/SCOOP powder, Take 17 g by mouth daily  senna (SENOKOT) 8.6 MG tablet, Take 2 tablet PO at bedtime     Current Facility-Administered Medications   Medication Dose Route Frequency    levoFLOXacin (LEVAQUIN) 750 MG/150ML infusion 750 mg  750 mg IntraVENous Q48H    metoprolol (LOPRESSOR) injection 5 mg  5 mg IntraVENous Q6H PRN    hydrALAZINE (APRESOLINE) injection 5 mg  5 mg IntraVENous Q8H PRN    benzocaine-menthol (CEPACOL SORE THROAT) lozenge 1 lozenge  1 lozenge Oral Q2H PRN    OLANZapine zydis (ZYPREXA) disintegrating tablet 5 mg  5 mg Oral Once    amLODIPine (NORVASC) tablet 5 mg  5 mg Oral Daily    cetirizine (ZYRTEC) tablet 10 mg  10 mg Oral Daily    levothyroxine (SYNTHROID) tablet 100 mcg  100 mcg Oral QAM AC    memantine (NAMENDA) tablet 10 mg  10 mg Oral BID    trospium (SANCTURA) tablet 20 mg  20 mg Oral Nightly    nitroGLYCERIN (NITRODUR) 0.4 MG/HR 1 patch  1 patch TransDERmal Daily    pantoprazole (PROTONIX) tablet 40 mg  40 mg Oral QAM AC    polyethylene glycol (GLYCOLAX) packet 17 g  17 g Oral Daily    senna (SENOKOT) tablet 17.2 mg  2 tablet Oral Nightly    sodium chloride flush 0.9 % injection 5-40 mL  5-40 mL IntraVENous 2 times per day    sodium chloride flush 0.9 % injection 5-40 mL  5-40 mL IntraVENous PRN    0.9 % sodium chloride infusion   IntraVENous PRN    magnesium sulfate 2000 mg in 50 mL IVPB premix  2,000 mg IntraVENous PRN    promethazine (PHENERGAN) tablet 12.5 mg  12.5 mg Oral Q6H PRN    Or    ondansetron (ZOFRAN) injection 4 mg  4 mg IntraVENous Q6H PRN    aluminum & magnesium hydroxide-simethicone (MAALOX) 200-200-20 MG/5ML suspension 30 mL  30 mL Oral Q6H PRN    acetaminophen (TYLENOL) tablet 650 mg  650 mg Oral Q6H PRN    Or    acetaminophen (TYLENOL) suppository 650 mg  650 mg Rectal Q6H PRN    insulin lispro (HUMALOG) injection vial 0-8 Units  0-8 Units SubCUTAneous TID WC    insulin lispro (HUMALOG) injection vial 0-4 Units  0-4 Units SubCUTAneous Nightly    glucose chewable tablet 16 g  4 tablet Oral PRN    dextrose bolus 10% 125 mL  125 mL IntraVENous PRN    Or    dextrose bolus 10% 250 mL  250 mL IntraVENous PRN    glucagon (rDNA) injection 1 mg  1 mg SubCUTAneous PRN    dextrose 10 % infusion   IntraVENous Continuous PRN    metoprolol tartrate (LOPRESSOR) tablet 25 mg  25 mg Oral BID       Review of Systems Unable to perform ROS: Dementia      Physical Exam  Vitals:    08/11/22 0400 08/11/22 0801 08/11/22 1032 08/11/22 1115   BP: 132/85 (!) 126/93  (!) 143/105   Pulse: 61 87  (!) 111   Resp: 20 19 18   Temp: 97.3 °F (36.3 °C) 96.8 °F (36 °C)  97.8 °F (36.6 °C)   TempSrc:  Axillary  Axillary   SpO2: 99% 99%     Weight:   156 lb (70.8 kg)    Height:   5' 8\" (1.727 m)        Patient Vitals for the past 96 hrs (Last 3 readings):   Weight   08/11/22 1032 156 lb (70.8 kg)   08/09/22 0553 156 lb 4.8 oz (70.9 kg)   08/08/22 0558 155 lb 12.8 oz (70.7 kg)         Intake/Output Summary (Last 24 hours) at 8/11/2022 1243  Last data filed at 8/11/2022 0441  Gross per 24 hour   Intake 360 ml   Output 2650 ml   Net -2290 ml       Net IO Since Admission: -2,548. 92 mL [08/11/22 1243]      Physical Exam:  General: Well Developed, Well Nourished, No Acute Distress  HEENT: pupils equal and round, no abnormalities noted  Neck: supple, no JVD, no carotid bruits  Heart: S1S2 with RRR without murmurs or gallops  Lungs: Clear throughout auscultation bilaterally without adventitious sounds  Abd: soft, nontender, nondistended, with good bowel sounds  Ext: warm, no edema, calves supple/nontender, pulses 2+ bilaterally  Skin: warm and dry  Psychiatric: Normal mood and affect  Neurologic: Alert and oriented X 3      Recent Results (from the past 24 hour(s))   POCT Glucose    Collection Time: 08/10/22  4:13 PM   Result Value Ref Range    POC Glucose 138 (H) 65 - 100 mg/dL    Performed by: Johnie    Brain Natriuretic Peptide    Collection Time: 08/10/22  6:37 PM   Result Value Ref Range    NT Pro-BNP 2,638 (H) <450 PG/ML   POCT Glucose    Collection Time: 08/10/22  8:23 PM   Result Value Ref Range    POC Glucose 204 (H) 65 - 100 mg/dL    Performed by: Crista    POCT Glucose    Collection Time: 08/11/22  6:24 AM   Result Value Ref Range    POC Glucose 129 (H) 65 - 100 mg/dL    Performed by:  Crista    Transthoracic echocardiogram (TTE) complete with contrast, bubble, strain, and 3D PRN    Collection Time: 08/11/22 10:32 AM   Result Value Ref Range    LV EDV A2C 72 mL    LV ESV A2C 45 mL    IVSd 1.2 (A) 0.6 - 0.9 cm    LVIDd 4.3 3.9 - 5.3 cm    LVIDs 3.0 cm    LVOT Diameter 2.3 cm    LVOT Mean Gradient 1 mmHg    LVOT VTI 7.6 cm    LVOT Peak Velocity 0.6 m/s    LVOT Peak Gradient 2 mmHg    LVPWd 1.1 (A) 0.6 - 0.9 cm    LV Ejection Fraction A2C 38 %    LVOT Area 4.2 cm2    LVOT SV 31.6 ml    LA Minor Axis 6.6 cm    LA Major Axis 7.2 cm    LA Area 2C 29.2 cm2    LA Area 4C 28.6 cm2    LA Volume  (A) 22 - 52 mL    AV Mean Velocity 1.9 m/s    AV Mean Gradient 17 mmHg    AV VTI 49.3 cm    AV Peak Velocity 2.5 m/s    AV Peak Gradient 24 mmHg    AV Area by VTI 0.6 cm2    AV Area by Peak Velocity 1.1 cm2    Aortic Root 3.2 cm    Ascending Aorta 3.4 cm    MR .0 cm    MV Mean Gradient 33 mmHg    MV VTI 89.5 cm    MV Mean Velocity 2.3 m/s    MR Peak Velocity 5.5 m/s    MR Peak Gradient 121 mmHg    MV Max Velocity 3.1 m/s    MV Peak Gradient 38 mmHg    MV Area by VTI 0.4 cm2    RV Basal Dimension 2.6 cm    TR Max Velocity 2.65 m/s    TR Peak Gradient 28 mmHg    Body Surface Area 1.84 m2    Fractional Shortening 2D 30 28 - 44 %    LV ESV Index A2C 24 mL/m2    LV EDV Index A2C 39 mL/m2    LVIDd Index 2.34 cm/m2    LVIDs Index 1.63 cm/m2    LV RWT Ratio 0.51     LV Mass 2D 173.6 (A) 67 - 162 g    LV Mass 2D Index 94.4 43 - 95 g/m2    LA Volume Index BP 56 (A) 16 - 34 ml/m2    LVOT Stroke Volume Index 17.2 mL/m2    Ao Root Index 1.74 cm/m2    Ascending Aorta Index 1.85 cm/m2    AV Velocity Ratio 0.24     LVOT:AV VTI Index 0.15     SUSANA/BSA VTI 0.3 cm2/m2    SUSANA/BSA Peak Velocity 0.6 cm2/m2    MV:LVOT VTI Index 11.78     Est. RA Pressure 5 mmHg    RVSP 33 mmHg   EKG 12 Lead    Collection Time: 08/11/22 10:52 AM   Result Value Ref Range    Ventricular Rate 110 BPM    Atrial Rate 125 BPM    QRS Duration 122 ms    Q-T Interval 322 ms QTc Calculation (Bazett) 435 ms    R Axis -75 degrees    T Axis 115 degrees    Diagnosis       Atrial fibrillation with rapid ventricular response   POCT Glucose    Collection Time: 08/11/22 11:32 AM   Result Value Ref Range    POC Glucose 146 (H) 65 - 100 mg/dL    Performed by: Roger 19         CT HEAD WO CONTRAST    Result Date: 8/10/2022  CT of the head without contrast. CLINICAL INDICATION: Altered mental status, prior intracranial hemorrhage, PROCEDURE: Serial thin section axial images are obtained from the cranial vertex through the skull base without the administration of intravenous contrast. Radiation dose reduction techniques were used for this study. Our CT scanners use one or all of the following: Automated exposure control, adjusted of the mA and/or kV according to patient size, iterative reconstruction COMPARISON: Head CT dated 8/8/2022. FINDINGS: The tiny punctate hyperdensity in the right parietal lobe near the vertex is unchanged. No new or acute hemorrhage appreciated. There is stable cerebral atrophy and extensive bilateral white matter hypoattenuation that is unchanged. No findings present to indicate large, cortical-based territorial infarction. There is no hydrocephalus. No significant interval change or acute intracranial abnormality. The suspected punctate hemorrhage in the right parietal lobe is now barely perceptible. CT HEAD WO CONTRAST    Result Date: 8/8/2022  CT of the head without contrast. CLINICAL INDICATION: Worsening altered mental status, prior intracranial hemorrhage, follow-up exam PROCEDURE: Serial thin section axial images are obtained from the cranial vertex through the skull base without the administration of intravenous contrast. Radiation dose reduction techniques were used for this study.  Our CT scanners use one or all of the following: Automated exposure control, adjusted of the mA and/or kV according to patient size, iterative reconstruction COMPARISON: Head CT dated 8/7/2022. FINDINGS: The punctate hyperdensity in a right parietal lobe sulcus near the vertex is again appreciated and not significantly change. The etiology of this hyperdensity is indeterminate. It is unchanged from prior exams. There is no mass effect. No additional suspected hemorrhage appreciated. There is bilateral cerebral atrophy and moderate bilateral white matter high attenuation. The basilar cisterns are patent. No findings present to indicate large, cortical-based territorial infarction. 1. Punctate hyperdensity in a right parietal lobe sulcus near the vertex unchanged. Although this could represent a tiny subarachnoid hemorrhage, could also represent a prominent cortical vessel. No areas of new hemorrhage or acute abnormality. 2. Stable cerebral atrophy and chronic white matter disease. CT HEAD WO CONTRAST    Result Date: 8/7/2022  EXAM: Noncontrast CT head. INDICATION: Follow-up intracranial hemorrhage. COMPARISON: Yesterday's CT head. TECHNIQUE: Axial noncontrast CT images of the head were obtained. Radiation dose reduction techniques were used for this study. Our CT scanners use one or all of the following:  Automated exposure control, adjustment of the mA and/or kV according to patient size, iterative reconstruction. FINDINGS: There is been no change in the appearance of the 6 mm hemorrhagic cortical contusion along the right parietal lobe, best visualized on the coronal reformatted images. No new hemorrhage is identified. There is no mass effect or midline shift. Again noted is moderate generalized cerebral atrophy with chronic small vessel ischemic changes in the white matter. The skull and skull base are intact. Unchanged small right parietal lobe hemorrhage, with no mass effect or midline shift. CT HEAD WO CONTRAST    Result Date: 8/6/2022  CT HEAD WITHOUT CONTRAST, 8/6/2022 History: Follow-up parietal contusion.  Comparison: CT head without contrast 8/6/2022 at parietal lobe cortex near the vertex, there is an acute 6 mm hemorrhagic cortical contusion. There is no mass effect, midline shift or depressed fracture. The visualized paranasal sinuses and mastoid air cells are clear. Acute 6 mm hemorrhagic contusion along the right parietal lobe cortex, without mass effect or midline shift. Dr. Diane Oshea verbally notified at 2:36 AM (DC 5). XR CHEST PORTABLE    Result Date: 8/10/2022  Portable chest x-ray CLINICAL INDICATION: Cough FINDINGS: Single AP view the chest compared to a similar exam dated 6/12/2022 shows new bilateral pulmonary edema with underexpanded lungs. Small pleural effusion suspected. The cardiac silhouette and mediastinum are stable with a large hiatal hernia. There is no pneumothorax. New bilateral pulmonary edema. CHF suspected. Transthoracic echocardiogram (TTE) complete with contrast, bubble, strain, and 3D PRN    Result Date: 8/11/2022  Formatting of this result is different from the original.   Left Ventricle: Moderately reduced left ventricular systolic function with a visually estimated EF of 35 - 40%. Left ventricle size is normal. Moderately increased wall thickness. Moderate global hypokinesis present. Aortic Valve: Tricuspid valve. Moderately thickened cusp. Severe sclerosis of the aortic valve cusp. Mild regurgitation. Classic low flow/low gradient severe aortic stenosis with low EF. Mitral Valve: Moderate to severe regurgitation. Tricuspid Valve: Moderate regurgitation. The estimated RVSP is 33 mmHg. Left Atrium: Left atrium is severely dilated. Technical qualifiers: Technically difficult study, limited Doppler study due to patient's condition and limited study due to patient's ability to tolerate test.   Contrast used: Definity. Initial Recommendations:      Cardiac Problems:    Traumatic cerebral brain hemorrhage: Secondary to fall Per primary team.  Hold anticoagulation antiplatelets.   Consider restarting when

## 2022-08-11 NOTE — FLOWSHEET NOTE
08/11/22 0402   Respiratory   Respiratory (WDL) X   Respiratory Interventions H.O.B. elevated;Cough & deep breathe   Respiratory Pattern Regular   Respiratory Depth Normal   Respiratory Quality/Effort Dyspnea with exertion   Chest Assessment Chest expansion symmetrical   L Breath Sounds Pleural Rub;Coarse Crackles   R Breath Sounds Pleural Rub;Coarse Crackles   Breath Sounds   Right Upper Lobe Pleural Rub;Coarse Crackles   Right Middle Lobe Pleural Rub   Right Lower Lobe Pleural Rub   Left Upper Lobe Pleural Rub;Coarse Crackles   Left Lower Lobe Pleural Rub   Hospitalist notified. Pt 02 sats 96% on room air respirations 19-20 bpm.  Pt states no SOB no signs of distress.   No new orders received

## 2022-08-11 NOTE — PLAN OF CARE
Problem: Discharge Planning  Goal: Discharge to home or other facility with appropriate resources  Outcome: Progressing  Flowsheets (Taken 8/10/2022 1928)  Discharge to home or other facility with appropriate resources: Identify barriers to discharge with patient and caregiver     Problem: Skin/Tissue Integrity  Goal: Absence of new skin breakdown  Description: 1. Monitor for areas of redness and/or skin breakdown  2. Assess vascular access sites hourly  3. Every 4-6 hours minimum:  Change oxygen saturation probe site  4. Every 4-6 hours:  If on nasal continuous positive airway pressure, respiratory therapy assess nares and determine need for appliance change or resting period. Outcome: Progressing     Problem: Safety - Adult  Goal: Free from fall injury  Outcome: Progressing  Flowsheets (Taken 8/10/2022 1928)  Free From Fall Injury: Instruct family/caregiver on patient safety     Problem: ABCDS Injury Assessment  Goal: Absence of physical injury  Outcome: Progressing  Flowsheets (Taken 8/10/2022 1928)  Absence of Physical Injury: Implement safety measures based on patient assessment     Problem: Pain  Goal: Verbalizes/displays adequate comfort level or baseline comfort level  Outcome: Progressing     Problem: Confusion  Goal: Confusion, delirium, dementia, or psychosis is improved or at baseline  Description: INTERVENTIONS:  1. Assess for possible contributors to thought disturbance, including medications, impaired vision or hearing, underlying metabolic abnormalities, dehydration, psychiatric diagnoses, and notify attending LIP  2. [place] high risk fall precautions, as indicated  3. Provide frequent short contacts to provide reality reorientation, refocusing and direction  4. Decrease environmental stimuli, including noise as appropriate  5. Monitor and intervene to maintain adequate nutrition, hydration, elimination, sleep and activity  6.  If unable to ensure safety without constant attention obtain sitter and review sitter guidelines with assigned personnel  7.  Initiate Psychosocial CNS and Spiritual Care consult, as indicated  Outcome: Progressing  Flowsheets (Taken 8/10/2022 1928)  Effect of thought disturbance (confusion, delirium, dementia, or psychosis) are managed with adequate functional status: Assess for contributors to thought disturbance, including medications, impaired vision or hearing, underlying metabolic abnormalities, dehydration, psychiatric diagnoses, notify LIP     Problem: Chronic Conditions and Co-morbidities  Goal: Patient's chronic conditions and co-morbidity symptoms are monitored and maintained or improved  Outcome: Progressing  Flowsheets (Taken 8/10/2022 1928)  Care Plan - Patient's Chronic Conditions and Co-Morbidity Symptoms are Monitored and Maintained or Improved: Monitor and assess patient's chronic conditions and comorbid symptoms for stability, deterioration, or improvement     Problem: Neurosensory - Adult  Goal: Achieves stable or improved neurological status  Outcome: Progressing  Flowsheets (Taken 8/10/2022 1928)  Achieves stable or improved neurological status: Assess for and report changes in neurological status  Goal: Absence of seizures  Outcome: Progressing  Goal: Remains free of injury related to seizures activity  Outcome: Progressing  Goal: Achieves maximal functionality and self care  Outcome: Progressing     Problem: Respiratory - Adult  Goal: Achieves optimal ventilation and oxygenation  Outcome: Progressing  Flowsheets (Taken 8/10/2022 1928)  Achieves optimal ventilation and oxygenation: Assess for changes in respiratory status     Problem: Cardiovascular - Adult  Goal: Maintains optimal cardiac output and hemodynamic stability  Outcome: Progressing  Flowsheets (Taken 8/10/2022 1928)  Maintains optimal cardiac output and hemodynamic stability: Monitor blood pressure and heart rate  Goal: Absence of cardiac dysrhythmias or at baseline  Outcome: Progressing  Flowsheets (Taken 8/10/2022 1928)  Absence of cardiac dysrhythmias or at baseline: Monitor cardiac rate and rhythm     Problem: Skin/Tissue Integrity - Adult  Goal: Skin integrity remains intact  Outcome: Progressing  Flowsheets (Taken 8/10/2022 1928)  Skin Integrity Remains Intact: Monitor for areas of redness and/or skin breakdown     Problem: Metabolic/Fluid and Electrolytes - Adult  Goal: Electrolytes maintained within normal limits  Outcome: Progressing  Flowsheets (Taken 8/10/2022 1928)  Electrolytes maintained within normal limits: Monitor labs and assess patient for signs and symptoms of electrolyte imbalances  Goal: Hemodynamic stability and optimal renal function maintained  Outcome: Progressing  Goal: Glucose maintained within prescribed range  Outcome: Progressing

## 2022-08-12 ENCOUNTER — APPOINTMENT (OUTPATIENT)
Dept: MRI IMAGING | Age: 87
DRG: 082 | End: 2022-08-12
Payer: MEDICARE

## 2022-08-12 ENCOUNTER — APPOINTMENT (OUTPATIENT)
Dept: GENERAL RADIOLOGY | Age: 87
DRG: 082 | End: 2022-08-12
Payer: MEDICARE

## 2022-08-12 PROBLEM — Z86.79 HISTORY OF SUBARACHNOID HEMORRHAGE: Status: ACTIVE | Noted: 2022-01-01

## 2022-08-12 LAB
ALBUMIN SERPL-MCNC: 2.3 G/DL (ref 3.2–4.6)
ALBUMIN/GLOB SERPL: 0.5 {RATIO} (ref 1.2–3.5)
ALP SERPL-CCNC: 87 U/L (ref 50–136)
ALT SERPL-CCNC: 11 U/L (ref 12–65)
ANION GAP SERPL CALC-SCNC: 6 MMOL/L (ref 7–16)
AST SERPL-CCNC: 14 U/L (ref 15–37)
BASOPHILS # BLD: 0 K/UL (ref 0–0.2)
BASOPHILS NFR BLD: 0 % (ref 0–2)
BILIRUB SERPL-MCNC: 0.8 MG/DL (ref 0.2–1.1)
BUN SERPL-MCNC: 9 MG/DL (ref 8–23)
CALCIUM SERPL-MCNC: 8.6 MG/DL (ref 8.3–10.4)
CHLORIDE SERPL-SCNC: 100 MMOL/L (ref 98–107)
CO2 SERPL-SCNC: 29 MMOL/L (ref 21–32)
CREAT SERPL-MCNC: 0.9 MG/DL (ref 0.6–1)
DIFFERENTIAL METHOD BLD: ABNORMAL
EOSINOPHIL # BLD: 0.1 K/UL (ref 0–0.8)
EOSINOPHIL NFR BLD: 2 % (ref 0.5–7.8)
ERYTHROCYTE [DISTWIDTH] IN BLOOD BY AUTOMATED COUNT: 16.1 % (ref 11.9–14.6)
GLOBULIN SER CALC-MCNC: 5 G/DL (ref 2.3–3.5)
GLUCOSE BLD STRIP.AUTO-MCNC: 127 MG/DL (ref 65–100)
GLUCOSE BLD STRIP.AUTO-MCNC: 127 MG/DL (ref 65–100)
GLUCOSE BLD STRIP.AUTO-MCNC: 137 MG/DL (ref 65–100)
GLUCOSE BLD STRIP.AUTO-MCNC: 159 MG/DL (ref 65–100)
GLUCOSE SERPL-MCNC: 133 MG/DL (ref 65–100)
HCT VFR BLD AUTO: 44.5 % (ref 35.8–46.3)
HGB BLD-MCNC: 14.3 G/DL (ref 11.7–15.4)
IMM GRANULOCYTES # BLD AUTO: 0 K/UL (ref 0–0.5)
IMM GRANULOCYTES NFR BLD AUTO: 0 % (ref 0–5)
LYMPHOCYTES # BLD: 2.3 K/UL (ref 0.5–4.6)
LYMPHOCYTES NFR BLD: 36 % (ref 13–44)
MCH RBC QN AUTO: 30 PG (ref 26.1–32.9)
MCHC RBC AUTO-ENTMCNC: 32.1 G/DL (ref 31.4–35)
MCV RBC AUTO: 93.3 FL (ref 79.6–97.8)
MONOCYTES # BLD: 0.7 K/UL (ref 0.1–1.3)
MONOCYTES NFR BLD: 11 % (ref 4–12)
NEUTS SEG # BLD: 3.2 K/UL (ref 1.7–8.2)
NEUTS SEG NFR BLD: 51 % (ref 43–78)
NRBC # BLD: 0.03 K/UL (ref 0–0.2)
PLATELET # BLD AUTO: 227 K/UL (ref 150–450)
PMV BLD AUTO: 10.1 FL (ref 9.4–12.3)
POTASSIUM SERPL-SCNC: 3.3 MMOL/L (ref 3.5–5.1)
PROT SERPL-MCNC: 7.3 G/DL (ref 6.3–8.2)
RBC # BLD AUTO: 4.77 M/UL (ref 4.05–5.2)
SERVICE CMNT-IMP: ABNORMAL
SODIUM SERPL-SCNC: 135 MMOL/L (ref 136–145)
WBC # BLD AUTO: 6.3 K/UL (ref 4.3–11.1)

## 2022-08-12 PROCEDURE — 6370000000 HC RX 637 (ALT 250 FOR IP): Performed by: STUDENT IN AN ORGANIZED HEALTH CARE EDUCATION/TRAINING PROGRAM

## 2022-08-12 PROCEDURE — 1100000003 HC PRIVATE W/ TELEMETRY

## 2022-08-12 PROCEDURE — 99233 SBSQ HOSP IP/OBS HIGH 50: CPT | Performed by: INTERNAL MEDICINE

## 2022-08-12 PROCEDURE — 85025 COMPLETE CBC W/AUTO DIFF WBC: CPT

## 2022-08-12 PROCEDURE — 6370000000 HC RX 637 (ALT 250 FOR IP): Performed by: INTERNAL MEDICINE

## 2022-08-12 PROCEDURE — 71045 X-RAY EXAM CHEST 1 VIEW: CPT

## 2022-08-12 PROCEDURE — 36415 COLL VENOUS BLD VENIPUNCTURE: CPT

## 2022-08-12 PROCEDURE — 6370000000 HC RX 637 (ALT 250 FOR IP): Performed by: NURSE PRACTITIONER

## 2022-08-12 PROCEDURE — 82962 GLUCOSE BLOOD TEST: CPT

## 2022-08-12 PROCEDURE — 80053 COMPREHEN METABOLIC PANEL: CPT

## 2022-08-12 PROCEDURE — 70551 MRI BRAIN STEM W/O DYE: CPT

## 2022-08-12 PROCEDURE — 6370000000 HC RX 637 (ALT 250 FOR IP): Performed by: FAMILY MEDICINE

## 2022-08-12 PROCEDURE — 2580000003 HC RX 258: Performed by: FAMILY MEDICINE

## 2022-08-12 RX ORDER — LORAZEPAM 0.5 MG/1
0.5 TABLET ORAL ONCE
Status: COMPLETED | OUTPATIENT
Start: 2022-08-12 | End: 2022-08-12

## 2022-08-12 RX ORDER — METOPROLOL TARTRATE 50 MG/1
50 TABLET, FILM COATED ORAL EVERY 6 HOURS
Status: DISCONTINUED | OUTPATIENT
Start: 2022-08-12 | End: 2022-08-16 | Stop reason: HOSPADM

## 2022-08-12 RX ORDER — FUROSEMIDE 20 MG/1
20 TABLET ORAL DAILY
Status: DISCONTINUED | OUTPATIENT
Start: 2022-08-12 | End: 2022-08-16 | Stop reason: HOSPADM

## 2022-08-12 RX ADMIN — MEMANTINE 10 MG: 5 TABLET ORAL at 09:11

## 2022-08-12 RX ADMIN — FUROSEMIDE 20 MG: 20 TABLET ORAL at 09:11

## 2022-08-12 RX ADMIN — MEMANTINE 10 MG: 5 TABLET ORAL at 22:33

## 2022-08-12 RX ADMIN — METOPROLOL TARTRATE 25 MG: 25 TABLET, FILM COATED ORAL at 12:49

## 2022-08-12 RX ADMIN — METOPROLOL TARTRATE 25 MG: 25 TABLET, FILM COATED ORAL at 01:03

## 2022-08-12 RX ADMIN — POTASSIUM BICARBONATE 40 MEQ: 782 TABLET, EFFERVESCENT ORAL at 09:11

## 2022-08-12 RX ADMIN — SENNOSIDES 17.2 MG: 8.6 TABLET, FILM COATED ORAL at 22:34

## 2022-08-12 RX ADMIN — LEVOTHYROXINE SODIUM 100 MCG: 0.05 TABLET ORAL at 06:01

## 2022-08-12 RX ADMIN — METOPROLOL TARTRATE 50 MG: 50 TABLET, FILM COATED ORAL at 22:27

## 2022-08-12 RX ADMIN — TROSPIUM CHLORIDE 20 MG: 20 TABLET, FILM COATED ORAL at 22:34

## 2022-08-12 RX ADMIN — SODIUM CHLORIDE, PRESERVATIVE FREE 10 ML: 5 INJECTION INTRAVENOUS at 09:13

## 2022-08-12 RX ADMIN — LORAZEPAM 0.5 MG: 0.5 TABLET ORAL at 19:46

## 2022-08-12 RX ADMIN — POLYETHYLENE GLYCOL 3350 17 G: 17 POWDER, FOR SOLUTION ORAL at 09:11

## 2022-08-12 RX ADMIN — METOPROLOL TARTRATE 25 MG: 25 TABLET, FILM COATED ORAL at 06:01

## 2022-08-12 RX ADMIN — CETIRIZINE HYDROCHLORIDE 10 MG: 10 TABLET ORAL at 09:11

## 2022-08-12 RX ADMIN — DILTIAZEM HYDROCHLORIDE 120 MG: 120 CAPSULE, COATED, EXTENDED RELEASE ORAL at 09:11

## 2022-08-12 RX ADMIN — PANTOPRAZOLE SODIUM 40 MG: 40 TABLET, DELAYED RELEASE ORAL at 06:01

## 2022-08-12 RX ADMIN — POTASSIUM BICARBONATE 40 MEQ: 782 TABLET, EFFERVESCENT ORAL at 22:27

## 2022-08-12 RX ADMIN — SODIUM CHLORIDE, PRESERVATIVE FREE 10 ML: 5 INJECTION INTRAVENOUS at 22:34

## 2022-08-12 ASSESSMENT — PAIN SCALES - GENERAL: PAINLEVEL_OUTOF10: 0

## 2022-08-12 NOTE — PROGRESS NOTES
SPEECH LANGUAGE PATHOLOGY NOTE      Patient evaluated by speech therapy earlier this admission with recommendation for regular diet and thin liquids. Patient awake/alert, although reports feels tired and worn out. Basic expressive and receptive language functional. Able to follow basic commands and express wants/needs. Additional cognitive linguistic evaluation not indicated given prior level of functioning. Will sign off. If new concerns arise, please reconsult.          Maria [address] CCC-SLP

## 2022-08-12 NOTE — PROGRESS NOTES
[facility] CARDIOLOGY PROGRESS NOTE           8/12/2022 12:52 PM    Admit Date: 8/6/2022      Subjective: The patient denies angina and dyspnea. The patient reports upper abdominal pain. ROS:  No obvious pertinent positives on review of systems except for what was outlined above. Objective:      Vitals:    08/12/22 0426 08/12/22 0800 08/12/22 1200 08/12/22 1209   BP: (!) 114/90 (!) 127/98 (!) 130/98    Pulse:  (!) 120 97    Resp: 20 (!) 1 20    Temp: 97.7 °F (36.5 °C) 97.3 °F (36.3 °C) 97 °F (36.1 °C)    TempSrc: Oral Axillary     SpO2:  97%  95%   Weight:       Height:           Physical Exam:  General-No Acute Distress  Neck- supple, no JVD  CV-irregularly irregular no RG  Lung- clear bilaterally  Abd- soft, nontender, nondistended  Ext- no edema bilaterally.   Skin- warm and dry    Data Review:   Recent Labs     08/11/22  1326 08/12/22  0647    135*   K 4.0 3.3*   BUN 11 9   WBC  --  6.3   HGB  --  14.3   HCT  --  44.5   PLT  --  227       Lab Results   Component Value Date    CHOL 188 10/19/2020     Lab Results   Component Value Date    TRIG 154 (H) 10/19/2020     Lab Results   Component Value Date    HDL 46 10/19/2020     Lab Results   Component Value Date    LDLCALC 115 (H) 10/19/2020     Lab Results   Component Value Date    VLDL 27 10/19/2020     No results found for: [facility]     Lab Results   Component Value Date/Time     08/12/2022 06:47 AM     08/11/2022 01:26 PM     08/09/2022 02:59 AM    K 3.3 08/12/2022 06:47 AM    K 4.0 08/11/2022 01:26 PM    K 3.6 08/09/2022 02:59 AM     08/12/2022 06:47 AM     08/11/2022 01:26 PM     08/09/2022 02:59 AM    CO2 29 08/12/2022 06:47 AM    CO2 25 08/11/2022 01:26 PM    CO2 28 08/09/2022 02:59 AM    BUN 9 08/12/2022 06:47 AM    BUN 11 08/11/2022 01:26 PM    BUN 13 08/09/2022 02:59 AM    CREATININE 0.90 08/12/2022 06:47 AM    CREATININE 1.00 08/11/2022 01:26 PM    CREATININE 1.20 08/09/2022 02:59 AM    GLUCOSE 133 08/12/2022 06:47 AM    GLUCOSE 141 08/11/2022 01:26 PM    GLUCOSE 114 08/09/2022 02:59 AM    CALCIUM 8.6 08/12/2022 06:47 AM    CALCIUM 8.4 08/11/2022 01:26 PM    CALCIUM 8.0 08/09/2022 02:59 AM         Lab Results   Component Value Date    ALT 11 (L) 08/12/2022    ALT 69 (H) 06/15/2022     (H) 06/12/2022    AST 14 (L) 08/12/2022    AST 47 (H) 06/15/2022     (H) 06/12/2022          Assessment/Plan:     1. Atrial fibrillation, unspecified type  2. Aortic stenosis  3.  HFrEF  4. History of subarachnoid hemorrhage    She is currently admitted with an [address] following a fall. 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 Her EF was noted to be 50 to 55% in June 2020. She is currently on Lopressor 25 mg 4 times daily and was started on diltiazem 120 mg daily this morning. P.o. Lasix was started this morning as well. Her heart rate is currently in the 90s with a goal heart rate <80 BPM in the setting of HFrEF. Discontinue diltiazem in the setting of HFrEF especially since there is room to increase Lopressor. Increase Lopressor to 50 mg 4 times daily. Continue with a rate control strategy since the patient is not currently on [facility] after developing an [address] following a fall. Upon discharge, anticipate switching Lopressor to Toprol-XL due to a reduced EF.     Natalie Diaz MD

## 2022-08-12 NOTE — PROGRESS NOTES
Hospitalist Progress Note   Admit Date:  2022  1:43 AM   Name:  Flaquita Yung   Age:  80 y.o. Sex:  female  :  1926   MRN:  [id number]   Room:  737/01    Presenting Complaint: Fall     Reason(s) for Admission: Cerebral brain hemorrhage [facility]) [I61.9]     Hospital Course & Interval History:   Patient is a 79 y/o female with past medical history hypertension, atrial fibrillation on Eliquis, DM II, diastolic dysfunction, hypothyroidism s/p thyroidectomy who presented from SNF to ED after fall with laceration to head. ED workup: steri-strips placed to right temporal laceration, CT head with 6 mm hemorrhagic contusion to right parietal lobe. Neurosurgery was contacted by ED who recommended hospital observation with repeat CT imaging. Eliquis was held. Patient was admitted to ICU until repeat CT imaging at 24 hours which remained unchanged. Patient was then transferred to floor. Found with ESBL UTI with antibiotics adjusted  based on finalized urine culture. Lethargy  for which head CT was obtained, neg for acute abnormality, R parietal hemorrhage not visualized. Echocardiogram was obtained due to elevated pBNP and CXR findings of pulmonary edema, LVEF now 35-40% from 50-55% 2 years prior. Tolerating low-dose diuresis with good UOP. Barr placed for urinary retention. PT/OT following with STR recommendations. CM assisting with referrals which are pending. Subjective/24hr Events (22): Patient is alert and oriented x 2 today. Improved mood, conversation, and alertness from yesterday. Nursing concerned with NIH change of 2 points for LLE drift. On exam, patient independently raising bilateral legs and held for 5 seconds with no evidence of drift. No focal deficits noted. I have asked Neurology to evaluate, MRI screening form completed in event brain MRI is needed.     Assessment & Plan:     Cerebral brain hemorrhage 2/2 fall (HCC)  -Avoid anti-platelets and AC  -Goal SBP < 140  -Repeat CT imaging with no interval change or acute abnormality  -8/11 repeat CT head neg for acute abnormality, R parietal hemorrhage not visualized    UTI due to extended-spectrum beta lactamase (ESBL) producing Escherichia coli  -Cont renally dosed Levaquin based on final culture sensitivities    Atrial fibrillation with RVR (HCC)  -AC held as above  -Appreciate Cardiology recommendations, increased metoprolol and added Cardizem today    Late onset Alzheimer's dementia without behavioral disturbance (HCC)  -Avoid narcotics, benzos, restraints  -Nonpharmacologic interventions, encourage good sleep-wake cycle    Type 2 diabetes with nephropathy (HCC)  -Cont SSI ACHS, carb-controlled diet    Essential hypertension, benign  -Cont metoprolol, change to Cardizem today per Cardiology    Hypothyroidism s/p thyroidectomy  -Cont Synthroid    Pulmonary Edema / Volume Overload  -CXR 8/10 with findings of pulmonary edema, suspect CHF, also with elevated pBNP, s/p Lasix 10 mg IV with 2850 UOP 8/10  -s/p 10 IV Lasix 8/11 with 1200 UOP  -Initiate Lasix 20 mg po daily today with K supplementation, renal function normal, CXR with improving fluid overload    New onset systolic CHF, Hx of diastolic dysfunction  -Echo obtained and reviewed, LVEF now 35-40% from 50-55% 2 years prior  -Appreciate Cardiology recommendations, likely will require low-dose diuretic on discharge if patient tolerates, has been tolerating low-dose Lasix inpatient  -Strict I&O's, daily weights    Urinary Retention  -Barr placed 8/11, voiding trial in 24 hours    Discharge Planning:  pending rehab placement when medically stable, anticipate 24-48 hours    Diet:  ADULT DIET;  Regular  DVT PPx: SCD's  Code status: DNR    Hospital Problems:  Principal Problem:    Cerebral brain hemorrhage ([facility] [address]  Active Problems:    Closed head injury    UTI due to extended-spectrum beta lactamase (ESBL) producing Escherichia coli    Atrial fibrillation with rapid ventricular jugular venous distension. Lungs:   Mild bibasilar crackles. No wheezing, rales. Symmetric expansion. Respirations even and unlabored on RA  Abdomen: Bowel sounds present. Soft, nontender, nondistended. Extremities: No cyanosis or clubbing. No edema  Skin:     No rashes and normal coloration. Warm and dry. Neuro:  CN II-XII grossly intact. Sensation intact. Alert and oriented x 2, disoriented to time. Follows commands in all extremities. Motor  intact. BLE with no drift noted. Psych:  Calm    I have personally reviewed labs and tests showing:  Recent Labs:  Recent Results (from the past 48 hour(s))   POCT Glucose    Collection Time: 08/10/22 11:38 AM   Result Value Ref Range    POC Glucose 164 (H) 65 - 100 mg/dL    Performed by: Yanely [id number]-[email or web address], Rapid    Collection Time: 08/10/22 11:41 AM    Specimen: Nasopharyngeal   Result Value Ref Range    Source NASAL      SARS-CoV-2, Rapid Not detected NOTD     POCT Glucose    Collection Time: 08/10/22  4:13 PM   Result Value Ref Range    POC Glucose 138 (H) 65 - 100 mg/dL    Performed by: Johnie    Brain Natriuretic Peptide    Collection Time: 08/10/22  6:37 PM   Result Value Ref Range    NT Pro-BNP 2,638 (H) <450 PG/ML   POCT Glucose    Collection Time: 08/10/22  8:23 PM   Result Value Ref Range    POC Glucose 204 (H) 65 - 100 mg/dL    Performed by: Crista    POCT Glucose    Collection Time: 08/11/22  6:24 AM   Result Value Ref Range    POC Glucose 129 (H) 65 - 100 mg/dL    Performed by:  Crista    Transthoracic echocardiogram (TTE) complete with contrast, bubble, strain, and 3D PRN    Collection Time: 08/11/22 10:32 AM   Result Value Ref Range    LV EDV A2C 72 mL    LV ESV A2C 45 mL    IVSd 1.2 (A) 0.6 - 0.9 cm    LVIDd 4.3 3.9 - 5.3 cm    LVIDs 3.0 cm    LVOT Diameter 2.3 cm    LVOT Mean Gradient 1 mmHg    LVOT VTI 7.6 cm    LVOT Peak Velocity 0.6 m/s    LVOT Peak Gradient 2 mmHg    LVPWd 1.1 (A) 0.6 - 0.9 cm LV Ejection Fraction A2C 38 %    LVOT Area 4.2 cm2    LVOT SV 31.6 ml    LA Minor Axis 6.6 cm    LA Major Axis 7.2 cm    LA Area 2C 29.2 cm2    LA Area 4C 28.6 cm2    LA Volume  (A) 22 - 52 mL    AV Mean Velocity 1.9 m/s    AV Mean Gradient 17 mmHg    AV VTI 49.3 cm    AV Peak Velocity 2.5 m/s    AV Peak Gradient 24 mmHg    AV Area by VTI 0.6 cm2    AV Area by Peak Velocity 1.1 cm2    Aortic Root 3.2 cm    Ascending Aorta 3.4 cm    MR .0 cm    MV Mean Gradient 33 mmHg    MV VTI 89.5 cm    MV Mean Velocity 2.3 m/s    MR Peak Velocity 5.5 m/s    MR Peak Gradient 121 mmHg    MV Max Velocity 3.1 m/s    MV Peak Gradient 38 mmHg    MV Area by VTI 0.4 cm2    RV Basal Dimension 2.6 cm    TR Max Velocity 2.65 m/s    TR Peak Gradient 28 mmHg    Body Surface Area 1.84 m2    Fractional Shortening 2D 30 28 - 44 %    LV ESV Index A2C 24 mL/m2    LV EDV Index A2C 39 mL/m2    LVIDd Index 2.34 cm/m2    LVIDs Index 1.63 cm/m2    LV RWT Ratio 0.51     LV Mass 2D 173.6 (A) 67 - 162 g    LV Mass 2D Index 94.4 43 - 95 g/m2    LA Volume Index BP 56 (A) 16 - 34 ml/m2    LVOT Stroke Volume Index 17.2 mL/m2    Ao Root Index 1.74 cm/m2    Ascending Aorta Index 1.85 cm/m2    AV Velocity Ratio 0.24     LVOT:AV VTI Index 0.15     SUSANA/BSA VTI 0.3 cm2/m2    SUSANA/BSA Peak Velocity 0.6 cm2/m2    MV:LVOT VTI Index 11.78     Est. RA Pressure 5 mmHg    RVSP 33 mmHg   EKG 12 Lead    Collection Time: 08/11/22 10:52 AM   Result Value Ref Range    Ventricular Rate 110 BPM    Atrial Rate 125 BPM    QRS Duration 122 ms    Q-T Interval 322 ms    QTc Calculation (Bazett) 435 ms    R Axis -75 degrees    T Axis 115 degrees    Diagnosis       Atrial fibrillation with rapid ventricular response   POCT Glucose    Collection Time: 08/11/22 11:32 AM   Result Value Ref Range    POC Glucose 146 (H) 65 - 100 mg/dL    Performed by: [facility]    Renal Function Panel    Collection Time: 08/11/22  1:26 PM   Result Value Ref Range    Sodium 136 136 - 145 mmol/L    Potassium 4.0 3.5 - 5.1 mmol/L    Chloride 103 98 - 107 mmol/L    CO2 25 21 - 32 mmol/L    Anion Gap 8 7 - 16 mmol/L    Glucose 141 (H) 65 - 100 mg/dL    BUN 11 8 - 23 MG/DL    Creatinine 1.00 0.6 - 1.0 MG/DL    GFR African American >60 >60 ml/min/1.73m2    GFR Non- 55 (L) >60 ml/min/1.73m2    Calcium 8.4 8.3 - 10.4 MG/DL    Phosphorus 3.2 2.3 - 3.7 MG/DL    Albumin 2.1 (L) 3.2 - 4.6 g/dL   POCT Glucose    Collection Time: 08/11/22  4:11 PM   Result Value Ref Range    POC Glucose 164 (H) 65 - 100 mg/dL    Performed by: Angela    POCT Glucose    Collection Time: 08/11/22  8:00 PM   Result Value Ref Range    POC Glucose 168 (H) 65 - 100 mg/dL    Performed by: Carroll    Troponin    Collection Time: 08/11/22  8:19 PM   Result Value Ref Range    Troponin, High Sensitivity 10.7 0 - 14 pg/mL   POCT Glucose    Collection Time: 08/12/22  6:36 AM   Result Value Ref Range    POC Glucose 127 (H) 65 - 100 mg/dL    Performed by: Carroll    Comprehensive Metabolic Panel    Collection Time: 08/12/22  6:47 AM   Result Value Ref Range    Sodium 135 (L) 136 - 145 mmol/L    Potassium 3.3 (L) 3.5 - 5.1 mmol/L    Chloride 100 98 - 107 mmol/L    CO2 29 21 - 32 mmol/L    Anion Gap 6 (L) 7 - 16 mmol/L    Glucose 133 (H) 65 - 100 mg/dL    BUN 9 8 - 23 MG/DL    Creatinine 0.90 0.6 - 1.0 MG/DL    GFR African American >60 >60 ml/min/1.73m2    GFR Non- >60 >60 ml/min/1.73m2    Calcium 8.6 8.3 - 10.4 MG/DL    Total Bilirubin 0.8 0.2 - 1.1 MG/DL    ALT 11 (L) 12 - 65 U/L    AST 14 (L) 15 - 37 U/L    Alk Phosphatase 87 50 - 136 U/L    Total Protein 7.3 6.3 - 8.2 g/dL    Albumin 2.3 (L) 3.2 - 4.6 g/dL    Globulin 5.0 (H) 2.3 - 3.5 g/dL    Albumin/Globulin Ratio 0.5 (L) 1.2 - 3.5     CBC with Auto Differential    Collection Time: 08/12/22  6:47 AM   Result Value Ref Range    WBC 6.3 4.3 - 11.1 K/uL    RBC 4.77 4.05 - 5.2 M/uL    Hemoglobin 14.3 11.7 - 15.4 g/dL    Hematocrit 44.5 35.8 - could represent minimal subarachnoid hemorrhage or a tiny cortical contusion in   the setting of acute trauma. .          This report was made using voice transcription. Despite my best efforts to avoid   any, transcription errors may persist. If there is any question about the   accuracy of the report or need for clarification, then please call [phone], or text me through [email or web address] for clarification or correction. CT HEAD WO CONTRAST   Final Result   Acute 6 mm hemorrhagic contusion along the right parietal lobe   cortex, without mass effect or midline shift. Dr. Federica Arechiga verbally notified at   2:36 AM (DC 5).       MRI BRAIN WO CONTRAST    (Results Pending)       Current Meds:  Current Facility-Administered Medications   Medication Dose Route Frequency    furosemide (LASIX) tablet 20 mg  20 mg Oral Daily    potassium bicarb-citric acid (EFFER-K) effervescent tablet 40 mEq  40 mEq Oral BID    levoFLOXacin (LEVAQUIN) 750 MG/150ML infusion 750 mg  750 mg IntraVENous Q48H    metoprolol tartrate (LOPRESSOR) tablet 25 mg  25 mg Oral Q6H    dilTIAZem (CARDIZEM CD) extended release capsule 120 mg  120 mg Oral Daily    metoprolol (LOPRESSOR) injection 5 mg  5 mg IntraVENous Q6H PRN    hydrALAZINE (APRESOLINE) injection 5 mg  5 mg IntraVENous Q8H PRN    benzocaine-menthol (CEPACOL SORE THROAT) lozenge 1 lozenge  1 lozenge Oral Q2H PRN    cetirizine (ZYRTEC) tablet 10 mg  10 mg Oral Daily    levothyroxine (SYNTHROID) tablet 100 mcg  100 mcg Oral QAM AC    memantine (NAMENDA) tablet 10 mg  10 mg Oral BID    trospium (SANCTURA) tablet 20 mg  20 mg Oral Nightly    nitroGLYCERIN (NITRODUR) 0.4 MG/HR 1 patch  1 patch TransDERmal Daily    pantoprazole (PROTONIX) tablet 40 mg  40 mg Oral QAM AC    polyethylene glycol (GLYCOLAX) packet 17 g  17 g Oral Daily    senna (SENOKOT) tablet 17.2 mg  2 tablet Oral Nightly    sodium chloride flush 0.9 % injection 5-40 mL  5-40 mL IntraVENous 2 times per day    sodium chloride flush 0.9 % injection 5-40 mL  5-40 mL IntraVENous PRN    0.9 % sodium chloride infusion   IntraVENous PRN    promethazine (PHENERGAN) tablet 12.5 mg  12.5 mg Oral Q6H PRN    Or    ondansetron (ZOFRAN) injection 4 mg  4 mg IntraVENous Q6H PRN    aluminum & magnesium hydroxide-simethicone (MAALOX) 200-200-20 MG/5ML suspension 30 mL  30 mL Oral Q6H PRN    acetaminophen (TYLENOL) tablet 650 mg  650 mg Oral Q6H PRN    Or    acetaminophen (TYLENOL) suppository 650 mg  650 mg Rectal Q6H PRN    insulin lispro (HUMALOG) injection vial 0-8 Units  0-8 Units SubCUTAneous TID WC    insulin lispro (HUMALOG) injection vial 0-4 Units  0-4 Units SubCUTAneous Nightly    glucose chewable tablet 16 g  4 tablet Oral PRN    dextrose bolus 10% 125 mL  125 mL IntraVENous PRN    Or    dextrose bolus 10% 250 mL  250 mL IntraVENous PRN    glucagon (rDNA) injection 1 mg  1 mg SubCUTAneous PRN    dextrose 10 % infusion   IntraVENous Continuous PRN     Signed: Delvis ESPINOZA AGACNP-BC

## 2022-08-12 NOTE — CONSULTS
Consult    Patient: Janora Denver MRN: [id number]     YOB: 1926  Age: 80 y.o. Sex: female      Subjective:      Janora Denver is a 80 y.o. female  with Pmhx of Alzheimers ( baseline VH, Aoxself on [place]) , with past medical history hypertension, atrial fibrillation on Eliquis, DM II, heart disease (EF 30-35%), severe aortic stenosis hypothyroidism s/p thyroidectomy who presented from SNF admitted on 8/6/2022 s/p fall out of bed at her SNF, admitted there s/p recent admit for  urosepsis, hypoNa. It is unclear from notes, but it appears non ambulatory at baseline, . She also has history of RA, evident on exam, and pubic and Thoraco-lumbar fractures, causing chronic MSK and thoraco-lumbar  pain     She had scalp lac and  acute but stable traumatic punctate contusion/SAH right parietal lobe with resolution on serial scanning (6 scans with last one 8/11) NSGY had already been contacted no intervention  Eliquis was held. And family decided not to resume due to risk. There is some concern of her care at the institution per note review of family [address] course for possible wax and wane mental status, due to multifactorial  etiology including advanced age, hospital acquired delirium on dementia, nursing home, recent hospitalizations and superimposed UTI and small traumatic Sah. Neuro consult for  left leg weakness, but notes do no document this and unclear true ambulatory/motor strength baseline     TTE: left atrial dilation, severe aortic stenosis with  poor EF. A1c is within stroke goal. Prior history Lipids with mild TG elevation, not on statin She is on ABX for again UTI        Past Medical History:   Diagnosis Date    Abdominal pain, epigastric     Abdominal pain, unspecified site     Anxiety state, unspecified     Aortic stenosis 6/10/2016    No sever SOB, no chest pain or syncope.  Echo showed mod AS, mean grad 22mm Hg, nl EF, mild LVH    Aortic valve disorders Atherosclerosis of aorta (HCC)     Atrial fibrillation (HCC)     Backache, unspecified     Benign neoplasm of colon     Cervicalgia     Closed fracture of unspecified bone     Depressive disorder, not elsewhere classified     Diabetes ([facility] [address]     Diarrhea     Disorders of magnesium metabolism     Edema     Elevated sedimentation rate     Encounter for long-term (current) use of other medications     Endocrine disease     Epistaxis     Esophageal reflux     Essential hypertension, benign     Gastrointestinal malfunction arising from mental factors     Hypertension     Hypertension, benign     Hypertonicity of bladder     Hypopotassemia     Insomnia, unspecified     Irritable bowel syndrome     Memory loss     Nausea alone     Osteoarthrosis, unspecified whether generalized or localized, unspecified site     Other ill-defined conditions(799.89)     hyperlipidemia    Other malaise and fatigue     Pain in joint, lower leg     Pain in joint, shoulder region     Postnasal drip     Rheumatoid arthritis(714.0)     Type II or unspecified type diabetes mellitus without mention of complication, not stated as uncontrolled     Unspecified constipation     Unspecified hemorrhoids with other complication     Unspecified hemorrhoids without mention of complication     Unspecified hereditary and idiopathic peripheral neuropathy     Unspecified hypothyroidism     Urgency of urination     Urinary tract infection, site not specified      Past Surgical History:   Procedure Laterality Date    CHOLECYSTECTOMY      OTHER SURGICAL HISTORY      thyroidectomy    TONSILLECTOMY        Family History   Problem Relation Age of Onset    Rheum Arthritis Daughter     Osteoarthritis Son      Social History     Tobacco Use    Smoking status: Never    Smokeless tobacco: Never   Substance Use Topics    Alcohol use: No      Current Facility-Administered Medications   Medication Dose Route Frequency Provider Last Rate Last Admin    furosemide (LASIX) tablet IntraVENous PRN Sherryle Howell, MD        0.9 % sodium chloride infusion   IntraVENous PRN Sherryle Howell, MD        promethazine [facility]) tablet 12.5 mg  12.5 mg Oral Q6H PRN Sherryle Howell, MD        Or    ondansetron [facility]) injection 4 mg  4 mg IntraVENous Q6H PRN Sherryle Howell, MD   4 mg at 08/07/22 1032    aluminum & magnesium hydroxide-simethicone (MAALOX) 200-200-20 MG/5ML suspension 30 mL  30 mL Oral Q6H PRN Sherryle Howell, MD        acetaminophen (TYLENOL) tablet 650 mg  650 mg Oral Q6H PRN Sherryle Howell, MD   650 mg at 08/10/22 1636    Or    acetaminophen (TYLENOL) suppository 650 mg  650 mg Rectal Q6H PRN Sherryle Howell, MD        insulin lispro (HUMALOG) injection vial 0-8 Units  0-8 Units SubCUTAneous TID WC Sherryle Howell, MD        insulin lispro (HUMALOG) injection vial 0-4 Units  0-4 Units SubCUTAneous Nightly Sherryle Howell, MD        glucose chewable tablet 16 g  4 tablet Oral PRN Sherryle Howell, MD        dextrose bolus 10% 125 mL  125 mL IntraVENous PRN Sherryle Howell, MD        Or    dextrose bolus 10% 250 mL  250 mL IntraVENous PRN Sherryle Howell, MD        glucagon (rDNA) injection 1 mg  1 mg SubCUTAneous PRN Sherryle Howell, MD        dextrose 10 % infusion   IntraVENous Continuous PRN Sherryle Howell, MD            Allergies   Allergen Reactions    Penicillins Hives    Strawberry Extract Itching    Codeine Rash       Review of Systems:  CONSTITUTIONAL:  Denies weight loss, fever, chills, weakness or fatigue. HEENT:  Eyes:  Denies visual loss, blurred vision, double vision or yellow sclerae. Ears, Nose, Throat:  Denies hearing loss, sneezing, congestion, runny nose or sore throat. SKIN:  Denies rash or itching. CARDIOVASCULAR:  Denies chest pain, chest pressure or chest discomfort. No palpitations or edema. RESPIRATORY:  Denies shortness of breath, cough or sputum.   GASTROINTESTINAL:  Denies anorexia, nausea, vomiting or diarrhea. No abdominal pain or blood. GENITOURINARY:  Denies burning with urination. NEUROLOGICAL: per hpi  MUSCULOSKELETAL:  Denies muscle, back pain, joint pain or stiffness. LYMPHATICS:  Denies enlarged nodes. Objective:     Vitals:    08/11/22 1959 08/11/22 2329 08/12/22 0426 08/12/22 0803   BP: 103/67 116/80 (!) 114/90 (!) 127/98   Pulse: (!) 108 (!) 108  (!) 120   Resp: 20 18 20 22   Temp: 98.2 °F (36.8 °C) 98.1 °F (36.7 °C) 97.7 °F (36.5 °C)    TempSrc: Oral Oral Oral Axillary   SpO2: 97% 96%  97%   Weight:       Height:          Physical Exam:  General   Well developed, well nourished, in no apparent distress. Pleasant and conversant. HEENT - Normocephalic, atraumatic. Conjunctiva, tympanic membranes, and oropharynx are clear. Neck - Supple without masses, no bruits   Cardiovascular - distant S1, S2 without murmurs, rubs, or gallops. Lungs - soft breaths sounds but Clear to auscultation. Abdomen - Soft, nontender with normal bowel sounds. Extremities/Skin - Peripheral pulses intact. No edema and no rashes. Multiple areas of brusiing  Bilateral arthritis and knees    Neuro:   Alert and orientated x 2,  year 2021can name and repeat no aphasia nor dysarthria: follows commands  CN:2-12 intact   Perrl, no APD, Visual fields Full, Fundoscopic not performed due to unknown COVID status, no ptosis or nystagmus, EOMI,normal sensation face, no facial droop, normal hearing, equal palatal elevation, no dysphonia dysphagia, swallow ok, SCM and shoulder shrug equal, tongue midline no fasciculations  Motor: normal bulk, tone, no abnl pathological movements  RUE:5-/5  RLE: 4+/5  LUE: 4+/5  LLE:  4/5  DTRs: 2+BR, BI, KJ.  All else 1+/5 no pathological reflexes  Sensory: intact to LT and PP, norm temp and vibration and position sense, but TTP in all 4 extremities left >right , hip knees etc   Cerebellar: FTN/HTS intact bilateral. Gait deferred      NIHSS 5   NIHSS Score:   1a-Level of Consciousness 0  1b-What is Month/Age 1  1c-Open/Close Eyes&Hand 0  2 -Best Gaze 0  3 -Visual Fields 0  4 -Facial Palsy 0  5a-Motor-Left Arm 1  5b-Motor-Right Arm 1  6a-Motor-Left Leg 1  6b-Motor-Right Leg 1  7 -Limb Ataxia 0  8 -Sensory 0  9 -Best Language 0  10-Dysarthria 0  11-Extinction/Inattention 0    Lab Results   Component Value Date/Time    CHOL 188 10/19/2020 10:17 AM    HDL 46 10/19/2020 10:17 AM    VLDL 27 10/19/2020 10:17 AM        No results found for: HBA1C, [id number]     CT Results (most recent):  @BSHSILASTIMGCAT([id number]:1)@    Most recent MRI   No results found for this or any previous visit. Most recent MRA   No results found for this or any previous visit. Most recent CTA  No results found for this or any previous visit. Most recent Echo  Results for orders placed during the hospital encounter of 08/06/22    Transthoracic echocardiogram (TTE) complete with contrast, bubble, strain, and 3D PRN    Interpretation Summary  Formatting of this result is different from the original.      Left Ventricle: Moderately reduced left ventricular systolic function with a visually estimated EF of 35 - 40%. Left ventricle size is normal. Moderately increased wall thickness. Moderate global hypokinesis present. Aortic Valve: Tricuspid valve. Moderately thickened cusp. Severe sclerosis of the aortic valve cusp. Mild regurgitation. Classic low flow/low gradient severe aortic stenosis with low EF. Mitral Valve: Moderate to severe regurgitation. Tricuspid Valve: Moderate regurgitation. The estimated RVSP is 33 mmHg. Left Atrium: Left atrium is severely dilated. Technical qualifiers: Technically difficult study, limited Doppler study due to patient's condition and limited study due to patient's ability to tolerate test.    Contrast used: Definity.          Most recent lipid panels  Lab Results   Component Value Date/Time    CHOL 188 10/19/2020 10:17 AM    HDL 46 10/19/2020 10:17 AM    VLDL 27 10/19/2020 10:17 AM       Most recent Hgb A1C  No results found for: HBA1C, [id number]      Assessment:   Andressa Dominguez is a 80 y.o. female  with Pmhx of Alzheimers ( baseline VH, Aoxself on [place]) , with past medical history hypertension, atrial fibrillation on Eliquis, DM II, heart disease (EF 30-35%), severe aortic stenosis hypothyroidism s/p thyroidectomy who presented from SNF admitted on 8/6/2022 s/p fall out of bed at her SNF, admitted there s/p recent admit for  urosepsis, hypoNa. It is unclear from notes, but it appears non ambulatory at baseline, . She also has history of RA, evident on exam, and pubic and Thoraco-lumbar fractures, causing chronic MSK and thoraco-lumbar  pain     She had scalp lac and  acute but stable traumatic punctate contusion/SAH right parietal lobe with resolution on serial scanning (6 scans with last one 8/11) NSGY had already been contacted no intervention  Eliquis was held. And family decided not to resume due to risk. There is some concern of her care at the institution per note review of family [address] course for possible wax and wane mental status, due to multifactorial  etiology including advanced age, hospital acquired delirium on dementia, nursing home, recent hospitalizations and superimposed UTI and small traumatic Sah. Neuro consult for  left leg weakness, but notes do no document this and unclear true ambulatory/motor strength baseline     TTE: left atrial dilation, severe aortic stenosis with  poor EF. A1c is within stroke goal. Prior history Lipids with mild TG elevation, not on statin She is on ABX for again UTI    Impressions  DDX: MSK, fracture sprain arthritis etc, rt JAIDEN stroke, lumbar    Stroke is possible given off Eliquis and immobility and afib etc, however exam also with multiple possible alternate etiologies (unknown baseline, Pain on exam and poor motor x4 , MSK/RA and functional baseline status etc  LLE is weakest but again with pain

## 2022-08-13 ENCOUNTER — APPOINTMENT (OUTPATIENT)
Dept: MRI IMAGING | Age: 87
DRG: 082 | End: 2022-08-13
Payer: MEDICARE

## 2022-08-13 LAB
25(OH)D3 SERPL-MCNC: 50.3 NG/ML (ref 30–100)
ANION GAP SERPL CALC-SCNC: 6 MMOL/L (ref 7–16)
BUN SERPL-MCNC: 8 MG/DL (ref 8–23)
CALCIUM SERPL-MCNC: 8.8 MG/DL (ref 8.3–10.4)
CHLORIDE SERPL-SCNC: 102 MMOL/L (ref 98–107)
CHOLEST SERPL-MCNC: 137 MG/DL
CO2 SERPL-SCNC: 27 MMOL/L (ref 21–32)
CREAT SERPL-MCNC: 1 MG/DL (ref 0.6–1)
GLUCOSE BLD STRIP.AUTO-MCNC: 124 MG/DL (ref 65–100)
GLUCOSE BLD STRIP.AUTO-MCNC: 132 MG/DL (ref 65–100)
GLUCOSE BLD STRIP.AUTO-MCNC: 141 MG/DL (ref 65–100)
GLUCOSE BLD STRIP.AUTO-MCNC: 157 MG/DL (ref 65–100)
GLUCOSE SERPL-MCNC: 138 MG/DL (ref 65–100)
HDLC SERPL-MCNC: 36 MG/DL (ref 40–60)
HDLC SERPL: 3.8 {RATIO}
LDLC SERPL CALC-MCNC: 73.4 MG/DL
POTASSIUM SERPL-SCNC: 3.8 MMOL/L (ref 3.5–5.1)
SERVICE CMNT-IMP: ABNORMAL
SODIUM SERPL-SCNC: 135 MMOL/L (ref 136–145)
TRIGL SERPL-MCNC: 138 MG/DL (ref 35–150)
VLDLC SERPL CALC-MCNC: 27.6 MG/DL (ref 6–23)

## 2022-08-13 PROCEDURE — 2580000003 HC RX 258: Performed by: STUDENT IN AN ORGANIZED HEALTH CARE EDUCATION/TRAINING PROGRAM

## 2022-08-13 PROCEDURE — 82306 VITAMIN D 25 HYDROXY: CPT

## 2022-08-13 PROCEDURE — 6370000000 HC RX 637 (ALT 250 FOR IP): Performed by: FAMILY MEDICINE

## 2022-08-13 PROCEDURE — 36415 COLL VENOUS BLD VENIPUNCTURE: CPT

## 2022-08-13 PROCEDURE — 2400000000

## 2022-08-13 PROCEDURE — 70544 MR ANGIOGRAPHY HEAD W/O DYE: CPT

## 2022-08-13 PROCEDURE — 6360000004 HC RX CONTRAST MEDICATION: Performed by: STUDENT IN AN ORGANIZED HEALTH CARE EDUCATION/TRAINING PROGRAM

## 2022-08-13 PROCEDURE — 6360000002 HC RX W HCPCS: Performed by: STUDENT IN AN ORGANIZED HEALTH CARE EDUCATION/TRAINING PROGRAM

## 2022-08-13 PROCEDURE — 82962 GLUCOSE BLOOD TEST: CPT

## 2022-08-13 PROCEDURE — A9579 GAD-BASE MR CONTRAST NOS,1ML: HCPCS | Performed by: STUDENT IN AN ORGANIZED HEALTH CARE EDUCATION/TRAINING PROGRAM

## 2022-08-13 PROCEDURE — 70548 MR ANGIOGRAPHY NECK W/DYE: CPT

## 2022-08-13 PROCEDURE — 80048 BASIC METABOLIC PNL TOTAL CA: CPT

## 2022-08-13 PROCEDURE — 6370000000 HC RX 637 (ALT 250 FOR IP): Performed by: INTERNAL MEDICINE

## 2022-08-13 PROCEDURE — 2580000003 HC RX 258: Performed by: FAMILY MEDICINE

## 2022-08-13 PROCEDURE — 6370000000 HC RX 637 (ALT 250 FOR IP): Performed by: STUDENT IN AN ORGANIZED HEALTH CARE EDUCATION/TRAINING PROGRAM

## 2022-08-13 PROCEDURE — 80061 LIPID PANEL: CPT

## 2022-08-13 PROCEDURE — 1100000003 HC PRIVATE W/ TELEMETRY

## 2022-08-13 RX ORDER — LORAZEPAM 0.5 MG/1
0.5 TABLET ORAL ONCE
Status: COMPLETED | OUTPATIENT
Start: 2022-08-13 | End: 2022-08-13

## 2022-08-13 RX ORDER — ATORVASTATIN CALCIUM 40 MG/1
40 TABLET, FILM COATED ORAL NIGHTLY
Status: DISCONTINUED | OUTPATIENT
Start: 2022-08-13 | End: 2022-08-16 | Stop reason: HOSPADM

## 2022-08-13 RX ORDER — SODIUM CHLORIDE 0.9 % (FLUSH) 0.9 %
30 SYRINGE (ML) INJECTION AS NEEDED
Status: DISCONTINUED | OUTPATIENT
Start: 2022-08-13 | End: 2022-08-16 | Stop reason: HOSPADM

## 2022-08-13 RX ORDER — ASPIRIN 81 MG/1
81 TABLET, CHEWABLE ORAL DAILY
Status: DISCONTINUED | OUTPATIENT
Start: 2022-08-13 | End: 2022-08-16 | Stop reason: HOSPADM

## 2022-08-13 RX ADMIN — ASPIRIN 81 MG: 81 TABLET, CHEWABLE ORAL at 17:38

## 2022-08-13 RX ADMIN — GADOTERIDOL 20 ML: 279.3 INJECTION, SOLUTION INTRAVENOUS at 12:49

## 2022-08-13 RX ADMIN — METOPROLOL TARTRATE 50 MG: 50 TABLET, FILM COATED ORAL at 21:50

## 2022-08-13 RX ADMIN — FUROSEMIDE 20 MG: 20 TABLET ORAL at 09:50

## 2022-08-13 RX ADMIN — SODIUM CHLORIDE, PRESERVATIVE FREE 10 ML: 5 INJECTION INTRAVENOUS at 21:53

## 2022-08-13 RX ADMIN — CETIRIZINE HYDROCHLORIDE 10 MG: 10 TABLET ORAL at 09:35

## 2022-08-13 RX ADMIN — LORAZEPAM 0.5 MG: 0.5 TABLET ORAL at 09:35

## 2022-08-13 RX ADMIN — TROSPIUM CHLORIDE 20 MG: 20 TABLET, FILM COATED ORAL at 21:51

## 2022-08-13 RX ADMIN — POLYETHYLENE GLYCOL 3350 17 G: 17 POWDER, FOR SOLUTION ORAL at 09:36

## 2022-08-13 RX ADMIN — MEMANTINE 10 MG: 5 TABLET ORAL at 21:52

## 2022-08-13 RX ADMIN — SODIUM CHLORIDE, PRESERVATIVE FREE 5 ML: 5 INJECTION INTRAVENOUS at 09:37

## 2022-08-13 RX ADMIN — METOPROLOL TARTRATE 50 MG: 50 TABLET, FILM COATED ORAL at 02:54

## 2022-08-13 RX ADMIN — POTASSIUM BICARBONATE 40 MEQ: 782 TABLET, EFFERVESCENT ORAL at 09:36

## 2022-08-13 RX ADMIN — SODIUM CHLORIDE, PRESERVATIVE FREE 30 ML: 5 INJECTION INTRAVENOUS at 12:49

## 2022-08-13 RX ADMIN — METOPROLOL TARTRATE 50 MG: 50 TABLET, FILM COATED ORAL at 09:35

## 2022-08-13 RX ADMIN — LEVOFLOXACIN 750 MG: 5 INJECTION, SOLUTION INTRAVENOUS at 13:13

## 2022-08-13 RX ADMIN — MEMANTINE 10 MG: 5 TABLET ORAL at 09:35

## 2022-08-13 RX ADMIN — LEVOTHYROXINE SODIUM 100 MCG: 0.05 TABLET ORAL at 06:25

## 2022-08-13 RX ADMIN — METOPROLOL TARTRATE 50 MG: 50 TABLET, FILM COATED ORAL at 14:50

## 2022-08-13 RX ADMIN — SENNOSIDES 17.2 MG: 8.6 TABLET, FILM COATED ORAL at 21:50

## 2022-08-13 RX ADMIN — ATORVASTATIN CALCIUM 40 MG: 40 TABLET, FILM COATED ORAL at 21:51

## 2022-08-13 ASSESSMENT — PAIN SCALES - GENERAL: PAINLEVEL_OUTOF10: 0

## 2022-08-13 NOTE — PROGRESS NOTES
Hospitalist Progress Note   Admit Date:  2022  1:43 AM   Name:  Flaquita Yung   Age:  80 y.o. Sex:  female  :  1926   MRN:  [id number]   Room:  737/01    Presenting Complaint: Fall     Reason(s) for Admission: Cerebral brain hemorrhage [facility]) [I61.9]     Hospital Course & Interval History:   Patient is a 81 y/o female with past medical history hypertension, atrial fibrillation on Eliquis, DM II, diastolic dysfunction, hypothyroidism s/p thyroidectomy who presented from SNF to ED after fall with laceration to head. ED workup: steri-strips placed to right temporal laceration, CT head with 6 mm hemorrhagic contusion to right parietal lobe. Neurosurgery was contacted by ED who recommended hospital observation with repeat CT imaging. Eliquis was held. Patient was admitted to ICU until repeat CT imaging at 24 hours which remained unchanged. Patient was then transferred to floor. Found with ESBL UTI with antibiotics adjusted  based on finalized urine culture. Lethargy  for which head CT was obtained, neg for acute abnormality, R parietal hemorrhage not visualized. Echocardiogram was obtained due to elevated pBNP and CXR findings of pulmonary edema, LVEF now 35-40% from 50-55% 2 years prior. Tolerating low-dose diuresis with good UOP. Barr placed for urinary retention, voiding trial . Neurology consulted and MRI brain obtained  due to lower extremity weakness, found with acute left frontal lobe CVA. MRA H/N obtained and pending although would likely not be surgical candidate if stenosis is present. Known atrial fibrillation but unable to resume AC due to intracranial hemorrhage but will initiate asa 81 mg and statin therapy. Not a TPA candidate due to intracranial hemorrhage. Will consult palliative care for further goals of care discussion although not here on weekends. PT/OT following with STR recommendations, referrals pending.     Subjective/24hr Events (22): Patient is alert and oriented x 1 today. She is alert but appears a bit more confused. She is eating breakfast and drinking hot chocolate with assistance. Denies chest pain, SOB, abdominal pain. She is unable to lift lower extremities off bed today. \"I'm too weak. \" She does follow commands in all extremities. I spoke with son and daughter at bedside this afternoon regarding new findings and plan of care. No indication to transfer to ICU at this time for frequent neuro monitoring, not TPA candidate. If progressive neurological worsening, would recommend consideration of hospice care. Patient is sleeping at the time of my discussion with family this afternoon.     Assessment & Plan:     Cerebral brain hemorrhage 2/2 fall (HCC)  -Acute 6 mm hemorrhagic contusion on R parietal lobe without mass effect or midline shift identified on 8/6 s/p fall, repeat imaging stable and improved 8/11  -Avoid anti-platelets and AC, Goal SBP < 140    UTI due to extended-spectrum beta lactamase (ESBL) producing Escherichia coli  -Cont renally dosed Levaquin based on final culture sensitivities    Atrial fibrillation with RVR ([address] improving  -AC held as above  -Cardiology following, now on Lopressor 50 mg QID, rate improved    Late onset Alzheimer's dementia without behavioral disturbance ([address]  -Avoid narcotics, benzos, restraints  -Nonpharmacologic interventions, encourage good sleep-wake cycle    Type 2 diabetes with nephropathy (HCC)  -Cont SSI ACHS, carb-controlled diet    Essential hypertension, benign  -Cont metoprolol    Hypothyroidism s/p thyroidectomy  -Cont Synthroid    Pulmonary Edema / Volume Overload  -CXR 8/10 with findings of pulmonary edema, suspect CHF, also with elevated pBNP, s/p IV diuresis with ~4L UOP and CXR improvement  -Cont Lasix 20 mg po daily with K supplementation, monitor renal function    New onset systolic CHF, Hx of diastolic dysfunction  -Echo obtained and reviewed, LVEF now 35-40% from 50-55% 2 years prior  -Appreciate Cardiology recommendations, likely will require low-dose diuretic on discharge, has been tolerating low-dose Lasix inpatient  -Strict I&O's, daily weights    Urinary Retention  -Barr placed 8/11, voiding trial 8/13    Acute CVA ([facility]  -MRI brain 8/12 with small acute left frontal lobe infarct with possible punctate focus of acute infarction within right frontal lobe  -Not TPA candidate due to intracranial hemorrhage  -Known Afib with AC held due to [address], consider resumption in future when cleared by Neurology, family had expressed wishes in past to hold [facility] going forward, further discussion and consideration as outpatient  -Neurology evaluated 8/12 with following recs: add asa 81 mg, MRA H/N obtained and pending, lipid panel evaluated, will initiate statin 40 mg due to LDL > 70  -Consult palliative care for goals of care discussion on Monday, recommend consideration of hospice care with any neurological worsening    Discharge Planning:  pending placement     Diet:  ADULT DIET; Regular  DVT PPx: SCD's  Code status: DNR    Hospital Problems:  Principal Problem:    Cerebral brain hemorrhage ([facility] [address]  Active Problems:    Closed head injury    UTI due to extended-spectrum beta lactamase (ESBL) producing Escherichia coli    Atrial fibrillation (HCC)    Late onset Alzheimer's dementia without behavioral disturbance (HCC)    HFrEF (heart failure with reduced ejection fraction) (HCC)    History of subarachnoid hemorrhage    Type 2 diabetes with nephropathy ([facility] [address]    Essential hypertension, benign  Resolved Problems:    * No resolved hospital problems.  *      Objective:   Patient Vitals for the past 24 hrs:   Temp Pulse Resp BP SpO2   08/13/22 1151 98.2 °F (36.8 °C) 85 16 (!) 94/52 96 %   08/13/22 0810 97.7 °F (36.5 °C) 98 16 (!) 150/99 91 %   08/13/22 0448 98.7 °F (37.1 °C) 83 20 (!) 145/90 95 %   08/13/22 0019 99 °F (37.2 °C) (!) 107 18 138/79 99 %   08/12/22 2059 98.7 °F (37.1 °C) 81 20 129/85 92 % 08/12/22 1600 97.7 °F (36.5 °C) 81 18 108/81 94 %       Oxygen Therapy  SpO2: 96 %  Pulse Oximetry Type: Continuous  Pulse via Oximetry: 93 beats per minute  Pulse Oximeter Device Mode: Continuous  Pulse Oximeter Device Location: Finger  O2 Device: None (Room air)  Oximetry Probe Site Changed: Yes  Skin Assessment: Clean, dry, & intact  Skin Protection for O2 Device: N/A  O2 Flow Rate (L/min): 2 L/min    Estimated body mass index is 23.72 kg/m² as calculated from the following:    Height as of this encounter: 5' 8\" (1.727 m). Weight as of this encounter: 156 lb (70.8 kg). No intake or output data in the 24 hours ending 08/13/22 1543        Physical Exam:     Blood pressure (!) 94/52, pulse 85, temperature 98.2 °F (36.8 °C), temperature source Axillary, resp. rate 16, height 5' 8\" (1.727 m), weight 156 lb (70.8 kg), SpO2 96 %. General:    Alert and oriented x 1, no acute distress, calm, conversational  Head:  Normocephalic, atraumatic. Purple/yellow bruising to R temporal area extending down lateral cheek, steri-strips intact  Eyes:  Sclerae appear normal.  Pupils equally round. ENT:  Nares appear normal, no drainage. Moist oral mucosa  Neck:  No restricted ROM. Trachea midline   CV:   IRR. Tachycardic No m/r/g. No jugular venous distension. Lungs:   Mild bibasilar crackles. No wheezing, rales. Symmetric expansion. Respirations even and unlabored on RA  Abdomen: Bowel sounds present. Soft, nontender, nondistended. Extremities: No cyanosis or clubbing. No edema  Skin:     No rashes and normal coloration. Warm and dry. Neuro:  CN II-XII grossly intact. Sensation intact. Alert and oriented x 1. Follows commands in all extremities. Motor  intact. BLE unable to lift off bed, + dorsi and plantar flexion.   Psych:  Calm    I have personally reviewed labs and tests showing:  Recent Labs:  Recent Results (from the past 48 hour(s))   POCT Glucose    Collection Time: 08/11/22  4:11 PM   Result Value Ref Range    POC Glucose 164 (H) 65 - 100 mg/dL    Performed by: [place] 19    POCT Glucose    Collection Time: 08/11/22  8:00 PM   Result Value Ref Range    POC Glucose 168 (H) 65 - 100 mg/dL    Performed by: Calvin Dorantes    Troponin    Collection Time: 08/11/22  8:19 PM   Result Value Ref Range    Troponin, High Sensitivity 10.7 0 - 14 pg/mL   POCT Glucose    Collection Time: 08/12/22  6:36 AM   Result Value Ref Range    POC Glucose 127 (H) 65 - 100 mg/dL    Performed by: Carroll    Comprehensive Metabolic Panel    Collection Time: 08/12/22  6:47 AM   Result Value Ref Range    Sodium 135 (L) 136 - 145 mmol/L    Potassium 3.3 (L) 3.5 - 5.1 mmol/L    Chloride 100 98 - 107 mmol/L    CO2 29 21 - 32 mmol/L    Anion Gap 6 (L) 7 - 16 mmol/L    Glucose 133 (H) 65 - 100 mg/dL    BUN 9 8 - 23 MG/DL    Creatinine 0.90 0.6 - 1.0 MG/DL    GFR African American >60 >60 ml/min/1.73m2    GFR Non- >60 >60 ml/min/1.73m2    Calcium 8.6 8.3 - 10.4 MG/DL    Total Bilirubin 0.8 0.2 - 1.1 MG/DL    ALT 11 (L) 12 - 65 U/L    AST 14 (L) 15 - 37 U/L    Alk Phosphatase 87 50 - 136 U/L    Total Protein 7.3 6.3 - 8.2 g/dL    Albumin 2.3 (L) 3.2 - 4.6 g/dL    Globulin 5.0 (H) 2.3 - 3.5 g/dL    Albumin/Globulin Ratio 0.5 (L) 1.2 - 3.5     CBC with Auto Differential    Collection Time: 08/12/22  6:47 AM   Result Value Ref Range    WBC 6.3 4.3 - 11.1 K/uL    RBC 4.77 4.05 - 5.2 M/uL    Hemoglobin 14.3 11.7 - 15.4 g/dL    Hematocrit 44.5 35.8 - 46.3 %    MCV 93.3 79.6 - 97.8 FL    MCH 30.0 26.1 - 32.9 PG    MCHC 32.1 31.4 - 35.0 g/dL    RDW 16.1 (H) 11.9 - 14.6 %    Platelets 744 808 - 047 K/uL    MPV 10.1 9.4 - 12.3 FL    nRBC 0.03 0.0 - 0.2 K/uL    Differential Type AUTOMATED      Seg Neutrophils 51 43 - 78 %    Lymphocytes 36 13 - 44 %    Monocytes 11 4.0 - 12.0 %    Eosinophils % 2 0.5 - 7.8 %    Basophils 0 0.0 - 2.0 %    Immature Granulocytes 0 0.0 - 5.0 %    Segs Absolute 3.2 1.7 - 8.2 K/UL    Absolute Lymph # 2.3 0.5 - 4.6 K/UL    Absolute Mono # 0.7 0.1 - 1.3 K/UL    Absolute Eos # 0.1 0.0 - 0.8 K/UL    Basophils Absolute 0.0 0.0 - 0.2 K/UL    Absolute Immature Granulocyte 0.0 0.0 - 0.5 K/UL   POCT Glucose    Collection Time: 08/12/22 12:05 PM   Result Value Ref Range    POC Glucose 137 (H) 65 - 100 mg/dL    Performed by: Darin    POCT Glucose    Collection Time: 08/12/22  4:27 PM   Result Value Ref Range    POC Glucose 127 (H) 65 - 100 mg/dL    Performed by: Johnie    POCT Glucose    Collection Time: 08/12/22  8:12 PM   Result Value Ref Range    POC Glucose 159 (H) 65 - 100 mg/dL    Performed by: Arely Franco    Basic Metabolic Panel    Collection Time: 08/13/22  4:45 AM   Result Value Ref Range    Sodium 135 (L) 136 - 145 mmol/L    Potassium 3.8 3.5 - 5.1 mmol/L    Chloride 102 98 - 107 mmol/L    CO2 27 21 - 32 mmol/L    Anion Gap 6 (L) 7 - 16 mmol/L    Glucose 138 (H) 65 - 100 mg/dL    BUN 8 8 - 23 MG/DL    Creatinine 1.00 0.6 - 1.0 MG/DL    GFR African American >60 >60 ml/min/1.73m2    GFR Non- 55 (L) >60 ml/min/1.73m2    Calcium 8.8 8.3 - 10.4 MG/DL   Lipid Panel    Collection Time: 08/13/22  4:45 AM   Result Value Ref Range    Cholesterol, Total 137 <200 MG/DL    Triglycerides 138 35 - 150 MG/DL    HDL 36 (L) 40 - 60 MG/DL    LDL Calculated 73.4 <100 MG/DL    VLDL Cholesterol Calculated 27.6 (H) 6.0 - 23.0 MG/DL    Chol/HDL Ratio 3.8     POCT Glucose    Collection Time: 08/13/22  6:11 AM   Result Value Ref Range    POC Glucose 141 (H) 65 - 100 mg/dL    Performed by: Arely Franco    Vitamin D 25 Hydroxy    Collection Time: 08/13/22  9:50 AM   Result Value Ref Range    Vit D, 25-Hydroxy 50.3 30.0 - 100.0 ng/mL   POCT Glucose    Collection Time: 08/13/22 11:48 AM   Result Value Ref Range    POC Glucose 157 (H) 65 - 100 mg/dL    Performed by: Jonathan        I have personally reviewed imaging studies showing: Other Studies:  MRI BRAIN WO CONTRAST   Final Result   1.  Small acute left frontal lobe infarct. There may be an additional punctate   focus of acute infarction within the right frontal lobe. 2. Findings most compatible with advanced chronic small vessel ischemic change. XR CHEST PORTABLE   Final Result   Improving CHF or fluid overload, with mild residual.      CT HEAD WO CONTRAST   Final Result      1. No acute intracranial abnormality. 2. The previously questioned punctate focus of subarachnoid hemorrhage in the   right parietal lobe is not visualized. CT HEAD WO CONTRAST   Final Result   No significant interval change or acute intracranial abnormality. The suspected punctate hemorrhage in the right parietal lobe is now barely   perceptible. XR CHEST PORTABLE   Final Result   New bilateral pulmonary edema. CHF suspected. CT HEAD WO CONTRAST   Final Result   1. Punctate hyperdensity in a right parietal lobe sulcus near the vertex   unchanged. Although this could represent a tiny subarachnoid hemorrhage, could   also represent a prominent cortical vessel. No areas of new hemorrhage or acute   abnormality. 2. Stable cerebral atrophy and chronic white matter disease. CT HEAD WO CONTRAST   Final Result   Unchanged small right parietal lobe hemorrhage, with no mass effect   or midline shift. CT HEAD WO CONTRAST   Final Result   1. Stable focal density closely associated with a right parietal sulcus which   could represent minimal subarachnoid hemorrhage or a tiny cortical contusion in   the setting of acute trauma. .          This report was made using voice transcription. Despite my best efforts to avoid   any, transcription errors may persist. If there is any question about the   accuracy of the report or need for clarification, then please call [phone], or text me through [employer] for clarification or correction.        CT HEAD WO CONTRAST   Final Result   Acute 6 mm hemorrhagic contusion along the right parietal lobe   cortex, without mass effect or midline shift. Dr. Ayad Melton verbally notified at   2:36 AM (DC 5).       MRA NECK W CONTRAST    (Results Pending)   MRA HEAD WO CONTRAST    (Results Pending)       Current Meds:  Current Facility-Administered Medications   Medication Dose Route Frequency    sodium chloride flush 0.9 % injection 30 mL  30 mL IntraVENous PRN    atorvastatin (LIPITOR) tablet 40 mg  40 mg Oral Nightly    aspirin chewable tablet 81 mg  81 mg Oral Daily    furosemide (LASIX) tablet 20 mg  20 mg Oral Daily    potassium bicarb-citric acid (EFFER-K) effervescent tablet 40 mEq  40 mEq Oral BID    metoprolol tartrate (LOPRESSOR) tablet 50 mg  50 mg Oral Q6H    levoFLOXacin (LEVAQUIN) 750 MG/150ML infusion 750 mg  750 mg IntraVENous Q48H    metoprolol (LOPRESSOR) injection 5 mg  5 mg IntraVENous Q6H PRN    hydrALAZINE (APRESOLINE) injection 5 mg  5 mg IntraVENous Q8H PRN    benzocaine-menthol (CEPACOL SORE THROAT) lozenge 1 lozenge  1 lozenge Oral Q2H PRN    cetirizine (ZYRTEC) tablet 10 mg  10 mg Oral Daily    levothyroxine (SYNTHROID) tablet 100 mcg  100 mcg Oral QAM AC    memantine (NAMENDA) tablet 10 mg  10 mg Oral BID    trospium (SANCTURA) tablet 20 mg  20 mg Oral Nightly    nitroGLYCERIN (NITRODUR) 0.4 MG/HR 1 patch  1 patch TransDERmal Daily    pantoprazole (PROTONIX) tablet 40 mg  40 mg Oral QAM AC    polyethylene glycol (GLYCOLAX) packet 17 g  17 g Oral Daily    senna (SENOKOT) tablet 17.2 mg  2 tablet Oral Nightly    sodium chloride flush 0.9 % injection 5-40 mL  5-40 mL IntraVENous 2 times per day    sodium chloride flush 0.9 % injection 5-40 mL  5-40 mL IntraVENous PRN    0.9 % sodium chloride infusion   IntraVENous PRN    promethazine (PHENERGAN) tablet 12.5 mg  12.5 mg Oral Q6H PRN    Or    ondansetron (ZOFRAN) injection 4 mg  4 mg IntraVENous Q6H PRN    aluminum & magnesium hydroxide-simethicone (MAALOX) 200-200-20 MG/5ML suspension 30 mL  30 mL Oral Q6H PRN    acetaminophen (TYLENOL)

## 2022-08-13 NOTE — PLAN OF CARE
Problem: Discharge Planning  Goal: Discharge to home or other facility with appropriate resources  Outcome: Progressing     Problem: Skin/Tissue Integrity  Goal: Absence of new skin breakdown  Description: 1. Monitor for areas of redness and/or skin breakdown  2. Assess vascular access sites hourly  3. Every 4-6 hours minimum:  Change oxygen saturation probe site  4. Every 4-6 hours:  If on nasal continuous positive airway pressure, respiratory therapy assess nares and determine need for appliance change or resting period. Outcome: Progressing     Problem: Safety - Adult  Goal: Free from fall injury  Outcome: Progressing  Flowsheets (Taken 8/13/2022 0604)  Free From Fall Injury: Instruct family/caregiver on patient safety     Problem: ABCDS Injury Assessment  Goal: Absence of physical injury  Outcome: Progressing  Flowsheets (Taken 8/13/2022 0604)  Absence of Physical Injury: Implement safety measures based on patient assessment     Problem: Pain  Goal: Verbalizes/displays adequate comfort level or baseline comfort level  Outcome: Progressing     Problem: Confusion  Goal: Confusion, delirium, dementia, or psychosis is improved or at baseline  Description: INTERVENTIONS:  1. Assess for possible contributors to thought disturbance, including medications, impaired vision or hearing, underlying metabolic abnormalities, dehydration, psychiatric diagnoses, and notify attending LIP  2. [place] high risk fall precautions, as indicated  3. Provide frequent short contacts to provide reality reorientation, refocusing and direction  4. Decrease environmental stimuli, including noise as appropriate  5. Monitor and intervene to maintain adequate nutrition, hydration, elimination, sleep and activity  6. If unable to ensure safety without constant attention obtain sitter and review sitter guidelines with assigned personnel  7.  Initiate Psychosocial CNS and Spiritual Care consult, as indicated  Outcome: Progressing Problem: Chronic Conditions and Co-morbidities  Goal: Patient's chronic conditions and co-morbidity symptoms are monitored and maintained or improved  Outcome: Progressing     Problem: Neurosensory - Adult  Goal: Achieves stable or improved neurological status  Outcome: Progressing  Goal: Absence of seizures  Outcome: Progressing  Goal: Remains free of injury related to seizures activity  Outcome: Progressing  Goal: Achieves maximal functionality and self care  Outcome: Progressing     Problem: Respiratory - Adult  Goal: Achieves optimal ventilation and oxygenation  Outcome: Progressing     Problem: Cardiovascular - Adult  Goal: Maintains optimal cardiac output and hemodynamic stability  Outcome: Progressing  Goal: Absence of cardiac dysrhythmias or at baseline  Outcome: Progressing     Problem: Skin/Tissue Integrity - Adult  Goal: Skin integrity remains intact  Outcome: Progressing  Flowsheets (Taken 8/13/2022 0604)  Skin Integrity Remains Intact: Monitor for areas of redness and/or skin breakdown     Problem: Metabolic/Fluid and Electrolytes - Adult  Goal: Electrolytes maintained within normal limits  Outcome: Progressing  Goal: Hemodynamic stability and optimal renal function maintained  Outcome: Progressing  Goal: Glucose maintained within prescribed range  Outcome: Progressing

## 2022-08-14 PROBLEM — I67.9 CEREBROVASCULAR DISEASE: Status: ACTIVE | Noted: 2022-01-01

## 2022-08-14 LAB
GLUCOSE BLD STRIP.AUTO-MCNC: 116 MG/DL (ref 65–100)
GLUCOSE BLD STRIP.AUTO-MCNC: 134 MG/DL (ref 65–100)
GLUCOSE BLD STRIP.AUTO-MCNC: 143 MG/DL (ref 65–100)
GLUCOSE BLD STRIP.AUTO-MCNC: 158 MG/DL (ref 65–100)
SERVICE CMNT-IMP: ABNORMAL

## 2022-08-14 PROCEDURE — 2580000003 HC RX 258: Performed by: FAMILY MEDICINE

## 2022-08-14 PROCEDURE — 99233 SBSQ HOSP IP/OBS HIGH 50: CPT | Performed by: INTERNAL MEDICINE

## 2022-08-14 PROCEDURE — 6360000002 HC RX W HCPCS: Performed by: FAMILY MEDICINE

## 2022-08-14 PROCEDURE — 97112 NEUROMUSCULAR REEDUCATION: CPT

## 2022-08-14 PROCEDURE — A4216 STERILE WATER/SALINE, 10 ML: HCPCS | Performed by: FAMILY MEDICINE

## 2022-08-14 PROCEDURE — 6370000000 HC RX 637 (ALT 250 FOR IP): Performed by: FAMILY MEDICINE

## 2022-08-14 PROCEDURE — 6370000000 HC RX 637 (ALT 250 FOR IP): Performed by: INTERNAL MEDICINE

## 2022-08-14 PROCEDURE — 51798 US URINE CAPACITY MEASURE: CPT

## 2022-08-14 PROCEDURE — 82962 GLUCOSE BLOOD TEST: CPT

## 2022-08-14 PROCEDURE — 99221 1ST HOSP IP/OBS SF/LOW 40: CPT | Performed by: PHYSICAL MEDICINE & REHABILITATION

## 2022-08-14 PROCEDURE — 92526 ORAL FUNCTION THERAPY: CPT

## 2022-08-14 PROCEDURE — C9113 INJ PANTOPRAZOLE SODIUM, VIA: HCPCS | Performed by: FAMILY MEDICINE

## 2022-08-14 PROCEDURE — 94760 N-INVAS EAR/PLS OXIMETRY 1: CPT

## 2022-08-14 PROCEDURE — 6370000000 HC RX 637 (ALT 250 FOR IP): Performed by: STUDENT IN AN ORGANIZED HEALTH CARE EDUCATION/TRAINING PROGRAM

## 2022-08-14 PROCEDURE — 51701 INSERT BLADDER CATHETER: CPT

## 2022-08-14 PROCEDURE — 1100000003 HC PRIVATE W/ TELEMETRY

## 2022-08-14 RX ADMIN — Medication: at 17:55

## 2022-08-14 RX ADMIN — SENNOSIDES 17.2 MG: 8.6 TABLET, FILM COATED ORAL at 21:43

## 2022-08-14 RX ADMIN — CETIRIZINE HYDROCHLORIDE 10 MG: 10 TABLET ORAL at 09:24

## 2022-08-14 RX ADMIN — TROSPIUM CHLORIDE 20 MG: 20 TABLET, FILM COATED ORAL at 21:43

## 2022-08-14 RX ADMIN — SODIUM CHLORIDE, PRESERVATIVE FREE 10 ML: 5 INJECTION INTRAVENOUS at 10:26

## 2022-08-14 RX ADMIN — METOPROLOL TARTRATE 50 MG: 50 TABLET, FILM COATED ORAL at 09:24

## 2022-08-14 RX ADMIN — POTASSIUM BICARBONATE 40 MEQ: 782 TABLET, EFFERVESCENT ORAL at 21:44

## 2022-08-14 RX ADMIN — SODIUM CHLORIDE 40 MG: 9 INJECTION, SOLUTION INTRAMUSCULAR; INTRAVENOUS; SUBCUTANEOUS at 06:30

## 2022-08-14 RX ADMIN — MEMANTINE 10 MG: 5 TABLET ORAL at 21:43

## 2022-08-14 RX ADMIN — ASPIRIN 81 MG: 81 TABLET, CHEWABLE ORAL at 09:24

## 2022-08-14 RX ADMIN — SODIUM CHLORIDE, PRESERVATIVE FREE 10 ML: 5 INJECTION INTRAVENOUS at 22:04

## 2022-08-14 RX ADMIN — METOPROLOL TARTRATE 50 MG: 50 TABLET, FILM COATED ORAL at 21:43

## 2022-08-14 RX ADMIN — FUROSEMIDE 20 MG: 20 TABLET ORAL at 09:25

## 2022-08-14 RX ADMIN — METOPROLOL TARTRATE 50 MG: 50 TABLET, FILM COATED ORAL at 02:47

## 2022-08-14 RX ADMIN — ATORVASTATIN CALCIUM 40 MG: 40 TABLET, FILM COATED ORAL at 21:44

## 2022-08-14 RX ADMIN — MEMANTINE 10 MG: 5 TABLET ORAL at 09:24

## 2022-08-14 RX ADMIN — POTASSIUM BICARBONATE 40 MEQ: 782 TABLET, EFFERVESCENT ORAL at 09:26

## 2022-08-14 RX ADMIN — LEVOTHYROXINE SODIUM 100 MCG: 0.05 TABLET ORAL at 06:30

## 2022-08-14 RX ADMIN — METOPROLOL TARTRATE 50 MG: 50 TABLET, FILM COATED ORAL at 14:58

## 2022-08-14 RX ADMIN — POLYETHYLENE GLYCOL 3350 17 G: 17 POWDER, FOR SOLUTION ORAL at 09:26

## 2022-08-14 ASSESSMENT — PAIN SCALES - GENERAL
PAINLEVEL_OUTOF10: 0
PAINLEVEL_OUTOF10: 0

## 2022-08-14 NOTE — PROGRESS NOTES
Reviewed notes prior to visit     had visit visited with patient and her daughter 2 days ago    Acknowledged the special relationship between pt/daughter      Daughter was tearful (poor prognosis)        Today patient was awake    No family present    Pt responded appropriately for dementia      Talked about her dog and became tearful      Re-directed her and assured her all is well    Provided calming presence while I was in room

## 2022-08-14 NOTE — PLAN OF CARE
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  High grade stenosis VA origins dominant rt vert, basilar open  Arthero in neck carotids not hemodynamic significant no occlusion dissesction   CVA is \"incidental\" finding  as she is off AC and risk and no really the etio for her LLE weakness  Ac being held family wishes head bleed fall risk  ASA 81mg suggested, although wont address embolic source CVA , she does have stenosis and arthero and could serve useful  Can also consider adding plavix 75 mgpo q day for 3 months, but again bleed risk risk may outweigh benefit  Avoid hypotension  Statin 80mg if no CI   F/u neurology out pt clinic in 4-6 weeks   See consult for all recs  Signing off

## 2022-08-14 NOTE — PROGRESS NOTES
OCCUPATIONAL THERAPY Daily Note and AM       OT Visit Days: 3   Acknowledge Orders  Time  OT Charge Capture  Rehab Caseload Tracker      Vanessa Horton is a 80 y.o. female   PRIMARY DIAGNOSIS: Cerebral brain hemorrhage ([facility] [address]  Cerebral brain hemorrhage ([facility] [address] [I61.9]       Reason for Referral: Generalized Muscle Weakness (M62.81)  Inpatient: Payor: MEDICARE / Plan: MEDICARE PART A AND B / Product Type: *No Product type* /     ASSESSMENT:     REHAB RECOMMENDATIONS:   Recommendation to date pending progress:  Setting:  Return back to nursing facility    Equipment:    To Be Determined     ASSESSMENT:  Ms. Andra Paredes presents with deficits in overall strength, activity tolerance, ADL performance, and functional mobility. Pt was supine in bed upon arrival. Pt completed bed mobility with max A. Pt sat EOB for 15 minutes with good sitting balance. Pt completed the exercises below while sitting EOB. Continue POC. SUBJECTIVE:     Ms. Andra Paredes states, \"I didn't mean to kill that dog. \"     Social/Functional Lives With: Other (comment) (LTC at [facility]/private pay)  Type of Home: Facility  Unclear PLOF however pt states she spends majority of her day in a w.c at her facility and requires assistance for bathing, dressing, and toileting tasks.    OBJECTIVE:     Lorena Sheffield / Jony Barnard / AIRWAY: IV    RESTRICTIONS/PRECAUTIONS:  Restrictions/Precautions: Fall Risk    PAIN: VITALS / O2:   Pre Treatment: 0      Numeric 0-10  0/10    Post Treatment:   0/10       Vitals        WFL    Oxygen     Stable on 2L 02         MOBILITY: I Mod I S SBA CGA Min Mod Max Total  NT x2 Comments:   Bed Mobility    Rolling [] [] [] [] [] [] [] [] [] [] []    Supine to Sit [] [] [] [] [] [] [] [x] [] [] []    Scooting [] [] [] [] [] [] [] [x] [] [] []    Sit to Supine [] [] [] [] [] [] [] [x] [] [] []    Transfers    Sit to Stand [] [] [] [] [] [] [] [] [] [x] []    Bed to Chair [] [] [] [] [] [] [] [] [] [x] []    Stand to Sit [] [] [] [] [] [] [] [] [] [x] []    Tub/Shower [] [] [] [] [] [] [] [] [] [x] []     Toilet [] [] [] [] [] [] [] [] [] [] []      [] [] [] [] [] [] [] [] [] [] []    I=Independent, Mod I=Modified Independent, S=Supervision/Setup, SBA=Standby Assistance, CGA=Contact Guard Assistance, Min=Minimal Assistance, Mod=Moderate Assistance, Max=Maximal Assistance, Total=Total Assistance, NT=Not Tested    ACTIVITIES OF DAILY LIVING: I Mod I S SBA CGA Min Mod Max Total NT Comments   BASIC ADLs:              Upper Body Bathing  [] [] [] [] [] [] [] [] [] []    Lower Body Bathing [] [] [] [] [] [] [] [] [] []    Toileting [] [] [] [] [] [] [] [] [] []    Upper Body Dressing [] [] [] [] [] [] [] [] [] []    Lower Body Dressing [] [] [] [] [] [] [] [] [] [x]    Feeding [] [] [] [] [] [] [] [] [] [x]    Grooming [] [] [] [x] [] [] [] [] [] [] Washing face seated EOB   Personal Device Care [] [] [] [] [] [] [] [] [] [x]    Functional Mobility [] [] [] [] [] [] [] [x] [] [] for bed mobility   I=Independent, Mod I=Modified Independent, S=Supervision/Setup, SBA=Standby Assistance, CGA=Contact Guard Assistance, Min=Minimal Assistance, Mod=Moderate Assistance, Max=Maximal Assistance, Total=Total Assistance, NT=Not Tested    PLAN:     FREQUENCY/DURATION     for duration of hospital stay or until stated goals are met, whichever comes first.    ACUTE OCCUPATIONAL THERAPY GOALS:   (Developed with and agreed upon by patient and/or caregiver.)  1. Patient will complete upper body bathing and dressing with SBA and adaptive equipment as needed. 2. Patient will complete self-grooming tasks in unsupported sitting with MOD I and adaptive equipment as needed. 3. Patient will tolerate 30 minutes of OT treatment with 1-2 rest breaks to increase activity tolerance for ADLs. 4. Patient will complete functional transfers with min A and adaptive equipment as needed.    5. Patient will complete functional activity while seated edge of bed with SBA and adaptive equipment as needed. 6. Patient will tolerate 25 minutes BUE therapeutic activities to increase use of BUE during ADL performance. Timeframe: 7 visits         PROBLEM LIST:   (Skilled intervention is medically necessary to address:)  Decreased ADL/Functional Activities  Decreased Activity Tolerance  Decreased AROM/PROM  Decreased Balance  Decreased Coordination  Decreased Gait Ability  Decreased Safety Awareness  Decreased Strength  Decreased Transfer Abilities   INTERVENTIONS PLANNED:  (Benefits and precautions of occupational therapy have been discussed with the patient.)  Self Care Training  Therapeutic Activity  Therapeutic Exercise/HEP  Neuromuscular Re-education  Manual Therapy  Education         TREATMENT:     TREATMENT:   Neuromuscular Re-education (30 Minutes): Neuromuscular Re-education included Balance Training, Coordination training, Postural training, and Sitting balance training to improve Coordination and Functional Mobility.     TREATMENT GRID:  While sitting EOB Date:  8/14/22 Date:   Date:     Activity/Exercise Parameters Parameters Parameters   Shoulder flex/ex  10 reps      Shoulder hor abd/add  10 reps      Elbow flex/ex  10 reps      Washing face  1 rep     Kicking  10 reps      Toe taps 10 reps                AFTER TREATMENT PRECAUTIONS: Alarm Activated, Bed, Bed/Chair Locked, Call light within reach, Heels floated, Needs within reach, RN notified, Side rails x3, and Visitors at bedside    INTERDISCIPLINARY COLLABORATION:  RN/ PCT, PT/ PTA, and OT/ CORNELIUS    EDUCATION:         TOTAL TREATMENT DURATION AND TIME:  Time In: 1050  Time Out: [address]  Minutes: [place], ARABELLA

## 2022-08-14 NOTE — PLAN OF CARE
Problem: Chronic Conditions and Co-morbidities  Goal: Patient's chronic conditions and co-morbidity symptoms are monitored and maintained or improved  Outcome: Progressing     Problem: Neurosensory - Adult  Goal: Achieves stable or improved neurological status  Outcome: Progressing  Goal: Absence of seizures  Outcome: Progressing  Goal: Remains free of injury related to seizures activity  Outcome: Progressing  Goal: Achieves maximal functionality and self care  Outcome: Progressing     Problem: Respiratory - Adult  Goal: Achieves optimal ventilation and oxygenation  Outcome: Progressing     Problem: Cardiovascular - Adult  Goal: Maintains optimal cardiac output and hemodynamic stability  Outcome: Progressing  Goal: Absence of cardiac dysrhythmias or at baseline  Outcome: Progressing     Problem: Skin/Tissue Integrity - Adult  Goal: Skin integrity remains intact  Outcome: Progressing  Flowsheets (Taken 8/13/2022 2000)  Skin Integrity Remains Intact: Monitor for areas of redness and/or skin breakdown     Problem: Metabolic/Fluid and Electrolytes - Adult  Goal: Electrolytes maintained within normal limits  Outcome: Progressing  Goal: Hemodynamic stability and optimal renal function maintained  Outcome: Progressing  Goal: Glucose maintained within prescribed range  Outcome: Progressing

## 2022-08-14 NOTE — CONSULTS
Physical Medicine & Rehabilitation Consult    Subjective:     Date of Consultation:  August 14, 2022  Referring Provider: Dr Roly Desai is a 80 y.o. female who is being evaluated at the request of the Hospitalist service for rehab recommendations s/p admission s/p fall with right parietal lobe contusion and left frontal infarct    HPI: hx per records \" Patient is a 81 y/o female with past medical history hypertension, atrial fibrillation on Eliquis, DM II, diastolic dysfunction, hypothyroidism s/p thyroidectomy who presented from SNF to ED after fall with laceration to head. ED workup: steri-strips placed to right temporal laceration, CT head with 6 mm hemorrhagic contusion to right parietal lobe. Neurosurgery was contacted by ED who recommended hospital observation with repeat CT imaging. Eliquis was held. Patient was admitted to ICU until repeat CT imaging at 24 hours which remained unchanged. Patient was then transferred to floor. Found with ESBL UTI with antibiotics adjusted 8/11 based on finalized urine culture. Lethargy 8/11 for which head CT was obtained, neg for acute abnormality, R parietal hemorrhage not visualized. Echocardiogram was obtained due to elevated pBNP and CXR findings of pulmonary edema, LVEF now 35-40% from 50-55% 2 years prior. Tolerating low-dose diuresis with good UOP. Barr placed for urinary retention, voiding trial 8/13. Neurology consulted and MRI brain obtained 8/12 due to lower extremity weakness, found with acute left frontal lobe CVA. MRA H/N obtained and pending although would likely not be surgical candidate if stenosis is present. Known atrial fibrillation but unable to resume AC due to intracranial hemorrhage but will initiate asa 81 mg and statin therapy. Not a TPA candidate due to intracranial hemorrhage. Will consult palliative care for further goals of care discussion\"  Pt is at wc level at baseline.  She was admitted from [facility] long term care facility . No therapy documented since 8/10 that I can see. Per PT at that time, MAX ASSIST OF 2 FOR BED MOBILITY, STS MAX ASSIST OF 2, NO AMBULATION      Principal Problem:    Cerebral brain hemorrhage (HCC)  Active Problems:    Closed head injury    UTI due to extended-spectrum beta lactamase (ESBL) producing Escherichia coli    Atrial fibrillation (HCC)    Late onset Alzheimer's dementia without behavioral disturbance (HCC)    HFrEF (heart failure with reduced ejection fraction) ([facility])    History of subarachnoid hemorrhage    Type 2 diabetes with nephropathy ([facility])    Essential hypertension, benign  Resolved Problems:    * No resolved hospital problems. *    Past Medical History:   Diagnosis Date    Abdominal pain, epigastric     Abdominal pain, unspecified site     Anxiety state, unspecified     Aortic stenosis 6/10/2016    No sever SOB, no chest pain or syncope.  Echo showed mod AS, mean grad 22mm Hg, nl EF, mild LVH    Aortic valve disorders     Atherosclerosis of aorta ([facility])     Atrial fibrillation ([facility])     Backache, unspecified     Benign neoplasm of colon     Cervicalgia     Closed fracture of unspecified bone     Depressive disorder, not elsewhere classified     Diabetes ([facility])     Diarrhea     Disorders of magnesium metabolism     Edema     Elevated sedimentation rate     Encounter for long-term (current) use of other medications     Endocrine disease     Epistaxis     Esophageal reflux     Essential hypertension, benign     Gastrointestinal malfunction arising from mental factors     Hypertension     Hypertension, benign     Hypertonicity of bladder     Hypopotassemia     Insomnia, unspecified     Irritable bowel syndrome     Memory loss     Nausea alone     Osteoarthrosis, unspecified whether generalized or localized, unspecified site     Other ill-defined conditions(799.89)     hyperlipidemia    Other malaise and fatigue     Pain in joint, lower leg     Pain in joint, shoulder region     Postnasal diclofenac (FLECTOR) 1.3 % PTCH patch Place 1 patch onto the skin every 12 hours 20   Ar Automatic Reconciliation   levothyroxine (SYNTHROID) 100 MCG tablet Take by mouth every morning (before breakfast)    Ar Automatic Reconciliation   Lidocaine HCl 4 % CREA Apply topically    Ar Automatic Reconciliation   memantine (NAMENDA) 10 MG tablet Take 10 mg by mouth 2 times daily 19   Ar Automatic Reconciliation   mirabegron (MYRBETRIQ) 25 MG TB24 Take 25 mg by mouth daily    Ar Automatic Reconciliation   nitroGLYCERIN (NITRODUR) 0.4 MG/HR Place 1 patch onto the skin daily 10/29/21   Ar Automatic Reconciliation   nitroGLYCERIN (NITROSTAT) 0.4 MG SL tablet Place under the tongue    Ar Automatic Reconciliation   ondansetron (ZOFRAN) 4 MG tablet Take 4 mg by mouth every 8 hours as needed 19   Ar Automatic Reconciliation   polyethylene glycol (GLYCOLAX) 17 GM/SCOOP powder Take 17 g by mouth daily 19   Ar Automatic Reconciliation   senna (SENOKOT) 8.6 MG tablet Take 2 tablet PO at bedtime    Ar Automatic Reconciliation     Allergies   Allergen Reactions    Penicillins Hives    Strawberry Extract Itching    Codeine Rash        Review of Systems:   Review of systems not obtained due to patient factors. Confused and lethargic  Objective:     Vitals:  Blood pressure 108/77, pulse 90, temperature 98 °F (36.7 °C), temperature source Oral, resp. rate 20, height 5' 8\" (1.727 m), weight 156 lb (70.8 kg), SpO2 92 %. Temp (24hrs), Av.1 °F (36.7 °C), Min:97.5 °F (36.4 °C), Max:98.6 °F (37 °C)      Physical Exam:  General:  Oriented to self only. Very difficult to keep her awake. Voice hardly audible. Dtg at bedside reports that shes been \"talking out of her head\"  NAD   Lungs:   Clear to auscultation bilaterally. Heart:  Irreg irreg S1, S2 stable, no murmur, click, rub or gallop. Abdomen:   Soft, non-tender. Bowel sounds present. No masses,  No organomegaly.    Genitourinary: Continent and PVR is stable Neuro Muscular: Somnolent, not fcs. Appears comfortable. Dtg at bedside doesn't want her to be bothered   Skin:  No rashes, lesions, or signs/symptoms or infection.        Labs/Studies:  Recent Results (from the past 72 hour(s))   Transthoracic echocardiogram (TTE) complete with contrast, bubble, strain, and 3D PRN    Collection Time: 08/11/22 10:32 AM   Result Value Ref Range    LV EDV A2C 72 mL    LV ESV A2C 45 mL    IVSd 1.2 (A) 0.6 - 0.9 cm    LVIDd 4.3 3.9 - 5.3 cm    LVIDs 3.0 cm    LVOT Diameter 2.3 cm    LVOT Mean Gradient 1 mmHg    LVOT VTI 7.6 cm    LVOT Peak Velocity 0.6 m/s    LVOT Peak Gradient 2 mmHg    LVPWd 1.1 (A) 0.6 - 0.9 cm    LV Ejection Fraction A2C 38 %    LVOT Area 4.2 cm2    LVOT SV 31.6 ml    LA Minor Axis 6.6 cm    LA Major Axis 7.2 cm    LA Area 2C 29.2 cm2    LA Area 4C 28.6 cm2    LA Volume  (A) 22 - 52 mL    AV Mean Velocity 1.9 m/s    AV Mean Gradient 17 mmHg    AV VTI 49.3 cm    AV Peak Velocity 2.5 m/s    AV Peak Gradient 24 mmHg    AV Area by VTI 0.6 cm2    AV Area by Peak Velocity 1.1 cm2    Aortic Root 3.2 cm    Ascending Aorta 3.4 cm    MR .0 cm    MV Mean Gradient 33 mmHg    MV VTI 89.5 cm    MV Mean Velocity 2.3 m/s    MR Peak Velocity 5.5 m/s    MR Peak Gradient 121 mmHg    MV Max Velocity 3.1 m/s    MV Peak Gradient 38 mmHg    MV Area by VTI 0.4 cm2    RV Basal Dimension 2.6 cm    TR Max Velocity 2.65 m/s    TR Peak Gradient 28 mmHg    Body Surface Area 1.84 m2    Fractional Shortening 2D 30 28 - 44 %    LV ESV Index A2C 24 mL/m2    LV EDV Index A2C 39 mL/m2    LVIDd Index 2.34 cm/m2    LVIDs Index 1.63 cm/m2    LV RWT Ratio 0.51     LV Mass 2D 173.6 (A) 67 - 162 g    LV Mass 2D Index 94.4 43 - 95 g/m2    LA Volume Index BP 56 (A) 16 - 34 ml/m2    LVOT Stroke Volume Index 17.2 mL/m2    Ao Root Index 1.74 cm/m2    Ascending Aorta Index 1.85 cm/m2    AV Velocity Ratio 0.24     LVOT:AV VTI Index 0.15     SUSANA/BSA VTI 0.3 cm2/m2    SUSANA/BSA Peak Velocity 0.6 cm2/m2 MV:LVOT VTI Index 11.78     Est. RA Pressure 5 mmHg    RVSP 33 mmHg   EKG 12 Lead    Collection Time: 08/11/22 10:52 AM   Result Value Ref Range    Ventricular Rate 110 BPM    Atrial Rate 125 BPM    QRS Duration 122 ms    Q-T Interval 322 ms    QTc Calculation (Bazett) 435 ms    R Axis -75 degrees    T Axis 115 degrees    Diagnosis       Atrial fibrillation with rapid ventricular response   POCT Glucose    Collection Time: 08/11/22 11:32 AM   Result Value Ref Range    POC Glucose 146 (H) 65 - 100 mg/dL    Performed by: [facility]    Renal Function Panel    Collection Time: 08/11/22  1:26 PM   Result Value Ref Range    Sodium 136 136 - 145 mmol/L    Potassium 4.0 3.5 - 5.1 mmol/L    Chloride 103 98 - 107 mmol/L    CO2 25 21 - 32 mmol/L    Anion Gap 8 7 - 16 mmol/L    Glucose 141 (H) 65 - 100 mg/dL    BUN 11 8 - 23 MG/DL    Creatinine 1.00 0.6 - 1.0 MG/DL    GFR African American >60 >60 ml/min/1.73m2    GFR Non- 55 (L) >60 ml/min/1.73m2    Calcium 8.4 8.3 - 10.4 MG/DL    Phosphorus 3.2 2.3 - 3.7 MG/DL    Albumin 2.1 (L) 3.2 - 4.6 g/dL   POCT Glucose    Collection Time: 08/11/22  4:11 PM   Result Value Ref Range    POC Glucose 164 (H) 65 - 100 mg/dL    Performed by: [facility]    POCT Glucose    Collection Time: 08/11/22  8:00 PM   Result Value Ref Range    POC Glucose 168 (H) 65 - 100 mg/dL    Performed by: Carroll    Troponin    Collection Time: 08/11/22  8:19 PM   Result Value Ref Range    Troponin, High Sensitivity 10.7 0 - 14 pg/mL   POCT Glucose    Collection Time: 08/12/22  6:36 AM   Result Value Ref Range    POC Glucose 127 (H) 65 - 100 mg/dL    Performed by: Carroll    Comprehensive Metabolic Panel    Collection Time: 08/12/22  6:47 AM   Result Value Ref Range    Sodium 135 (L) 136 - 145 mmol/L    Potassium 3.3 (L) 3.5 - 5.1 mmol/L    Chloride 100 98 - 107 mmol/L    CO2 29 21 - 32 mmol/L    Anion Gap 6 (L) 7 - 16 mmol/L    Glucose 133 (H) 65 - 100 mg/dL    BUN 9 8 - 23 MG/DL    Creatinine 0.90 0.6 - 1.0 MG/DL    GFR African American >60 >60 ml/min/1.73m2    GFR Non- >60 >60 ml/min/1.73m2    Calcium 8.6 8.3 - 10.4 MG/DL    Total Bilirubin 0.8 0.2 - 1.1 MG/DL    ALT 11 (L) 12 - 65 U/L    AST 14 (L) 15 - 37 U/L    Alk Phosphatase 87 50 - 136 U/L    Total Protein 7.3 6.3 - 8.2 g/dL    Albumin 2.3 (L) 3.2 - 4.6 g/dL    Globulin 5.0 (H) 2.3 - 3.5 g/dL    Albumin/Globulin Ratio 0.5 (L) 1.2 - 3.5     CBC with Auto Differential    Collection Time: 08/12/22  6:47 AM   Result Value Ref Range    WBC 6.3 4.3 - 11.1 K/uL    RBC 4.77 4.05 - 5.2 M/uL    Hemoglobin 14.3 11.7 - 15.4 g/dL    Hematocrit 44.5 35.8 - 46.3 %    MCV 93.3 79.6 - 97.8 FL    MCH 30.0 26.1 - 32.9 PG    MCHC 32.1 31.4 - 35.0 g/dL    RDW 16.1 (H) 11.9 - 14.6 %    Platelets 540 712 - 441 K/uL    MPV 10.1 9.4 - 12.3 FL    nRBC 0.03 0.0 - 0.2 K/uL    Differential Type AUTOMATED      Seg Neutrophils 51 43 - 78 %    Lymphocytes 36 13 - 44 %    Monocytes 11 4.0 - 12.0 %    Eosinophils % 2 0.5 - 7.8 %    Basophils 0 0.0 - 2.0 %    Immature Granulocytes 0 0.0 - 5.0 %    Segs Absolute 3.2 1.7 - 8.2 K/UL    Absolute Lymph # 2.3 0.5 - 4.6 K/UL    Absolute Mono # 0.7 0.1 - 1.3 K/UL    Absolute Eos # 0.1 0.0 - 0.8 K/UL    Basophils Absolute 0.0 0.0 - 0.2 K/UL    Absolute Immature Granulocyte 0.0 0.0 - 0.5 K/UL   POCT Glucose    Collection Time: 08/12/22 12:05 PM   Result Value Ref Range    POC Glucose 137 (H) 65 - 100 mg/dL    Performed by: Darin    POCT Glucose    Collection Time: 08/12/22  4:27 PM   Result Value Ref Range    POC Glucose 127 (H) 65 - 100 mg/dL    Performed by: Johnie    POCT Glucose    Collection Time: 08/12/22  8:12 PM   Result Value Ref Range    POC Glucose 159 (H) 65 - 100 mg/dL    Performed by: Jacqueline Gibson    Basic Metabolic Panel    Collection Time: 08/13/22  4:45 AM   Result Value Ref Range    Sodium 135 (L) 136 - 145 mmol/L    Potassium 3.8 3.5 - 5.1 mmol/L    Chloride 102 98 - 107 mmol/L    CO2 27 21 - 32 mmol/L    Anion Gap 6 (L) 7 - 16 mmol/L    Glucose 138 (H) 65 - 100 mg/dL    BUN 8 8 - 23 MG/DL    Creatinine 1.00 0.6 - 1.0 MG/DL    GFR African American >60 >60 ml/min/1.73m2    GFR Non- 55 (L) >60 ml/min/1.73m2    Calcium 8.8 8.3 - 10.4 MG/DL   Lipid Panel    Collection Time: 08/13/22  4:45 AM   Result Value Ref Range    Cholesterol, Total 137 <200 MG/DL    Triglycerides 138 35 - 150 MG/DL    HDL 36 (L) 40 - 60 MG/DL    LDL Calculated 73.4 <100 MG/DL    VLDL Cholesterol Calculated 27.6 (H) 6.0 - 23.0 MG/DL    Chol/HDL Ratio 3.8     POCT Glucose    Collection Time: 08/13/22  6:11 AM   Result Value Ref Range    POC Glucose 141 (H) 65 - 100 mg/dL    Performed by: Jenna Thapa    Vitamin D 25 Hydroxy    Collection Time: 08/13/22  9:50 AM   Result Value Ref Range    Vit D, 25-Hydroxy 50.3 30.0 - 100.0 ng/mL   POCT Glucose    Collection Time: 08/13/22 11:48 AM   Result Value Ref Range    POC Glucose 157 (H) 65 - 100 mg/dL    Performed by: StephaniehPCT    POCT Glucose    Collection Time: 08/13/22  4:50 PM   Result Value Ref Range    POC Glucose 132 (H) 65 - 100 mg/dL    Performed by: StephaniehPCT    POCT Glucose    Collection Time: 08/13/22  9:36 PM   Result Value Ref Range    POC Glucose 124 (H) 65 - 100 mg/dL    Performed by: Lidia    POCT Glucose    Collection Time: 08/14/22  6:44 AM   Result Value Ref Range    POC Glucose 134 (H) 65 - 100 mg/dL    Performed by: Lidia        Impression / Assessment:     Principal Problem:    Cerebral brain hemorrhage (HCC)  Active Problems:    Closed head injury    UTI due to extended-spectrum beta lactamase (ESBL) producing Escherichia coli    Atrial fibrillation (HCC)    Late onset Alzheimer's dementia without behavioral disturbance (HCC)    HFrEF (heart failure with reduced ejection fraction) (HCC)    History of subarachnoid hemorrhage    Type 2 diabetes with nephropathy (HCC)    Essential hypertension, benign  Resolved Problems:    * No resolved hospital problems. *      Plan / Recommendations / Medical Decision Making:     Recommendations:   Continue acute rehab program  Coordination of rehab / medical care  Counseling of PM&R care issues management  Monitoring and management of medical conditions per plan of care / orders  -given advanced age, prior non ambulatory status and underlying dementia; poor overall prognosis. At risk of further embolic strokes due to afib and now anticoag is contraindicated. -approved by ST for minced and moist diet and thin liquids; I would not allow oral feedings at this time as she is quite somnolent. Dtg states that they will not agree to a NGT.  -DNR. Pt is to be seen by Palliative Care. Family reasonable. Dtg thinks that \"mom is waiting for my brother and sister to get here before she goes\"  -dtg does not want therapies to see at this time    Discussion with Family / Caregiver / Staff    Jeffrey Mahan / Eliza Villegas / Records    Thank you very much for this referral. We appreciate the opportunity to participate in this patient's care.

## 2022-08-14 NOTE — PROGRESS NOTES
[facility] CARDIOLOGY PROGRESS NOTE           8/14/2022 9:24 AM    Admit Date: 8/6/2022      Subjective: The patient is somnolent. Her daughter is at bedside and notes that she \"should be comfortable\". ROS:  No obvious pertinent positives on review of systems except for what was outlined above. Objective:      Vitals:    08/14/22 0247 08/14/22 0330 08/14/22 0350 08/14/22 0738   BP:  112/74  108/77   Pulse: 89 94  90   Resp:  17  20   Temp:  98.6 °F (37 °C)  98 °F (36.7 °C)   TempSrc:  Axillary  Oral   SpO2:  97% 95% 92%   Weight:       Height:           Physical Exam:  General-No Acute Distress  Neck- supple, no JVD  CV- IRIR no RG  Lung- clear bilaterally  Abd- soft, nontender, nondistended  Ext- no edema bilaterally.   Skin- warm and dry    Data Review:   Recent Labs     08/12/22  0647 08/13/22  0445   * 135*   K 3.3* 3.8   BUN 9 8   WBC 6.3  --    HGB 14.3  --    HCT 44.5  --      --    CHOL  --  137   HDL  --  36*       Lab Results   Component Value Date    CHOL 137 08/13/2022    CHOL 188 10/19/2020     Lab Results   Component Value Date    TRIG 138 08/13/2022    TRIG 154 (H) 10/19/2020     Lab Results   Component Value Date    HDL 36 (L) 08/13/2022    HDL 46 10/19/2020     Lab Results   Component Value Date    LDLCALC 73.4 08/13/2022    LDLCALC 115 (H) 10/19/2020     Lab Results   Component Value Date    LABVLDL 27.6 (H) 08/13/2022    VLDL 27 10/19/2020     Lab Results   Component Value Date    CHOLHDLRATIO 3.8 08/13/2022        Lab Results   Component Value Date/Time     08/13/2022 04:45 AM     08/12/2022 06:47 AM     08/11/2022 01:26 PM    K 3.8 08/13/2022 04:45 AM    K 3.3 08/12/2022 06:47 AM    K 4.0 08/11/2022 01:26 PM     08/13/2022 04:45 AM     08/12/2022 06:47 AM     08/11/2022 01:26 PM    CO2 27 08/13/2022 04:45 AM    CO2 29 08/12/2022 06:47 AM    CO2 25 08/11/2022 01:26 PM    BUN 8 08/13/2022 04:45 AM    BUN 9 08/12/2022 06:47 AM    BUN 11 08/11/2022 01:26 PM    CREATININE 1.00 08/13/2022 04:45 AM    CREATININE 0.90 08/12/2022 06:47 AM    CREATININE 1.00 08/11/2022 01:26 PM    GLUCOSE 138 08/13/2022 04:45 AM    GLUCOSE 133 08/12/2022 06:47 AM    GLUCOSE 141 08/11/2022 01:26 PM    CALCIUM 8.8 08/13/2022 04:45 AM    CALCIUM 8.6 08/12/2022 06:47 AM    CALCIUM 8.4 08/11/2022 01:26 PM         Lab Results   Component Value Date    ALT 11 (L) 08/12/2022    ALT 69 (H) 06/15/2022     (H) 06/12/2022    AST 14 (L) 08/12/2022    AST 47 (H) 06/15/2022     (H) 06/12/2022          Assessment/Plan:        1. Atrial fibrillation, unspecified type  2. Aortic stenosis  3.  HFrEF  4. History of subarachnoid hemorrhage  5. Cerebrovascular disease     She is currently admitted with an [address] following a fall. She has a MRA yesterday that noted left MCA stenosis and bilateral vertebral artery stenosis. 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 Her EF was noted to be 50 to 55% in June 2020 (now it is documented at 35-40%). Continue with metoprolol and convert to 200 mg daily of Toprol XL upon discharge. Consider RASi as an outpatient. Continue with a rate control strategy since the patient is not currently on [employer] after developing an [address] following a fall. Though she has an elevated stroke risk in the setting of AF, she is at prohibitive risk for [address] at this time in the setting of recent ICH/SAH. She is currently on baby aspirin daily and Lipitor. She is not an appropriate candidate for an ischemic evaluation. Consider a palliative care consult with a poor overall prognosis and desire of patient's daughter to keep her mother \"comfortable\". We will sign off. Thanks for allowing us to care for this patient. Please call with questions.  She can follow

## 2022-08-14 NOTE — PROGRESS NOTES
LTG: patient will tolerate safest, least restrictive oral diet without s/sx airway compromise  STG: Patient will tolerate minced and moist diet and thin liquids without overt s/sx aspiration  STG: Patient will tolerate ongoing po trials in efforts to advance diet  STG: Patient will participate in modified barium swallow study to objectively assess oropharyngeal swallow if indicated      SPEECH LANGUAGE PATHOLOGY: DYSPHAGIA  Re-evaluation    NAME: [place]  : 1926  MRN: [id number]    ADMISSION DATE: 2022  PRIMARY DIAGNOSIS: Cerebral brain hemorrhage (HCC)  Cerebral brain hemorrhage ([facility] [address] [I61.9]    ICD-10: Treatment Diagnosis: R13.11 Dysphagia, Oral Phase    RECOMMENDATIONS   Diet:  Diet Solids Recommendation: Minced & Moist  Liquid Consistency Recommendation: Thin    Medications: Crushed in puree as able     Recommendations: Total feed   Compensatory Swallowing Strategies:Upright as possible for all oral intake; Alternate solids and liquids   Therapeutic Intervention:Patient/Family education   Patient continues to require skilled intervention: Yes  D/C Recommendations: Ongoing speech therapy is recommended during this hospitalization     ASSESSMENT    Dysphagia Diagnosis: Moderate oral stage dysphagia  Dysphagia Impression : Moderate dysphagia appears related to fatigue, decreased desire and confusion. Per nursing, decreased overall intakes. Increased confusion. Unable to effectively pull through straw. Minimal intake. No overt s/sx. Significantly slowed mastication and increased oral hold of bolus. Improved with liquid wash. Downgrade to minced and moist, medication crushed with thin liquids. Follow for improvement in tolerance.      GENERAL    History of Present Injury/Illness: Ms. Shilpa Leal  has a past medical history of Abdominal pain, epigastric, Abdominal pain, unspecified site, Anxiety state, unspecified, Aortic stenosis, Aortic valve disorders, Atherosclerosis of aorta ([facility] [address] Atrial fibrillation ([facility] [address] Backache, unspecified, Benign neoplasm of colon, Cervicalgia, Closed fracture of unspecified bone, Depressive disorder, not elsewhere classified, Diabetes ([facility] [address] Diarrhea, Disorders of magnesium metabolism, Edema, Elevated sedimentation rate, Encounter for long-term (current) use of other medications, Endocrine disease, Epistaxis, Esophageal reflux, Essential hypertension, benign, Gastrointestinal malfunction arising from mental factors, Hypertension, Hypertension, benign, Hypertonicity of bladder, Hypopotassemia, Insomnia, unspecified, Irritable bowel syndrome, Memory loss, Nausea alone, Osteoarthrosis, unspecified whether generalized or localized, unspecified site, Other ill-defined conditions(799.89), Other malaise and fatigue, Pain in joint, lower leg, Pain in joint, shoulder region, Postnasal drip, Rheumatoid arthritis(714.0), Type II or unspecified type diabetes mellitus without mention of complication, not stated as uncontrolled, Unspecified constipation, Unspecified hemorrhoids with other complication, Unspecified hemorrhoids without mention of complication, Unspecified hereditary and idiopathic peripheral neuropathy, Unspecified hypothyroidism, Urgency of urination, and Urinary tract infection, site not specified. . She also  has a past surgical history that includes other surgical history; Tonsillectomy; and Cholecystectomy.   Chart Reviewed: Yes  Subjective: Patient drowsy, min PO intakes per nursing  Behavior/Cognition: Lethargic  Patient Position: upright in bed  Communication Observation: Functional  Follows Directions: Simple                 Prior Dysphagia History: Previously seen for dysphagia management, however now more lethargic with new CVA     Current Diet : Regular  Current Liquid Diet : Thin  Pain:   Patient does not c/o pain                                Hearing: Exceptions to [facility]    Hearing Exceptions: Hard of hearing/hearing concerns  OBJECTIVE    Patient Positioning: Upright in bed   Oral Motor   Labial: Decreased rate  Oral Hygiene: Clean;Moist  Lingual: Decreased rate;Decreased strength  Dentition: Adequate        Baseline Vocal Quality: Normal  Oropharyngeal Phase: All  Assessment Method(s): Observation  Patient Position: upright in bed  Consistency Presented: All consistencies  How Presented: SLP-fed/Presented  Bolus Acceptance: Impaired  Bolus Formation/Control: Impaired  Type of Impairment: Delayed;Piecemeal  Oral Residue: 10-50% of bolus  Pharyngeal Phase Characteristics: No impairment, issues, or problems  Effective Modifications: Alternate liquids/solids;Small sips and bites        Oral Phase - Comment: moderate  Pharyngeal Phase: WFL  PLAN    Duration/Frequency: Continue to follow patient 3x/week for duration of hospitalization and/or until goals met    Dysphagia Outcome and Severity Scale (WAN)  Dysphagia Outcome Severity Scale: Level 3: Moderate dysphagia- Total assisstance, supervision or strategies. Two or more diet consistencies restricted  Interpretation of Tool: The Dysphagia Outcome and Severity Scale (WAN) is a simple, easy-to-use, 7-point scale developed to systematically rate the functional severity of dysphagia based on objective assessment and make recommendations for diet level, independence level, and type of nutrition. Normal(7), Functional(6), Mild(5), Mild-Moderate(4), Moderate(3), Moderate-Severe(2), Severe(1)         Education:    Patient Education: Diet recommendations  Patient Education Response: Needs reinforcement    Current Medications:   No current facility-administered medications on file prior to encounter.      Current Outpatient Medications on File Prior to Encounter   Medication Sig Dispense Refill    metoprolol tartrate (LOPRESSOR) 100 MG tablet Take 1 tablet by mouth 2 times daily 60 tablet 3    pantoprazole (PROTONIX) 40 MG tablet Take 1 tablet by mouth every morning (before breakfast) 30 tablet 3    urea (URE-NA) 15 g PACK packet Take 15 g by mouth in the morning and at bedtime 20 each 0    torsemide (DEMADEX) 20 MG tablet Take 1 tablet by mouth 2 times daily as needed (volume overload, edema) 60 tablet 0    acetaminophen (TYLENOL) 500 MG tablet Take 1,000 mg by mouth daily      amLODIPine (NORVASC) 5 MG tablet TAKE ONE TABLET BY MOUTH EVERY DAY      apixaban (ELIQUIS) 5 MG TABS tablet Take 5 mg by mouth 2 times daily      cetirizine (ZYRTEC) 10 MG tablet Take 10 mg by mouth daily      vitamin D 25 MCG (1000 UT) CAPS Take 1,000 Units by mouth daily      diclofenac (FLECTOR) 1.3 % PTCH patch Place 1 patch onto the skin every 12 hours      levothyroxine (SYNTHROID) 100 MCG tablet Take by mouth every morning (before breakfast)      Lidocaine HCl 4 % CREA Apply topically      memantine (NAMENDA) 10 MG tablet Take 10 mg by mouth 2 times daily      mirabegron (MYRBETRIQ) 25 MG TB24 Take 25 mg by mouth daily      nitroGLYCERIN (NITRODUR) 0.4 MG/HR Place 1 patch onto the skin daily      nitroGLYCERIN (NITROSTAT) 0.4 MG SL tablet Place under the tongue      ondansetron (ZOFRAN) 4 MG tablet Take 4 mg by mouth every 8 hours as needed      polyethylene glycol (GLYCOLAX) 17 GM/SCOOP powder Take 17 g by mouth daily      senna (SENOKOT) 8.6 MG tablet Take 2 tablet PO at bedtime         PRECAUTIONS/ALLERGIES: Penicillins, Strawberry extract, and Codeine        Therapy Time  SLP Individual Minutes  Time In: 0715  Time Out: 0732  Minutes: Ramon Mendoza.  Polly Santiago MS, CCC-SLP         Speech Language Pathologist          Acute Rehabilitation Services                   Contact: Hetal Del Real [place]  8/14/2022 7:50 AM

## 2022-08-14 NOTE — PROGRESS NOTES
[facility]     Referral received and discussed with [place] CM. Hospice Liaison will contact patient/family for hospice presentation and evaluation. Appointment made for 130 PM    Thank you for this referral.   Alejandro Schwarz  [phone]: C  [phone]:  O

## 2022-08-15 LAB
GLUCOSE BLD STRIP.AUTO-MCNC: 134 MG/DL (ref 65–100)
GLUCOSE BLD STRIP.AUTO-MCNC: 142 MG/DL (ref 65–100)
GLUCOSE BLD STRIP.AUTO-MCNC: 176 MG/DL (ref 65–100)
GLUCOSE BLD STRIP.AUTO-MCNC: 205 MG/DL (ref 65–100)
SERVICE CMNT-IMP: ABNORMAL

## 2022-08-15 PROCEDURE — 6370000000 HC RX 637 (ALT 250 FOR IP): Performed by: FAMILY MEDICINE

## 2022-08-15 PROCEDURE — 1100000000 HC RM PRIVATE

## 2022-08-15 PROCEDURE — 2580000003 HC RX 258: Performed by: FAMILY MEDICINE

## 2022-08-15 PROCEDURE — 6370000000 HC RX 637 (ALT 250 FOR IP): Performed by: INTERNAL MEDICINE

## 2022-08-15 PROCEDURE — A4216 STERILE WATER/SALINE, 10 ML: HCPCS | Performed by: FAMILY MEDICINE

## 2022-08-15 PROCEDURE — 51798 US URINE CAPACITY MEASURE: CPT

## 2022-08-15 PROCEDURE — 97530 THERAPEUTIC ACTIVITIES: CPT

## 2022-08-15 PROCEDURE — 6360000002 HC RX W HCPCS: Performed by: FAMILY MEDICINE

## 2022-08-15 PROCEDURE — 6360000002 HC RX W HCPCS: Performed by: STUDENT IN AN ORGANIZED HEALTH CARE EDUCATION/TRAINING PROGRAM

## 2022-08-15 PROCEDURE — 82962 GLUCOSE BLOOD TEST: CPT

## 2022-08-15 PROCEDURE — 6370000000 HC RX 637 (ALT 250 FOR IP): Performed by: STUDENT IN AN ORGANIZED HEALTH CARE EDUCATION/TRAINING PROGRAM

## 2022-08-15 PROCEDURE — C9113 INJ PANTOPRAZOLE SODIUM, VIA: HCPCS | Performed by: FAMILY MEDICINE

## 2022-08-15 RX ADMIN — CETIRIZINE HYDROCHLORIDE 10 MG: 10 TABLET ORAL at 09:39

## 2022-08-15 RX ADMIN — SODIUM CHLORIDE, PRESERVATIVE FREE 10 ML: 5 INJECTION INTRAVENOUS at 20:55

## 2022-08-15 RX ADMIN — METOPROLOL TARTRATE 50 MG: 50 TABLET, FILM COATED ORAL at 20:54

## 2022-08-15 RX ADMIN — SODIUM CHLORIDE 40 MG: 9 INJECTION, SOLUTION INTRAMUSCULAR; INTRAVENOUS; SUBCUTANEOUS at 05:20

## 2022-08-15 RX ADMIN — LEVOTHYROXINE SODIUM 100 MCG: 0.05 TABLET ORAL at 05:21

## 2022-08-15 RX ADMIN — FUROSEMIDE 20 MG: 20 TABLET ORAL at 09:39

## 2022-08-15 RX ADMIN — METOPROLOL TARTRATE 50 MG: 50 TABLET, FILM COATED ORAL at 09:39

## 2022-08-15 RX ADMIN — METOPROLOL TARTRATE 50 MG: 50 TABLET, FILM COATED ORAL at 13:45

## 2022-08-15 RX ADMIN — POTASSIUM BICARBONATE 40 MEQ: 782 TABLET, EFFERVESCENT ORAL at 20:53

## 2022-08-15 RX ADMIN — SODIUM CHLORIDE, PRESERVATIVE FREE 10 ML: 5 INJECTION INTRAVENOUS at 09:40

## 2022-08-15 RX ADMIN — SENNOSIDES 17.2 MG: 8.6 TABLET, FILM COATED ORAL at 20:53

## 2022-08-15 RX ADMIN — METOPROLOL TARTRATE 50 MG: 50 TABLET, FILM COATED ORAL at 01:50

## 2022-08-15 RX ADMIN — TROSPIUM CHLORIDE 20 MG: 20 TABLET, FILM COATED ORAL at 20:53

## 2022-08-15 RX ADMIN — ASPIRIN 81 MG: 81 TABLET, CHEWABLE ORAL at 09:39

## 2022-08-15 RX ADMIN — ATORVASTATIN CALCIUM 40 MG: 40 TABLET, FILM COATED ORAL at 20:53

## 2022-08-15 RX ADMIN — MEMANTINE 10 MG: 5 TABLET ORAL at 09:39

## 2022-08-15 RX ADMIN — INSULIN LISPRO 2 UNITS: 100 INJECTION, SOLUTION INTRAVENOUS; SUBCUTANEOUS at 12:01

## 2022-08-15 RX ADMIN — POTASSIUM BICARBONATE 40 MEQ: 782 TABLET, EFFERVESCENT ORAL at 09:39

## 2022-08-15 RX ADMIN — POLYETHYLENE GLYCOL 3350 17 G: 17 POWDER, FOR SOLUTION ORAL at 09:40

## 2022-08-15 RX ADMIN — MEMANTINE 10 MG: 5 TABLET ORAL at 20:53

## 2022-08-15 RX ADMIN — LEVOFLOXACIN 750 MG: 5 INJECTION, SOLUTION INTRAVENOUS at 13:45

## 2022-08-15 RX ADMIN — ACETAMINOPHEN 650 MG: 325 TABLET, FILM COATED ORAL at 20:53

## 2022-08-15 ASSESSMENT — PAIN DESCRIPTION - LOCATION
LOCATION: HEAD
LOCATION: HEAD

## 2022-08-15 ASSESSMENT — PAIN DESCRIPTION - DESCRIPTORS: DESCRIPTORS: DISCOMFORT

## 2022-08-15 ASSESSMENT — PAIN SCALES - GENERAL
PAINLEVEL_OUTOF10: 2
PAINLEVEL_OUTOF10: 1

## 2022-08-15 NOTE — PROGRESS NOTES
(ml/day):   (1 ml/kcal)    Nutrition Diagnosis:   Inadequate oral intake related to cognitive or neurological impairment as evidenced by intake 0-25%  Nutrition Interventions:   Food and/or Nutrient Delivery: Continue Current Diet, Start Oral Nutrition Supplement     Coordination of Nutrition Care: Continue to monitor while inpatient  Plan of Care discussed with: Alyssa Frankel, RN    Goals: Active Goal:  (PO intake consistent with GOC)       Nutrition Monitoring and Evaluation:      Food/Nutrient Intake Outcomes: Food and Nutrient Intake, Supplement Intake  Physical Signs/Symptoms Outcomes: Meal Time Behavior, Weight    Discharge Planning:     Too soon to determine    Taina Jordan RD

## 2022-08-15 NOTE — PROGRESS NOTES
PHYSICAL THERAPY: Daily Note AM   (Link to Caseload Tracking: PT Visit Days : 4  Time In/Out PT Charge Capture  Rehab Caseload Tracker  Orders    Isra Littlejohn is a 80 y.o. female   PRIMARY DIAGNOSIS: Cerebral brain hemorrhage ([facility] [address]  Cerebral brain hemorrhage ([facility] [address] [I61.9]       Inpatient: Payor: MEDICARE / Plan: MEDICARE PART A AND B / Product Type: *No Product type* /     ASSESSMENT:     REHAB RECOMMENDATIONS:   Recommendation to date pending progress:  Setting:  Short-term Rehab    Equipment:    To Be Determined     ASSESSMENT:  Ms. Caitlin Michaud  is a 80year old female who presents supine in bed upon PT arrival. This date pt performs mobility including bed mobility and transfers with maxAx2 and sitting balance activities with CGA. Pt was extremely hot when PT entered room and stated she felt like she was \"boiling\". PT turned down thermostat and rehab tech placed ice cold cloth around her neck. RN informed. Pt presents as functioning below her baseline, with deficits in mobility including transfers, gait, balance, and activity tolerance. Pt will benefit from skilled therapy services to address stated deficits to promote return to highest level of function, independence, and safety. Will continue to follow.      SUBJECTIVE:   Ms. Caitlin Michaud states, \"Please don't leave me\"     Social/Functional Lives With: Other (comment) (LTC at [facility]/private pay)  Type of Home: Facility  OBJECTIVE:     PAIN: Gabino Divers / O2: PRECAUTION / Luke Simple / Earvin Rios:   Pre Treatment:          Post Treatment: 0 Vitals        Oxygen    None    RESTRICTIONS/PRECAUTIONS:  Restrictions/Precautions  Restrictions/Precautions: Fall Risk  Restrictions/Precautions: Fall Risk     MOBILITY: I Mod I S SBA CGA Min Mod Max Total  NT x2 Comments:   Bed Mobility    Rolling [] [] [] [] [] [] [] [] [] [] []    Supine to Sit [] [] [] [] [] [] [] [x] [] [] [x] Cuing for use of bedrails and moving legs off EOB   Scooting [] [] [] [] [] [] [] [] [x] [] [x] Cuing to push through hands to clear hips   Sit to Supine [] [] [] [] [] [] [] [] [] [] []    Transfers    Sit to Stand [] [] [] [] [] [] [] [] [] [] []     Bed to Chair [] [] [] [] [] [] [] [x] [] [x] [x] Squat pivot to drop arm, cuing for sequencing   Stand to Sit [] [] [] [] [] [] [] [] [] [] []     [] [] [] [] [] [] [] [] [] [] []    I=Independent, Mod I=Modified Independent, S=Supervision, SBA=Standby Assistance, CGA=Contact Guard Assistance,   Min=Minimal Assistance, Mod=Moderate Assistance, Max=Maximal Assistance, Total=Total Assistance, NT=Not Tested    BALANCE: Good Fair+ Fair Fair- Poor NT Comments   Sitting Static [] [x] [] [] [] []    Sitting Dynamic [] [] [x] [] [] [] Sitting EOB x6min, cuing for weightshifting and propping activities to maintain midline             Standing Static [] [] [] [] [] []    Standing Dynamic [] [] [] [] [] []      GAIT: I Mod I S SBA CGA Min Mod Max Total  NT x2 Comments:   Level of Assistance [] [] [] [] [] [] [] [] [] [x] []    Distance   feet    DME N/A    Gait Quality N/A    Weightbearing Status      Stairs      I=Independent, Mod I=Modified Independent, S=Supervision, SBA=Standby Assistance, CGA=Contact Guard Assistance,   Min=Minimal Assistance, Mod=Moderate Assistance, Max=Maximal Assistance, Total=Total Assistance, NT=Not Tested    PLAN:   ACUTE PHYSICAL THERAPY GOALS:   (Developed with and agreed upon by patient and/or caregiver. )  LTG:  (1.)Ms. Cleveland Meeks will move from supine to sit and sit to supine , scoot up and down, and roll side to side in bed with MINIMAL ASSIST within 7 treatment day(s). (2.)Ms. Cleveland Meeks will transfer from bed to chair and chair to bed with MINIMAL ASSIST using the least restrictive device within 7 treatment day(s). (3.)Ms. Cleveland Meeks will tolerate OOB activity  with CONTACT GUARD ASSIST for 24min within 7 treatment day(s).     FREQUENCY AND DURATION: 3 times/week for duration of hospital stay or until stated goals are met, whichever comes first.    TREATMENT:   TREATMENT:   Therapeutic Activity (27 Minutes): Therapeutic activity included Supine to Sit, Scooting, Lateral Scooting, Transfer Training, and Sitting balance  to improve functional Activity tolerance, Balance, Coordination, Mobility, and Strength.     TREATMENT GRID:  N/A    AFTER TREATMENT PRECAUTIONS: Alarm Activated, Bed/Chair Locked, Call light within reach, Chair, Needs within reach, RN notified, and Visitors at bedside    INTERDISCIPLINARY COLLABORATION:  RN/ PCT and Rehab Attendant    EDUCATION:      TIME IN/OUT:  Time In: 1004  Time Out: 1031  Minutes: [address], PT

## 2022-08-15 NOTE — PROGRESS NOTES
Hospitalist Progress Note   Admit Date:  2022  1:43 AM   Name:  Molly Villanueva   Age:  80 y.o. Sex:  female  :  1926   MRN:  [id number]   Room:  737/01    Presenting Complaint: Fall     Reason(s) for Admission: Cerebral brain hemorrhage [facility]) [I61.9]     Hospital Course & Interval History:   Patient is a 81 y/o female with past medical history hypertension, atrial fibrillation on Eliquis, DM II, diastolic dysfunction, hypothyroidism s/p thyroidectomy who presented from SNF to ED after fall with laceration to head. ED workup: steri-strips placed to right temporal laceration, CT head with 6 mm hemorrhagic contusion to right parietal lobe. Neurosurgery was contacted by ED who recommended hospital observation with repeat CT imaging. Eliquis was held. Patient was admitted to ICU until repeat CT imaging at 24 hours which remained unchanged. Patient was then transferred to floor. Found with ESBL UTI with antibiotics adjusted  based on finalized urine culture. Lethargy  for which head CT was obtained, neg for acute abnormality, R parietal hemorrhage not visualized. Echocardiogram was obtained due to elevated pBNP and CXR findings of pulmonary edema, LVEF now 35-40% from 50-55% 2 years prior. Tolerating low-dose diuresis with good UOP. Barr placed for urinary retention, voiding trial . Neurology consulted and MRI brain obtained  due to lower extremity weakness, found with acute left frontal lobe CVA. MRA H/N obtained and pending although would likely not be surgical candidate if stenosis is present. Known atrial fibrillation but unable to resume AC due to intracranial hemorrhage but will initiate asa 81 mg and statin therapy. Not a TPA candidate due to intracranial hemorrhage. Pt's mentation waxes and wanes with poor nutritional intake and underlying severe dementia.   Patient's daughter 2022 requested hospice house evaluation as she does not believe patient would be able to work with PT/OT and STR. Hospice services deemed pt appropriate for hospice care but not inpatient hospice. Plan is to have pt return to [facility] long term care and hospice services to follow at [facility]. Subjective/24hr Events (08/15/22): \"I think I'm going to die. Welcome to the party. \"  Demented 89y.o. WF sitting up in bed    Unable to review systems with this confused patient    Assessment & Plan:     Principal Problem:     Cerebral brain hemorrhage 2/2 fall ([address]  - post fall 8/6/2022; 6 mm hemorrhagic contusion on R parietal lobe without mass effect or midline shift with what appears to be resolution on f/u 8/11/2022 CT head \"previously questioned punctate focus of subarachnoid hemorrhage in the right parietal lobe is not visualized\"  - avoid anti-platelets and AC, Goal SBP < 140    Active Problems:    Acute CVA ([address]  - 8/12/2022 MRI brain 8/12 small acute left frontal lobe infarct with possible punctate focus of acute infarction within right frontal lobe; not TPA candidate due to intracranial hemorrhage  - family expressed wishes to continue holding AC going forward  - cont statin / asa  - MRA neck without acute large vessel occlusion, no significant carotid stenosis identified, high-grade stenosis at the origin of the left vertebral artery      ESBL E Coli UTI    - levaquin (per sensitivities) EOT 8/17/2022      Atrial fibrillation with RVR ([address] improving  - AC held in setting of acute brain bleed  - rate ~controlled      Late onset Alzheimer's dementia without behavioral disturbance (HCC)  - Avoid narcotics, benzos, restraints      Type 2 diabetes with nephropathy (HCC)  - cont SSI ACHS, carb-controlled diet  - generally acceptable control      Essential hypertension, benign  - cont metoprolol      Hypothyroidism s/p thyroidectomy  - cont Synthroid      Pulmonary Edema / Volume Overload  - resolved; on RA      New onset systolic CHF, Hx of diastolic dysfunction  - 7/50/9021 echo revealed worsening LVEF 35-40% (2020 EF 50-55%)  - cont diuretics, strict I&O's, daily weights      Urinary Retention  - gonzalez placed 8/11; will keep in place as plan is to dc back to facility with hospice services      Discharge Planning:  - case mgmt aware of daughter's wishes, will be in contact with [facility] for dc plans    Diet:  ADULT DIET; Dysphagia - Minced and Moist  DVT PPx: SCDs  Code status: DNR    Hospital Problems:  Principal Problem:    Cerebral brain hemorrhage ([facility]  Active Problems:    Closed head injury    UTI due to extended-spectrum beta lactamase (ESBL) producing Escherichia coli    Atrial fibrillation (HCC)    Late onset Alzheimer's dementia without behavioral disturbance (HCC)    HFrEF (heart failure with reduced ejection fraction) ([facility])    History of subarachnoid hemorrhage    Cerebrovascular disease    Type 2 diabetes with nephropathy ([facility]    Essential hypertension, benign  Resolved Problems:    * No resolved hospital problems. *      Objective:   Patient Vitals for the past 24 hrs:   Temp Pulse Resp BP SpO2   08/15/22 1137 97.5 °F (36.4 °C) 96 22 126/77 94 %   08/15/22 0800 99 °F (37.2 °C) 100 22 112/78 95 %   08/15/22 0400 97.7 °F (36.5 °C) (!) 106 19 (!) 142/94 90 %   08/15/22 0000 97.5 °F (36.4 °C) 96 18 107/88 91 %   08/14/22 2000 97.7 °F (36.5 °C) 83 18 129/85 93 %   08/14/22 1552 97.6 °F (36.4 °C) 81 18 122/87 97 %         Oxygen Therapy  SpO2: 94 %  Pulse Oximetry Type: Continuous  Pulse via Oximetry: 93 beats per minute  Pulse Oximeter Device Mode: Intermittent  Pulse Oximeter Device Location: Finger  O2 Device: None (Room air)  Oximetry Probe Site Changed: Yes  Skin Assessment: Clean, dry, & intact  Skin Protection for O2 Device: N/A  O2 Flow Rate (L/min): 2 L/min    Estimated body mass index is 23.72 kg/m² as calculated from the following:    Height as of this encounter: 5' 8\" (1.727 m). Weight as of this encounter: 156 lb (70.8 kg).     Intake/Output Summary (Last 24 hours) at 8/15/2022 1227  Last data filed at 8/15/2022 9772  Gross per 24 hour   Intake 240 ml   Output --   Net 240 ml           Physical Exam:     Blood pressure 126/77, pulse 96, temperature 97.5 °F (36.4 °C), temperature source Axillary, resp. rate 22, height 5' 8\" (1.727 m), weight 156 lb (70.8 kg), SpO2 94 %. General:    Demented, awake in NAD  Head:  Normocephalic, atraumatic. Ecchymotic area R temporal area   Eyes:  Sclerae appear normal.  Pupils equally round. ENT:  Nares appear normal, no drainage. Dry oral mucosa  Neck:  No restricted ROM. Trachea midline   CV:   Irreg rhythm, rate ~controlled No m/r/g. No jugular venous distension. Lungs:   CTA b/l; no wheezing, rhonchi, rales. Symmetric expansion. Respirations even   Abdomen: Bowel sounds present. Soft, nontender, nondistended. Extremities: No edema; + mvmt x 4  Skin:     Warm and dry. Neuro:  Alert and oriented x 1. Garbled speech, no seizure activities grossly.   Psych:  Calm, affect flat    I have personally reviewed labs and tests showing:  Recent Labs:  Recent Results (from the past 48 hour(s))   POCT Glucose    Collection Time: 08/13/22  4:50 PM   Result Value Ref Range    POC Glucose 132 (H) 65 - 100 mg/dL    Performed by: Jonathan    POCT Glucose    Collection Time: 08/13/22  9:36 PM   Result Value Ref Range    POC Glucose 124 (H) 65 - 100 mg/dL    Performed by: Lidia    POCT Glucose    Collection Time: 08/14/22  6:44 AM   Result Value Ref Range    POC Glucose 134 (H) 65 - 100 mg/dL    Performed by: KarinaitaPCXAVI    POCT Glucose    Collection Time: 08/14/22 11:09 AM   Result Value Ref Range    POC Glucose 158 (H) 65 - 100 mg/dL    Performed by: Kanu    POCT Glucose    Collection Time: 08/14/22  4:44 PM   Result Value Ref Range    POC Glucose 116 (H) 65 - 100 mg/dL    Performed by: Darin    POCT Glucose    Collection Time: 08/14/22  9:06 PM   Result Value Ref Range    POC Glucose 143 (H) 65 - 100 mg/dL    Performed by: CuedylorPCA    POCT Glucose    Collection Time: 08/15/22  6:25 AM   Result Value Ref Range    POC Glucose 134 (H) 65 - 100 mg/dL    Performed by: MATIvisionTaylorPCA    POCT Glucose    Collection Time: 08/15/22 11:40 AM   Result Value Ref Range    POC Glucose 205 (H) 65 - 100 mg/dL    Performed by: Tyler Rodrigez        I have personally reviewed imaging studies showing: Other Studies:  MRA NECK W CONTRAST   Final Result   1. No acute large vessel occlusion. No significant carotid stenosis identified. 2.  There appears to be high-grade stenoses involving the origins of the   bilateral vertebral arteries. The right vertebral artery is dominant. MRA HEAD WO CONTRAST   Final Result   There is suggestion of a moderate stenosis at the left MCA   bifurcation. Otherwise no significant stenosis or occlusion is seen. MRI BRAIN WO CONTRAST   Final Result   1. Small acute left frontal lobe infarct. There may be an additional punctate   focus of acute infarction within the right frontal lobe. 2. Findings most compatible with advanced chronic small vessel ischemic change. XR CHEST PORTABLE   Final Result   Improving CHF or fluid overload, with mild residual.      CT HEAD WO CONTRAST   Final Result      1. No acute intracranial abnormality. 2. The previously questioned punctate focus of subarachnoid hemorrhage in the   right parietal lobe is not visualized. CT HEAD WO CONTRAST   Final Result   No significant interval change or acute intracranial abnormality. The suspected punctate hemorrhage in the right parietal lobe is now barely   perceptible. XR CHEST PORTABLE   Final Result   New bilateral pulmonary edema. CHF suspected. CT HEAD WO CONTRAST   Final Result   1. Punctate hyperdensity in a right parietal lobe sulcus near the vertex   unchanged.  Although this could represent a tiny subarachnoid hemorrhage, could   also represent a prominent cortical vessel. No areas of new hemorrhage or acute   abnormality. 2. Stable cerebral atrophy and chronic white matter disease. CT HEAD WO CONTRAST   Final Result   Unchanged small right parietal lobe hemorrhage, with no mass effect   or midline shift. CT HEAD WO CONTRAST   Final Result   1. Stable focal density closely associated with a right parietal sulcus which   could represent minimal subarachnoid hemorrhage or a tiny cortical contusion in   the setting of acute trauma. .          This report was made using voice transcription. Despite my best efforts to avoid   any, transcription errors may persist. If there is any question about the   accuracy of the report or need for clarification, then please call [phone], or text me through [employer] for clarification or correction. CT HEAD WO CONTRAST   Final Result   Acute 6 mm hemorrhagic contusion along the right parietal lobe   cortex, without mass effect or midline shift. Dr. Federica Arechiga verbally notified at   2:36 AM (DC 5).           Current Meds:  Current Facility-Administered Medications   Medication Dose Route Frequency    zinc oxide 40 % paste   Topical 4x Daily PRN    sodium chloride flush 0.9 % injection 30 mL  30 mL IntraVENous PRN    atorvastatin (LIPITOR) tablet 40 mg  40 mg Oral Nightly    aspirin chewable tablet 81 mg  81 mg Oral Daily    furosemide (LASIX) tablet 20 mg  20 mg Oral Daily    potassium bicarb-citric acid (EFFER-K) effervescent tablet 40 mEq  40 mEq Oral BID    metoprolol tartrate (LOPRESSOR) tablet 50 mg  50 mg Oral Q6H    levoFLOXacin (LEVAQUIN) 750 MG/150ML infusion 750 mg  750 mg IntraVENous Q48H    metoprolol (LOPRESSOR) injection 5 mg  5 mg IntraVENous Q6H PRN    hydrALAZINE (APRESOLINE) injection 5 mg  5 mg IntraVENous Q8H PRN    benzocaine-menthol (CEPACOL SORE THROAT) lozenge 1 lozenge  1 lozenge Oral Q2H PRN    cetirizine (ZYRTEC) tablet 10 mg  10 mg Oral Daily    levothyroxine (SYNTHROID) tablet 100 mcg  100 mcg Oral QAM AC    memantine (NAMENDA) tablet 10 mg  10 mg Oral BID    trospium (SANCTURA) tablet 20 mg  20 mg Oral Nightly    nitroGLYCERIN (NITRODUR) 0.4 MG/HR 1 patch  1 patch TransDERmal Daily    pantoprazole (PROTONIX) tablet 40 mg  40 mg Oral QAM AC    polyethylene glycol (GLYCOLAX) packet 17 g  17 g Oral Daily    senna (SENOKOT) tablet 17.2 mg  2 tablet Oral Nightly    sodium chloride flush 0.9 % injection 5-40 mL  5-40 mL IntraVENous 2 times per day    sodium chloride flush 0.9 % injection 5-40 mL  5-40 mL IntraVENous PRN    0.9 % sodium chloride infusion   IntraVENous PRN    promethazine (PHENERGAN) tablet 12.5 mg  12.5 mg Oral Q6H PRN    Or    ondansetron (ZOFRAN) injection 4 mg  4 mg IntraVENous Q6H PRN    aluminum & magnesium hydroxide-simethicone (MAALOX) 200-200-20 MG/5ML suspension 30 mL  30 mL Oral Q6H PRN    acetaminophen (TYLENOL) tablet 650 mg  650 mg Oral Q6H PRN    Or    acetaminophen (TYLENOL) suppository 650 mg  650 mg Rectal Q6H PRN    insulin lispro (HUMALOG) injection vial 0-8 Units  0-8 Units SubCUTAneous TID WC    insulin lispro (HUMALOG) injection vial 0-4 Units  0-4 Units SubCUTAneous Nightly    glucose chewable tablet 16 g  4 tablet Oral PRN    dextrose bolus 10% 125 mL  125 mL IntraVENous PRN    Or    dextrose bolus 10% 250 mL  250 mL IntraVENous PRN    glucagon (rDNA) injection 1 mg  1 mg SubCUTAneous PRN    dextrose 10 % infusion   IntraVENous Continuous PRN       Signed:  Juliann De Jesus

## 2022-08-15 NOTE — PLAN OF CARE
Problem: Chronic Conditions and Co-morbidities  Goal: Patient's chronic conditions and co-morbidity symptoms are monitored and maintained or improved  Outcome: Progressing     Problem: Neurosensory - Adult  Goal: Achieves stable or improved neurological status  Outcome: Progressing  Goal: Absence of seizures  Outcome: Progressing  Goal: Remains free of injury related to seizures activity  Outcome: Progressing  Goal: Achieves maximal functionality and self care  Outcome: Progressing     Problem: Respiratory - Adult  Goal: Achieves optimal ventilation and oxygenation  Outcome: Progressing     Problem: Cardiovascular - Adult  Goal: Maintains optimal cardiac output and hemodynamic stability  Outcome: Progressing  Goal: Absence of cardiac dysrhythmias or at baseline  Outcome: Progressing     Problem: Skin/Tissue Integrity - Adult  Goal: Skin integrity remains intact  Outcome: Progressing  Flowsheets (Taken 8/14/2022 2000)  Skin Integrity Remains Intact: Monitor for areas of redness and/or skin breakdown     Problem: Metabolic/Fluid and Electrolytes - Adult  Goal: Electrolytes maintained within normal limits  Outcome: Progressing  Goal: Hemodynamic stability and optimal renal function maintained  Outcome: Progressing  Goal: Glucose maintained within prescribed range  Outcome: Progressing

## 2022-08-15 NOTE — PROGRESS NOTES
Follow up on referral.   Patient continues to be appropriate for Routine Hospice Care only. Per Clemencia LECHUGA, Patient will discharge back to [place], her long-term care facility; likely with hospice services. Please contact [facility] should patient decline, or family desire to care for patient at home with hospice support.      Thank you for this referral,   Ct Calzada RN  [facility]  [phone] meconium staining

## 2022-08-15 NOTE — DISCHARGE INSTRUCTIONS

## 2022-08-15 NOTE — PROGRESS NOTES
Follow-up visit to convey care and concern to patient and family.      Bonnetta Leventhal, [address]  Board Certified

## 2022-08-15 NOTE — CARE COORDINATION
CM continuing to follow for ongoing discharge planning. Notified by [facility] this AM at 10:44 that patient was not appropriate for hospice house. CM contacted [facility] SNF to discuss patient returning with hospice care. They will have their hospice agency who is already familiar with patient, reach out to patient's son Carli Lugo to discuss return to [facility]. CM faxed order and updated clinicals to Ally Pitts with [facility]. She will call son to discuss discharge back to long term care at [id number] with their support; appears, per documentation, family has not agreed to total hospice care yet. CM requested that if family is agreeable, that facility have all necessary DME present prior to patient discharging to facility. Per facility liaison, patient will not need new covid testing prior to return to facility.

## 2022-08-16 VITALS
BODY MASS INDEX: 23.64 KG/M2 | OXYGEN SATURATION: 94 % | TEMPERATURE: 98.4 F | HEART RATE: 93 BPM | RESPIRATION RATE: 18 BRPM | HEIGHT: 68 IN | DIASTOLIC BLOOD PRESSURE: 92 MMHG | SYSTOLIC BLOOD PRESSURE: 126 MMHG | WEIGHT: 156 LBS

## 2022-08-16 LAB
GLUCOSE BLD STRIP.AUTO-MCNC: 122 MG/DL (ref 65–100)
GLUCOSE BLD STRIP.AUTO-MCNC: 142 MG/DL (ref 65–100)
SERVICE CMNT-IMP: ABNORMAL
SERVICE CMNT-IMP: ABNORMAL

## 2022-08-16 PROCEDURE — 51798 US URINE CAPACITY MEASURE: CPT

## 2022-08-16 PROCEDURE — 6370000000 HC RX 637 (ALT 250 FOR IP): Performed by: INTERNAL MEDICINE

## 2022-08-16 PROCEDURE — 6370000000 HC RX 637 (ALT 250 FOR IP): Performed by: STUDENT IN AN ORGANIZED HEALTH CARE EDUCATION/TRAINING PROGRAM

## 2022-08-16 PROCEDURE — 2580000003 HC RX 258: Performed by: FAMILY MEDICINE

## 2022-08-16 PROCEDURE — 6370000000 HC RX 637 (ALT 250 FOR IP): Performed by: FAMILY MEDICINE

## 2022-08-16 PROCEDURE — 82962 GLUCOSE BLOOD TEST: CPT

## 2022-08-16 RX ORDER — LEVOFLOXACIN 750 MG/1
750 TABLET ORAL DAILY
Qty: 1 TABLET | Refills: 0
Start: 2022-08-17 | End: 2022-08-18

## 2022-08-16 RX ORDER — OXYCODONE HCL 20 MG/ML
5 CONCENTRATE, ORAL ORAL EVERY 6 HOURS PRN
Qty: 3 ML | Refills: 0 | Status: SHIPPED | OUTPATIENT
Start: 2022-08-16 | End: 2022-08-19

## 2022-08-16 RX ORDER — OXYCODONE HCL 20 MG/ML
5 CONCENTRATE, ORAL ORAL EVERY 6 HOURS PRN
Status: DISCONTINUED | OUTPATIENT
Start: 2022-08-16 | End: 2022-08-16 | Stop reason: HOSPADM

## 2022-08-16 RX ORDER — METOPROLOL TARTRATE 50 MG/1
50 TABLET, FILM COATED ORAL EVERY 6 HOURS
Qty: 60 TABLET | Refills: 3
Start: 2022-08-16

## 2022-08-16 RX ADMIN — POTASSIUM BICARBONATE 40 MEQ: 782 TABLET, EFFERVESCENT ORAL at 09:10

## 2022-08-16 RX ADMIN — ASPIRIN 81 MG: 81 TABLET, CHEWABLE ORAL at 09:10

## 2022-08-16 RX ADMIN — METOPROLOL TARTRATE 50 MG: 50 TABLET, FILM COATED ORAL at 09:10

## 2022-08-16 RX ADMIN — LEVOTHYROXINE SODIUM 100 MCG: 0.05 TABLET ORAL at 06:46

## 2022-08-16 RX ADMIN — SODIUM CHLORIDE, PRESERVATIVE FREE 5 ML: 5 INJECTION INTRAVENOUS at 10:19

## 2022-08-16 RX ADMIN — CETIRIZINE HYDROCHLORIDE 10 MG: 10 TABLET ORAL at 09:10

## 2022-08-16 RX ADMIN — FUROSEMIDE 20 MG: 20 TABLET ORAL at 09:10

## 2022-08-16 RX ADMIN — MEMANTINE 10 MG: 5 TABLET ORAL at 09:10

## 2022-08-16 RX ADMIN — POLYETHYLENE GLYCOL 3350 17 G: 17 POWDER, FOR SOLUTION ORAL at 09:10

## 2022-08-16 RX ADMIN — PANTOPRAZOLE SODIUM 40 MG: 40 TABLET, DELAYED RELEASE ORAL at 06:46

## 2022-08-16 ASSESSMENT — PAIN SCALES - GENERAL: PAINLEVEL_OUTOF10: 0

## 2022-08-16 ASSESSMENT — PAIN SCALES - WONG BAKER: WONGBAKER_NUMERICALRESPONSE: 0

## 2022-08-16 NOTE — DISCHARGE SUMMARY
Hospitalist Discharge Summary   Admit Date:  2022  1:43 AM   DC Note date: 2022  Name:  Susana Hall   Age:  80 y.o. Sex:  female  :  1926   MRN:  [id number]   Room:  [facility]  PCP:  Angelika Schuster MD    Presenting Complaint: Fall     Initial Admission Diagnosis: Cerebral brain hemorrhage ([facility] [I61.9]     Problem List for this Hospitalization (present on admission):    Principal Problem:    Cerebral brain hemorrhage ([facility]  Active Problems:    Closed head injury    UTI due to extended-spectrum beta lactamase (ESBL) producing Escherichia coli    Atrial fibrillation ([facility]    Late onset Alzheimer's dementia without behavioral disturbance (HCC)    HFrEF (heart failure with reduced ejection fraction) ([facility]    History of subarachnoid hemorrhage    Cerebrovascular disease    Type 2 diabetes with nephropathy ([facility]    Essential hypertension, benign  Resolved Problems:    * No resolved hospital problems. [facility] Course:  Patient is a 81 y/o female with past medical history hypertension, atrial fibrillation on Eliquis, DM II, diastolic dysfunction, hypothyroidism s/p thyroidectomy who presented from SNF to ED after fall with laceration to head. ED workup: steri-strips placed to right temporal laceration, CT head with 6 mm hemorrhagic contusion to right parietal lobe. Neurosurgery was contacted by ED who recommended hospital observation with repeat CT imaging. Eliquis was held. Patient was admitted to ICU until repeat CT imaging at 24 hours which remained unchanged. Patient was then transferred to floor. Found with ESBL UTI with antibiotics adjusted  based on finalized urine culture. Lethargy  for which head CT was obtained, neg for acute abnormality, R parietal hemorrhage not visualized. Echocardiogram was obtained due to elevated pBNP and CXR findings of pulmonary edema, LVEF now 35-40% from 50-55% 2 years prior. Tolerating low-dose diuresis with good UOP.  Barr placed for urinary retention, voiding trial 8/13. Neurology consulted and MRI brain obtained 8/12 due to lower extremity weakness, found with acute left frontal lobe CVA. MRA H/N obtained and pending although would likely not be surgical candidate if stenosis is present. Known atrial fibrillation but unable to resume AC due to intracranial hemorrhage. Not a TPA candidate due to intracranial hemorrhage. Pt's mentation waxes and wanes with poor nutritional intake and underlying severe dementia. Patient's daughter 8/14/2022 requested hospice house evaluation as she does not believe patient would be able to work with PT/OT and STR. Hospice services deemed pt appropriate for hospice care but not inpatient hospice. Plan is to have pt return to [facility] long term care and hospice services to follow at [facility]. Hospice care confirmed with son who is HCPOA and pt discharged to [facility] w/ [address] hospice on 8/16. Of note on date of discharge pt with some urinary retention (800mL) and gonzalez placed for comfort. Also with pain and started on PRN oxyfast for symptoms - watch for rash given codeine allergy. To complete 1 more dose of Levaquin for ESBL UTI. Disposition: [facility] with hospice  Diet: ADULT DIET; Dysphagia - Minced and Moist  ADULT ORAL NUTRITION SUPPLEMENT; Breakfast, Lunch, Dinner; Diabetic Oral Supplement  Code Status: DNR    Follow Ups:   Contact information for after-discharge care     Discharge [facility]) . Service: Skilled Nursing  Contact information:  [id number] [id number] [phone]                           Time spent in patient discharge and coordination 60 minutes. Follow up labs/diagnostics (ultimately defer to outpatient provider):  None, transition to hospice care    Plan was discussed with patients HCPJOAQUIN (brother), case management, RN. All questions answered.   Patient was stable at time of discharge. Instructions given to call a physician or return if any concerns. Current Discharge Medication List        START taking these medications    Details   zinc oxide 40 % PSTE paste Apply 1 applicator topically 4 times daily as needed (Sacrum)  Qty: 1 g, Refills: 0      oxyCODONE (ROXICODONE INTENSOL) 100 MG/5ML concentrated solution Take 0.25 mLs by mouth every 6 hours as needed for Pain for up to 3 days. Qty: 3 mL, Refills: 0    Comments: Reduce doses taken as pain becomes manageable  Associated Diagnoses: Hospice care patient      potassium bicarb-citric acid (EFFER-K) 20 MEQ TBEF effervescent tablet Take 1 tablet by mouth in the morning and 1 tablet before bedtime. Qty: 120 tablet, Refills: 0      levoFLOXacin (LEVAQUIN) 750 MG tablet Take 1 tablet by mouth in the morning for 1 dose. Qty: 1 tablet, Refills: 0           CONTINUE these medications which have CHANGED    Details   metoprolol tartrate (LOPRESSOR) 50 MG tablet Take 1 tablet by mouth in the morning and 1 tablet at noon and 1 tablet in the evening and 1 tablet before bedtime.   Qty: 60 tablet, Refills: 3           CONTINUE these medications which have NOT CHANGED    Details   pantoprazole (PROTONIX) 40 MG tablet Take 1 tablet by mouth every morning (before breakfast)  Qty: 30 tablet, Refills: 3      torsemide (DEMADEX) 20 MG tablet Take 1 tablet by mouth 2 times daily as needed (volume overload, edema)  Qty: 60 tablet, Refills: 0      acetaminophen (TYLENOL) 500 MG tablet Take 1,000 mg by mouth daily      diclofenac (FLECTOR) 1.3 % PTCH patch Place 1 patch onto the skin every 12 hours      levothyroxine (SYNTHROID) 100 MCG tablet Take by mouth every morning (before breakfast)      mirabegron (MYRBETRIQ) 25 MG TB24 Take 25 mg by mouth daily      nitroGLYCERIN (NITRODUR) 0.4 MG/HR Place 1 patch onto the skin daily      nitroGLYCERIN (NITROSTAT) 0.4 MG SL tablet Place under the tongue      ondansetron (ZOFRAN) 4 MG tablet Take 4 mg by mouth every 8 hours as needed      polyethylene glycol (GLYCOLAX) 17 GM/SCOOP powder Take 17 g by mouth daily      senna (SENOKOT) 8.6 MG tablet Take 2 tablet PO at bedtime           STOP taking these medications       urea (URE-NA) 15 g PACK packet Comments:   Reason for Stopping:         amLODIPine (NORVASC) 5 MG tablet Comments:   Reason for Stopping:         apixaban (ELIQUIS) 5 MG TABS tablet Comments:   Reason for Stopping:         cetirizine (ZYRTEC) 10 MG tablet Comments:   Reason for Stopping:         vitamin D 25 MCG (1000 UT) CAPS Comments:   Reason for Stopping:         Lidocaine HCl 4 % CREA Comments:   Reason for Stopping:         memantine (NAMENDA) 10 MG tablet Comments:   Reason for Stopping:               Procedures done this admission:  * No surgery found *    Consults this admission:  IP CONSULT TO CARDIOLOGY  IP CONSULT TO NEUROLOGY  IP CONSULT TO STROKE COORDINATOR  IP CONSULT TO SPIRITUAL SERVICES  IP CONSULT TO PALLIATIVE CARE  IP CONSULT FOR IP REHAB SCREEN  IP CONSULT TO CASE MANAGEMENT  IP CONSULT TO HOSPICE    Echocardiogram results:  08/06/22    TRANSTHORACIC ECHOCARDIOGRAM (TTE) COMPLETE (CONTRAST/BUBBLE/3D PRN) 08/11/2022 11:25 AM (Final)    Interpretation Summary  Formatting of this result is different from the original.      Left Ventricle: Moderately reduced left ventricular systolic function with a visually estimated EF of 35 - 40%. Left ventricle size is normal. Moderately increased wall thickness. Moderate global hypokinesis present. Aortic Valve: Tricuspid valve. Moderately thickened cusp. Severe sclerosis of the aortic valve cusp. Mild regurgitation. Classic low flow/low gradient severe aortic stenosis with low EF. Mitral Valve: Moderate to severe regurgitation. Tricuspid Valve: Moderate regurgitation. The estimated RVSP is 33 mmHg. Left Atrium: Left atrium is severely dilated.     Technical qualifiers: Technically difficult study, limited Doppler study due to patient's condition and limited study due to patient's ability to tolerate test.    Contrast used: Definity. Signed by: Bertha Chen MD on 8/11/2022 11:25 AM      Diagnostic Imaging/Tests:   CT HEAD WO CONTRAST    Result Date: 8/11/2022  1. No acute intracranial abnormality. 2. The previously questioned punctate focus of subarachnoid hemorrhage in the right parietal lobe is not visualized. CT HEAD WO CONTRAST    Result Date: 8/10/2022  No significant interval change or acute intracranial abnormality. The suspected punctate hemorrhage in the right parietal lobe is now barely perceptible. CT HEAD WO CONTRAST    Result Date: 8/8/2022  1. Punctate hyperdensity in a right parietal lobe sulcus near the vertex unchanged. Although this could represent a tiny subarachnoid hemorrhage, could also represent a prominent cortical vessel. No areas of new hemorrhage or acute abnormality. 2. Stable cerebral atrophy and chronic white matter disease. CT HEAD WO CONTRAST    Result Date: 8/7/2022  Unchanged small right parietal lobe hemorrhage, with no mass effect or midline shift. CT HEAD WO CONTRAST    Result Date: 8/6/2022  1. Stable focal density closely associated with a right parietal sulcus which could represent minimal subarachnoid hemorrhage or a tiny cortical contusion in the setting of acute trauma. . This report was made using voice transcription. Despite my best efforts to avoid any, transcription errors may persist. If there is any question about the accuracy of the report or need for clarification, then please call [phone], or text me through [employer] for clarification or correction. CT HEAD WO CONTRAST    Result Date: 8/6/2022  Acute 6 mm hemorrhagic contusion along the right parietal lobe cortex, without mass effect or midline shift. Dr. Laura Craig verbally notified at 2:36 AM (DC 5).     MRA HEAD WO CONTRAST    Result Date: 8/13/2022  There is suggestion of a moderate stenosis at the left MCA bifurcation. Otherwise no significant stenosis or occlusion is seen. MRA NECK W CONTRAST    Result Date: 8/13/2022  1. No acute large vessel occlusion. No significant carotid stenosis identified. 2.  There appears to be high-grade stenoses involving the origins of the bilateral vertebral arteries. The right vertebral artery is dominant. XR CHEST PORTABLE    Result Date: 8/12/2022  Improving CHF or fluid overload, with mild residual.    XR CHEST PORTABLE    Result Date: 8/10/2022  New bilateral pulmonary edema. CHF suspected. MRI BRAIN WO CONTRAST    Result Date: 8/13/2022  1. Small acute left frontal lobe infarct. There may be an additional punctate focus of acute infarction within the right frontal lobe. 2. Findings most compatible with advanced chronic small vessel ischemic change. Recent Labs     08/06/22  1117   CULTURE >100,000 COLONIES/mL ESCHERICHIA COLI ** (EXTENDED SPECTRUM BETA LACTAMASE )  <10,000 COLONIES/mL MIXED SKIN JESS ISOLATED  ESBL CALLED TO AND CORRECTLY REPEATED BY: Karissa Skinner RN ON 8/9/22 @0820, TA       Labs: Results:       BMP, Mg, Phos No results for input(s): NA, K, CL, CO2, ANIONGAP, BUN, CREATININE, LABGLOM, GFRAA, CALCIUM, GLUCOSE, MG, PHOS in the last 72 hours. CBC No results for input(s): WBC, RBC, HGB, HCT, MCV, MCH, MCHC, RDW, PLT, MPV, NRBC, SEGS, LYMPHOPCT, EOSRELPCT, MONOPCT, BASOPCT, IMMGRAN, SEGSABS, LYMPHSABS, EOSABS, MONOSABS, BASOSABS, ABSIMMGRAN in the last 72 hours. LFT No results for input(s): BILITOT, BILIDIR, ALKPHOS, AST, ALT, PROT, LABALBU, GLOB in the last 72 hours.    Cardiac  Lab Results   Component Value Date/Time    NTPROBNP 2,638 08/10/2022 06:37 PM    NTPROBNP 11,985 06/15/2022 11:55 AM    TROPHS 10.7 08/11/2022 08:19 PM    TROPHS 12.2 05/11/2022 04:46 PM    TROPHS 13.0 05/11/2022 02:45 PM      Coags Lab Results   Component Value Date/Time    PROTIME 15.6 08/06/2022 05:07 AM    PROTIME 24.3 06/11/2022 12:10 PM    INR 1.2 08/06/2022 05:07 AM    INR 2.1 06/11/2022 12:10 PM    APTT 37.6 08/06/2022 05:07 AM    APTT 42.1 06/11/2022 12:10 PM      A1c Lab Results   Component Value Date/Time    LABA1C 6.5 08/07/2022 04:28 AM    LABA1C 6.8 06/11/2022 12:10 PM    LABA1C 6.3 10/19/2020 10:17 AM     08/07/2022 04:28 AM     06/11/2022 12:10 PM     10/19/2020 10:17 AM      Lipids Lab Results   Component Value Date/Time    CHOL 137 08/13/2022 04:45 AM    LDLCALC 73.4 08/13/2022 04:45 AM    LABVLDL 27.6 08/13/2022 04:45 AM    HDL 36 08/13/2022 04:45 AM    CHOLHDLRATIO 3.8 08/13/2022 04:45 AM    TRIG 138 08/13/2022 04:45 AM      Thyroid  Lab Results   Component Value Date/Time    TSHELE 22.00 08/06/2022 05:07 AM    GOZ7DUW 6.820 06/14/2022 07:30 PM        Most Recent UA Lab Results   Component Value Date/Time    COLORU YELLOW/STRAW 08/06/2022 11:17 AM    APPEARANCE CLOUDY 08/06/2022 11:17 AM    SPECGRAV 1.011 08/06/2022 11:17 AM    LABPH 6.0 08/06/2022 11:17 AM    PROTEINU TRACE 08/06/2022 11:17 AM    GLUCOSEU Negative 08/06/2022 11:17 AM    KETUA TRACE 08/06/2022 11:17 AM    BILIRUBINUR Negative 08/06/2022 11:17 AM    BILIRUBINUR Negative 06/28/2020 07:41 AM    BLOODU Negative 08/06/2022 11:17 AM    UROBILINOGEN 1.0 08/06/2022 11:17 AM    NITRU Positive 08/06/2022 11:17 AM    LEUKOCYTESUR LARGE 08/06/2022 11:17 AM    WBCUA 20-50 08/06/2022 11:17 AM    RBCUA 0-3 08/06/2022 11:17 AM    EPITHUA 3-5 08/06/2022 11:17 AM    BACTERIA 4+ 08/06/2022 11:17 AM        All Labs from Last 24 Hrs:  Recent Results (from the past 24 hour(s))   POCT Glucose    Collection Time: 08/15/22 11:40 AM   Result Value Ref Range    POC Glucose 205 (H) 65 - 100 mg/dL    Performed by: Johnie    POCT Glucose    Collection Time: 08/15/22  3:57 PM   Result Value Ref Range    POC Glucose 176 (H) 65 - 100 mg/dL    Performed by: Johnie    POCT Glucose    Collection Time: 08/15/22  9:17 PM   Result Value Ref Range    POC Glucose 142 (H) 65 - 100 mg/dL    Performed by: Tonie    POCT Glucose    Collection Time: 08/16/22  6:44 AM   Result Value Ref Range    POC Glucose 122 (H) 65 - 100 mg/dL    Performed by: Nery    POCT Glucose    Collection Time: 08/16/22 11:14 AM   Result Value Ref Range    POC Glucose 142 (H) 65 - 100 mg/dL    Performed by: Angela        Allergies   Allergen Reactions    Penicillins Hives    Strawberry Extract Itching    Codeine Rash     Immunization History   Administered Date(s) Administered    Influenza Virus Vaccine 10/26/2014    Influenza, High Dose (Fluzone 65 yrs and older) 09/19/2011, 10/01/2015, 10/20/2017, 09/01/2018    Influenza, MDCK Quadv, IM, PF (Flucelvax 2 yrs and older) 10/24/2019    Influenza, Triv, inactivated, subunit, adjuvanted, IM (Fluad 65 yrs and older) 10/30/2018    PPD Test 06/24/2020, 06/29/2020, 06/11/2022, 08/07/2022    Pneumococcal Vaccine 01/30/2015    Tdap (Boostrix, Adacel) 12/15/2012       Recent Vital Data:  Patient Vitals for the past 24 hrs:   Temp Pulse Resp BP SpO2   08/16/22 1112 98.4 °F (36.9 °C) 93 18 (!) 126/92 94 %   08/16/22 0800 97.9 °F (36.6 °C) 92 19 (!) 134/92 93 %   08/16/22 0400 97.3 °F (36.3 °C) 84 18 107/73 94 %   08/16/22 0000 97.5 °F (36.4 °C) 78 20 108/79 95 %   08/15/22 2113 98.6 °F (37 °C) 80 18 100/70 --   08/15/22 2000 98.6 °F (37 °C) 80 18 100/70 --   08/15/22 1600 97.5 °F (36.4 °C) 92 22 102/69 95 %   08/15/22 1200 97.5 °F (36.4 °C) 96 22 126/77 94 %       Oxygen Therapy  SpO2: 94 %  Pulse Oximetry Type: Continuous  Pulse via Oximetry: 93 beats per minute  Pulse Oximeter Device Mode: Intermittent  Pulse Oximeter Device Location: Finger  O2 Device: None (Room air)  Oximetry Probe Site Changed: Yes  Skin Assessment: Clean, dry, & intact  Skin Protection for O2 Device: N/A  O2 Flow Rate (L/min): 2 L/min    Estimated body mass index is 23.72 kg/m² as calculated from the following:    Height as of this encounter: 5' 8\" (1.727 m).     Weight as of 8/12/22: 156 lb (70.8 kg). Intake/Output Summary (Last 24 hours) at 8/16/2022 1139  Last data filed at 8/15/2022 1753  Gross per 24 hour   Intake 360 ml   Output --   Net 360 ml      Physical Exam:  General:    Ill appearing F, in acute distress complaining of pain  Head:  Normocephalic, atraumatic  Eyes:  Sclerae appear normal.  Pupils equally round. HENT:  Nares appear normal, no drainage. Moist mucous membranes  Neck:  No restricted ROM. Trachea midline  CV:   RRR. No m/r/g. No JVD  Lungs:   CTAB. No wheezing, rhonchi, or rales. Respirations even, unlabored  Abdomen:   Soft, nontender, nondistended. Extremities: Warm and dry. No cyanosis or clubbing. No edema. Ecchymosis  Skin:     No rashes.   Normal coloration  Neuro:  Diffuse weakness  Psych:  Acute distress and confusion    Signed:  Frankey Carrier, PA

## 2022-08-16 NOTE — CARE COORDINATION
Patient is medically ready for discharge back to long term care at [facility] transport scheduled for 1300; reference number [id number]. Patient will admit to room 316 and primary RN, Leandra Mathur, will call report to [phone]. Patient will discharge with paper script for controlled medication in discharge packet. [facility] liaison spoke with patient's son this AM to clarify medication questions and notify them that a hospice nurse will meet them at the facility to chat further.

## 2022-08-16 NOTE — PROGRESS NOTES
's follow-up with family in the hallway to convey care and concern. Patient was scheduled to discharge today.      Mary Carmen Escobar 68  Board Certified

## 2022-08-17 ENCOUNTER — CARE COORDINATION (OUTPATIENT)
Dept: CARE COORDINATION | Facility: CLINIC | Age: 87
End: 2022-08-17

## 2023-01-13 NOTE — CONSULTS
Consult received, chart reviewed. Pt to discharge back to her SNF with hospice support. No PC needs identified, consult will not be completed.     GRAY NaikP-C  Palliative Care None